# Patient Record
Sex: FEMALE | Race: WHITE | Employment: OTHER | URBAN - NONMETROPOLITAN AREA
[De-identification: names, ages, dates, MRNs, and addresses within clinical notes are randomized per-mention and may not be internally consistent; named-entity substitution may affect disease eponyms.]

---

## 2018-01-05 ENCOUNTER — DOCTOR'S OFFICE (OUTPATIENT)
Dept: URBAN - NONMETROPOLITAN AREA CLINIC 1 | Facility: CLINIC | Age: 68
Setting detail: OPHTHALMOLOGY
End: 2018-01-05
Payer: COMMERCIAL

## 2018-01-05 DIAGNOSIS — H43.813: ICD-10-CM

## 2018-01-05 DIAGNOSIS — H04.123: ICD-10-CM

## 2018-01-05 DIAGNOSIS — H52.4: ICD-10-CM

## 2018-01-05 DIAGNOSIS — H52.13: ICD-10-CM

## 2018-01-05 PROCEDURE — 92014 COMPRE OPH EXAM EST PT 1/>: CPT | Performed by: OPTOMETRIST

## 2018-01-05 PROCEDURE — 92310 CONTACT LENS FITTING OU: CPT | Performed by: OPTOMETRIST

## 2018-01-05 ASSESSMENT — REFRACTION_AUTOREFRACTION
OD_SPHERE: -0.75
OD_CYLINDER: -1.50
OS_SPHERE: -0.75
OD_AXIS: 78
OS_CYLINDER: -0.75
OS_AXIS: 61

## 2018-01-05 ASSESSMENT — REFRACTION_MANIFEST
OS_VA3: 20/
OS_VA3: 20/
OD_VA1: 20/
OS_VA2: 20/
OD_VA3: 20/
OU_VA: 20/
OS_VA2: 20/
OS_VA1: 20/
OD_VA3: 20/
OD_VA2: 20/
OU_VA: 20/
OS_VA1: 20/
OD_VA2: 20/
OD_VA1: 20/

## 2018-01-05 ASSESSMENT — REFRACTION_CURRENTRX
OD_VPRISM_DIRECTION: PROGS
OD_SPHERE: -2.75
OD_AXIS: 096
OS_OVR_VA: 20/
OD_CYLINDER: -0.50
OS_VPRISM_DIRECTION: PROGS
OD_CYLINDER: -0.75
OS_OVR_VA: 20/
OS_ADD: +2.50
OD_SPHERE: -2.50
OD_AXIS: 81
OD_OVR_VA: 20/
OS_CYLINDER: SPH
OD_ADD: +2.50
OS_SPHERE: -2.50
OS_CYLINDER: SPH
OS_OVR_VA: 20/
OS_SPHERE: -2.50
OD_OVR_VA: 20/
OD_OVR_VA: 20/
OS_AXIS: 180

## 2018-01-05 ASSESSMENT — VISUAL ACUITY
OS_BCVA: 20/25
OD_BCVA: 20/30

## 2018-01-05 ASSESSMENT — LID POSITION - COMMENTS
OD_COMMENTS: +VE EVERTED PUNCTUM RUL
OS_COMMENTS: +VE EVERTED PUNCTUM LUL

## 2018-01-05 ASSESSMENT — LID EXAM ASSESSMENTS: OS_TRICHIASIS: LUL 1+

## 2018-01-05 ASSESSMENT — LID POSITION - DERMATOCHALASIS
OD_DERMATOCHALASIS: RUL 1+
OS_DERMATOCHALASIS: LUL 1+

## 2018-01-05 ASSESSMENT — REFRACTION_OUTSIDERX
OS_ADD: +2.50
OU_VA: 20/25
OD_VA1: 20/25-2
OD_AXIS: 090
OD_SPHERE: -1.75
OD_VA2: 20/25+2
OD_CYLINDER: -0.75
OS_VA2: 20/25
OS_AXIS: 060
OS_VA1: 20/25
OD_ADD: +2.50
OS_CYLINDER: -0.75
OS_VA3: 20/
OD_VA3: 20/
OS_SPHERE: -1.00

## 2018-01-05 ASSESSMENT — SUPERFICIAL PUNCTATE KERATITIS (SPK)
OD_SPK: T
OS_SPK: +1

## 2018-01-05 ASSESSMENT — SPHEQUIV_DERIVED
OD_SPHEQUIV: -1.5
OS_SPHEQUIV: -1.125

## 2018-01-05 ASSESSMENT — CONFRONTATIONAL VISUAL FIELD TEST (CVF)
OS_FINDINGS: FULL
OD_FINDINGS: FULL

## 2018-01-18 ENCOUNTER — OPTICAL OFFICE (OUTPATIENT)
Dept: URBAN - NONMETROPOLITAN AREA CLINIC 4 | Facility: CLINIC | Age: 68
Setting detail: OPHTHALMOLOGY
End: 2018-01-18
Payer: COMMERCIAL

## 2018-01-18 DIAGNOSIS — H52.223: ICD-10-CM

## 2018-01-18 PROCEDURE — V2203 LENS SPHCYL BIFOCAL 4.00D/.1: HCPCS | Performed by: OPTOMETRIST

## 2018-01-18 PROCEDURE — S0500 DISPOS CONT LENS: HCPCS | Performed by: OPTOMETRIST

## 2018-01-18 PROCEDURE — V2781 PROGRESSIVE LENS PER LENS: HCPCS | Performed by: OPTOMETRIST

## 2018-01-18 PROCEDURE — V2020 VISION SVCS FRAMES PURCHASES: HCPCS | Performed by: OPTOMETRIST

## 2018-09-13 ENCOUNTER — OPTICAL OFFICE (OUTPATIENT)
Dept: URBAN - NONMETROPOLITAN AREA CLINIC 4 | Facility: CLINIC | Age: 68
Setting detail: OPHTHALMOLOGY
End: 2018-09-13
Payer: COMMERCIAL

## 2018-09-13 DIAGNOSIS — H52.13: ICD-10-CM

## 2018-09-13 PROCEDURE — S0500 DISPOS CONT LENS: HCPCS | Performed by: OPTOMETRIST

## 2019-01-08 ENCOUNTER — DOCTOR'S OFFICE (OUTPATIENT)
Dept: URBAN - NONMETROPOLITAN AREA CLINIC 1 | Facility: CLINIC | Age: 69
Setting detail: OPHTHALMOLOGY
End: 2019-01-08
Payer: COMMERCIAL

## 2019-01-08 ENCOUNTER — RX ONLY (RX ONLY)
Age: 69
End: 2019-01-08

## 2019-01-08 DIAGNOSIS — H52.13: ICD-10-CM

## 2019-01-08 DIAGNOSIS — H52.4: ICD-10-CM

## 2019-01-08 PROCEDURE — 92014 COMPRE OPH EXAM EST PT 1/>: CPT | Performed by: OPTOMETRIST

## 2019-01-08 PROCEDURE — 92310 CONTACT LENS FITTING OU: CPT | Performed by: OPTOMETRIST

## 2019-01-08 ASSESSMENT — REFRACTION_CURRENTRX
OD_AXIS: 096
OS_CYLINDER: SPH
OD_OVR_VA: 20/
OS_CYLINDER: -0.50
OD_CYLINDER: -0.75
OD_AXIS: 091
OS_VPRISM_DIRECTION: PROGS
OD_ADD: +2.50
OS_SPHERE: -0.75
OD_OVR_VA: 20/
OD_VPRISM_DIRECTION: PROGS
OS_OVR_VA: 20/
OD_OVR_VA: 20/
OS_SPHERE: -2.50
OD_SPHERE: -1.75
OS_OVR_VA: 20/
OS_AXIS: 092
OS_OVR_VA: 20/
OD_CYLINDER: -0.75
OD_SPHERE: -2.75
OS_ADD: +2.50

## 2019-01-08 ASSESSMENT — SUPERFICIAL PUNCTATE KERATITIS (SPK)
OD_SPK: T
OS_SPK: +1

## 2019-01-08 ASSESSMENT — REFRACTION_MANIFEST
OS_AXIS: 060
OD_VA1: 20/
OS_VA1: 20/
OD_ADD: +2.50
OS_SPHERE: -0.25
OS_ADD: +2.50
OD_VA2: 20/
OU_VA: 20/
OD_CYLINDER: -0.75
OD_AXIS: 090
OS_VA2: 20/25
OS_VA3: 20/
OD_VA3: 20/
OS_VA2: 20/
OD_VA1: 20/25-2
OD_SPHERE: -1.25
OS_CYLINDER: -0.75
OD_VA3: 20/
OU_VA: 20/25
OS_VA1: 20/25
OS_VA3: 20/
OD_VA2: 20/25+2

## 2019-01-08 ASSESSMENT — SPHEQUIV_DERIVED
OD_SPHEQUIV: -1.625
OS_SPHEQUIV: -0.25
OD_SPHEQUIV: -1.5
OS_SPHEQUIV: -0.625

## 2019-01-08 ASSESSMENT — REFRACTION_AUTOREFRACTION
OD_CYLINDER: -1.00
OD_SPHERE: -1.00
OS_CYLINDER: -1.00
OS_SPHERE: +0.25
OD_AXIS: 101
OS_AXIS: 088

## 2019-01-08 ASSESSMENT — LID POSITION - DERMATOCHALASIS
OS_DERMATOCHALASIS: LUL 1+
OD_DERMATOCHALASIS: RUL 1+

## 2019-01-08 ASSESSMENT — CONFRONTATIONAL VISUAL FIELD TEST (CVF)
OD_FINDINGS: FULL
OS_FINDINGS: FULL

## 2019-01-08 ASSESSMENT — LID EXAM ASSESSMENTS: OS_TRICHIASIS: LUL 1+

## 2019-01-08 ASSESSMENT — VISUAL ACUITY
OD_BCVA: 20/30+2
OS_BCVA: 20/25+2

## 2019-01-08 ASSESSMENT — LID POSITION - COMMENTS
OD_COMMENTS: +VE EVERTED PUNCTUM RUL
OS_COMMENTS: +VE EVERTED PUNCTUM LUL

## 2020-01-21 ENCOUNTER — OPTICAL OFFICE (OUTPATIENT)
Dept: URBAN - NONMETROPOLITAN AREA CLINIC 4 | Facility: CLINIC | Age: 70
Setting detail: OPHTHALMOLOGY
End: 2020-01-21
Payer: COMMERCIAL

## 2020-01-21 ENCOUNTER — DOCTOR'S OFFICE (OUTPATIENT)
Dept: URBAN - NONMETROPOLITAN AREA CLINIC 1 | Facility: CLINIC | Age: 70
Setting detail: OPHTHALMOLOGY
End: 2020-01-21
Payer: COMMERCIAL

## 2020-01-21 DIAGNOSIS — H52.4: ICD-10-CM

## 2020-01-21 DIAGNOSIS — H52.13: ICD-10-CM

## 2020-01-21 DIAGNOSIS — H52.11: ICD-10-CM

## 2020-01-21 PROCEDURE — 92014 COMPRE OPH EXAM EST PT 1/>: CPT | Performed by: OPTOMETRIST

## 2020-01-21 PROCEDURE — S0500 DISPOS CONT LENS: HCPCS | Performed by: OPTOMETRIST

## 2020-01-21 ASSESSMENT — REFRACTION_CURRENTRX
OD_CYLINDER: -0.75
OS_OVR_VA: 20/
OS_SPHERE: -0.75
OS_CYLINDER: SPH
OS_VPRISM_DIRECTION: PROGS
OD_AXIS: 096
OS_ADD: +2.50
OD_OVR_VA: 20/
OD_AXIS: 091
OD_VPRISM_DIRECTION: PROGS
OD_CYLINDER: -0.75
OD_OVR_VA: 20/
OS_CYLINDER: -0.50
OS_SPHERE: -2.50
OS_OVR_VA: 20/
OD_SPHERE: -1.75
OD_SPHERE: -2.75
OD_ADD: +2.50
OS_AXIS: 092

## 2020-01-21 ASSESSMENT — SUPERFICIAL PUNCTATE KERATITIS (SPK)
OS_SPK: +1
OD_SPK: T

## 2020-01-21 ASSESSMENT — REFRACTION_MANIFEST
OD_VA3: 20/
OD_VA2: 20/25+2
OD_ADD: +2.50
OD_SPHERE: -1.50
OS_ADD: +2.50
OS_VA2: 20/25+2
OS_VA3: 20/
OD_AXIS: 085
OS_VA1: 20/25+2
OS_CYLINDER: -0.75
OD_CYLINDER: -0.50
OD_VA1: 20/25+2
OU_VA: 20/25
OS_SPHERE: -1.25
OS_AXIS: 070

## 2020-01-21 ASSESSMENT — LID POSITION - DERMATOCHALASIS
OS_DERMATOCHALASIS: LUL 1+
OD_DERMATOCHALASIS: RUL 1+

## 2020-01-21 ASSESSMENT — REFRACTION_AUTOREFRACTION
OD_CYLINDER: -0.75
OS_CYLINDER: -1.25
OD_AXIS: 083
OS_SPHERE: -0.75
OS_AXIS: 071
OD_SPHERE: -1.00

## 2020-01-21 ASSESSMENT — SPHEQUIV_DERIVED
OD_SPHEQUIV: -1.375
OS_SPHEQUIV: -1.625
OD_SPHEQUIV: -1.75
OS_SPHEQUIV: -1.375

## 2020-01-21 ASSESSMENT — CONFRONTATIONAL VISUAL FIELD TEST (CVF)
OD_FINDINGS: FULL
OS_FINDINGS: FULL

## 2020-01-21 ASSESSMENT — LID EXAM ASSESSMENTS: OS_TRICHIASIS: LUL 1+

## 2020-01-21 ASSESSMENT — LID POSITION - COMMENTS
OS_COMMENTS: +VE EVERTED PUNCTUM LUL
OD_COMMENTS: +VE EVERTED PUNCTUM RUL

## 2020-01-21 ASSESSMENT — VISUAL ACUITY
OD_BCVA: 20/20-1
OS_BCVA: 20/20-2

## 2020-12-05 ENCOUNTER — APPOINTMENT (EMERGENCY)
Dept: CT IMAGING | Facility: HOSPITAL | Age: 70
DRG: 065 | End: 2020-12-05
Payer: COMMERCIAL

## 2020-12-05 ENCOUNTER — HOSPITAL ENCOUNTER (INPATIENT)
Facility: HOSPITAL | Age: 70
LOS: 12 days | DRG: 065 | End: 2020-12-17
Attending: EMERGENCY MEDICINE | Admitting: STUDENT IN AN ORGANIZED HEALTH CARE EDUCATION/TRAINING PROGRAM
Payer: COMMERCIAL

## 2020-12-05 DIAGNOSIS — I63.9 ACUTE CVA (CEREBROVASCULAR ACCIDENT) (HCC): ICD-10-CM

## 2020-12-05 DIAGNOSIS — K59.00 CONSTIPATION: ICD-10-CM

## 2020-12-05 DIAGNOSIS — I63.9 CEREBROVASCULAR ACCIDENT (CVA), UNSPECIFIED MECHANISM (HCC): Primary | ICD-10-CM

## 2020-12-05 DIAGNOSIS — I10 HYPERTENSION: ICD-10-CM

## 2020-12-05 PROBLEM — I16.0 HYPERTENSIVE URGENCY: Status: ACTIVE | Noted: 2020-12-05

## 2020-12-05 PROBLEM — E03.9 HYPOTHYROIDISM: Status: ACTIVE | Noted: 2020-12-05

## 2020-12-05 PROBLEM — G43.909 MIGRAINES: Status: ACTIVE | Noted: 2020-12-05

## 2020-12-05 PROBLEM — R94.31 QT PROLONGATION: Status: ACTIVE | Noted: 2020-12-05

## 2020-12-05 LAB
ANION GAP SERPL CALCULATED.3IONS-SCNC: 9 MMOL/L (ref 4–13)
APTT PPP: 27 SECONDS (ref 23–37)
BUN SERPL-MCNC: 14 MG/DL (ref 5–25)
CALCIUM SERPL-MCNC: 9.7 MG/DL (ref 8.3–10.1)
CHLORIDE SERPL-SCNC: 102 MMOL/L (ref 100–108)
CO2 SERPL-SCNC: 26 MMOL/L (ref 21–32)
CREAT SERPL-MCNC: 0.95 MG/DL (ref 0.6–1.3)
ERYTHROCYTE [DISTWIDTH] IN BLOOD BY AUTOMATED COUNT: 12 % (ref 11.6–15.1)
FLUAV RNA RESP QL NAA+PROBE: NEGATIVE
FLUBV RNA RESP QL NAA+PROBE: NEGATIVE
GFR SERPL CREATININE-BSD FRML MDRD: 60 ML/MIN/1.73SQ M
GLUCOSE SERPL-MCNC: 108 MG/DL (ref 65–140)
GLUCOSE SERPL-MCNC: 138 MG/DL (ref 65–140)
HCT VFR BLD AUTO: 44.5 % (ref 34.8–46.1)
HGB BLD-MCNC: 14.6 G/DL (ref 11.5–15.4)
INR PPP: 0.93 (ref 0.84–1.19)
MCH RBC QN AUTO: 29.9 PG (ref 26.8–34.3)
MCHC RBC AUTO-ENTMCNC: 32.8 G/DL (ref 31.4–37.4)
MCV RBC AUTO: 91 FL (ref 82–98)
PLATELET # BLD AUTO: 321 THOUSANDS/UL (ref 149–390)
PMV BLD AUTO: 9.3 FL (ref 8.9–12.7)
POTASSIUM SERPL-SCNC: 4 MMOL/L (ref 3.5–5.3)
PROTHROMBIN TIME: 12.3 SECONDS (ref 11.6–14.5)
RBC # BLD AUTO: 4.89 MILLION/UL (ref 3.81–5.12)
RSV RNA RESP QL NAA+PROBE: NEGATIVE
SARS-COV-2 RNA RESP QL NAA+PROBE: NEGATIVE
SODIUM SERPL-SCNC: 137 MMOL/L (ref 136–145)
TROPONIN I SERPL-MCNC: <0.02 NG/ML
WBC # BLD AUTO: 7.54 THOUSAND/UL (ref 4.31–10.16)

## 2020-12-05 PROCEDURE — 99291 CRITICAL CARE FIRST HOUR: CPT | Performed by: EMERGENCY MEDICINE

## 2020-12-05 PROCEDURE — 70498 CT ANGIOGRAPHY NECK: CPT

## 2020-12-05 PROCEDURE — 96374 THER/PROPH/DIAG INJ IV PUSH: CPT

## 2020-12-05 PROCEDURE — 85027 COMPLETE CBC AUTOMATED: CPT | Performed by: EMERGENCY MEDICINE

## 2020-12-05 PROCEDURE — 85730 THROMBOPLASTIN TIME PARTIAL: CPT | Performed by: EMERGENCY MEDICINE

## 2020-12-05 PROCEDURE — 99291 CRITICAL CARE FIRST HOUR: CPT

## 2020-12-05 PROCEDURE — 36415 COLL VENOUS BLD VENIPUNCTURE: CPT | Performed by: EMERGENCY MEDICINE

## 2020-12-05 PROCEDURE — 80048 BASIC METABOLIC PNL TOTAL CA: CPT | Performed by: EMERGENCY MEDICINE

## 2020-12-05 PROCEDURE — G0509 CRIT CARE TELEHEA CONSULT 50: HCPCS | Performed by: PSYCHIATRY & NEUROLOGY

## 2020-12-05 PROCEDURE — 93005 ELECTROCARDIOGRAM TRACING: CPT

## 2020-12-05 PROCEDURE — G1004 CDSM NDSC: HCPCS

## 2020-12-05 PROCEDURE — 82948 REAGENT STRIP/BLOOD GLUCOSE: CPT

## 2020-12-05 PROCEDURE — 99223 1ST HOSP IP/OBS HIGH 75: CPT | Performed by: FAMILY MEDICINE

## 2020-12-05 PROCEDURE — 0241U HB NFCT DS VIR RESP RNA 4 TRGT: CPT | Performed by: EMERGENCY MEDICINE

## 2020-12-05 PROCEDURE — 70496 CT ANGIOGRAPHY HEAD: CPT

## 2020-12-05 PROCEDURE — 84484 ASSAY OF TROPONIN QUANT: CPT | Performed by: EMERGENCY MEDICINE

## 2020-12-05 PROCEDURE — 85610 PROTHROMBIN TIME: CPT | Performed by: EMERGENCY MEDICINE

## 2020-12-05 RX ORDER — LABETALOL 20 MG/4 ML (5 MG/ML) INTRAVENOUS SYRINGE
10 EVERY 4 HOURS PRN
Status: DISCONTINUED | OUTPATIENT
Start: 2020-12-05 | End: 2020-12-08

## 2020-12-05 RX ORDER — CLOPIDOGREL BISULFATE 75 MG/1
300 TABLET ORAL ONCE
Status: COMPLETED | OUTPATIENT
Start: 2020-12-05 | End: 2020-12-05

## 2020-12-05 RX ORDER — ATENOLOL 50 MG/1
50 TABLET ORAL DAILY
COMMUNITY

## 2020-12-05 RX ORDER — LEVOTHYROXINE SODIUM 0.03 MG/1
25 TABLET ORAL DAILY
COMMUNITY

## 2020-12-05 RX ORDER — HYDRALAZINE HYDROCHLORIDE 20 MG/ML
10 INJECTION INTRAMUSCULAR; INTRAVENOUS ONCE
Status: COMPLETED | OUTPATIENT
Start: 2020-12-05 | End: 2020-12-05

## 2020-12-05 RX ORDER — ACETAMINOPHEN 325 MG/1
650 TABLET ORAL EVERY 4 HOURS PRN
Status: DISCONTINUED | OUTPATIENT
Start: 2020-12-05 | End: 2020-12-17 | Stop reason: HOSPADM

## 2020-12-05 RX ORDER — AMLODIPINE BESYLATE 2.5 MG/1
2.5 TABLET ORAL DAILY
Status: ON HOLD | COMMUNITY
End: 2021-03-11 | Stop reason: SDUPTHER

## 2020-12-05 RX ORDER — LEVOTHYROXINE SODIUM 0.03 MG/1
25 TABLET ORAL DAILY
Status: DISCONTINUED | OUTPATIENT
Start: 2020-12-05 | End: 2020-12-17 | Stop reason: HOSPADM

## 2020-12-05 RX ORDER — FLUTICASONE PROPIONATE 50 MCG
1 SPRAY, SUSPENSION (ML) NASAL DAILY PRN
COMMUNITY

## 2020-12-05 RX ORDER — CLOPIDOGREL BISULFATE 75 MG/1
75 TABLET ORAL DAILY
Status: DISCONTINUED | OUTPATIENT
Start: 2020-12-05 | End: 2020-12-17 | Stop reason: HOSPADM

## 2020-12-05 RX ORDER — ASPIRIN 325 MG
325 TABLET ORAL ONCE
Status: COMPLETED | OUTPATIENT
Start: 2020-12-05 | End: 2020-12-05

## 2020-12-05 RX ORDER — ATORVASTATIN CALCIUM 40 MG/1
40 TABLET, FILM COATED ORAL EVERY EVENING
Status: DISCONTINUED | OUTPATIENT
Start: 2020-12-05 | End: 2020-12-07

## 2020-12-05 RX ORDER — SODIUM CHLORIDE 9 MG/ML
500 INJECTION, SOLUTION INTRAVENOUS ONCE
Status: COMPLETED | OUTPATIENT
Start: 2020-12-05 | End: 2020-12-06

## 2020-12-05 RX ADMIN — IOHEXOL 100 ML: 350 INJECTION, SOLUTION INTRAVENOUS at 13:03

## 2020-12-05 RX ADMIN — SODIUM CHLORIDE 500 ML/HR: 0.9 INJECTION, SOLUTION INTRAVENOUS at 16:17

## 2020-12-05 RX ADMIN — CLOPIDOGREL BISULFATE 300 MG: 75 TABLET ORAL at 13:20

## 2020-12-05 RX ADMIN — HYDRALAZINE HYDROCHLORIDE 10 MG: 20 INJECTION INTRAMUSCULAR; INTRAVENOUS at 13:24

## 2020-12-05 RX ADMIN — ATORVASTATIN CALCIUM 40 MG: 40 TABLET, FILM COATED ORAL at 17:27

## 2020-12-05 RX ADMIN — ASPIRIN 325 MG ORAL TABLET 325 MG: 325 PILL ORAL at 13:20

## 2020-12-05 NOTE — H&P
H&P- Manuel Stephany 1950, 69 y o  female MRN: [id number]    Unit/Bed#: -01 Encounter: [id number]    Primary Care Provider: No primary care provider on file  Date and time admitted to hospital: 12/5/2020 12:48 PM        * Acute CVA (cerebrovascular accident) [facility])  Assessment & Plan  · Suspected Patient presented with the right-sided weakness some numbness and right facial droop and difficulty finding words  Numbness and weakness has resolved as her strength is 5/5 on both extremities  The speech is clear she has no difficulty or delay in speaking  But there is minimal right facial droop  CT CT of the brain is negative  Patient was found to have hypertensive urgency  She was given a dose of hydralazine  Blood pressure has improved to 170  Now the blood pressure is down to 140 that is too low will give her normal saline 500 mL bolus  · Would like to keep blood pressure for now for at least another 24 hours anywhere 180-200 labetalol if systolic is greater than 636 or diastolic greater than 088  · MRI of the brain to be done  · 2D echo with bubble study  · Reviewed neurology evaluation  · Patient was loaded with Plavix 300 mg on aspirin  Will continue aspirin 81 mg daily and Plavix 75 mg daily place her on Lipitor 40 mg daily  Check hemoglobin A1c in the morning and lipid panel in the morning  All lab work otherwise looks normal   · EKG normal sinus rhythm will keep on telemetry monitor to assess any arrhythmia in terms of atrial fibrillation or atrial flutter  · Patient has no previous history of a stroke or a TIA  · She does have a history of hypertension it usually controlled she is on 2 medications and takes them every day  · Not a tPA candidate as the outside of the window    Migraines  Assessment & Plan  · Patient has history of migraines he denies any headaches yesterday denies any headaches now      Hypothyroidism  Assessment & Plan  · Continue home dose Synthroid and check a TSH in the morning    QT prolongation  Assessment & Plan  · EKG with evidence of QTC of 497  Will keep on telemetry monitor repeat an EKG tomorrow avoid QT prolongation medications  Her potassium is 4  Hypertension  Assessment & Plan  · Uncontrolled initial arrival   Patient is on atenolol and Norvasc  Has been taking them nightly  Did not take them today as she does take nightly  She was given a dose of hydralazine blood pressure improved but now it is on the lower side is stroke rule out walk like to keep it on permissive hypertension and 180-200 will give her a bolus of normal saline 500 mL  She is on labetalol p r n  If systolic is greater than 598 or diastolic is greater than 172  Hypertensive urgency  Assessment & Plan  · In light of ruling out stroke  Will avoid lowering the blood pressure less than 180  VTE Prophylaxis: Pharmacologic VTE Prophylaxis contraindicated due to Encourage ambulation  / sequential compression device   Code Status:  Full code  POLST: There is no POLST form on file for this patient (pre-hospital)  Discussion with family:  Unavailable    Anticipated Length of Stay:  Patient will be admitted on an Inpatient basis with an anticipated length of stay of  > 2 midnights  Justification for Hospital Stay:  Stroke rule out    Total Time for Visit, including Counseling / Coordination of Care: 1 hour  Greater than 50% of this total time spent on direct patient counseling and coordination of care  Chief Complaint:   Speech difficulty finding words and right upper and lower extremity weakness    History of Present Illness:    Shant Richter is a 69 y o  female who presents with right-sided weakness that happened yesterday to 30 and has been improving and now resolved and she had associated some numbness  Today when she spoke to her son she had trouble finding words and apparently there was a right facial droop and that is what brought him to the ER    She denies having a headache before that happening she denies ever having a TIA or stroke denies any double vision or blurry vision she denies having shortness of breath currently denies any chest pain denies any nausea vomiting or diarrhea abdominal pain  She has been taking all her medications       Review of Systems:    Review of Systems   Constitutional: Negative for chills and fever  HENT: Negative for ear pain and sore throat  Eyes: Negative for pain and visual disturbance  Respiratory: Negative for cough and shortness of breath  Cardiovascular: Negative for chest pain and palpitations  Gastrointestinal: Negative for abdominal pain and vomiting  Genitourinary: Negative for dysuria and hematuria  Musculoskeletal: Negative for arthralgias and back pain  Skin: Negative for color change and rash  Neurological: Positive for speech difficulty, weakness and numbness  Negative for seizures and syncope  All other systems reviewed and are negative  Past Medical and Surgical History:     Past Medical History:   Diagnosis Date    Hypertension        Past Surgical History:   Procedure Laterality Date    ABLATION SOFT TISSUE         Meds/Allergies:    Prior to Admission medications    Medication Sig Start Date End Date Taking? Authorizing Provider   amLODIPine (NORVASC) 2 5 mg tablet Take 2 5 mg by mouth daily   Yes Historical Provider, MD   atenolol (TENORMIN) 50 mg tablet Take 50 mg by mouth daily   Yes Historical Provider, MD   levothyroxine 25 mcg tablet Take 25 mcg by mouth daily   Yes Historical Provider, MD   fluticasone (FLONASE) 50 mcg/act nasal spray 1 spray into each nostril daily as needed for rhinitis    Historical Provider, MD     I have reviewed home medications using allscripts      Allergies: No Known Allergies    Social History:     Marital Status: /Civil Union     Substance Use History:   Social History     Substance and Sexual Activity   Alcohol Use Not Currently     Social History Tobacco Use   Smoking Status Never Smoker   Smokeless Tobacco Never Used     Social History     Substance and Sexual Activity   Drug Use Not Currently       Family History:    History reviewed  No pertinent family history  Physical Exam:     Vitals:   Blood Pressure: 141/83 (12/05/20 1449)  Pulse: 72 (12/05/20 1449)  Temperature: (!) 97 4 °F (36 3 °C) (12/05/20 1449)  Respirations: 20 (12/05/20 1449)  Height: 5' 4" (162 6 cm) (12/05/20 1255)  Weight - Scale: 63 6 kg (140 lb 3 4 oz) (12/05/20 1324)  SpO2: 99 % (12/05/20 1449)    Physical Exam  Constitutional:       Appearance: Normal appearance  HENT:      Head: Normocephalic and atraumatic  Eyes:      Pupils: Pupils are equal, round, and reactive to light  Cardiovascular:      Rate and Rhythm: Normal rate and regular rhythm  Pulmonary:      Effort: Pulmonary effort is normal       Breath sounds: Normal breath sounds  Abdominal:      General: Abdomen is flat  Bowel sounds are normal  There is no distension  Palpations: Abdomen is soft  Tenderness: There is no abdominal tenderness  There is no guarding  Musculoskeletal:         General: No swelling  Neurological:      Mental Status: She is alert and oriented to person, place, and time  Cranial Nerves: No cranial nerve deficit  Sensory: No sensory deficit  Motor: No weakness  Coordination: Coordination normal       Deep Tendon Reflexes: Reflexes normal       Comments: Patient has done physical examination 5/5 strength bilaterally  Her speech is clear and she does not have any difficulty finding words she is speaking to them without delay  She does have some mild right facial droop               Additional Data:     Lab Results: I have personally reviewed pertinent films in PACS    Results from last 7 days   Lab Units 12/05/20  1254   WBC Thousand/uL 7 54   HEMOGLOBIN g/dL 14 6   HEMATOCRIT % 44 5   PLATELETS Thousands/uL 321     Results from last 7 days   Lab Units 12/05/20  1254   SODIUM mmol/L 137   POTASSIUM mmol/L 4 0   CHLORIDE mmol/L 102   CO2 mmol/L 26   BUN mg/dL 14   CREATININE mg/dL 0 95   ANION GAP mmol/L 9   CALCIUM mg/dL 9 7   GLUCOSE RANDOM mg/dL 138     Results from last 7 days   Lab Units 12/05/20  1254   INR  0 93     Results from last 7 days   Lab Units 12/05/20  1250   POC GLUCOSE mg/dl 108               Imaging: I have personally reviewed pertinent films in PACS    CTA stroke alert (head/neck)   Final Result by Gamal Dockery MD (12/05 1321)      No large vessel flow limiting stenosis  Findings were directly discussed with Smith Talamantes on 12/5/2020 1:14 PM                      Workstation performed: [id number]         CT stroke alert brain   Final Result by Gamal Dockery MD (12/05 5471)      No acute intracranial abnormality  Findings were directly discussed with Smith Talamantes on 12/5/2020 1:08 PM       Workstation performed: [id number]         MRI inpatient order    (Results Pending)       EKG, Pathology, and Other Studies Reviewed on Admission:   · EKG: reviewed    Allscripts / Epic Records Reviewed: Yes     ** Please Note: This note has been constructed using a voice recognition system   **

## 2020-12-05 NOTE — PLAN OF CARE
Problem: Potential for Falls  Goal: Patient will remain free of falls  Description: INTERVENTIONS:  - Assess patient frequently for physical needs  -  Identify cognitive and physical deficits and behaviors that affect risk of falls  -  [place] fall precautions as indicated by assessment   - Educate patient/family on patient safety including physical limitations  - Instruct patient to call for assistance with activity based on assessment  - Modify environment to reduce risk of injury  - Consider OT/PT consult to assist with strengthening/mobility  12/5/2020 1502 by Rayna Vides RN  Outcome: Progressing  12/5/2020 1502 by Rayna Vides RN  Outcome: Progressing     Problem: Neurological Deficit  Goal: Neurological status is stable or improving  Description: Interventions:  - Monitor and assess patient's level of consciousness, motor function, sensory function, and level of assistance needed for ADLs  - Monitor and report changes from baseline  Collaborate with interdisciplinary team to initiate plan and implement interventions as ordered  - Provide and maintain a safe environment  - Consider seizure precautions  - Consider fall precautions  - Consider aspiration precautions  - Consider bleeding precautions  12/5/2020 1502 by Rayna Vides RN  Outcome: Progressing  12/5/2020 1502 by Rayna Vides RN  Outcome: Progressing     Problem: Activity Intolerance/Impaired Mobility  Goal: Mobility/activity is maintained at optimum level for patient  Description: Interventions:  - Assess and monitor patient  barriers to mobility and need for assistive/adaptive devices  - Assess patient's emotional response to limitations  - Collaborate with interdisciplinary team and initiate plans and interventions as ordered  - Encourage independent activity per ability   - Maintain proper body alignment  - Perform active/passive rom as tolerated/ordered    - Plan activities to conserve energy   - Turn patient as appropriate  12/5/2020 1502 by Maria Beatty RN  Outcome: Progressing  12/5/2020 1502 by Maria Beatty RN  Outcome: Progressing     Problem: Communication Impairment  Goal: Ability to express needs and understand communication  Description: Assess patient's communication skills and ability to understand information  Patient will demonstrate use of effective communication techniques, alternative methods of communication and understanding even if not able to speak  - Encourage communication and provide alternate methods of communication as needed  - Collaborate with case management/ for discharge needs  - Include patient/family/caregiver in decisions related to communication  12/5/2020 1502 by Maria Beatty RN  Outcome: Progressing  12/5/2020 1502 by Maria Beatty RN  Outcome: Progressing     Problem: Potential for Aspiration  Goal: Non-ventilated patient's risk of aspiration is minimized  Description: Assess and monitor vital signs, respiratory status, and labs (WBC)  Monitor for signs of aspiration (tachypnea, cough, rales, wheezing, cyanosis, fever)  - Assess and monitor patient's ability to swallow  - Place patient up in chair to eat if possible  - HOB up at 90 degrees to eat if unable to get patient up into chair   - Supervise patient during oral intake  - Instruct patient/ family to take small bites  - Instruct patient/ family to take small single sips when taking liquids  - Follow patient-specific strategies generated by speech pathologist   12/5/2020 1502 by Maria Beatty RN  Outcome: Progressing  12/5/2020 1502 by Maria Beatty RN  Outcome: Progressing  Goal: Ventilated patient's risk of aspiration is minimized  Description: Assess and monitor vital signs, respiratory status, airway cuff pressure, and labs (WBC)  Monitor for signs of aspiration (tachypnea, cough, rales, wheezing, cyanosis, fever)      - Elevate head of bed 30 degrees if patient has tube feeding   - Monitor tube feeding  12/5/2020 1502 by Godwin Drew RN  Outcome: Progressing  12/5/2020 1502 by Godwin Drew RN  Outcome: Progressing     Problem: Nutrition  Goal: Nutrition/Hydration status is improving  Description: Monitor and assess patient's nutrition/hydration status for malnutrition (ex- brittle hair, bruises, dry skin, pale skin and conjunctiva, muscle wasting, smooth red tongue, and disorientation)  Collaborate with interdisciplinary team and initiate plan and interventions as ordered  Monitor patient's weight and dietary intake as ordered or per policy  Utilize nutrition screening tool and intervene per policy  Determine patient's food preferences and provide high-protein, high-caloric foods as appropriate  - Assist patient with eating   - Allow adequate time for meals   - Encourage patient to take dietary supplement as ordered  - Collaborate with clinical nutritionist   - Include patient/family/caregiver in decisions related to nutrition    12/5/2020 1502 by Godwin Drew RN  Outcome: Progressing  12/5/2020 1502 by Godwin Drew RN  Outcome: Progressing     Problem: NEUROSENSORY - ADULT  Goal: Achieves stable or improved neurological status  Description: INTERVENTIONS  - Monitor and report changes in neurological status  - Monitor vital signs such as temperature, blood pressure, glucose, and any other labs ordered   - Initiate measures to prevent increased intracranial pressure  - Monitor for seizure activity and implement precautions if appropriate      12/5/2020 1502 by Godwin Drew RN  Outcome: Progressing  12/5/2020 1502 by Godwin Drew RN  Outcome: Progressing  Goal: Remains free of injury related to seizures activity  Description: INTERVENTIONS  - Maintain airway, patient safety  and administer oxygen as ordered  - Monitor patient for seizure activity, document and report duration and description of seizure to physician/advanced practitioner  - If seizure occurs,  ensure patient safety during seizure  - Reorient patient post seizure  - Seizure pads on all 4 side rails  - Instruct patient/family to notify RN of any seizure activity including if an aura is experienced  - Instruct patient/family to call for assistance with activity based on nursing assessment  - Administer anti-seizure medications if ordered    12/5/2020 1502 by Sepideh Ragland RN  Outcome: Progressing  12/5/2020 1502 by Sepideh Ragland RN  Outcome: Progressing  Goal: Achieves maximal functionality and self care  Description: INTERVENTIONS  - Monitor swallowing and airway patency with patient fatigue and changes in neurological status  - Encourage and assist patient to increase activity and self care     - Encourage visually impaired, hearing impaired and aphasic patients to use assistive/communication devices  12/5/2020 1502 by Sepideh Ragland RN  Outcome: Progressing  12/5/2020 1502 by Sepideh Ragland RN  Outcome: Progressing     Problem: CARDIOVASCULAR - ADULT  Goal: Maintains optimal cardiac output and hemodynamic stability  Description: INTERVENTIONS:  - Monitor I/O, vital signs and rhythm  - Monitor for S/S and trends of decreased cardiac output  - Administer and titrate ordered vasoactive medications to optimize hemodynamic stability  - Assess quality of pulses, skin color and temperature  - Assess for signs of decreased coronary artery perfusion  - Instruct patient to report change in severity of symptoms  12/5/2020 1502 by Sepideh Ragland RN  Outcome: Progressing  12/5/2020 1502 by Sepideh Ragland RN  Outcome: Progressing  Goal: Absence of cardiac dysrhythmias or at baseline rhythm  Description: INTERVENTIONS:  - Continuous cardiac monitoring, vital signs, obtain 12 lead EKG if ordered  - Administer antiarrhythmic and heart rate control medications as ordered  - Monitor electrolytes and administer replacement therapy as ordered  12/5/2020 1502 by Sepideh Ragland RN  Outcome: Progressing  12/5/2020 1502 by Christa Mckenzie RN  Outcome: Progressing     Problem: METABOLIC, FLUID AND ELECTROLYTES - ADULT  Goal: Electrolytes maintained within normal limits  Description: INTERVENTIONS:  - Monitor labs and assess patient for signs and symptoms of electrolyte imbalances  - Administer electrolyte replacement as ordered  - Monitor response to electrolyte replacements, including repeat lab results as appropriate  - Instruct patient on fluid and nutrition as appropriate  12/5/2020 1502 by Christa Mckenzie RN  Outcome: Progressing  12/5/2020 1502 by Christa Mckenzie RN  Outcome: Progressing  Goal: Fluid balance maintained  Description: INTERVENTIONS:  - Monitor labs   - Monitor I/O and WT  - Instruct patient on fluid and nutrition as appropriate  - Assess for signs & symptoms of volume excess or deficit  12/5/2020 1502 by Christa Mckenzie RN  Outcome: Progressing  12/5/2020 1502 by Christa Mckenzie RN  Outcome: Progressing  Goal: Glucose maintained within target range  Description: INTERVENTIONS:  - Monitor Blood Glucose as ordered  - Assess for signs and symptoms of hyperglycemia and hypoglycemia  - Administer ordered medications to maintain glucose within target range  - Assess nutritional intake and initiate nutrition service referral as needed  12/5/2020 1502 by Christa Mckenzie RN  Outcome: Progressing  12/5/2020 1502 by Christa Mckenzie RN  Outcome: Progressing     Problem: HEMATOLOGIC - ADULT  Goal: Maintains hematologic stability  Description: INTERVENTIONS  - Assess for signs and symptoms of bleeding or hemorrhage  - Monitor labs  - Administer supportive blood products/factors as ordered and appropriate  12/5/2020 1502 by Christa Mckenzie RN  Outcome: Progressing  12/5/2020 1502 by Christa Mckenzie RN  Outcome: Progressing     Problem: PAIN - ADULT  Goal: Verbalizes/displays adequate comfort level or baseline comfort level  Description: Interventions:  - Encourage patient to monitor pain and request assistance  - Assess pain using appropriate pain scale  - Administer analgesics based on type and severity of pain and evaluate response  - Implement non-pharmacological measures as appropriate and evaluate response  - Consider cultural and social influences on pain and pain management  - Notify physician/advanced practitioner if interventions unsuccessful or patient reports new pain  12/5/2020 1502 by Helene Molina RN  Outcome: Progressing  12/5/2020 1502 by Helene Molina RN  Outcome: Progressing     Problem: SAFETY ADULT  Goal: Patient will remain free of falls  Description: INTERVENTIONS:  - Assess patient frequently for physical needs  -  Identify cognitive and physical deficits and behaviors that affect risk of falls    -  [place] fall precautions as indicated by assessment   - Educate patient/family on patient safety including physical limitations  - Instruct patient to call for assistance with activity based on assessment  - Modify environment to reduce risk of injury  - Consider OT/PT consult to assist with strengthening/mobility  12/5/2020 1502 by Helene Molina RN  Outcome: Progressing  12/5/2020 1502 by Helene Molina RN  Outcome: Progressing  Goal: Maintain or return to baseline ADL function  Description: INTERVENTIONS:  -  Assess patient's ability to carry out ADLs; assess patient's baseline for ADL function and identify physical deficits which impact ability to perform ADLs (bathing, care of mouth/teeth, toileting, grooming, dressing, etc )  - Assess/evaluate cause of self-care deficits   - Assess range of motion  - Assess patient's mobility; develop plan if impaired  - Assess patient's need for assistive devices and provide as appropriate  - Encourage maximum independence but intervene and supervise when necessary  - Involve family in performance of ADLs  - Assess for home care needs following discharge   - Consider OT consult to assist with ADL evaluation and planning for discharge  - Provide patient education as appropriate  12/5/2020 1502 by Anila Dobbs RN  Outcome: Progressing  12/5/2020 1502 by Anila Dobbs RN  Outcome: Progressing  Goal: Maintain or return mobility status to optimal level  Description: INTERVENTIONS:  - Assess patient's baseline mobility status (ambulation, transfers, stairs, etc )    - Identify cognitive and physical deficits and behaviors that affect mobility  - Identify mobility aids required to assist with transfers and/or ambulation (gait belt, sit-to-stand, lift, walker, cane, etc )  - [place] fall precautions as indicated by assessment  - Record patient progress and toleration of activity level on Mobility SBAR; progress patient to next Phase/Stage  - Instruct patient to call for assistance with activity based on assessment  - Consider rehabilitation consult to assist with strengthening/weightbearing, etc   12/5/2020 1502 by Anila Dobbs RN  Outcome: Progressing  12/5/2020 1502 by Anila Dobbs RN  Outcome: Progressing     Problem: DISCHARGE PLANNING  Goal: Discharge to home or other facility with appropriate resources  Description: INTERVENTIONS:  - Identify barriers to discharge w/patient and caregiver  - Arrange for needed discharge resources and transportation as appropriate  - Identify discharge learning needs (meds, wound care, etc )  - Arrange for interpretive services to assist at discharge as needed  - Refer to Case Management Department for coordinating discharge planning if the patient needs post-hospital services based on physician/advanced practitioner order or complex needs related to functional status, cognitive ability, or social support system  12/5/2020 1502 by Anila Dobbs RN  Outcome: Progressing  12/5/2020 1502 by Anila Dobbs RN  Outcome: Progressing     Problem: Knowledge Deficit  Goal: Patient/family/caregiver demonstrates understanding of disease process, treatment plan, medications, and discharge instructions  Description: Complete learning assessment and assess knowledge base    Interventions:  - Provide teaching at level of understanding  - Provide teaching via preferred learning methods  12/5/2020 1502 by Jonas Doshi RN  Outcome: Progressing  12/5/2020 1502 by Jonas Doshi, RN  Outcome: Progressing

## 2020-12-05 NOTE — ED NOTES
Pt denies weakness on right side  Hand grasps strong bilateral  - Drift  Pupils equal and reactive  Difficulty forming thoughts  No facial droop        Adina Krishnan, ASAD  12/05/20 5210

## 2020-12-05 NOTE — ASSESSMENT & PLAN NOTE
· EKG with evidence of QTC of 497  Will keep on telemetry monitor repeat an EKG tomorrow avoid QT prolongation medications  Her potassium is 4

## 2020-12-05 NOTE — ASSESSMENT & PLAN NOTE
· Suspected Patient presented with the right-sided weakness some numbness and right facial droop and difficulty finding words  Numbness and weakness has resolved as her strength is 5/5 on both extremities  The speech is clear she has no difficulty or delay in speaking  But there is minimal right facial droop  CT CT of the brain is negative  Patient was found to have hypertensive urgency  She was given a dose of hydralazine  Blood pressure has improved to 170  Now the blood pressure is down to 140 that is too low will give her normal saline 500 mL bolus  · Would like to keep blood pressure for now for at least another 24 hours anywhere 180-200 labetalol if systolic is greater than 531 or diastolic greater than 793  · MRI of the brain to be done  · 2D echo with bubble study  · Reviewed neurology evaluation  · Patient was loaded with Plavix 300 mg on aspirin  Will continue aspirin 81 mg daily and Plavix 75 mg daily place her on Lipitor 40 mg daily  Check hemoglobin A1c in the morning and lipid panel in the morning  All lab work otherwise looks normal   · EKG normal sinus rhythm will keep on telemetry monitor to assess any arrhythmia in terms of atrial fibrillation or atrial flutter  · Patient has no previous history of a stroke or a TIA  · She does have a history of hypertension it usually controlled she is on 2 medications and takes them every day      · Not a tPA candidate as the outside of the window

## 2020-12-05 NOTE — QUICK NOTE
Stroke Alert    Called 12:52pm    70 y/o F with HTN that presents with onset of reported R weakness and "stuttering" speech  LKN 2:30pm yesterday  NIHSS 2 on exam for apparent word-finding difficulty and slight R facial droop; extremity strength appears intact without any other notable deficits  HCT and CTA reviewed - no evidence of acute ischemia or hemorrhage however L MCA did appear hyperdense; no acute vascular abnormality noted on angiogram however  Not a tPA candidate as pt is outside of the window  If no contraindication, load with 325mg ASA and 300mg Plavix then continue on DAPT and admit to stroke pathway  Maintain on telemetry

## 2020-12-05 NOTE — ASSESSMENT & PLAN NOTE
· Uncontrolled initial arrival   Patient is on atenolol and Norvasc  Has been taking them nightly  Did not take them today as she does take nightly  She was given a dose of hydralazine blood pressure improved but now it is on the lower side is stroke rule out walk like to keep it on permissive hypertension and 180-200 will give her a bolus of normal saline 500 mL  She is on labetalol p r n  If systolic is greater than 185 or diastolic is greater than 447

## 2020-12-05 NOTE — TELEMEDICINE
TeleConsultation - Stroke   Lidia Bangura 69 y o  female MRN: [id number]  Unit/Bed#: -01 Encounter: [id number]      REQUIRED DOCUMENTATION:     1  This service was provided via Telemedicine  2  Provider located at office  3  TeleMed provider: Sarah Beth Victoria DO   4  Identify all parties in room with patient during tele consult:  None  5  After connecting through [employer], patient was identified by name and date of birth and assistant checked wristband  Patient was then informed that this was a Telemedicine visit and that the exam was being conducted confidentially over secure lines  My office door was closed  No one else was in the room  Patient acknowledged consent and understanding of privacy and security of the Telemedicine visit, and gave us permission to have the assistant stay in the room in order to assist with the history and to conduct the exam   I informed the patient that I have reviewed their record in Epic and presented the opportunity for them to ask any questions regarding the visit today  The patient agreed to participate  Assessment/Plan   Assessment:  Aphasia and R hemiparesis with subsequent resolution of hemiparesis  Concern for possible minor stroke, potentially embolic given deficits noted  No etiology for stroke found on angiogram     TPA Decision: Patient not a TPA candidate  Unclear time of onset outside appropriate time window      Plan:   -continue neurochecks; notify Neurology with changes  -continue ASA 81mg daily and Plavix 75mg daily for now with plan for total 3 weeks then monotherapy with ASA; if an embolic etiology is found will consider AC in lieu of this  -continue Atorvastatin  -HCT reviewed - unremarkable  -CTA reviewed - unremarkable  -MRI brain pending  -TTE pending  -permissive HTN for now; can begin to gradually reduce to normotension tomorrow  -maintain on telemetry  -check lipid panel and P1H  -if embolic infarct is found on MRI, will need to consider further work-up including potentially LUIZ, thrombosis panel, and D-dimer    Mimi Ruano will need follow up in in 4 weeks with neurovascular attending or advance practitioner  She will not require outpatient neurological testing  Reason for Consult / Principal Problem: stroke  Hx and PE limited by: aphasia  Patient last known well: 2:30pm 12/4  Stroke alert called: 12:52pm  Neurology time of arrival: immediate, by phone    HPI: Mimi Ruano is a 69 y o  female with HTN who presents with reported onset of R sided hemiparesis along with difficulty speaking  She was last seen normal at 2:30pm yesterday and was found this morning by family with difficulty speaking and "stuttering" speech  She reportedly began with weakness yesterday that eventually improved; she did not have weakness upon arrival to the ED for evaluation but was noted to have a R facial droop and difficulty with her expressive speech  NIHSS reported to be 2  She was made a stroke alert - HCT and CTA were performed and were unremarkable for any acute ischemia/hemorrhage or significant vascular abnormality  As she was outside of the window, tPA was not administered  Of note, BP was significantly elevated - 203/98  She was loaded with DAPT then admitted for further work-up  Unfortunately, due to expressive aphasia pt is unable to provide details however she denies ever having symptoms like this before  She admits that her weakness has improved, which was primarily in the arm, but her speech problems persist     Inpatient consult to Neurology  Consult performed by: Lucila Zavala DO  Consult ordered by: Femi Jauregui MD    Consult to Neurology  Consult performed by: Lucila Zavala DO  Consult ordered by: Liu Shaffer MD        Review of Systems   Neurological: Positive for facial asymmetry, speech difficulty and weakness  All other systems reviewed and are negative        Historical Information   Past Medical History:   Diagnosis Date    Hypertension      Past Surgical History:   Procedure Laterality Date    ABLATION SOFT TISSUE       Social History   Social History     Substance and Sexual Activity   Alcohol Use Not Currently     Social History     Substance and Sexual Activity   Drug Use Not Currently     E-Cigarette/Vaping    E-Cigarette Use Never User      E-Cigarette/Vaping Substances     Social History     Tobacco Use   Smoking Status Never Smoker   Smokeless Tobacco Never Used     Family History: History reviewed  No pertinent family history  Review of previous medical records was completed  Meds/Allergies   all current active meds have been reviewed, current meds:   Current Facility-Administered Medications   Medication Dose Route Frequency    acetaminophen (TYLENOL) tablet 650 mg  650 mg Oral Q4H PRN    atorvastatin (LIPITOR) tablet 40 mg  40 mg Oral QPM    clopidogrel (PLAVIX) tablet 75 mg  75 mg Oral Daily    Labetalol HCl (NORMODYNE) injection 10 mg  10 mg Intravenous Q4H PRN    levothyroxine tablet 25 mcg  25 mcg Oral Daily    and PTA meds:   Prior to Admission Medications   Prescriptions Last Dose Informant Patient Reported? Taking? amLODIPine (NORVASC) 2 5 mg tablet 2020 at Unknown time  Yes Yes   Sig: Take 2 5 mg by mouth daily   atenolol (TENORMIN) 50 mg tablet 2020 at Unknown time  Yes Yes   Sig: Take 50 mg by mouth daily   fluticasone (FLONASE) 50 mcg/act nasal spray Unknown at Unknown time  Yes No   Si spray into each nostril daily as needed for rhinitis   levothyroxine 25 mcg tablet 2020 at Unknown time  Yes Yes   Sig: Take 25 mcg by mouth daily      Facility-Administered Medications: None       No Known Allergies    Objective   Vitals:Blood pressure 163/98, pulse 69, temperature 97 8 °F (36 6 °C), temperature source Oral, resp  rate 19, height 5' 4" (1 626 m), weight 63 6 kg (140 lb 3 4 oz), SpO2 97 %  ,Body mass index is 24 07 kg/m²    No intake or output data in the 24 hours ending 20 1657    Invasive Devices: Invasive Devices     Peripheral Intravenous Line            Peripheral IV 20 Right Antecubital less than 1 day                Physical Exam  Constitutional:       Appearance: She is well-developed  HENT:      Head: Normocephalic and atraumatic  Eyes:      Extraocular Movements: EOM normal    Neurological:      Mental Status: She is alert and oriented to person, place, and time  Psychiatric:         Speech: Speech normal        Neurologic Exam     Mental Status   Oriented to person, place, and time  Attention: normal  Concentration: normal    Speech: speech is normal   Level of consciousness: alert  Knowledge: good  Able to name object  Normal comprehension  Mild aphasia noted, expressive with word-finding difficulty  Difficulty with repetition with some paraphasic errors     Cranial Nerves     CN III, IV, VI   Extraocular motions are normal      CN VIII   Hearing: intact    CN XI   Right sternocleidomastoid strength: normal  Left sternocleidomastoid strength: normal  Right trapezius strength: normal  Left trapezius strength: normal    CN XII   Tongue deviation: none  Perhaps slight R lower facial droop     Motor Exam Good antigravity strength in all extremities without any clear asymmetry, no drift noted       NIHSS:  1a Level of Consciousness: 0 = Alert   1b  LOC Questions: 0 = Answers both correctly   1c  LOC Commands: 0 = Obeys both correctly   2  Best Gaze: 0 = Normal   3  Visual: 0 = No visual field loss   4  Facial Palsy: 1=Minor paralysis (flattened nasolabial fold, asymmetric on smiling)   5a  Motor Right Arm: 0=No drift, limb holds 90 (or 45) degrees for full 10 seconds   5b  Motor Left Arm: 0=No drift, limb holds 90 (or 45) degrees for full 10 seconds   6a  Motor Right Le=No drift, limb holds 90 (or 45) degrees for full 10 seconds   6b  Motor Left Le=No drift, limb holds 90 (or 45) degrees for full 10 seconds   7  Limb Ataxia:  0=Absent   8  Sensory: 0=Normal; no sensory loss   9  Best Language:  1=Mild to moderate aphasia; some obvious loss of fluency or facility of comprehension without significant limitation on ideas expressed or form of expression  10  Dysarthria: 0=Normal articulation   11  Extinction and Inattention (formerly Neglect): 0=No abnormality   Total Score: 2     Time NIHSS was completed: 5:40pm 12/5    Modified [place] Score:  Unable to determine currently, will gather additional data    Lab Results:   CBC:   Results from last 7 days   Lab Units 12/05/20  1254   WBC Thousand/uL 7 54   RBC Million/uL 4 89   HEMOGLOBIN g/dL 14 6   HEMATOCRIT % 44 5   MCV fL 91   PLATELETS Thousands/uL 321   , BMP/CMP:   Results from last 7 days   Lab Units 12/05/20  1254   SODIUM mmol/L 137   POTASSIUM mmol/L 4 0   CHLORIDE mmol/L 102   CO2 mmol/L 26   BUN mg/dL 14   CREATININE mg/dL 0 95   CALCIUM mg/dL 9 7   EGFR ml/min/1 73sq m 60   , Coagulation:   Results from last 7 days   Lab Units 12/05/20  1254   INR  0 93     Imaging Studies: I have personally reviewed pertinent films in PACS  EKG, Pathology, and Other Studies: I have personally reviewed pertinent reports  VTE Prophylaxis: Sequential compression device (Venodyne)     Code Status: Level 1 - Full Code    Counseling / Coordination of Care  Total Critical Care time spent 30 minutes excluding procedures, teaching and family updates

## 2020-12-05 NOTE — ED PROVIDER NOTES
History  Chief Complaint   Patient presents with    Extremity Weakness     right side weakness that started yesterday     Patient states that around 230 yesterday she was doing [place] decorations when she noticed some weakness and heaviness in the right arm  Had a slight funny feeling around her face  Unsure if she difficulty speaking at that time as she was by herself  Family noticed her today to have some speech difficulty  Patient still complaining of right arm weakness  No headaches  No chest pain or shortness of breath  No history of blood thinners  Does have a history of hypertension  Does not smoke  History provided by:  Patient   used: No    CVA/TIA-like Symptoms  Presenting symptoms: weakness    Presenting symptoms: no headaches and no visual change    Date/time of last known well:  12/4/2020 2:30 PM  Onset quality:  Sudden  Timing:  Constant  Progression:  Unchanged  Similar to previous episodes: no    Associated symptoms: no chest pain, no trouble swallowing, no dizziness, no fever, no hearing loss, no bladder incontinence, no nausea, no neck pain, no seizures, no tinnitus and no vomiting        None       Past Medical History:   Diagnosis Date    Hypertension        Past Surgical History:   Procedure Laterality Date    ABLATION SOFT TISSUE         History reviewed  No pertinent family history  I have reviewed and agree with the history as documented  E-Cigarette/Vaping    E-Cigarette Use Never User      E-Cigarette/Vaping Substances     Social History     Tobacco Use    Smoking status: Never Smoker    Smokeless tobacco: Never Used   Substance Use Topics    Alcohol use: Not Currently    Drug use: Not Currently       Review of Systems   Constitutional: Negative for chills and fever  HENT: Negative for ear pain, hearing loss, sore throat, tinnitus, trouble swallowing and voice change  Eyes: Negative for pain and discharge     Respiratory: Negative for cough, shortness of breath and wheezing  Cardiovascular: Negative for chest pain and palpitations  Gastrointestinal: Negative for abdominal pain, blood in stool, constipation, diarrhea, nausea and vomiting  Genitourinary: Negative for bladder incontinence, dysuria, flank pain, frequency and hematuria  Musculoskeletal: Negative for joint swelling, neck pain and neck stiffness  Skin: Negative for rash and wound  Neurological: Positive for weakness  Negative for dizziness, seizures, syncope, facial asymmetry and headaches  Psychiatric/Behavioral: Negative for hallucinations, self-injury and suicidal ideas  All other systems reviewed and are negative  Physical Exam  Physical Exam  Vitals signs and nursing note reviewed  Constitutional:       General: She is not in acute distress  Appearance: She is well-developed  HENT:      Head: Normocephalic and atraumatic  Eyes:      Conjunctiva/sclera: Conjunctivae normal    Neck:      Musculoskeletal: Neck supple  Cardiovascular:      Rate and Rhythm: Normal rate and regular rhythm  Heart sounds: No murmur  Pulmonary:      Effort: Pulmonary effort is normal  No respiratory distress  Breath sounds: Normal breath sounds  Abdominal:      Palpations: Abdomen is soft  Tenderness: There is no abdominal tenderness  Skin:     General: Skin is warm and dry  Neurological:      Mental Status: She is alert and oriented to person, place, and time  Comments: Very minimal right facial droop noted  Hand  appeared normal although if pressed may be a 5-on the right  Patient having difficulty getting words out     Psychiatric:         Mood and Affect: Mood normal          Behavior: Behavior normal          Vital Signs  ED Triage Vitals   Temperature Pulse Respirations Blood Pressure SpO2   12/05/20 1248 12/05/20 1250 12/05/20 1250 12/05/20 1250 12/05/20 1255   (!) 97 °F (36 1 °C) 84 16 (!) 203/98 98 %      Temp src Heart Rate Source Patient Position - Orthostatic VS BP Location FiO2 (%)   -- 12/05/20 1324 12/05/20 1324 12/05/20 1324 --    Monitor Sitting Left arm       Pain Score       --                  Vitals:    12/05/20 1250 12/05/20 1255 12/05/20 1309 12/05/20 1324   BP: (!) 203/98 (!) 203/98 (!) 222/108 (!) 206/104   Pulse: 84 84 79 74   Patient Position - Orthostatic VS:    Sitting         Visual Acuity  Visual Acuity      Most Recent Value   L Pupil Size (mm)  3   R Pupil Size (mm)  3          ED Medications  Medications   iohexol (OMNIPAQUE) 350 MG/ML injection (SINGLE-DOSE) 100 mL (100 mL Intravenous Given 12/5/20 1303)   hydrALAZINE (APRESOLINE) injection 10 mg (10 mg Intravenous Given 12/5/20 1324)   aspirin tablet 325 mg (325 mg Oral Given 12/5/20 1320)   clopidogrel (PLAVIX) tablet 300 mg (300 mg Oral Given 12/5/20 1320)       Diagnostic Studies  Results Reviewed     Procedure Component Value Units Date/Time    Protime-INR [[id number]]  (Normal) Collected: 12/05/20 1254    Lab Status: Final result Specimen: Blood from Arm, Right Updated: 12/05/20 1327     Protime 12 3 seconds      INR 0 93    APTT [[id number]]  (Normal) Collected: 12/05/20 1254    Lab Status: Final result Specimen: Blood from Arm, Right Updated: 12/05/20 1327     PTT 27 seconds     Troponin I [[id number]]  (Normal) Collected: 12/05/20 1254    Lab Status: Final result Specimen: Blood from Arm, Right Updated: 12/05/20 1321     Troponin I <0 02 ng/mL     COVID[facility], [facility]/B, [facility] [[id number]] Collected: 12/05/20 1312    Lab Status:  In process Specimen: Nares from Nose Updated: 12/05/20 3881    Basic metabolic panel [[id number]] Collected: 12/05/20 1254    Lab Status: Final result Specimen: Blood from Arm, Right Updated: 12/05/20 1312     Sodium 137 mmol/L      Potassium 4 0 mmol/L      Chloride 102 mmol/L      CO2 26 mmol/L      ANION GAP 9 mmol/L      BUN 14 mg/dL      Creatinine 0 95 mg/dL      Glucose 138 mg/dL      Calcium 9 7 mg/dL      eGFR 60 ml/min/1 73sq m     Narrative:      National Kidney Disease Foundation guidelines for Chronic Kidney Disease (CKD):     Stage 1 with normal or high GFR (GFR > 90 mL/min/1 73 square meters)    Stage 2 Mild CKD (GFR = 60-89 mL/min/1 73 square meters)    Stage 3A Moderate CKD (GFR = 45-59 mL/min/1 73 square meters)    Stage 3B Moderate CKD (GFR = 30-44 mL/min/1 73 square meters)    Stage 4 Severe CKD (GFR = 15-29 mL/min/1 73 square meters)    Stage 5 End Stage CKD (GFR <15 mL/min/1 73 square meters)  Note: GFR calculation is accurate only with a steady state creatinine    CBC and Platelet [[id number]]  (Normal) Collected: 12/05/20 1254    Lab Status: Final result Specimen: Blood from Arm, Right Updated: 12/05/20 1300     WBC 7 54 Thousand/uL      RBC 4 89 Million/uL      Hemoglobin 14 6 g/dL      Hematocrit 44 5 %      MCV 91 fL      MCH 29 9 pg      MCHC 32 8 g/dL      RDW 12 0 %      Platelets 459 Thousands/uL      MPV 9 3 fL     Fingerstick Glucose (POCT) [[id number]]  (Normal) Collected: 12/05/20 1250    Lab Status: Final result Updated: 12/05/20 1253     POC Glucose 108 mg/dl                  CTA stroke alert (head/neck)   Final Result by Fern Villa MD (12/05 1321)      No large vessel flow limiting stenosis  Findings were directly discussed with Natalio Joseph on 12/5/2020 1:14 PM                      Workstation performed: [id number]         CT stroke alert brain   Final Result by Fern Villa MD (12/05 1314)      No acute intracranial abnormality              Findings were directly discussed with Natalio Joseph on 12/5/2020 1:08 PM       Workstation performed: [id number]                    Procedures  ECG 12 Lead Documentation Only    Date/Time: 12/5/2020 1:06 PM  Performed by: Osei Briggs MD  Authorized by: Osei Briggs MD     ECG reviewed by me, the ED Provider: yes    Patient location:  ED  Previous ECG:     Previous ECG:  Unavailable  Rate:     ECG rate: 70  Rhythm:     Rhythm: sinus rhythm    Ectopy:     Ectopy: none    QRS:     QRS axis:  Normal  CriticalCare Time  Performed by: Lloyd Mcfadden MD  Authorized by: Lloyd Mcfadden MD     Critical care provider statement:     Critical care time (minutes):  35    Critical care time was exclusive of:  Separately billable procedures and treating other patients    Critical care was necessary to treat or prevent imminent or life-threatening deterioration of the following conditions:  CNS failure or compromise    Critical care was time spent personally by me on the following activities:  Discussions with consultants, development of treatment plan with patient or surrogate, obtaining history from patient or surrogate, ordering and review of laboratory studies, ordering and review of radiographic studies and re-evaluation of patient's condition             ED Course  ED Course as of Dec 05 1348   Sat Dec 05, 2020   1301 Discussed with neurology on call, Dr Lesly Yu  If CT and CTA are normal recommends aspirin 325 mg and Plavix 300 mg  Admit for stroke pathway  1317 CT and CTA look unremarkable per neurology  Stroke Assessment     Row Name 12/05/20 1253             NIH Stroke Scale    Interval  Baseline      Level of Consciousness (1a )  0      LOC Questions (1b )  0      LOC Commands (1c )  0      Best Gaze (2 )  0      Visual (3 )  0      Facial Palsy (4 )  1      Motor Arm, Left (5a )  0      Motor Arm, Right (5b )  0      Motor Leg, Left (6a )  0      Motor Leg, Right (6b )  0      Limb Ataxia (7 )  0      Sensory (8 )  0      Best Language (9 )  1      Dysarthria (10 )  0      Extinction and Inattention (11 ) (Formerly Neglect)  0      Total  2          First Filed Value   TPA Decision  Patient not a TPA candidate  SBIRT 20yo+      Most Recent Value   SBIRT (24 yo +)   In order to provide better care to our patients, we are screening all of our patients for alcohol and drug use  Would it be okay to ask you these screening questions? Yes Filed at: 12/05/2020 1252   Initial Alcohol Screen: US AUDIT-C    1  How often do you have a drink containing alcohol?  0 Filed at: 12/05/2020 1252   2  How many drinks containing alcohol do you have on a typical day you are drinking? 0 Filed at: 12/05/2020 1252   3a  Male UNDER 65: How often do you have five or more drinks on one occasion? 0 Filed at: 12/05/2020 1252   3b  FEMALE Any Age, or MALE 65+: How often do you have 4 or more drinks on one occassion? 0 Filed at: 12/05/2020 1252   Audit-C Score  0 Filed at: 12/05/2020 1252   HILDA: How many times in the past year have you    Used an illegal drug or used a prescription medication for non-medical reasons? Never Filed at: 12/05/2020 1252                  MDM    Disposition  Final diagnoses:   Cerebrovascular accident (CVA), unspecified mechanism ([address] )   Hypertension     Time reflects when diagnosis was documented in both MDM as applicable and the Disposition within this note     Time User Action Codes Description Comment    12/5/2020  1:17 PM Modesto Waller Add [I63 9] Cerebrovascular accident (CVA), unspecified mechanism ([address] )     12/5/2020  1:17 PM Modesto Waller Add [I10] Hypertension       ED Disposition     ED Disposition Condition Date/Time Comment    Admit Stable Sat Dec 5, 2020  1:47 PM Case was discussed with Dr Max Turk and the patient's admission status was agreed to be  to the service of Dr Max Turk  Follow-up Information    None         Patient's Medications    No medications on file     No discharge procedures on file      PDMP Review     None          ED Provider  Electronically Signed by           Victor Hugo Pérez MD  12/05/20 0069

## 2020-12-06 ENCOUNTER — APPOINTMENT (INPATIENT)
Dept: CT IMAGING | Facility: HOSPITAL | Age: 70
DRG: 065 | End: 2020-12-06
Payer: COMMERCIAL

## 2020-12-06 LAB
ANION GAP SERPL CALCULATED.3IONS-SCNC: 9 MMOL/L (ref 4–13)
BUN SERPL-MCNC: 13 MG/DL (ref 5–25)
CALCIUM SERPL-MCNC: 9.3 MG/DL (ref 8.3–10.1)
CHLORIDE SERPL-SCNC: 107 MMOL/L (ref 100–108)
CHOLEST SERPL-MCNC: 208 MG/DL (ref 50–200)
CO2 SERPL-SCNC: 26 MMOL/L (ref 21–32)
CREAT SERPL-MCNC: 1.05 MG/DL (ref 0.6–1.3)
EST. AVERAGE GLUCOSE BLD GHB EST-MCNC: 117 MG/DL
GFR SERPL CREATININE-BSD FRML MDRD: 54 ML/MIN/1.73SQ M
GLUCOSE SERPL-MCNC: 107 MG/DL (ref 65–140)
HBA1C MFR BLD: 5.7 %
HDLC SERPL-MCNC: 40 MG/DL
LDLC SERPL CALC-MCNC: 125 MG/DL (ref 0–100)
POTASSIUM SERPL-SCNC: 3.9 MMOL/L (ref 3.5–5.3)
SODIUM SERPL-SCNC: 142 MMOL/L (ref 136–145)
T4 FREE SERPL-MCNC: 1.07 NG/DL (ref 0.76–1.46)
TRIGL SERPL-MCNC: 214 MG/DL
TSH SERPL DL<=0.05 MIU/L-ACNC: 6.21 UIU/ML (ref 0.36–3.74)

## 2020-12-06 PROCEDURE — G1004 CDSM NDSC: HCPCS

## 2020-12-06 PROCEDURE — 84443 ASSAY THYROID STIM HORMONE: CPT | Performed by: FAMILY MEDICINE

## 2020-12-06 PROCEDURE — 99232 SBSQ HOSP IP/OBS MODERATE 35: CPT | Performed by: FAMILY MEDICINE

## 2020-12-06 PROCEDURE — 83036 HEMOGLOBIN GLYCOSYLATED A1C: CPT | Performed by: FAMILY MEDICINE

## 2020-12-06 PROCEDURE — 80061 LIPID PANEL: CPT | Performed by: FAMILY MEDICINE

## 2020-12-06 PROCEDURE — 70450 CT HEAD/BRAIN W/O DYE: CPT

## 2020-12-06 PROCEDURE — 84439 ASSAY OF FREE THYROXINE: CPT | Performed by: FAMILY MEDICINE

## 2020-12-06 PROCEDURE — 80048 BASIC METABOLIC PNL TOTAL CA: CPT | Performed by: FAMILY MEDICINE

## 2020-12-06 RX ORDER — AMLODIPINE BESYLATE 5 MG/1
5 TABLET ORAL DAILY
Status: DISCONTINUED | OUTPATIENT
Start: 2020-12-06 | End: 2020-12-07

## 2020-12-06 RX ORDER — ATENOLOL 50 MG/1
50 TABLET ORAL DAILY
Status: DISCONTINUED | OUTPATIENT
Start: 2020-12-06 | End: 2020-12-07

## 2020-12-06 RX ADMIN — AMLODIPINE BESYLATE 5 MG: 5 TABLET ORAL at 11:44

## 2020-12-06 RX ADMIN — ATENOLOL 50 MG: 50 TABLET ORAL at 11:44

## 2020-12-06 RX ADMIN — ATORVASTATIN CALCIUM 40 MG: 40 TABLET, FILM COATED ORAL at 17:19

## 2020-12-06 RX ADMIN — ACETAMINOPHEN 650 MG: 325 TABLET ORAL at 11:08

## 2020-12-06 RX ADMIN — LEVOTHYROXINE SODIUM 25 MCG: 25 TABLET ORAL at 05:27

## 2020-12-06 RX ADMIN — CLOPIDOGREL 75 MG: 75 TABLET, FILM COATED ORAL at 08:08

## 2020-12-06 NOTE — ASSESSMENT & PLAN NOTE
· Continue home dose Synthroid and check a TSH in the morning TSH is slightly elevated will check a free T4 continue same dose

## 2020-12-06 NOTE — PROGRESS NOTES
Patient is alert and oriented  Woken up during shift for neuro assessments  Call bell within reach no safety concerns noted  No questions or concerns from patient  Alarms are on and functioning no incautious behavior

## 2020-12-06 NOTE — PROGRESS NOTES
Reported to have some worsening dysarthria with difficulty articulating her words  No other weakness noted anywhere else  No visual deficits noted  Will do a stat CT brain for further evaluation  Continue present management and monitoring    Blood pressure is acceptable range at this time at 160/58

## 2020-12-06 NOTE — ASSESSMENT & PLAN NOTE
· Resolved  · Start getting blood pressure to normotensive restarted her home medications increased norvasc to 5 mg daily and restarted her atenolol 50 mg daily

## 2020-12-06 NOTE — PLAN OF CARE
Problem: Potential for Falls  Goal: Patient will remain free of falls  Description: INTERVENTIONS:  - Assess patient frequently for physical needs  -  Identify cognitive and physical deficits and behaviors that affect risk of falls  -  [place] fall precautions as indicated by assessment   - Educate patient/family on patient safety including physical limitations  - Instruct patient to call for assistance with activity based on assessment  - Modify environment to reduce risk of injury  - Consider OT/PT consult to assist with strengthening/mobility  Outcome: Progressing     Problem: Neurological Deficit  Goal: Neurological status is stable or improving  Description: Interventions:  - Monitor and assess patient's level of consciousness, motor function, sensory function, and level of assistance needed for ADLs  - Monitor and report changes from baseline  Collaborate with interdisciplinary team to initiate plan and implement interventions as ordered  - Provide and maintain a safe environment  - Consider seizure precautions  - Consider fall precautions  - Consider aspiration precautions  - Consider bleeding precautions  Outcome: Progressing     Problem: Activity Intolerance/Impaired Mobility  Goal: Mobility/activity is maintained at optimum level for patient  Description: Interventions:  - Assess and monitor patient  barriers to mobility and need for assistive/adaptive devices  - Assess patient's emotional response to limitations  - Collaborate with interdisciplinary team and initiate plans and interventions as ordered  - Encourage independent activity per ability   - Maintain proper body alignment  - Perform active/passive rom as tolerated/ordered    - Plan activities to conserve energy   - Turn patient as appropriate  Outcome: Progressing     Problem: Communication Impairment  Goal: Ability to express needs and understand communication  Description: Assess patient's communication skills and ability to understand information  Patient will demonstrate use of effective communication techniques, alternative methods of communication and understanding even if not able to speak  - Encourage communication and provide alternate methods of communication as needed  - Collaborate with case management/ for discharge needs  - Include patient/family/caregiver in decisions related to communication  Outcome: Progressing     Problem: Potential for Aspiration  Goal: Non-ventilated patient's risk of aspiration is minimized  Description: Assess and monitor vital signs, respiratory status, and labs (WBC)  Monitor for signs of aspiration (tachypnea, cough, rales, wheezing, cyanosis, fever)  - Assess and monitor patient's ability to swallow  - Place patient up in chair to eat if possible  - HOB up at 90 degrees to eat if unable to get patient up into chair   - Supervise patient during oral intake  - Instruct patient/ family to take small bites  - Instruct patient/ family to take small single sips when taking liquids  - Follow patient-specific strategies generated by speech pathologist   Outcome: Progressing  Goal: Ventilated patient's risk of aspiration is minimized  Description: Assess and monitor vital signs, respiratory status, airway cuff pressure, and labs (WBC)  Monitor for signs of aspiration (tachypnea, cough, rales, wheezing, cyanosis, fever)  - Elevate head of bed 30 degrees if patient has tube feeding   - Monitor tube feeding  Outcome: Progressing     Problem: Nutrition  Goal: Nutrition/Hydration status is improving  Description: Monitor and assess patient's nutrition/hydration status for malnutrition (ex- brittle hair, bruises, dry skin, pale skin and conjunctiva, muscle wasting, smooth red tongue, and disorientation)  Collaborate with interdisciplinary team and initiate plan and interventions as ordered  Monitor patient's weight and dietary intake as ordered or per policy   Utilize nutrition screening tool and intervene per policy  Determine patient's food preferences and provide high-protein, high-caloric foods as appropriate  - Assist patient with eating   - Allow adequate time for meals   - Encourage patient to take dietary supplement as ordered  - Collaborate with clinical nutritionist   - Include patient/family/caregiver in decisions related to nutrition  Outcome: Progressing     Problem: NEUROSENSORY - ADULT  Goal: Achieves stable or improved neurological status  Description: INTERVENTIONS  - Monitor and report changes in neurological status  - Monitor vital signs such as temperature, blood pressure, glucose, and any other labs ordered   - Initiate measures to prevent increased intracranial pressure  - Monitor for seizure activity and implement precautions if appropriate      Outcome: Progressing  Goal: Remains free of injury related to seizures activity  Description: INTERVENTIONS  - Maintain airway, patient safety  and administer oxygen as ordered  - Monitor patient for seizure activity, document and report duration and description of seizure to physician/advanced practitioner  - If seizure occurs,  ensure patient safety during seizure  - Reorient patient post seizure  - Seizure pads on all 4 side rails  - Instruct patient/family to notify RN of any seizure activity including if an aura is experienced  - Instruct patient/family to call for assistance with activity based on nursing assessment  - Administer anti-seizure medications if ordered    Outcome: Progressing  Goal: Achieves maximal functionality and self care  Description: INTERVENTIONS  - Monitor swallowing and airway patency with patient fatigue and changes in neurological status  - Encourage and assist patient to increase activity and self care     - Encourage visually impaired, hearing impaired and aphasic patients to use assistive/communication devices  Outcome: Progressing     Problem: CARDIOVASCULAR - ADULT  Goal: Maintains optimal cardiac output and hemodynamic stability  Description: INTERVENTIONS:  - Monitor I/O, vital signs and rhythm  - Monitor for S/S and trends of decreased cardiac output  - Administer and titrate ordered vasoactive medications to optimize hemodynamic stability  - Assess quality of pulses, skin color and temperature  - Assess for signs of decreased coronary artery perfusion  - Instruct patient to report change in severity of symptoms  Outcome: Progressing  Goal: Absence of cardiac dysrhythmias or at baseline rhythm  Description: INTERVENTIONS:  - Continuous cardiac monitoring, vital signs, obtain 12 lead EKG if ordered  - Administer antiarrhythmic and heart rate control medications as ordered  - Monitor electrolytes and administer replacement therapy as ordered  Outcome: Progressing     Problem: METABOLIC, FLUID AND ELECTROLYTES - ADULT  Goal: Electrolytes maintained within normal limits  Description: INTERVENTIONS:  - Monitor labs and assess patient for signs and symptoms of electrolyte imbalances  - Administer electrolyte replacement as ordered  - Monitor response to electrolyte replacements, including repeat lab results as appropriate  - Instruct patient on fluid and nutrition as appropriate  Outcome: Progressing  Goal: Fluid balance maintained  Description: INTERVENTIONS:  - Monitor labs   - Monitor I/O and WT  - Instruct patient on fluid and nutrition as appropriate  - Assess for signs & symptoms of volume excess or deficit  Outcome: Progressing  Goal: Glucose maintained within target range  Description: INTERVENTIONS:  - Monitor Blood Glucose as ordered  - Assess for signs and symptoms of hyperglycemia and hypoglycemia  - Administer ordered medications to maintain glucose within target range  - Assess nutritional intake and initiate nutrition service referral as needed  Outcome: Progressing     Problem: HEMATOLOGIC - ADULT  Goal: Maintains hematologic stability  Description: INTERVENTIONS  - Assess for signs and symptoms of bleeding or hemorrhage  - Monitor labs  - Administer supportive blood products/factors as ordered and appropriate  Outcome: Progressing     Problem: PAIN - ADULT  Goal: Verbalizes/displays adequate comfort level or baseline comfort level  Description: Interventions:  - Encourage patient to monitor pain and request assistance  - Assess pain using appropriate pain scale  - Administer analgesics based on type and severity of pain and evaluate response  - Implement non-pharmacological measures as appropriate and evaluate response  - Consider cultural and social influences on pain and pain management  - Notify physician/advanced practitioner if interventions unsuccessful or patient reports new pain  Outcome: Progressing     Problem: SAFETY ADULT  Goal: Patient will remain free of falls  Description: INTERVENTIONS:  - Assess patient frequently for physical needs  -  Identify cognitive and physical deficits and behaviors that affect risk of falls    -  [place] fall precautions as indicated by assessment   - Educate patient/family on patient safety including physical limitations  - Instruct patient to call for assistance with activity based on assessment  - Modify environment to reduce risk of injury  - Consider OT/PT consult to assist with strengthening/mobility  Outcome: Progressing  Goal: Maintain or return to baseline ADL function  Description: INTERVENTIONS:  -  Assess patient's ability to carry out ADLs; assess patient's baseline for ADL function and identify physical deficits which impact ability to perform ADLs (bathing, care of mouth/teeth, toileting, grooming, dressing, etc )  - Assess/evaluate cause of self-care deficits   - Assess range of motion  - Assess patient's mobility; develop plan if impaired  - Assess patient's need for assistive devices and provide as appropriate  - Encourage maximum independence but intervene and supervise when necessary  - Involve family in performance of ADLs  - Assess for home care needs following discharge   - Consider OT consult to assist with ADL evaluation and planning for discharge  - Provide patient education as appropriate  Outcome: Progressing  Goal: Maintain or return mobility status to optimal level  Description: INTERVENTIONS:  - Assess patient's baseline mobility status (ambulation, transfers, stairs, etc )    - Identify cognitive and physical deficits and behaviors that affect mobility  - Identify mobility aids required to assist with transfers and/or ambulation (gait belt, sit-to-stand, lift, walker, cane, etc )  - [place] fall precautions as indicated by assessment  - Record patient progress and toleration of activity level on Mobility SBAR; progress patient to next Phase/Stage  - Instruct patient to call for assistance with activity based on assessment  - Consider rehabilitation consult to assist with strengthening/weightbearing, etc   Outcome: Progressing     Problem: DISCHARGE PLANNING  Goal: Discharge to home or other facility with appropriate resources  Description: INTERVENTIONS:  - Identify barriers to discharge w/patient and caregiver  - Arrange for needed discharge resources and transportation as appropriate  - Identify discharge learning needs (meds, wound care, etc )  - Arrange for interpretive services to assist at discharge as needed  - Refer to Case Management Department for coordinating discharge planning if the patient needs post-hospital services based on physician/advanced practitioner order or complex needs related to functional status, cognitive ability, or social support system  Outcome: Progressing     Problem: Knowledge Deficit  Goal: Patient/family/caregiver demonstrates understanding of disease process, treatment plan, medications, and discharge instructions  Description: Complete learning assessment and assess knowledge base    Interventions:  - Provide teaching at level of understanding  - Provide teaching via preferred learning methods  Outcome: Progressing

## 2020-12-06 NOTE — UTILIZATION REVIEW
Initial Clinical Review    Admission: Date/Time/Statement:   Admission Orders (From admission, onward)     Ordered        12/05/20 1348  Inpatient Admission  Once                   Orders Placed This Encounter   Procedures    Inpatient Admission     Standing Status:   Standing     Number of Occurrences:   1     Order Specific Question:   Admitting Physician     Answer:   Sydnie Gomes [[id number]]     Order Specific Question:   Level of Care     Answer:   Med Surg [16]     Order Specific Question:   Estimated length of stay     Answer:   More than 2 Midnights     Order Specific Question:   Certification     Answer:   I certify that inpatient services are medically necessary for this patient for a duration of greater than two midnights  See H&P and MD Progress Notes for additional information about the patient's course of treatment  ED Arrival Information     Expected Arrival Acuity Means of Arrival Escorted By Service Admission Type    - 12/5/2020 12:44 Urgent Walk-In Family Member Hospitalist Urgent    Arrival Complaint    Rightside weakness, slurring words, confusion        Chief Complaint   Patient presents with    Extremity Weakness     right side weakness that started yesterday     Assessment/Plan:   69  Y O female  Presents to ED  From home with right sided weakness and numbness  Which started the day  Prior to admission, now resolved  The am of admission, son  Noticed right facial droop and  Difficulty with word finding  Denies  Any other symptoms  BP  Elevated on arrival,   203/98  PMH  Is  Hypertension, migraines and  Hypothyroidism  Ct  Brain negative  Not a   Candidate for TPA, out of window  Admit  Ip with Suspected acute  CVA and  Uncontrolled  Hypertension and plan is  2 DE,  MRI brain, monitor labs, neuro consult, NS  Bolus, plavix and aspirin  Neuro consult  ( 12/5)   Telemedicine  Not a  TPA  Candidate, unsure onset  Of symptoms       Aphasia and R hemiparesis with subsequent resolution of hemiparesis  Concern for possible minor stroke, potentially embolic given deficits noted  No etiology  For stroke found  Continue  Neuro checks  CTA/CT scan negative  Continue  Tele  Wait  MRI  Brain  12/6  Continue current  Treatment for  Acute  CVA>  Continue neuro checks, wait  MRI  Brain  Monitor  BP  Still complains of right sided heaviness         ED Triage Vitals   Temperature Pulse Respirations Blood Pressure SpO2   12/05/20 1248 12/05/20 1250 12/05/20 1250 12/05/20 1250 12/05/20 1255   (!) 97 °F (36 1 °C) 84 16 (!) 203/98 98 %      Temp Source Heart Rate Source Patient Position - Orthostatic VS BP Location FiO2 (%)   12/05/20 1550 12/05/20 1324 12/05/20 1324 12/05/20 1324 --   Oral Monitor Sitting Left arm       Pain Score       12/05/20 1950       No Pain          Wt Readings from Last 1 Encounters:   12/05/20 63 6 kg (140 lb 3 4 oz)     Additional Vital Signs:   12/06/20 0350  98 1 °F (36 7 °C)  --  18  155/84  96  --  --  Lying   12/06/20 01:48:58  98 1 °F (36 7 °C)  65  16  156/69  --  98 %  --  Lying   12/05/20 2350  97 9 °F (36 6 °C)  63  12  137/88  97  97 %  None (Room air)  Lying   12/05/20 2150  97 5 °F (36 4 °C)  68  18  169/97  116  --  --  --   12/05/20 2100  98 °F (36 7 °C)  66  18  165/96  131  --  --  --   12/05/20 1950  97 9 °F (36 6 °C)  69  18  154/88  100  --  None (Room air)  --   12/05/20 1849  98 7 °F (37 1 °C)  66  19  169/95  111  96 %  None (Room air)  Lying   12/05/20 1750  97 5 °F (36 4 °C)  69  20  191/101Abnormal   133  97 %  None (Room air)  Lying   12/05/20 1651  97 8 °F (36 6 °C)  69  19  163/98  121  97 %  None (Room air)  Sitting   12/05/20 16:49:42  --  67  --  --  --  97 %  --  --   12/05/20 1550  97 6 °F (36 4 °C)  60  20  167/93  120  97 %  None (Room air)  Lying   12/05/20 1500  --  --  --  --  --  --  None (Room air)  --   12/05/20 14:49:51  97 4 °F (36 3 °C)Abnormal   72  20  141/83  102  99 %  --  --   12/05/20 1430  --  72  24Abnormal 154/80  112  99 %  --  --   12/05/20 1415  --  73  21  174/80Abnormal   115  98 %  --  --   12/05/20 1400  --  74  20  167/82  117  98 %  --  --   12/05/20 13:55:02  --  72  20  164/80  --  98 %  --  --   12/05/20 1350  --  74  --  164/80  115  98 %  --  --   12/05/20 1340  --  72  --  179/87Abnormal   125  98 %  --  --   12/05/20 1324  --  74  14  206/104Abnormal   --  98 %  None (Room air)  Sitting   12/05/20 13:09:16  --  79  16  222/108Abnormal   --  97 %  --  --   12/05/20 1255  97 °F (36 1 °C)Abnormal   84  16  203/98Abnormal   --  98 %  None (Room air)  --   12/05/20 1250  97 °F (36 1 °C)Abnormal   84  16  203/98Abnormal   --  --  --  --   12/05/20 1248  97 °F (36 1 °C)Abnormal   --  --  --  --  --  --           Pertinent Labs/Diagnostic Test Results:   CTA  head/neck ( 12/5)    No large vessel flow limiting stenosis     Ct  Brain ( 12/5)  No acute intracranial abnormality  EKG    ( 12/5)    Sinus rhythm    Normal    QRS     QTC of 497  Results from last 7 days   Lab Units 12/05/20  1312   SARS-COV-2  Negative     Results from last 7 days   Lab Units 12/05/20  1254   WBC Thousand/uL 7 54   HEMOGLOBIN g/dL 14 6   HEMATOCRIT % 44 5   PLATELETS Thousands/uL 321         Results from last 7 days   Lab Units 12/06/20  0510 12/05/20  1254   SODIUM mmol/L 142 137   POTASSIUM mmol/L 3 9 4 0   CHLORIDE mmol/L 107 102   CO2 mmol/L 26 26   ANION GAP mmol/L 9 9   BUN mg/dL 13 14   CREATININE mg/dL 1 05 0 95   EGFR ml/min/1 73sq m 54 60   CALCIUM mg/dL 9 3 9 7         Results from last 7 days   Lab Units 12/05/20  1250   POC GLUCOSE mg/dl 108     Results from last 7 days   Lab Units 12/06/20  0510 12/05/20  1254   GLUCOSE RANDOM mg/dL 107 138           Results from last 7 days   Lab Units 12/05/20  1254   TROPONIN I ng/mL <0 02         Results from last 7 days   Lab Units 12/05/20  1254   PROTIME seconds 12 3   INR  0 93   PTT seconds 27     Results from last 7 days   Lab Units 12/06/20  0510   TSH 3RD GENERATON uIU/mL 6 205*           Results from last 7 days   Lab Units 12/05/20  1312   INFLUENZA A PCR  Negative   INFLUENZA B PCR  Negative   RSV PCR  Negative         ED Treatment:   Medication Administration from 12/05/2020 1242 to 12/05/2020 1443       Date/Time Order Dose Route Action Comments     12/05/2020 1303 iohexol (OMNIPAQUE) 350 MG/ML injection (SINGLE-DOSE) 100 mL 100 mL Intravenous Given      12/05/2020 1324 hydrALAZINE (APRESOLINE) injection 10 mg 10 mg Intravenous Given      12/05/2020 1320 aspirin tablet 325 mg 325 mg Oral Given      12/05/2020 1320 clopidogrel (PLAVIX) tablet 300 mg 300 mg Oral Given         Admitting Diagnosis: Weakness [R53 1]  Hypertension [I10]  Cerebrovascular accident (CVA), unspecified mechanism ([facility] [address] ) [I63 9]  Age/Sex: 69 y o  female  Admission Orders:  Scheduled Medications:  atorvastatin, 40 mg, Oral, QPM  clopidogrel, 75 mg, Oral, Daily  levothyroxine, 25 mcg, Oral, Daily      Continuous IV Infusions:     PRN Meds:  acetaminophen, 650 mg, Oral, Q4H PRN  Labetalol HCl, 10 mg, Intravenous, Q4H PRN    Stroke teaching  Neuro checks  Q 15  Min  X 4,  Q  30 min  X 2, Q 1 hr  X 4  Then Q  4 hrs  X  72  hrs  Dysphagia eval  2 DE  MRI  Brain  PT/OT/speech  O2  2L    IP CONSULT TO NEUROLOGY  IP CONSULT TO NEUROLOGY  IP CONSULT TO CASE MANAGEMENT  IP CONSULT TO NUTRITION SERVICES    Network Utilization Review Department  [email or web address]  ATTENTION: Please call with any questions or concerns to [phone] and carefully listen to the prompts so that you are directed to the right person  All voicemails are confidential   Chitra Rao all requests for admission clinical reviews, approved or denied determinations and any other requests to dedicated fax number below belonging to the campus where the patient is receiving treatment   List of dedicated fax numbers for the Facilities:  FACILITY NAME [facility] [place] [address] DENIALS (Administrative/Medical Necessity) [phone] [address] (Maternity/NICU/Pediatrics) [facility] [phone]   Manuela Caller [phone]   Ellie Moran [phone]   [address] [phone]   [facility]  [phone]   [address] [facility]  [address] [facility] [address] [facility] [phone]

## 2020-12-06 NOTE — ASSESSMENT & PLAN NOTE
· Suboptimal will start getting her to normotensive Norvasc increased to 5 mg as stated above in restarted her atenolol 50 mg daily

## 2020-12-06 NOTE — ASSESSMENT & PLAN NOTE
· Evaluated by Neurology appreciate recommendations the MRI still pending but she will need aspirin and Plavix for 3 weeks and then monitor therapy of aspirin alone  Her right-sided hemiparesis has resolved she does have still some right facial droop and some expressive aphasia although has been improving since admission  She does not have any trouble finding words that I have noticed  · No arrhythmia evidenced on telemetry will continue 24 hours more  Will start getting blood pressure normotensive restart her atenolol 50 mg daily and increase her Norvasc to 5 mg daily  · MRI of the brain to be done  · 2D echo with bubble study  · Reviewed neurology evaluation  · Patient was loaded with Plavix 300 mg on aspirin  · Lipid panel is suboptimal continue Lipitor 40 mg daily  · EKG normal sinus rhythm will keep on telemetry monitor to assess any arrhythmia in terms of atrial fibrillation or atrial flutter  · Patient has no previous history of a stroke or a TIA  · She does have a history of hypertension it usually controlled she is on 2 medications and takes them every day      · Not a tPA candidate as the outside of the window

## 2020-12-06 NOTE — PROGRESS NOTES
Progress Note - Angel Riggins 1950, 69 y o  female MRN: [id number]    Unit/Bed#: -01 Encounter: [id number]    Primary Care Provider: No primary care provider on file  Date and time admitted to hospital: 12/5/2020 12:48 PM        * Acute CVA (cerebrovascular accident) [facility])  Assessment & Plan  · Evaluated by Neurology appreciate recommendations the MRI still pending but she will need aspirin and Plavix for 3 weeks and then monitor therapy of aspirin alone  Her right-sided hemiparesis has resolved she does have still some right facial droop and some expressive aphasia although has been improving since admission  She does not have any trouble finding words that I have noticed  · No arrhythmia evidenced on telemetry will continue 24 hours more  Will start getting blood pressure normotensive restart her atenolol 50 mg daily and increase her Norvasc to 5 mg daily  · MRI of the brain to be done  · 2D echo with bubble study  · Reviewed neurology evaluation  · Patient was loaded with Plavix 300 mg on aspirin  · Lipid panel is suboptimal continue Lipitor 40 mg daily  · EKG normal sinus rhythm will keep on telemetry monitor to assess any arrhythmia in terms of atrial fibrillation or atrial flutter  · Patient has no previous history of a stroke or a TIA  · She does have a history of hypertension it usually controlled she is on 2 medications and takes them every day  · Not a tPA candidate as the outside of the window    Migraines  Assessment & Plan  · Patient has history of migraines he denies any headaches yesterday denies any headaches now  Hypothyroidism  Assessment & Plan  · Continue home dose Synthroid and check a TSH in the morning TSH is slightly elevated will check a free T4 continue same dose    QT prolongation  Assessment & Plan  · EKG with evidence of QTC of 497   Improved on the EKG reviewed to 480    Hypertension  Assessment & Plan  · Suboptimal will start getting her to normotensive Norvasc increased to 5 mg as stated above in restarted her atenolol 50 mg daily    Hypertensive urgency  Assessment & Plan  · Resolved  · Start getting blood pressure to normotensive restarted her home medications increased norvasc to 5 mg daily and restarted her atenolol 50 mg daily        VTE Pharmacologic Prophylaxis:   Pharmacologic: Pharmacologic VTE Prophylaxis contraindicated due to Encourage ambulation  Mechanical VTE Prophylaxis in Place: Yes    Patient Centered Rounds: I have performed bedside rounds with nursing staff today  Discussions with Specialists or Other Care Team Provider:  Discussed with nursing    Education and Discussions with Family / Patient:  Patient and son    Time Spent for Care: 30 minutes  More than 50% of total time spent on counseling and coordination of care as described above  Current Length of Stay: 1 day(s)    Current Patient Status: Inpatient   Certification Statement: The patient will continue to require additional inpatient hospital stay due to Acute CVA    Discharge Plan:  Anticipate 24 hours pending imaging    Code Status: Level 1 - Full Code      Subjective:   Patient seen and examined still feels the right side a little heavy no chest pain or shortness of breath    Objective:     Vitals:   Temp (24hrs), Av 7 °F (36 5 °C), Min:97 °F (36 1 °C), Max:98 7 °F (37 1 °C)    Temp:  [97 °F (36 1 °C)-98 7 °F (37 1 °C)] 98 °F (36 7 °C)  HR:  [60-84] 74  Resp:  [12-24] 18  BP: (137-222)/() 163/88  SpO2:  [96 %-99 %] 98 %  Body mass index is 24 07 kg/m²  Input and Output Summary (last 24 hours): Intake/Output Summary (Last 24 hours) at 2020 1124  Last data filed at 2020  Gross per 24 hour   Intake 1340 ml   Output --   Net 1340 ml       Physical Exam:     Physical Exam  Constitutional:       Appearance: Normal appearance  HENT:      Head: Normocephalic and atraumatic     Eyes:      Extraocular Movements: Extraocular movements intact  Pupils: Pupils are equal, round, and reactive to light  Neck:      Musculoskeletal: Normal range of motion and neck supple  Cardiovascular:      Rate and Rhythm: Normal rate and regular rhythm  Pulmonary:      Effort: Pulmonary effort is normal  No respiratory distress  Breath sounds: Normal breath sounds  No wheezing or rales  Abdominal:      General: Abdomen is flat  Bowel sounds are normal       Palpations: Abdomen is soft  Musculoskeletal:         General: No swelling  Neurological:      Mental Status: She is alert and oriented to person, place, and time  Comments: Mild expressive aphasia strength 5/5 equally on both upper extremity and lower extremities she does have a mild right facial droop  Psychiatric:         Mood and Affect: Mood normal            Additional Data:     Labs:    Results from last 7 days   Lab Units 12/05/20  1254   WBC Thousand/uL 7 54   HEMOGLOBIN g/dL 14 6   HEMATOCRIT % 44 5   PLATELETS Thousands/uL 321     Results from last 7 days   Lab Units 12/06/20  0510   SODIUM mmol/L 142   POTASSIUM mmol/L 3 9   CHLORIDE mmol/L 107   CO2 mmol/L 26   BUN mg/dL 13   CREATININE mg/dL 1 05   ANION GAP mmol/L 9   CALCIUM mg/dL 9 3   GLUCOSE RANDOM mg/dL 107     Results from last 7 days   Lab Units 12/05/20  1254   INR  0 93     Results from last 7 days   Lab Units 12/05/20  1250   POC GLUCOSE mg/dl 108                   * I Have Reviewed All Lab Data Listed Above  * Additional Pertinent Lab Tests Reviewed:  All Labs Within Last 24 Hours Reviewed    Imaging:    Imaging Reports Reviewed Today Include: none today  Imaging Personally Reviewed by Myself Includes:      Recent Cultures (last 7 days):           Last 24 Hours Medication List:   Current Facility-Administered Medications   Medication Dose Route Frequency Provider Last Rate    acetaminophen  650 mg Oral Q4H PRN Denilson Miguel MD      amLODIPine  5 mg Oral Daily Denilson Miguel MD      atenolol  50 mg Oral Daily Roxi Giordano MD      atorvastatin  40 mg Oral QPM Roxi Giordano MD      clopidogrel  75 mg Oral Daily Roxi Giordano MD      Labetalol HCl  10 mg Intravenous Q4H PRN Roxi Giordano MD     Shruti Crystal levothyroxine  25 mcg Oral Daily Roxi Giordano MD          Today, Patient Was Seen By: Roxi Giordano MD    ** Please Note: Dictation voice to text software may have been used in the creation of this document   **

## 2020-12-06 NOTE — PLAN OF CARE
Problem: Potential for Falls  Goal: Patient will remain free of falls  Description: INTERVENTIONS:  - Assess patient frequently for physical needs  -  Identify cognitive and physical deficits and behaviors that affect risk of falls  -  [place] fall precautions as indicated by assessment   - Educate patient/family on patient safety including physical limitations  - Instruct patient to call for assistance with activity based on assessment  - Modify environment to reduce risk of injury  - Consider OT/PT consult to assist with strengthening/mobility  Outcome: Progressing     Problem: Neurological Deficit  Goal: Neurological status is stable or improving  Description: Interventions:  - Monitor and assess patient's level of consciousness, motor function, sensory function, and level of assistance needed for ADLs  - Monitor and report changes from baseline  Collaborate with interdisciplinary team to initiate plan and implement interventions as ordered  - Provide and maintain a safe environment  - Consider seizure precautions  - Consider fall precautions  - Consider aspiration precautions  - Consider bleeding precautions  Outcome: Progressing     Problem: Activity Intolerance/Impaired Mobility  Goal: Mobility/activity is maintained at optimum level for patient  Description: Interventions:  - Assess and monitor patient  barriers to mobility and need for assistive/adaptive devices  - Assess patient's emotional response to limitations  - Collaborate with interdisciplinary team and initiate plans and interventions as ordered  - Encourage independent activity per ability   - Maintain proper body alignment  - Perform active/passive rom as tolerated/ordered    - Plan activities to conserve energy   - Turn patient as appropriate  Outcome: Progressing     Problem: Communication Impairment  Goal: Ability to express needs and understand communication  Description: Assess patient's communication skills and ability to understand information  Patient will demonstrate use of effective communication techniques, alternative methods of communication and understanding even if not able to speak  - Encourage communication and provide alternate methods of communication as needed  - Collaborate with case management/ for discharge needs  - Include patient/family/caregiver in decisions related to communication  Outcome: Progressing     Problem: Potential for Aspiration  Goal: Non-ventilated patient's risk of aspiration is minimized  Description: Assess and monitor vital signs, respiratory status, and labs (WBC)  Monitor for signs of aspiration (tachypnea, cough, rales, wheezing, cyanosis, fever)  - Assess and monitor patient's ability to swallow  - Place patient up in chair to eat if possible  - HOB up at 90 degrees to eat if unable to get patient up into chair   - Supervise patient during oral intake  - Instruct patient/ family to take small bites  - Instruct patient/ family to take small single sips when taking liquids  - Follow patient-specific strategies generated by speech pathologist   Outcome: Progressing  Goal: Ventilated patient's risk of aspiration is minimized  Description: Assess and monitor vital signs, respiratory status, airway cuff pressure, and labs (WBC)  Monitor for signs of aspiration (tachypnea, cough, rales, wheezing, cyanosis, fever)  - Elevate head of bed 30 degrees if patient has tube feeding   - Monitor tube feeding    Outcome: Progressing

## 2020-12-07 ENCOUNTER — APPOINTMENT (INPATIENT)
Dept: MRI IMAGING | Facility: HOSPITAL | Age: 70
DRG: 065 | End: 2020-12-07
Payer: COMMERCIAL

## 2020-12-07 ENCOUNTER — APPOINTMENT (INPATIENT)
Dept: NON INVASIVE DIAGNOSTICS | Facility: HOSPITAL | Age: 70
DRG: 065 | End: 2020-12-07
Payer: COMMERCIAL

## 2020-12-07 LAB
ATRIAL RATE: 73 BPM
P AXIS: 70 DEGREES
PR INTERVAL: 136 MS
QRS AXIS: 32 DEGREES
QRSD INTERVAL: 84 MS
QT INTERVAL: 452 MS
QTC INTERVAL: 497 MS
T WAVE AXIS: 60 DEGREES
VENTRICULAR RATE: 73 BPM

## 2020-12-07 PROCEDURE — 85303 CLOT INHIBIT PROT C ACTIVITY: CPT | Performed by: PSYCHIATRY & NEUROLOGY

## 2020-12-07 PROCEDURE — 81241 F5 GENE: CPT | Performed by: PSYCHIATRY & NEUROLOGY

## 2020-12-07 PROCEDURE — 97116 GAIT TRAINING THERAPY: CPT

## 2020-12-07 PROCEDURE — 85306 CLOT INHIBIT PROT S FREE: CPT | Performed by: PSYCHIATRY & NEUROLOGY

## 2020-12-07 PROCEDURE — NC001 PR NO CHARGE: Performed by: PSYCHIATRY & NEUROLOGY

## 2020-12-07 PROCEDURE — 85705 THROMBOPLASTIN INHIBITION: CPT | Performed by: PSYCHIATRY & NEUROLOGY

## 2020-12-07 PROCEDURE — 86146 BETA-2 GLYCOPROTEIN ANTIBODY: CPT | Performed by: PSYCHIATRY & NEUROLOGY

## 2020-12-07 PROCEDURE — 97167 OT EVAL HIGH COMPLEX 60 MIN: CPT

## 2020-12-07 PROCEDURE — 85670 THROMBIN TIME PLASMA: CPT | Performed by: PSYCHIATRY & NEUROLOGY

## 2020-12-07 PROCEDURE — 93010 ELECTROCARDIOGRAM REPORT: CPT | Performed by: INTERNAL MEDICINE

## 2020-12-07 PROCEDURE — 70551 MRI BRAIN STEM W/O DYE: CPT

## 2020-12-07 PROCEDURE — 85613 RUSSELL VIPER VENOM DILUTED: CPT | Performed by: PSYCHIATRY & NEUROLOGY

## 2020-12-07 PROCEDURE — 99232 SBSQ HOSP IP/OBS MODERATE 35: CPT | Performed by: FAMILY MEDICINE

## 2020-12-07 PROCEDURE — G1004 CDSM NDSC: HCPCS

## 2020-12-07 PROCEDURE — 86147 CARDIOLIPIN ANTIBODY EA IG: CPT | Performed by: PSYCHIATRY & NEUROLOGY

## 2020-12-07 PROCEDURE — 81240 F2 GENE: CPT | Performed by: PSYCHIATRY & NEUROLOGY

## 2020-12-07 PROCEDURE — 97129 THER IVNTJ 1ST 15 MIN: CPT

## 2020-12-07 PROCEDURE — 85305 CLOT INHIBIT PROT S TOTAL: CPT | Performed by: PSYCHIATRY & NEUROLOGY

## 2020-12-07 PROCEDURE — 85300 ANTITHROMBIN III ACTIVITY: CPT | Performed by: PSYCHIATRY & NEUROLOGY

## 2020-12-07 PROCEDURE — 97163 PT EVAL HIGH COMPLEX 45 MIN: CPT

## 2020-12-07 PROCEDURE — 93306 TTE W/DOPPLER COMPLETE: CPT | Performed by: INTERNAL MEDICINE

## 2020-12-07 PROCEDURE — 92523 SPEECH SOUND LANG COMPREHEN: CPT

## 2020-12-07 PROCEDURE — 93306 TTE W/DOPPLER COMPLETE: CPT

## 2020-12-07 PROCEDURE — 85732 THROMBOPLASTIN TIME PARTIAL: CPT | Performed by: PSYCHIATRY & NEUROLOGY

## 2020-12-07 RX ORDER — AMLODIPINE BESYLATE 5 MG/1
5 TABLET ORAL ONCE
Status: COMPLETED | OUTPATIENT
Start: 2020-12-07 | End: 2020-12-07

## 2020-12-07 RX ORDER — AMLODIPINE BESYLATE 10 MG/1
10 TABLET ORAL DAILY
Status: DISCONTINUED | OUTPATIENT
Start: 2020-12-08 | End: 2020-12-07

## 2020-12-07 RX ORDER — AMLODIPINE BESYLATE 5 MG/1
5 TABLET ORAL DAILY
Status: DISCONTINUED | OUTPATIENT
Start: 2020-12-08 | End: 2020-12-08

## 2020-12-07 RX ORDER — ATORVASTATIN CALCIUM 40 MG/1
80 TABLET, FILM COATED ORAL EVERY EVENING
Status: DISCONTINUED | OUTPATIENT
Start: 2020-12-07 | End: 2020-12-17 | Stop reason: HOSPADM

## 2020-12-07 RX ORDER — ATENOLOL 50 MG/1
50 TABLET ORAL DAILY
Status: DISCONTINUED | OUTPATIENT
Start: 2020-12-08 | End: 2020-12-08

## 2020-12-07 RX ORDER — LORAZEPAM 2 MG/ML
0.5 INJECTION INTRAMUSCULAR ONCE
Status: COMPLETED | OUTPATIENT
Start: 2020-12-07 | End: 2020-12-07

## 2020-12-07 RX ADMIN — CLOPIDOGREL 75 MG: 75 TABLET, FILM COATED ORAL at 09:16

## 2020-12-07 RX ADMIN — LEVOTHYROXINE SODIUM 25 MCG: 25 TABLET ORAL at 05:47

## 2020-12-07 RX ADMIN — AMLODIPINE BESYLATE 5 MG: 5 TABLET ORAL at 12:26

## 2020-12-07 RX ADMIN — ATORVASTATIN CALCIUM 80 MG: 40 TABLET, FILM COATED ORAL at 17:50

## 2020-12-07 RX ADMIN — ATENOLOL 50 MG: 50 TABLET ORAL at 09:16

## 2020-12-07 RX ADMIN — LORAZEPAM 0.5 MG: 2 INJECTION INTRAMUSCULAR; INTRAVENOUS at 09:54

## 2020-12-07 RX ADMIN — AMLODIPINE BESYLATE 5 MG: 5 TABLET ORAL at 09:16

## 2020-12-07 NOTE — SPEECH THERAPY NOTE
Speech/Language Assessment    Patient Name: Archie Garcia 69 y o  Today's Date: 2020    Problem List  Principal Problem:    Acute CVA (cerebrovascular accident) [facility])  Active Problems:    Hypertensive urgency    Hypertension    QT prolongation    Hypothyroidism    Migraines    Past Medical History  Past Medical History:   Diagnosis Date    Hypertension      Past Surgical History  Past Surgical History:   Procedure Laterality Date    ABLATION SOFT TISSUE         CURRENT MEDICAL per Dr Tom Kuhn H&P 2020  Archie Garcia is a 69 y o  female who presents with right-sided weakness that happened yesterday to 27 and has been improving and now resolved and she had associated some numbness  Today when she spoke to her son she had trouble finding words and apparently there was a right facial droop and that is what brought him to the ER  She denies having a headache before that happening she denies ever having a TIA or stroke denies any double vision or blurry vision she denies having shortness of breath currently denies any chest pain denies any nausea vomiting or diarrhea abdominal pain   She has been taking all her medications      CURRENT COGNITIVE:  Alert and oriented     CURRENT PAIN & RESPONSE TO INTERVENTIONS:  No indication of pain     Pt was assessed using the Western Aphasia Battery revised Bedside with the following findings/results:     AUDITORY COMPREHENSION:  Body part ID: 100%  Object/Picture ID: 100%   Personal yes/no ?'s: 100%  Simple yes/no ?'s: 100%  Moderate y/n ?'s: 100%  One step commands: 100%  Complex or 3 step commands: 100%     READING COMPREHENSION/FLUENCY:  Paragraph comprehension: WFL  Simple paragraph reading fluency: Impaired d/t dysarthria     VERBAL EXPRESSION/EXPR LANGUAGE:  Word/phrase repetition: 100% (mildly dysarthric)  Confrontation Namin%  Picture Description: WFL  Conversation: Spontaneous speech fluency judged to be halting at times however appears to be more due to pt attempting to correct her articulation errors rather than aphasia  Ability to make needs known: WFL     WRITTEN EXPRESSION:  Name: Significant difficulty writing name, discontinued to avoid frustration     ORAL MOTOR/SPEECH MECHANISM EXAM:  R facial weakness  Decreased lingual coordination  Lower partial, upper natural  Reduced R side labial retraction   Reduced lingual and labial rate/precision    MOTOR SPEECH:  Dysarthria: yes              Imprecise artic: yes              Rate: WFL however intelligibility is improved when rate is slowed              Nasality: hyponasal              Breath support: WFL              Volume: mildly reduced  Apraxia:              Oral: no              Verbal: no     Needs further motor speech evaluation: yes     Cognitive -linguistic skills:  Suspect WFL     Symptoms noted:  Aware of deficits: yes    WAB Bedside:  Bedside Aphasia Score 97  Bedside Language Score 85 (brought down due to poor writing, however suspect this is due to R hand weakness)     Summary/Impression: The bedside WAB was administered due to pt's report of difficulty getting words out  Through assessment, it appears pt has minimal-no aphasia, however more significant dysarthria  Her difficulty in getting words out appears more due to trouble with motor production of the words rather than inability to retrieve them  Speech is halting at times, however this appears to be due to pt attempting to correct her articulation errors   ST will f/u for additional diagnostic treatment of dysarthria       Treatment Recommended and Frequency:   Yes, 2-3x/week    Impression and Recommendations reviewed with:   Pt     Patient Stated Goal:   To go home     Education Initiated with:   Pt      Therapy Prognosis: good  Prognosis considerations: age, motivation    LONG TERM GOALS:  -The patient will demonstrate intelligible speech in all activities of daily living including dynamic conversation  -The patient will independently utilize compensatory strategies to maximize communication in all ADLs      SHORT TERM GOALS:  -Patient will utilize the strategy of increased respiratory support (ie inhale more deeply)  before beginning an utterance at the word, phrase and sentence level tasks  for 90% of trials with minimal to no cues needed in both formal speech tasks and in informal ADL activities    -Patient will demonstrate adequate strength, ROM and control for labial and bilabial sounds m, p, b, w, f, v and produce 20 intelligible words, phrases, sentences) related to basic medical and personal  needs     -Patient will demonstrate adequate anterior lingual strength, ROM and control for  t, d, n,l and produce 20 intelligible (words, phrases, sentences) related to basic personal and medical  needs      -Patient will demonstrate adequate posterior  lingual strength, ROM and control for k, g  and produce 20 intelligible (words, phrases, sentences) related to basic personal and medical needs      -Patient will demonstrate adequate lingual strength, ROM and control for  fricatives and affricates and produce 20 understandable words, phrases, sentences) related to basic personal and medical needs    -Patient will accurately produce vowels/consonants (bilabials/velars/palatals) in isolation, initial position, medial position and final position with __% accuracy     -Patient will use trained strategies (eg slowed rate, over articulation, increased oral opening, writing key word, increased loudness, phrasing)  to improve speech intelligibility in word,  phrases, sentences and  conversation with __% accuracy      -Patient will discriminate between intelligible and unintelligible speech and use trained strategies to repair communication (eg slowed rate, exaggerated articulation, repetition, rephrasal)

## 2020-12-07 NOTE — PLAN OF CARE
Problem: PHYSICAL THERAPY ADULT  Goal: Performs mobility at highest level of function for planned discharge setting  See evaluation for individualized goals  Description: Treatment/Interventions: Functional transfer training, LE strengthening/ROM, Elevations, Therapeutic exercise, Endurance training, Patient/family training, Equipment eval/education, Bed mobility, Gait training, Compensatory technique education, Spoke to nursing, OT          See flowsheet documentation for full assessment, interventions and recommendations  Note: Prognosis: Good  Problem List: Decreased strength, Decreased endurance, Impaired balance, Decreased mobility, Impaired judgement, Decreased safety awareness  Assessment: Pt is a 69 y o  female seen for PT evaluation s/p admission to [address] [place] [address] on 12/5/2020 with Acute CVA (cerebrovascular accident) [facility])  Order placed for PT services  Upon evaluation: Pt is presenting with impaired functional mobility due to decreased strength, decreased endurance, impaired balance, gait deviations, impaired cognition, decreased safety awareness, impaired judgment and fall risk requiring supervision assistance for bed mobility and steadying assistance for transfers and ambulation with no  Pt's clinical presentation is currently unstable/unpredictable given the functional mobility deficits above coupled with fall risks as indicated by AM-PAC 6-Clicks: 29/98 as well as impaired balance, impaired coordination, impaired judgement, decreased safety awareness and decreased cognition and combined with medical complications of hypertension  and need for input for mobility technique/safety  Pt's PMHx and comorbidities that may affect physical performance and progress include: HTN, CVA, limited cognition and hypothyroidism   Personal factors affecting pt at time of IE include: inaccessible home environment, step(s) to enter environment, limited home support, inability to navigate community distances, limited insight into impairments and difficulty communicating  Pt will benefit from continued skilled PT services to address deficits as defined above and to maximize level of functional mobility to facilitate return toward PLOF and improved QOL  From PT/mobility standpoint, recommendation at time of d/c would be Short term rehab pending progress in order to reduce fall risk and maximize pt's functional independence and consistency with mobility in order to facilitate return to PLOF  Recommend trial with cane next 1-2 sessions and ther ex next 1-2 sessions  Barriers to Discharge: Decreased caregiver support, Inaccessible home environment  Barriers to Discharge Comments: Pt requires increased assistance for mobility and lives alone  PT Discharge Recommendation: Post-Acute Rehabilitation Services(rehab)     PT - OK to Discharge: Yes(when medically clear)    See flowsheet documentation for full assessment

## 2020-12-07 NOTE — ASSESSMENT & PLAN NOTE
· Patient today had worsening expressive aphasia and she had right upper extremity and lower extremity weakness compared to the previous  Discussed with Neurology Dr Isadora Sanders- as she had the MRI today she most likely was completing her acute stroke the stroke is quite large  Will keep blood pressure anywhere systolic [phone]  To continue her Norvasc increased to 5 mg daily as prior and atenolol  There was no AFib found on 48 hours of telemetry CT was negative  For now do not think it embolic 2D echo with bubble study is pending to show if there is any clot/pfo  · She will continue aspirin and Plavix for 3 weeks and aspirin alone actually increased her Lipitor to 80 mg daily  Evaluated by PT OT she will need acute rehab  · Neurology wants her monitor neurologically for another 48 hours     · MRI of the brain-Acute to early subacute left basal ganglionic infarction involving the left putamen and corona radiata      · Mild cerebral chronic microangiopathic disease  · 2D echo with bubble study pending  · Patient was loaded with Plavix 300 mg on aspirin  In the ER  · Lipid panel is suboptimal  · EKG normal sinus rhythm will keep on telemetry monitor to assess any arrhythmia in terms of atrial fibrillation or atrial flutter  · Patient has no previous history of a stroke or a TIA  · She does have a history of hypertension it usually controlled she is on 2 medications and takes them every day      · Not a tPA candidate as the outside of the window

## 2020-12-07 NOTE — CONSULTS
Consult received for stroke pathway  Pt with elevated lipid profile  Reports fairly low consumption of fruits and vegetables, eats refined grains and sometimes lunchmeat/higher fat/Na meat options  Discussed heart healthy diet including lean meats, fruits and vegetables, whole grains, low fat diary products  Discussed alternatives to typical intake  Handout provided  Pt had no questions at this time  Intake of 50 - 100% of meals  Will order cardiac diet 4 gm FACUNDO to reinforce diet ed, will monitor intake and liberalize if indicated  Thank you for the consult

## 2020-12-07 NOTE — PROGRESS NOTES
Progress Note - Diane Luna 1950, 69 y o  female MRN: [id number]    Unit/Bed#: -01 Encounter: [id number]    Primary Care Provider: Talisha Pearl DO   Date and time admitted to hospital: 12/5/2020 12:48 PM        * Acute CVA (cerebrovascular accident) [facility])  Assessment & Plan  · Patient today had worsening expressive aphasia and she had right upper extremity and lower extremity weakness compared to the previous  Discussed with Neurology Dr Andrew Zapien- as she had the MRI today she most likely was completing her acute stroke the stroke is quite large  Will keep blood pressure anywhere systolic 739-580  To continue her Norvasc increased to 5 mg daily as prior and atenolol  There was no AFib found on 48 hours of telemetry CT was negative  For now do not think it embolic 2D echo with bubble study is pending to show if there is any clot/pfo  · She will continue aspirin and Plavix for 3 weeks and aspirin alone actually increased her Lipitor to 80 mg daily  Evaluated by PT OT she will need acute rehab  · Neurology wants her monitor neurologically for another 48 hours     · MRI of the brain-Acute to early subacute left basal ganglionic infarction involving the left putamen and corona radiata      · Mild cerebral chronic microangiopathic disease  · 2D echo with bubble study pending  · Patient was loaded with Plavix 300 mg on aspirin  In the ER  · Lipid panel is suboptimal  · EKG normal sinus rhythm will keep on telemetry monitor to assess any arrhythmia in terms of atrial fibrillation or atrial flutter  · Patient has no previous history of a stroke or a TIA  · She does have a history of hypertension it usually controlled she is on 2 medications and takes them every day  · Not a tPA candidate as the outside of the window    Migraines  Assessment & Plan  · Patient has history of migraines he denies any headaches yesterday denies any headaches now      Hypothyroidism  Assessment & Plan  · Continue home dose Synthroid and check a TSH in the morning TSH is slightly elevated free T4 is normal   Will continue same dose repeat TSH in 4 weeks    QT prolongation  Assessment & Plan  · EKG with evidence of QTC of 497  Improved on the EKG reviewed to 480    Hypertension  Assessment & Plan  · Blood pressure is acceptable keep her on higher side anywhere between 160-180 continue Norvasc and atenolol    Hypertensive urgency  Assessment & Plan  · Resolved  · Due to large stroke keep systolic 606-536  She will continue her current Norvasc and atenolol      VTE Pharmacologic Prophylaxis:   Pharmacologic: Pharmacologic VTE Prophylaxis contraindicated due to Encourage ambulation  Mechanical VTE Prophylaxis in Place: Yes    Patient Centered Rounds: I have performed bedside rounds with nursing staff today  Discussions with Specialists or Other Care Team Provider:  Discussed with Neurology    Education and Discussions with Family / Patient:  Patient ,I  updated son    Time Spent for Care: 30 minutes  More than 50% of total time spent on counseling and coordination of care as described above  Current Length of Stay: 2 day(s)    Current Patient Status: Inpatient   Certification Statement: The patient will continue to require additional inpatient hospital stay due to Acute large CVA    Discharge Plan: At least 48 hours    Code Status: Level 1 - Full Code      Subjective:   Patient seen and examined today she had worsening dysarthria and weakness in the right side    Objective:     Vitals:   Temp (24hrs), Av °F (36 7 °C), Min:97 3 °F (36 3 °C), Max:98 6 °F (37 °C)    Temp:  [97 3 °F (36 3 °C)-98 6 °F (37 °C)] 98 6 °F (37 °C)  HR:  [] 68  Resp:  [16-20] 20  BP: (153-190)/() 157/81  SpO2:  [96 %-98 %] 98 %  Body mass index is 21 8 kg/m²  Input and Output Summary (last 24 hours):        Intake/Output Summary (Last 24 hours) at 2020 1512  Last data filed at 2020 1427  Gross per 24 hour   Intake 540 ml   Output --   Net 540 ml       Physical Exam:     Physical Exam  HENT:      Head: Normocephalic and atraumatic  Eyes:      Extraocular Movements: Extraocular movements intact  Pupils: Pupils are equal, round, and reactive to light  Cardiovascular:      Rate and Rhythm: Normal rate and regular rhythm  Pulses: Normal pulses  Heart sounds: Normal heart sounds  Pulmonary:      Effort: Pulmonary effort is normal  No respiratory distress  Breath sounds: Normal breath sounds  No wheezing or rales  Abdominal:      General: Abdomen is flat  Bowel sounds are normal  There is no distension  Palpations: Abdomen is soft  Tenderness: There is no abdominal tenderness  Musculoskeletal:         General: No swelling  Neurological:      Mental Status: She is alert  Comments: So today the patient has worsening dysarthria with the expressive aphasia she does have right upper and lower extremity weakness   Psychiatric:         Mood and Affect: Mood normal            Additional Data:     Labs:    Results from last 7 days   Lab Units 12/05/20  1254   WBC Thousand/uL 7 54   HEMOGLOBIN g/dL 14 6   HEMATOCRIT % 44 5   PLATELETS Thousands/uL 321     Results from last 7 days   Lab Units 12/06/20  0510   SODIUM mmol/L 142   POTASSIUM mmol/L 3 9   CHLORIDE mmol/L 107   CO2 mmol/L 26   BUN mg/dL 13   CREATININE mg/dL 1 05   ANION GAP mmol/L 9   CALCIUM mg/dL 9 3   GLUCOSE RANDOM mg/dL 107     Results from last 7 days   Lab Units 12/05/20  1254   INR  0 93     Results from last 7 days   Lab Units 12/05/20  1250   POC GLUCOSE mg/dl 108     Results from last 7 days   Lab Units 12/06/20  0510   HEMOGLOBIN A1C % 5 7*               * I Have Reviewed All Lab Data Listed Above  * Additional Pertinent Lab Tests Reviewed:  All Labs Within Last 24 Hours Reviewed    Imaging:    Imaging Reports Reviewed Today Include:  MRI of the brain  Imaging Personally Reviewed by Myself Includes:      Recent Cultures (last 7 days):           Last 24 Hours Medication List:   Current Facility-Administered Medications   Medication Dose Route Frequency Provider Last Rate    acetaminophen  650 mg Oral Q4H PRN Pricilla Garcia MD     Lary Anderson [START ON 12/8/2020] amLODIPine  5 mg Oral Daily Pricilla Garcia MD      [START ON 12/8/2020] atenolol  50 mg Oral Daily Pricilla Garcia MD      atorvastatin  80 mg Oral QPM Pricilla Garcia MD      clopidogrel  75 mg Oral Daily Pricilla Garcia MD      Labetalol HCl  10 mg Intravenous Q4H PRN Pricilla Garcia MD      levothyroxine  25 mcg Oral Daily Pricilla Garcia MD          Today, Patient Was Seen By: Pricilla Garcia MD    ** Please Note: Dictation voice to text software may have been used in the creation of this document   **

## 2020-12-07 NOTE — PLAN OF CARE
Problem: PHYSICAL THERAPY ADULT  Goal: Performs mobility at highest level of function for planned discharge setting  See evaluation for individualized goals  Description: Treatment/Interventions: Functional transfer training, LE strengthening/ROM, Elevations, Therapeutic exercise, Endurance training, Patient/family training, Equipment eval/education, Bed mobility, Gait training, Compensatory technique education, Spoke to nursing, OT          See flowsheet documentation for full assessment, interventions and recommendations  12/7/2020 1209 by Mora Loaiza PT  Outcome: Progressing  Note: Prognosis: Good  Problem List: Decreased strength, Decreased endurance, Impaired balance, Decreased mobility, Impaired judgement, Decreased safety awareness  Pt seen for PT treatment session this date with interventions consisting of gait training w/ emphasis on improving pt's ability to ambulate level surfaces x 30' with steadying assistance provided by therapist with [address] O[address]eda  Pt agreeable to PT treatment session upon arrival, pt found standing at sink with therapist present, in no apparent distress  In comparison to previous session, pt with improvements in functional mobility as evidenced by pt ambulating increased distance  Post session: pt returned back to recliner, chair alarm engaged, all needs in reach and RN notified of session findings/recommendations Continue to recommend STR at time of d/c in order to maximize pt's functional independence and safety w/ mobility  Pt continues to be functioning below baseline level, and remains limited 2* factors listed above and including decreased strength, balance, activity tolerance, decreased safety awareness, and limited insight into impairments Pt likely able to tolerate 3 hrs of therapy per day  PT will continue to see pt while here in order to address the deficits listed above and provide interventions consistent w/ POC in effort to achieve STGs     Barriers to Discharge: Decreased caregiver support, Inaccessible home environment  Barriers to Discharge Comments: Pt requires increased assistance for mobility and lives alone  PT Discharge Recommendation: Post-Acute Rehabilitation Services(rehab)     PT - OK to Discharge: Yes(when medically clear)    See flowsheet documentation for full assessment

## 2020-12-07 NOTE — ASSESSMENT & PLAN NOTE
· Resolved  · Due to large stroke keep systolic 118-839    She will continue her current Norvasc and atenolol

## 2020-12-07 NOTE — PLAN OF CARE
Problem: OCCUPATIONAL THERAPY ADULT  Goal: Performs self-care activities at highest level of function for planned discharge setting  See evaluation for individualized goals  Description: Treatment Interventions: ADL retraining, Visual perceptual retraining, Functional transfer training, UE strengthening/ROM, Endurance training, Cognitive reorientation, Equipment evaluation/education, Neuromuscular reeducation, Fine motor coordination activities, Compensatory technique education, Continued evaluation, Energy conservation, Activityengagement          See flowsheet documentation for full assessment, interventions and recommendations  Note: Limitation: Decreased ADL status, Decreased UE strength, Decreased Safe judgement during ADL, Decreased cognition, Decreased endurance, Visual deficit, Decreased fine motor control, Decreased self-care trans, Decreased high-level ADLs  Prognosis: Good  Assessment: Pt is a 68 y/o F admitted to [address] [place] [address] 12/5/2020 d/t experiencing R sided weakness and numbness  Dx: acute CVA  MRI shows Acute to early subacute left basal ganglionic infarction involving the left putamen and corona radiata  Mild cerebral chronic microangiopathic disease  Pt with PMHx impacting performance during functional tasks including: HTN, migraines  Pt reports living at home independently in a 1 story home with 3 DAMION  Pt reports completing ADLs, IADLs, functional ambulation, and community mobility + driving @ I  Pt has support from her children PRN  On evaluation, Pt A&Ox4 (cues for year)  Pt completing bed mobility @ S  UB Dressing @ S after set-up  LB Dressing @ Steadying-Min A for balance while standing to complete CM around waist  Pt completing STS and SPT with no AD for UB support @ Steadying Assist  Pt insist on using no AD although demonstrate multiple episodes of LOB to R side  Pt standing at sinkside to complete hand hygiene @ SBA with increased time   Decreased FM and GM coordination noted in RUE during functional tasks  Slight visual deficit noted to R side although Pt inconsistently reporting  Will continue to assess as able  Pt then returning to recliner chair with call bell in reach, chair alarm intact and all needs met at this time  Pt BUE ROM and MS WFL  Although Pt noted to have slight decreased MS in RUE  Pt scoring 45/100 on Barthel Index  Pt presents with decrease activity tolerance, decrease standing balance, decrease performance during ADL tasks, decrease cognition, decrease safety awareness , decrease BUE ROM, decrease UB MS, decrease generalized strength, decrease activity engagement, decrease performance during functional transfers, decrease FM control and decrease GM control  Pt would benefit from continued acute OT services to address deficits as well as post acute rehab services upon d/c from [address] [place] [address]  Pt is motivated to participate and demonstrates ability to tolerate high levels of therapy at this time    Recommendation: (post acute rehab)  OT Discharge Recommendation: Post-Acute Rehabilitation Services

## 2020-12-07 NOTE — TELEMEDICINE
Discussed case with Bobbi Shin and reviewed imaging in PACS  Pt with worse aphasia and now densely paretic right UE and LE worse than exam yesterday and days prior  MRI brain done earlier today with LBG infarct- lenticulostriate artery territory  CTA H/N with no large vessel occlusion  Pt has not been hypotensive per primary team or per chart review  Suspect Pt completed her stroke with ischemia noted left caudate/ putamen- thus worse symptoms  Potential etiology small vessel disease however  Cannot rule out embolic etiology  ECHO pending, no Afib on telemetry I am told  C/w DUAP for now, after three weeks switch to ASA  Will follow up ECHO, pending this will consider LUIZ    Recommend thrombosis panel  BP goal for now- 160s-180s SBP for at least 48 hours pending clinical course can gently lower afterwards    D/w Dr Rice  Total time spent with direct physician to physician audio telecommunication including chart review to formulate above plan at least 15 minutes

## 2020-12-07 NOTE — ASSESSMENT & PLAN NOTE
· Blood pressure is acceptable keep her on higher side anywhere between 160-180 continue Norvasc and atenolol

## 2020-12-07 NOTE — PHYSICAL THERAPY NOTE
PHYSICAL THERAPY EVALUATION  NAME:  Yodit Elise  DATE: 12/07/20    AGE:   69 y o  Mrn:   [id number]  ADMIT DX:  Weakness [R53 1]  Hypertension [I10]  Cerebrovascular accident (CVA), unspecified mechanism ([address] ) [I63 9]    Past Medical History:   Diagnosis Date    Hypertension      Length Of Stay: 2  Performed at least 2 patient identifiers during session: Name and Birthday  PHYSICAL THERAPY EVALUATION :        12/07/20 1059   Note Type   Note type Evaluation   Pain Assessment   Pain Assessment Tool 0-10   Pain Score No Pain   Home Living   Type of Home House  (3 DAMION w/ HR)   Home Layout One level; Able to live on main level with bedroom/bathroom; Performs ADLs on one level   Bathroom Shower/Tub Tub/shower unit   Bathroom Toilet Raised   Bathroom Equipment Grab bars in [facility] Other (Comment)  (denies DME)   Additional Comments Pt reports living independently in 1 SH with 3 DAMION with HR  Pt reports her son and DIL live up the street   Prior Function   Level of [place] Independent with ADLs and functional mobility   Lives With Alone   Receives Help From Family  (son and DIL "live up the street")   ADL Assistance Independent   IADLs Independent   Falls in the last 6 months 0   Vocational Retired   Comments Pt reports being Ind with ADLs, IADLs, mobility without AD, and driving   Restrictions/Precautions   Other Precautions Chair Alarm; Bed Alarm; Fall Risk;Cognitive   General   Additional Pertinent History Pt admitted with dx acute CVA   Cognition   Orientation Level Oriented X4  (cues for year)   Following Commands Follows one step commands without difficulty   RLE Assessment   RLE Assessment WFL  (4/5 strength)   LLE Assessment   LLE Assessment WFL  (4/5 strength)   Coordination   Sensation WFL   Light Touch   RLE Light Touch Grossly intact   LLE Light Touch Grossly intact   Bed Mobility   Supine to Sit 5  Supervision   Additional items Increased time required;Verbal cues   Additional Comments Pt performed supine > sit with supervision and min VC for sequencing   Transfers   Sit to Stand   (steadying assistance)   Additional items Increased time required;Verbal cues   Stand to Sit   (steadying assistance)   Additional items Increased time required;Verbal cues   Stand pivot   (steaying assistance)   Additional items Increased time required;Verbal cues   Additional Comments Pt performed all transfers without AD with steadying assistance  Pt required several attempts to obtain standing from seated EOB due to imbalance  Ambulation/Elevation   Gait pattern Decreased foot clearance;Narrow GARRY; Short stride; Excessively slow   Gait Assistance   (steadying assist)   Additional items Verbal cues   Assistive Device None   Distance 12' Pt ambulated without AD with narrow GARRY, decreased foot clearance, and short stride  Pt stood at sink to complete ADL for 5 min without UE support with trunk support on sink  Pt without LOB however noted unsteadiness   Balance   Static Sitting Good   Dynamic Sitting Fair   Static Standing Fair   Dynamic Standing Fair -   Ambulatory Fair -   Endurance Deficit   Endurance Deficit No   Activity Tolerance   Activity Tolerance Patient tolerated treatment well   Medical Staff Made Aware Agnieszka SHERMAN   Nurse Made Aware RN, Corinne Foreman   Assessment   Prognosis Good   Problem List Decreased strength;Decreased endurance; Impaired balance;Decreased mobility; Impaired judgement;Decreased safety awareness   Barriers to Discharge Decreased caregiver support; Inaccessible home environment   Barriers to Discharge Comments Pt requires increased assistance for mobility and lives alone   Goals   Patient Goals None stated   STG Expiration Date 12/17/20   PT Treatment Day 1   Plan   Treatment/Interventions Functional transfer training;LE strengthening/ROM; Elevations; Therapeutic exercise; Endurance training;Patient/family training;Equipment eval/education; Bed mobility;Gait training; Compensatory technique education;Spoke to nursing;OT   PT Frequency 5x/wk   Recommendation   PT Discharge Recommendation Post-Acute Rehabilitation Services  (rehab)   PT - OK to Discharge Yes  (when medically clear)   [address] Mobility Inpatient   Turning in Bed Without Bedrails 3   Lying on Back to Sitting on Edge of Flat Bed 3   Moving Bed to Chair 3   Standing Up From Chair 3   Walk in Room 3   Climb 3-5 Stairs 3   Basic Mobility Inpatient Raw Score 18   Basic Mobility Standardized Score 41 05       (Please find full objective findings from PT assessment regarding body systems outlined above)  Assessment: Pt is a 69 y o  female seen for PT evaluation s/p admission to [address] [place] [address] on 12/5/2020 with Acute CVA (cerebrovascular accident) [facility])  Order placed for PT services  Upon evaluation: Pt is presenting with impaired functional mobility due to decreased strength, decreased endurance, impaired balance, gait deviations, impaired cognition, decreased safety awareness, impaired judgment and fall risk requiring supervision assistance for bed mobility and steadying assistance for transfers and ambulation with no  Pt's clinical presentation is currently unstable/unpredictable given the functional mobility deficits above coupled with fall risks as indicated by AM-PAC 6-Clicks: 73/14 as well as impaired balance, impaired coordination, impaired judgement, decreased safety awareness and decreased cognition and combined with medical complications of hypertension  and need for input for mobility technique/safety  Pt's PMHx and comorbidities that may affect physical performance and progress include: HTN, CVA, limited cognition and hypothyroidism  Personal factors affecting pt at time of IE include: inaccessible home environment, step(s) to enter environment, limited home support, inability to navigate community distances, limited insight into impairments and difficulty communicating   Pt will benefit from continued skilled PT services to address deficits as defined above and to maximize level of functional mobility to facilitate return toward PLOF and improved QOL  From PT/mobility standpoint, recommendation at time of d/c would be Short term rehab pending progress in order to reduce fall risk and maximize pt's functional independence and consistency with mobility in order to facilitate return to PLOF  Recommend trial with cane next 1-2 sessions and ther ex next 1-2 sessions  Goals: Pt will: Perform bed mobility tasks with modified I to reposition in bed and prepare for transfers  Pt will perform transfers with modified I to increase Indep in home alone environment and prepare for ambulation  Pt will ambulate with LRAD for >/= 250' with  modified I  to improve gait quality and promote proper use of assistive device and to access home environment  Pt will complete >/= 3 steps with with unilateral handrail with modified I to return to home with DAMION  Increase bilateral LE strength 1/2 grade to facilitate independent mobility and Increase all balance 1/2 grade to decrease risk for falls  Prateek Mcleod, PT,DPT       PHYSICAL THERAPY TREATMENT NOTE  NAME:  Lidia Headings  DATE: 12/07/20    Length Of Stay: 2  Performed at least 2 patient identifiers during session: Name and Birthday  Treatment time: 6778 - 9812  Treatment length: 12 min    S: "I don't need to go [to the bathroom]"    O: At start of session, pt standing at sink performing ADL in presence of therapist  Pt ambulated 12' with steadying assist and no AD with F- ambulatory balance, decreased step length, gait speed, and narrow GARRY  Pt completed sit> stand transfer with steadying assistance  Pt ambulated 30' with SPC with min VC for sequencing and technique  Pt with 2 instances of nearly walking into objects on R side, min A provided to prevent collision as mod VCs provided however pt unable to self correct path   Pt without LOB with F-/P+ ambulatory balance with SPC  Pt states she feels she doesn't need a cane  Pt completed SPT and stand > sit transfer with steadying assistance  No LOB however pt continues to demonstrate imbalance with gait and transfers  Assessment: Pt seen for PT treatment session this date with interventions consisting of gait training w/ emphasis on improving pt's ability to ambulate level surfaces x 30' with steadying assistance provided by therapist with [address]  Pt agreeable to PT treatment session upon arrival, pt found standing at sink with therapist present, in no apparent distress  In comparison to previous session, pt with improvements in functional mobility as evidenced by pt ambulating increased distance  Post session: pt returned back to recliner, chair alarm engaged, all needs in reach and RN notified of session findings/recommendations Continue to recommend STR at time of d/c in order to maximize pt's functional independence and safety w/ mobility  Pt continues to be functioning below baseline level, and remains limited 2* factors listed above and including decreased strength, balance, activity tolerance, decreased safety awareness, and limited insight into impairments Pt likely able to tolerate 3 hrs of therapy per day  PT will continue to see pt while here in order to address the deficits listed above and provide interventions consistent w/ POC in effort to achieve STGs  P: Complete therex, gait training, stair training, balance, and endurance training      [place], PT,DPT

## 2020-12-07 NOTE — ASSESSMENT & PLAN NOTE
· Continue home dose Synthroid and check a TSH in the morning TSH is slightly elevated free T4 is normal   Will continue same dose repeat TSH in 4 weeks

## 2020-12-07 NOTE — OCCUPATIONAL THERAPY NOTE
Occupational Therapy Evaluation     Evaluation: 1375-1278  Treatment: 6928-9811    Patient Name: Edel Valera  KKLYN'H Date: 12/7/2020  Problem List  Principal Problem:    Acute CVA (cerebrovascular accident) [facility])  Active Problems:    Hypertensive urgency    Hypertension    QT prolongation    Hypothyroidism    Migraines    Past Medical History  Past Medical History:   Diagnosis Date    Hypertension      Past Surgical History  Past Surgical History:   Procedure Laterality Date    ABLATION SOFT TISSUE           12/07/20 1100   Note Type   Note type Evaluation   Restrictions/Precautions   Other Precautions Cognitive; Chair Alarm; Bed Alarm; Fall Risk   Pain Assessment   Pain Assessment Tool 0-10   Pain Score No Pain   Home Living   Type of Home House  (3 DAMION with HR)   Home Layout One level;Performs ADLs on one level; Able to live on main level with bedroom/bathroom   Bathroom Shower/Tub Tub/shower unit   [employer] Raised   Bathroom Equipment Grab bars in shower   Additional Comments Pt reports living alone in a 1 story home with 3 DAMION  Pt ambulates with no AD PTA  Prior Function   Level of [place] Independent with ADLs and functional mobility   Lives With Alone   Receives Help From Family   ADL Assistance Independent   IADLs Independent   Falls in the last 6 months 0   Vocational Retired   Comments Pt reports being completely independent with ADLs, IADLs, functional ambulation , community mobility + driving @ I   ADL   Where Assessed Edge of bed   Grooming Assistance   (SBA standing at sinkside)   Grooming Deficit Verbal cueing;Supervision/safety; Increased time to complete;Wash/dry hands; Teeth care   UB Dressing Assistance 5  Supervision/Setup   UB Dressing Deficit Setup;Verbal cueing;Supervision/safety; Increased time to complete   LB Dressing Assistance   (Steadying Assist-Min A)   LB Dressing Deficit Steadying;Verbal cueing;Supervision/safety; Increased time to complete; Don/doff R sock; Don/doff L sock;Thread RLE into pants; Thread LLE into pants;Pull up over hips   Additional Comments Pt completing ADL tasks while seated at EOB  UB dressing @ S after set-up  Pt donning socks @ S with increased time  Donning CM around waist @ Steadying-Min A for balance  Pt standing at sinkside to complete hand hygiene and oral care @ SBA with increased time  Pt noted to be utilize LUE to complete oral care, when asked why Pt was not using dominant hand (right) Pt reports "its harder"  Pt encouraged to utilize R hand, decreased coordination noted, increased time required  Pt able to locate tooth brush on R side of sink  Pt requiring Steadying-Min A for LB dressing when standign d/t balance deficits noted, Pt with multiple LOB to R side requiring UB support to correct  Bed Mobility   Supine to Sit 5  Supervision   Additional items Increased time required;Verbal cues   Transfers   Sit to Stand   [facility] Assist)   Additional items Increased time required;Verbal cues   Stand to Sit   (SBA)   Additional items Increased time required;Verbal cues   Stand pivot   (Steadying Assist)   Additional items Increased time required;Verbal cues   Additional Comments Pt completing functional transfers with no AD for UB support  Pt completin gSTS from EOB @ Steadying Assist although requiring attempt x's 3 to complete d/t LOB to R side   Pt then completing SPT with no AD @ Steadying Assist with increased time and vc'ing for safety and tehcnique   Balance   Static Sitting Good   Dynamic Sitting Fair +   Static Standing Fair   Dynamic Standing Fair -   Activity Tolerance   Activity Tolerance Patient limited by fatigue   Medical Staff Made Aware Spoke with PT, Ya   Nurse Made Aware Spoke with RNYong Assessment   RUE Assessment WFL   LUE Assessment   LUE Assessment WFL   Hand Function   Gross Motor Coordination Impaired   Fine Motor Coordination Impaired   Sensation   Light Touch No apparent deficits   Additional Comments Pt noted to have full ROM in BUE  slight decrease MS noted in RUE compared to right (more significant in biceps/triceps)  Pt able to comoplete finger opposition although having difficulty with writing and other FM tasks  Pt with increased difficulty noted during oral hygiene, increaesd time and difficulty noted when utilizing RUE  Pt reports no change in sensation at this time  Pt reports no visual changes other than "sometimes it gets burrly but then it goes away"  Pt unable to identify finger in perhipheral vision until 20* away from midline on R side  L side intact  Pt is aware of R side and demonstrates spontaneous attention to R side although appears to possibly have some R visual deficits  will continue to monitor and assess as able   Perception   Figure Ground Pt able to complete finger to nose with B hands although increased time and effort required for accuracy of RUE   Cognition   Overall Cognitive Status Impaired   Arousal/Participation Alert; Responsive; Cooperative   Attention Attends with cues to redirect   Orientation Level Oriented to person;Oriented to place;Oriented to situation;Disoriented to time  (oriented to month, cues for year)   Memory Within functional limits   Following Commands Follows one step commands with increased time or repetition   Comments Therapist completed SLUMS with Pt  pt scoring 21/30 which suggest moderate cognitive deficits at this time  Cognition Assessment Tools SLUMS   Score 21   Assessment   Limitation Decreased ADL status; Decreased UE strength;Decreased Safe judgement during ADL;Decreased cognition;Decreased endurance;Visual deficit; Decreased fine motor control;Decreased self-care trans;Decreased high-level ADLs   Prognosis Good   Assessment Pt is a 68 y/o F admitted to [address] [place] [address] 12/5/2020 d/t experiencing R sided weakness and numbness  Dx: acute CVA  MRI shows Acute to early subacute left basal ganglionic infarction involving the left putamen and corona radiata   Mild cerebral chronic microangiopathic disease  Pt with PMHx impacting performance during functional tasks including: HTN, migraines  Pt reports living at home independently in a 1 story home with 3 DAMION  Pt reports completing ADLs, IADLs, functional ambulation, and community mobility + driving @ I  Pt has support from her children PRN  On evaluation, Pt A&Ox4 (cues for year)  Pt completing bed mobility @ S  UB Dressing @ S after set-up  LB Dressing @ Steadying-Min A for balance while standing to complete CM around waist  Pt completing STS and SPT with no AD for UB support @ Steadying Assist  Pt insist on using no AD although demonstrate multiple episodes of LOB to R side  Pt standing at sinkside to complete hand hygiene @ SBA with increased time  Decreased FM and GM coordination noted in RUE during functional tasks  Slight visual deficit noted to R side although Pt inconsistently reporting  Will continue to assess as able  Pt then returning to recliner chair with call bell in reach, chair alarm intact and all needs met at this time  Pt BUE ROM and MS WFL  Although Pt noted to have slight decreased MS in RUE  Pt scoring 45/100 on Barthel Index  Pt presents with decrease activity tolerance, decrease standing balance, decrease performance during ADL tasks, decrease cognition, decrease safety awareness , decrease BUE ROM, decrease UB MS, decrease generalized strength, decrease activity engagement, decrease performance during functional transfers, decrease FM control and decrease GM control  Pt would benefit from continued acute OT services to address deficits as well as post acute rehab services upon d/c from [address] [place] [address]  Pt is motivated to participate and demonstrates ability to tolerate high levels of therapy at this time  Plan   Treatment Interventions ADL retraining;Visual perceptual retraining;Functional transfer training;UE strengthening/ROM; Endurance training;Cognitive reorientation;Equipment evaluation/education; Neuromuscular reeducation; Fine motor coordination activities; Compensatory technique education;Continued evaluation; Energy conservation; Activityengagement   Goal Expiration Date 12/17/20   OT Treatment Day 1   OT Frequency 3-5x/wk   Additional Treatment Session   Start Time 1115   End Time 1130   Treatment Assessment Pt seen for treatment session #1 this date  pt alert and agreeable to participate at this time  Pt completing SLUMS with therapist while seated upright in recliner chair  Pt scorign 21/30  Pt losing points in recall, high level problen solving, FM coordination, comprehension  Pt with Good participation and attention to task although increased difficulty completing task noted  Pt seated OOB at end of session with call bell in reach, chair alarm intact and all needs met at this time   Additional Treatment Day 1   Recommendation   Recommendation   (post acute rehab)   OT Discharge Recommendation Post-Acute Rehabilitation Services   Additional Comments  Pt demonstrating Good participation during therapy session  Pt is able to tolerate high level of therapy and demonstrates good potential to progress and return to PLOF with intensive therapy  Barthel Index   Feeding 5   Bathing 0   Grooming Score 0   Dressing Score 5   Bladder Score 10   Bowels Score 10   Toilet Use Score 5   Transfers (Bed/Chair) Score 10   Mobility (Level Surface) Score 0   Stairs Score 0   Barthel Index Score 45     Pt goals to be met by 12/17/2020    1  Pt will demonstrate ability to complete LB dressing @ I in order to increase safety and independence during meaningful tasks  2  Pt will demonstrate ability to eric/doff socks/shoes while sitting EOB @ I in order to increase safety and independence during meaningful tasks  3  Pt will demonstrate ability to complete toileting tasks including CM and pericare @ I in order to increase safety and independence during meaningful tasks    4  Pt will demonstrate ability to complete EOB, chair, toilet/commode transfers @ I in order to increase performance and participation during functional tasks  5  Pt will demonstrate ability to stand for 10+ minutes while maintaining G balance with use of no AD for UB support in order to safely complete ADL/IADL tasks  6  Pt will demonstrate ability to tolerate 30-35 minute OT session with no vc'ing for deep breathing or use of energy conservation techniques in order to increase activity tolerance during functional tasks  7  Pt will demonstrate Good carryover of use of energy conservation/compensatory strategies during ADLs and functional tasks in order to increase safety and reduce risk for falls  8  Pt will demonstrate Good attention and participation in continued evaluation of functional ambulation house hold distances in order to assist with safe d/c planning  9  Pt will attend to continued cognitive assessments 100% of the time in order to provide most appropriate d/c recommendations  10  Pt will follow 100% simple 2-step commands and be A&O x4 consistently with environmental cues to increase participation in functional activities  11  Pt will identify 3 areas of interest/hobbies and 1 intervention on how to incorporate into daily life in order to increase interaction with environment and peers as well as increase participation in meaningful tasks  12  Pt will demonstrate 100% carryover of BUE HEP in order to increase BUE MS and increase performance during functional tasks upon d/c home      Nilesh Roche OTR/L

## 2020-12-07 NOTE — PLAN OF CARE
Problem: Potential for Falls  Goal: Patient will remain free of falls  Description: INTERVENTIONS:  - Assess patient frequently for physical needs  -  Identify cognitive and physical deficits and behaviors that affect risk of falls  -  [place] fall precautions as indicated by assessment   - Educate patient/family on patient safety including physical limitations  - Instruct patient to call for assistance with activity based on assessment  - Modify environment to reduce risk of injury  - Consider OT/PT consult to assist with strengthening/mobility  Outcome: Not Progressing     Problem: Neurological Deficit  Goal: Neurological status is stable or improving  Description: Interventions:  - Monitor and assess patient's level of consciousness, motor function, sensory function, and level of assistance needed for ADLs  - Monitor and report changes from baseline  Collaborate with interdisciplinary team to initiate plan and implement interventions as ordered  - Provide and maintain a safe environment  - Consider seizure precautions  - Consider fall precautions  - Consider aspiration precautions  - Consider bleeding precautions  Outcome: Not Progressing     Problem: Activity Intolerance/Impaired Mobility  Goal: Mobility/activity is maintained at optimum level for patient  Description: Interventions:  - Assess and monitor patient  barriers to mobility and need for assistive/adaptive devices  - Assess patient's emotional response to limitations  - Collaborate with interdisciplinary team and initiate plans and interventions as ordered  - Encourage independent activity per ability   - Maintain proper body alignment  - Perform active/passive rom as tolerated/ordered    - Plan activities to conserve energy   - Turn patient as appropriate  Outcome: Not Progressing     Problem: Communication Impairment  Goal: Ability to express needs and understand communication  Description: Assess patient's communication skills and ability to understand information  Patient will demonstrate use of effective communication techniques, alternative methods of communication and understanding even if not able to speak  - Encourage communication and provide alternate methods of communication as needed  - Collaborate with case management/ for discharge needs  - Include patient/family/caregiver in decisions related to communication  Outcome: Not Progressing     Problem: Potential for Aspiration  Goal: Non-ventilated patient's risk of aspiration is minimized  Description: Assess and monitor vital signs, respiratory status, and labs (WBC)  Monitor for signs of aspiration (tachypnea, cough, rales, wheezing, cyanosis, fever)  - Assess and monitor patient's ability to swallow  - Place patient up in chair to eat if possible  - HOB up at 90 degrees to eat if unable to get patient up into chair   - Supervise patient during oral intake  - Instruct patient/ family to take small bites  - Instruct patient/ family to take small single sips when taking liquids  - Follow patient-specific strategies generated by speech pathologist   Outcome: Not Progressing  Goal: Ventilated patient's risk of aspiration is minimized  Description: Assess and monitor vital signs, respiratory status, airway cuff pressure, and labs (WBC)  Monitor for signs of aspiration (tachypnea, cough, rales, wheezing, cyanosis, fever)  - Elevate head of bed 30 degrees if patient has tube feeding   - Monitor tube feeding  Outcome: Not Progressing     Problem: Nutrition  Goal: Nutrition/Hydration status is improving  Description: Monitor and assess patient's nutrition/hydration status for malnutrition (ex- brittle hair, bruises, dry skin, pale skin and conjunctiva, muscle wasting, smooth red tongue, and disorientation)  Collaborate with interdisciplinary team and initiate plan and interventions as ordered    Monitor patient's weight and dietary intake as ordered or per policy  Utilize nutrition screening tool and intervene per policy  Determine patient's food preferences and provide high-protein, high-caloric foods as appropriate  - Assist patient with eating   - Allow adequate time for meals   - Encourage patient to take dietary supplement as ordered  - Collaborate with clinical nutritionist   - Include patient/family/caregiver in decisions related to nutrition  Outcome: Not Progressing     Problem: NEUROSENSORY - ADULT  Goal: Achieves stable or improved neurological status  Description: INTERVENTIONS  - Monitor and report changes in neurological status  - Monitor vital signs such as temperature, blood pressure, glucose, and any other labs ordered   - Initiate measures to prevent increased intracranial pressure  - Monitor for seizure activity and implement precautions if appropriate      Outcome: Not Progressing  Goal: Remains free of injury related to seizures activity  Description: INTERVENTIONS  - Maintain airway, patient safety  and administer oxygen as ordered  - Monitor patient for seizure activity, document and report duration and description of seizure to physician/advanced practitioner  - If seizure occurs,  ensure patient safety during seizure  - Reorient patient post seizure  - Seizure pads on all 4 side rails  - Instruct patient/family to notify RN of any seizure activity including if an aura is experienced  - Instruct patient/family to call for assistance with activity based on nursing assessment  - Administer anti-seizure medications if ordered    Outcome: Not Progressing  Goal: Achieves maximal functionality and self care  Description: INTERVENTIONS  - Monitor swallowing and airway patency with patient fatigue and changes in neurological status  - Encourage and assist patient to increase activity and self care     - Encourage visually impaired, hearing impaired and aphasic patients to use assistive/communication devices  Outcome: Not Progressing Problem: CARDIOVASCULAR - ADULT  Goal: Maintains optimal cardiac output and hemodynamic stability  Description: INTERVENTIONS:  - Monitor I/O, vital signs and rhythm  - Monitor for S/S and trends of decreased cardiac output  - Administer and titrate ordered vasoactive medications to optimize hemodynamic stability  - Assess quality of pulses, skin color and temperature  - Assess for signs of decreased coronary artery perfusion  - Instruct patient to report change in severity of symptoms  Outcome: Not Progressing  Goal: Absence of cardiac dysrhythmias or at baseline rhythm  Description: INTERVENTIONS:  - Continuous cardiac monitoring, vital signs, obtain 12 lead EKG if ordered  - Administer antiarrhythmic and heart rate control medications as ordered  - Monitor electrolytes and administer replacement therapy as ordered  Outcome: Not Progressing     Problem: METABOLIC, FLUID AND ELECTROLYTES - ADULT  Goal: Electrolytes maintained within normal limits  Description: INTERVENTIONS:  - Monitor labs and assess patient for signs and symptoms of electrolyte imbalances  - Administer electrolyte replacement as ordered  - Monitor response to electrolyte replacements, including repeat lab results as appropriate  - Instruct patient on fluid and nutrition as appropriate  Outcome: Not Progressing  Goal: Fluid balance maintained  Description: INTERVENTIONS:  - Monitor labs   - Monitor I/O and WT  - Instruct patient on fluid and nutrition as appropriate  - Assess for signs & symptoms of volume excess or deficit  Outcome: Not Progressing  Goal: Glucose maintained within target range  Description: INTERVENTIONS:  - Monitor Blood Glucose as ordered  - Assess for signs and symptoms of hyperglycemia and hypoglycemia  - Administer ordered medications to maintain glucose within target range  - Assess nutritional intake and initiate nutrition service referral as needed  Outcome: Not Progressing     Problem: HEMATOLOGIC - ADULT  Goal: Maintains hematologic stability  Description: INTERVENTIONS  - Assess for signs and symptoms of bleeding or hemorrhage  - Monitor labs  - Administer supportive blood products/factors as ordered and appropriate  Outcome: Not Progressing     Problem: PAIN - ADULT  Goal: Verbalizes/displays adequate comfort level or baseline comfort level  Description: Interventions:  - Encourage patient to monitor pain and request assistance  - Assess pain using appropriate pain scale  - Administer analgesics based on type and severity of pain and evaluate response  - Implement non-pharmacological measures as appropriate and evaluate response  - Consider cultural and social influences on pain and pain management  - Notify physician/advanced practitioner if interventions unsuccessful or patient reports new pain  Outcome: Not Progressing     Problem: SAFETY ADULT  Goal: Patient will remain free of falls  Description: INTERVENTIONS:  - Assess patient frequently for physical needs  -  Identify cognitive and physical deficits and behaviors that affect risk of falls    -  [place] fall precautions as indicated by assessment   - Educate patient/family on patient safety including physical limitations  - Instruct patient to call for assistance with activity based on assessment  - Modify environment to reduce risk of injury  - Consider OT/PT consult to assist with strengthening/mobility  Outcome: Not Progressing  Goal: Maintain or return to baseline ADL function  Description: INTERVENTIONS:  -  Assess patient's ability to carry out ADLs; assess patient's baseline for ADL function and identify physical deficits which impact ability to perform ADLs (bathing, care of mouth/teeth, toileting, grooming, dressing, etc )  - Assess/evaluate cause of self-care deficits   - Assess range of motion  - Assess patient's mobility; develop plan if impaired  - Assess patient's need for assistive devices and provide as appropriate  - Encourage maximum independence but intervene and supervise when necessary  - Involve family in performance of ADLs  - Assess for home care needs following discharge   - Consider OT consult to assist with ADL evaluation and planning for discharge  - Provide patient education as appropriate  Outcome: Not Progressing  Goal: Maintain or return mobility status to optimal level  Description: INTERVENTIONS:  - Assess patient's baseline mobility status (ambulation, transfers, stairs, etc )    - Identify cognitive and physical deficits and behaviors that affect mobility  - Identify mobility aids required to assist with transfers and/or ambulation (gait belt, sit-to-stand, lift, walker, cane, etc )  - [place] fall precautions as indicated by assessment  - Record patient progress and toleration of activity level on Mobility SBAR; progress patient to next Phase/Stage  - Instruct patient to call for assistance with activity based on assessment  - Consider rehabilitation consult to assist with strengthening/weightbearing, etc   Outcome: Not Progressing     Problem: DISCHARGE PLANNING  Goal: Discharge to home or other facility with appropriate resources  Description: INTERVENTIONS:  - Identify barriers to discharge w/patient and caregiver  - Arrange for needed discharge resources and transportation as appropriate  - Identify discharge learning needs (meds, wound care, etc )  - Arrange for interpretive services to assist at discharge as needed  - Refer to Case Management Department for coordinating discharge planning if the patient needs post-hospital services based on physician/advanced practitioner order or complex needs related to functional status, cognitive ability, or social support system  Outcome: Not Progressing     Problem: Knowledge Deficit  Goal: Patient/family/caregiver demonstrates understanding of disease process, treatment plan, medications, and discharge instructions  Description: Complete learning assessment and assess knowledge base   Interventions:  - Provide teaching at level of understanding  - Provide teaching via preferred learning methods  Outcome: Not Progressing     Problem: Nutrition/Hydration-ADULT  Goal: Nutrient/Hydration intake appropriate for improving, restoring or maintaining nutritional needs  Description: Monitor and assess patient's nutrition/hydration status for malnutrition  Collaborate with interdisciplinary team and initiate plan and interventions as ordered  Monitor patient's weight and dietary intake as ordered or per policy  Utilize nutrition screening tool and intervene as necessary  Determine patient's food preferences and provide high-protein, high-caloric foods as appropriate       INTERVENTIONS:  - Monitor oral intake, urinary output, labs, and treatment plans  - Assess nutrition and hydration status and recommend course of action  - Evaluate amount of meals eaten  - Assist patient with eating if necessary   - Allow adequate time for meals  - Recommend/ encourage appropriate diets, oral nutritional supplements, and vitamin/mineral supplements  - Order, calculate, and assess calorie counts as needed  - Recommend, monitor, and adjust tube feedings and TPN/PPN based on assessed needs  - Assess need for intravenous fluids  - Provide specific nutrition/hydration education as appropriate  - Include patient/family/caregiver in decisions related to nutrition  Outcome: Not Progressing

## 2020-12-07 NOTE — SOCIAL WORK
Current LOS 2 days  CM consult for Ischemic Stroke  CM completed chart review  Pt case discussed with attending Dr Alyce Hunter  Neurology consult completed via telemed  [employer]  MRI pending for further recommendations  PT/OT evals completed  Recommending [facility] for pt  Speech eval still pending  Nutritional eval still pending  CM met with patient at the bedside,baseline information was obtained  CM discussed the role of CM in helping the patient develop a discharge plan and assist the patient in carry out their plan  Pt lives alone in a 1 [address], 3 [address]  Pt completes ADLs independently  Pt ambulates independently No AD  No DME  Pt is retired  Pt drives  Pt has no hx of STR or HHC  Pt denies hx of MH or D&A     PCP: Dr Charlie Shea  Preferred Pharmacy: [facility] on [address] in [place]  Contact: Srinivasa Cutler (son) [phone]  Pt states yassine Balderas is POA -[phone]  Transportation at time of d/c  A post acute care recommendation was made by your care team for Acute Rehab  Discussed [place] [facility] with patient  List of facilities given to patient via in person  patient aware the list is custom filtered for them by zip code location and that [facility] post acute providers are designated  Pt wants to discuss with her sons  Pt wants to stay close  (Will make [facility] referral to [facility] once therapy notes are available)    CM will continue to follow pt medical course and assist with d/c planning as needed

## 2020-12-08 ENCOUNTER — APPOINTMENT (INPATIENT)
Dept: CT IMAGING | Facility: HOSPITAL | Age: 70
DRG: 065 | End: 2020-12-08
Payer: COMMERCIAL

## 2020-12-08 PROBLEM — I16.0 HYPERTENSIVE URGENCY: Status: RESOLVED | Noted: 2020-12-05 | Resolved: 2020-12-08

## 2020-12-08 PROBLEM — G43.909 MIGRAINES: Status: RESOLVED | Noted: 2020-12-05 | Resolved: 2020-12-08

## 2020-12-08 PROBLEM — I10 HYPERTENSION: Status: RESOLVED | Noted: 2020-12-05 | Resolved: 2020-12-08

## 2020-12-08 LAB
ANION GAP SERPL CALCULATED.3IONS-SCNC: 10 MMOL/L (ref 4–13)
BASOPHILS # BLD AUTO: 0.05 THOUSANDS/ΜL (ref 0–0.1)
BASOPHILS NFR BLD AUTO: 1 % (ref 0–1)
BUN SERPL-MCNC: 20 MG/DL (ref 5–25)
CALCIUM SERPL-MCNC: 9.7 MG/DL (ref 8.3–10.1)
CARDIOLIPIN IGA SER IA-ACNC: <9 APL U/ML (ref 0–11)
CARDIOLIPIN IGG SER IA-ACNC: <9 GPL U/ML (ref 0–14)
CARDIOLIPIN IGM SER IA-ACNC: 22 MPL U/ML (ref 0–12)
CHLORIDE SERPL-SCNC: 106 MMOL/L (ref 100–108)
CO2 SERPL-SCNC: 24 MMOL/L (ref 21–32)
CREAT SERPL-MCNC: 1.03 MG/DL (ref 0.6–1.3)
DEPRECATED AT III PPP: 89 % OF NORMAL (ref 92–136)
DEPRECATED AT III PPP: 93 % OF NORMAL (ref 92–136)
EOSINOPHIL # BLD AUTO: 0.22 THOUSAND/ΜL (ref 0–0.61)
EOSINOPHIL NFR BLD AUTO: 2 % (ref 0–6)
ERYTHROCYTE [DISTWIDTH] IN BLOOD BY AUTOMATED COUNT: 12 % (ref 11.6–15.1)
GFR SERPL CREATININE-BSD FRML MDRD: 55 ML/MIN/1.73SQ M
GLUCOSE SERPL-MCNC: 120 MG/DL (ref 65–140)
GLUCOSE SERPL-MCNC: 148 MG/DL (ref 65–140)
HCT VFR BLD AUTO: 43.5 % (ref 34.8–46.1)
HGB BLD-MCNC: 14.3 G/DL (ref 11.5–15.4)
IMM GRANULOCYTES # BLD AUTO: 0.04 THOUSAND/UL (ref 0–0.2)
IMM GRANULOCYTES NFR BLD AUTO: 0 % (ref 0–2)
INR PPP: 1.02 (ref 0.84–1.19)
LYMPHOCYTES # BLD AUTO: 2.69 THOUSANDS/ΜL (ref 0.6–4.47)
LYMPHOCYTES NFR BLD AUTO: 27 % (ref 14–44)
MCH RBC QN AUTO: 30.1 PG (ref 26.8–34.3)
MCHC RBC AUTO-ENTMCNC: 32.9 G/DL (ref 31.4–37.4)
MCV RBC AUTO: 92 FL (ref 82–98)
MONOCYTES # BLD AUTO: 0.85 THOUSAND/ΜL (ref 0.17–1.22)
MONOCYTES NFR BLD AUTO: 9 % (ref 4–12)
NEUTROPHILS # BLD AUTO: 5.95 THOUSANDS/ΜL (ref 1.85–7.62)
NEUTS SEG NFR BLD AUTO: 61 % (ref 43–75)
NRBC BLD AUTO-RTO: 0 /100 WBCS
PLATELET # BLD AUTO: 309 THOUSANDS/UL (ref 149–390)
PMV BLD AUTO: 9.1 FL (ref 8.9–12.7)
POTASSIUM SERPL-SCNC: 3.5 MMOL/L (ref 3.5–5.3)
PROTHROMBIN TIME: 13.2 SECONDS (ref 11.6–14.5)
RBC # BLD AUTO: 4.75 MILLION/UL (ref 3.81–5.12)
SODIUM SERPL-SCNC: 140 MMOL/L (ref 136–145)
WBC # BLD AUTO: 9.8 THOUSAND/UL (ref 4.31–10.16)

## 2020-12-08 PROCEDURE — 81241 F5 GENE: CPT | Performed by: FAMILY MEDICINE

## 2020-12-08 PROCEDURE — 85732 THROMBOPLASTIN TIME PARTIAL: CPT | Performed by: FAMILY MEDICINE

## 2020-12-08 PROCEDURE — 85613 RUSSELL VIPER VENOM DILUTED: CPT | Performed by: FAMILY MEDICINE

## 2020-12-08 PROCEDURE — G1004 CDSM NDSC: HCPCS

## 2020-12-08 PROCEDURE — 85610 PROTHROMBIN TIME: CPT | Performed by: PHYSICIAN ASSISTANT

## 2020-12-08 PROCEDURE — 85306 CLOT INHIBIT PROT S FREE: CPT | Performed by: FAMILY MEDICINE

## 2020-12-08 PROCEDURE — 85300 ANTITHROMBIN III ACTIVITY: CPT | Performed by: FAMILY MEDICINE

## 2020-12-08 PROCEDURE — 80048 BASIC METABOLIC PNL TOTAL CA: CPT | Performed by: PHYSICIAN ASSISTANT

## 2020-12-08 PROCEDURE — 85670 THROMBIN TIME PLASMA: CPT | Performed by: FAMILY MEDICINE

## 2020-12-08 PROCEDURE — 99233 SBSQ HOSP IP/OBS HIGH 50: CPT | Performed by: NURSE PRACTITIONER

## 2020-12-08 PROCEDURE — 82948 REAGENT STRIP/BLOOD GLUCOSE: CPT

## 2020-12-08 PROCEDURE — 85025 COMPLETE CBC W/AUTO DIFF WBC: CPT | Performed by: PHYSICIAN ASSISTANT

## 2020-12-08 PROCEDURE — 86147 CARDIOLIPIN ANTIBODY EA IG: CPT | Performed by: FAMILY MEDICINE

## 2020-12-08 PROCEDURE — 81240 F2 GENE: CPT | Performed by: FAMILY MEDICINE

## 2020-12-08 PROCEDURE — G0425 INPT/ED TELECONSULT30: HCPCS | Performed by: PSYCHIATRY & NEUROLOGY

## 2020-12-08 PROCEDURE — NC001 PR NO CHARGE: Performed by: STUDENT IN AN ORGANIZED HEALTH CARE EDUCATION/TRAINING PROGRAM

## 2020-12-08 PROCEDURE — 99292 CRITICAL CARE ADDL 30 MIN: CPT | Performed by: PHYSICIAN ASSISTANT

## 2020-12-08 PROCEDURE — 85303 CLOT INHIBIT PROT C ACTIVITY: CPT | Performed by: FAMILY MEDICINE

## 2020-12-08 PROCEDURE — 70450 CT HEAD/BRAIN W/O DYE: CPT

## 2020-12-08 PROCEDURE — 85705 THROMBOPLASTIN INHIBITION: CPT | Performed by: FAMILY MEDICINE

## 2020-12-08 PROCEDURE — 99291 CRITICAL CARE FIRST HOUR: CPT | Performed by: PHYSICIAN ASSISTANT

## 2020-12-08 PROCEDURE — 86146 BETA-2 GLYCOPROTEIN ANTIBODY: CPT | Performed by: FAMILY MEDICINE

## 2020-12-08 PROCEDURE — 85305 CLOT INHIBIT PROT S TOTAL: CPT | Performed by: FAMILY MEDICINE

## 2020-12-08 RX ORDER — SODIUM CHLORIDE, SODIUM GLUCONATE, SODIUM ACETATE, POTASSIUM CHLORIDE, MAGNESIUM CHLORIDE, SODIUM PHOSPHATE, DIBASIC, AND POTASSIUM PHOSPHATE .53; .5; .37; .037; .03; .012; .00082 G/100ML; G/100ML; G/100ML; G/100ML; G/100ML; G/100ML; G/100ML
500 INJECTION, SOLUTION INTRAVENOUS ONCE
Status: COMPLETED | OUTPATIENT
Start: 2020-12-08 | End: 2020-12-08

## 2020-12-08 RX ADMIN — SODIUM CHLORIDE, SODIUM GLUCONATE, SODIUM ACETATE, POTASSIUM CHLORIDE, MAGNESIUM CHLORIDE, SODIUM PHOSPHATE, DIBASIC, AND POTASSIUM PHOSPHATE 500 ML: .53; .5; .37; .037; .03; .012; .00082 INJECTION, SOLUTION INTRAVENOUS at 09:36

## 2020-12-08 RX ADMIN — PHENYLEPHRINE HYDROCHLORIDE 25 MCG/MIN: 10 INJECTION INTRAVENOUS at 11:14

## 2020-12-08 RX ADMIN — ATORVASTATIN CALCIUM 80 MG: 40 TABLET, FILM COATED ORAL at 18:51

## 2020-12-08 RX ADMIN — CLOPIDOGREL 75 MG: 75 TABLET, FILM COATED ORAL at 10:08

## 2020-12-08 RX ADMIN — LEVOTHYROXINE SODIUM 25 MCG: 25 TABLET ORAL at 05:44

## 2020-12-08 NOTE — ASSESSMENT & PLAN NOTE
· Hx of Hypertension  · Outpatient maintained on Norvasc and tenormin - will hold at this time as will allow for permissive hypertension with systolic BP goal [phone]

## 2020-12-08 NOTE — CONSULTS
Critical Care Consult / Acceptance Note- Ghazal Mack 1950, 69 y o  female MRN: [id number]    Unit/Bed#: -01 Encounter: [id number]    Primary Care Provider: Shannon Mcfarlane DO   Date and time admitted to hospital: 12/5/2020 12:48 PM        * Acute CVA (cerebrovascular accident) [facility])  Assessment & Plan  · Patient is a 70 y/o F whom presented on 12/5 with R Sided weakness and speech difficulties  · Initial CTA Head and neck without large vessel flow stenosis  · Initial [address] documented as 2  · Patient not a tPA candidate  · She was admitted and continued on Stroke pathway  · Continued ASA, Plavix and Lipitor daily  · Speech evaluation performed   · Neurology following in consult  · MRI Brain performed on 12/7 revealing Acute to early subacute left basal ganglionic infarction involving the left putamen and corona radiata  · 12/7 ECHO (TTE) performed revealing EF 55-60% with G1DD without evidence of PFO  · This am (12/8) Rapid response secondary to worsening facial droop and R Sided weakness  · See Rapid Response note  · [address] at time of rapid response 6  · STAT Head CT performed revealing evolving left basal ganglia infarct  · Consider worsening symptoms in setting of hypoperfusion  · Initial plan to place supine, admin fluid bolus and closely monitor  · Further discussed with Neurology regarding further interventions or performance of repeat CTA - determined not indicated  · Frequently reassessed noting no improvement in BP or symptoms  · Upgraded to ICU for pressor initiation and frequent monitoring  · Further discussed with neurology whom was in agreement with plan  · Continue Telemetry and Hemodynamic monitoring  · Will initiate Johnny and titrate to systolic BP goal of [phone]  · Closely monitor Neuro exam  · Repeat dysphagia assessment  · Continue stroke pathway    Hypertension  Assessment & Plan  · Hx of Hypertension  · Outpatient maintained on Norvasc and tenormin - will hold at this time as will allow for permissive hypertension with systolic BP goal [id number]    Hypothyroidism  Assessment & Plan  · TSH 6 20  · T4 Normal  · Continue home levothyroxine    QT prolongation  Assessment & Plan  · Initial EKG revealed QTc 497 with repeat EKG noting improvement  · Avoid QTc prolonging medications    Hypertensive urgency-resolved as of 12/8/2020  Assessment & Plan  · Hx of Hypertension  · Outpatient maintained on Norvasc and tenormin - will hold at this time as will allow for permissive hypertension with systolic BP goal [id number]        -------------------------------------------------------------------------------------------------------------  Chief Complaint:   "I am weak"    History of Present Illness   Sameera Adams is a 69 y o  female whom presented to [address] [place] [address] on 12/5 with the chief complaint of RUE and RLE weakness as well as speech difficulties  Initial documented [address] of 2, not a tPA candidate  In speaking with patients children today they reported patient with vague complaint of "not feeling well Friday"  Son then spoke with patient on Saturday and noted patient with difficulty speaking  Patient has a PMH of Hypertension with reported medication compliance  Initial workup included CT Head / CTA Head/Neck without large vessel flow stenosis  On 12/7 MRI obtained revealing Acute vs early subacute Left basal ganglionic infarct involving putamen and corona radiata  Echo performed on 12/7 revealing EF 55-60% with G1DD, no evidence of PFO  Overnight 12/7 into 12/8 Primary RN reported worsening R sided weakness as well as facial droop  Rapid response called later in the am when attempting to ambulate back from bathroom  Team noted worsening facial droop as well as R Sided weakness  Repeat Head CT revealed evolving left basal ganglia infarct without hemorrhage  Considered more perfusion related    Patient placed supine and fluid bolus initiated without marked increase in BP or symptom resolution  Patient upgraded to the [facility] service of Dr Claudeen Last for initiation of pressor support for goal systolic -[id number]    History obtained from chart review, the patient and discussion with sons  -------------------------------------------------------------------------------------------------------------  Dispo: Transfer to Critical Care     Code Status: Level 1 - Full Code  --------------------------------------------------------------------------------------------------------------  Review of Systems   Constitutional: Negative for activity change, appetite change, chills, diaphoresis, fatigue and fever  HENT: Negative  Eyes: Negative for photophobia and visual disturbance  Respiratory: Negative for shortness of breath  Cardiovascular: Negative for chest pain and leg swelling  Gastrointestinal: Negative for abdominal pain, nausea and vomiting  Endocrine: Negative  Genitourinary: Negative for dysuria, frequency and urgency  Musculoskeletal: Positive for gait problem  Negative for back pain, neck pain and neck stiffness  Skin: Negative  Allergic/Immunologic: Negative  Neurological: Positive for dizziness, facial asymmetry, speech difficulty, weakness and light-headedness  Negative for tremors, seizures, syncope, numbness and headaches  Hematological: Negative  A 12-point, complete review of systems was reviewed and negative except as stated above     Physical Exam     General:  70 y/o F in bed, awake, alert with continued R sided facial droop, slurred speech as well as R sided weakness  HEENT: Normocephalic, atraumatic   PERR, No visual field deficits, sclera anicteric, conjunctiva pink, no gaze preference  Neck: supple, no bruits, trachea midline  Heart: RRR without murmur, rub, or gallop  Lungs: clear and equal all fields bilaterally  Abd: soft, non-tender, no guarding, rebound, or peritoneal signs, active BS noted  Ext: RUE and RLE weakness with continued drift, however again not striking bed, no left sided deficits, sensory intact  Neuro: GCS 15, conscious, alert, and oriented  Skin: warm and dry  --------------------------------------------------------------------------------------------------------------  Vitals:   Vitals:    20 1116 20 1215 20 1230 20 1245   BP: 144/78 165/85 166/92 165/87   BP Location: Right arm Right arm Right arm Right arm   Pulse: 70 74 75 67   Resp: 20  18 20   Temp:       TempSrc:       SpO2: 97% 99% 99% 97%   Weight:       Height:         Temp  Min: 97 °F (36 1 °C)  Max: 98 7 °F (37 1 °C)  IBW: 54 7 kg  Height: 5' 4" (162 6 cm)  Body mass index is 21 8 kg/m²  Laboratory and Diagnostics:  Results from last 7 days   Lab Units 20  0750 20  1254   WBC Thousand/uL 9 80 7 54   HEMOGLOBIN g/dL 14 3 14 6   HEMATOCRIT % 43 5 44 5   PLATELETS Thousands/uL 309 321   NEUTROS PCT % 61  --    MONOS PCT % 9  --      Results from last 7 days   Lab Units 20  0750 20  0510 20  1254   SODIUM mmol/L 140 142 137   POTASSIUM mmol/L 3 5 3 9 4 0   CHLORIDE mmol/L 106 107 102   CO2 mmol/L 24 26 26   ANION GAP mmol/L 10 9 9   BUN mg/dL 20 13 14   CREATININE mg/dL 1 03 1 05 0 95   CALCIUM mg/dL 9 7 9 3 9 7   GLUCOSE RANDOM mg/dL 148* 107 138          Results from last 7 days   Lab Units 20  0750 20  1254   INR  1 02 0 93   PTT seconds  --  27      Results from last 7 days   Lab Units 20  1254   TROPONIN I ng/mL <0 02     Micro:        COVID Negative    EK Lead EKG reviewed noting QTc 497ms on  with improvement on repeat EKGs  Imaging: I have personally reviewed pertinent reports     and I have personally reviewed pertinent films in PACS      Historical Information   Past Medical History:   Diagnosis Date    Hypertension      Past Surgical History:   Procedure Laterality Date    ABLATION SOFT TISSUE       Social History   Social History     Substance and Sexual Activity   Alcohol Use Not Currently     Social History     Substance and Sexual Activity   Drug Use Not Currently     Social History     Tobacco Use   Smoking Status Never Smoker   Smokeless Tobacco Never Used     Exercise History: Walks frequently  Family History:   History reviewed  No pertinent family history  I have reviewed this patient's family history and commented on sigificant items within the HPI and Discussed with patient, no signficant stroke history      Medications:  Current Facility-Administered Medications   Medication Dose Route Frequency    acetaminophen (TYLENOL) tablet 650 mg  650 mg Oral Q4H PRN    atorvastatin (LIPITOR) tablet 80 mg  80 mg Oral QPM    clopidogrel (PLAVIX) tablet 75 mg  75 mg Oral Daily    levothyroxine tablet 25 mcg  25 mcg Oral Daily    phenylephrine (LISETH-SYNEPHRINE) 50 mg (STANDARD CONCENTRATION) in sodium chloride 0 9% 250 mL   mcg/min Intravenous Titrated     Home medications:  Prior to Admission Medications   Prescriptions Last Dose Informant Patient Reported? Taking?    amLODIPine (NORVASC) 2 5 mg tablet 2020 at Unknown time  Yes Yes   Sig: Take 2 5 mg by mouth daily   atenolol (TENORMIN) 50 mg tablet 2020 at Unknown time  Yes Yes   Sig: Take 50 mg by mouth daily   fluticasone (FLONASE) 50 mcg/act nasal spray Unknown at Unknown time  Yes No   Si spray into each nostril daily as needed for rhinitis   levothyroxine 25 mcg tablet 2020 at Unknown time  Yes Yes   Sig: Take 25 mcg by mouth daily      Facility-Administered Medications: None     Allergies:  No Known Allergies  ------------------------------------------------------------------------------------------------------------  Advance Directive and Living Will:      Power of :    POLST:    ------------------------------------------------------------------------------------------------------------  Care Time Delivered:   Upon my evaluation, this patient had a high probability of imminent or life-threatening deterioration due to Stroke with concerns for hypoperfusion, which required my direct attention, intervention, and personal management  I have personally provided 30 minutes (0930 to 1000) of critical care time, exclusive of procedures, teaching, family meetings, and any prior time recorded by providers other than myself  Red De Souza PA-C        Portions of the record may have been created with voice recognition software  Occasional wrong word or "sound a like" substitutions may have occurred due to the inherent limitations of voice recognition software    Read the chart carefully and recognize, using context, where substitutions have occurred

## 2020-12-08 NOTE — NURSING NOTE
Pt has worsening R sided weakness with facial droop when assessed at 0150  BP now 143/80, HR 70, Resp 16, Temp 97 4  Pt states right extremities feel "heavy"  Pupils are reactive and visual fields are intact at this time  Only other complaint is tingling in lips, which is ongoing  Yolanda Suh, TINO, aware of same  Will notify if systolic BP gets less than 130  Call bell in reach, bed alarmed  Will continue to monitor

## 2020-12-08 NOTE — PLAN OF CARE
Problem: Potential for Falls  Goal: Patient will remain free of falls  Description: INTERVENTIONS:  - Assess patient frequently for physical needs  -  Identify cognitive and physical deficits and behaviors that affect risk of falls  -  [place] fall precautions as indicated by assessment   - Educate patient/family on patient safety including physical limitations  - Instruct patient to call for assistance with activity based on assessment  - Modify environment to reduce risk of injury  - Consider OT/PT consult to assist with strengthening/mobility  Outcome: Progressing     Problem: Neurological Deficit  Goal: Neurological status is stable or improving  Description: Interventions:  - Monitor and assess patient's level of consciousness, motor function, sensory function, and level of assistance needed for ADLs  - Monitor and report changes from baseline  Collaborate with interdisciplinary team to initiate plan and implement interventions as ordered  - Provide and maintain a safe environment  - Consider seizure precautions  - Consider fall precautions  - Consider aspiration precautions  - Consider bleeding precautions  Outcome: Progressing     Problem: Activity Intolerance/Impaired Mobility  Goal: Mobility/activity is maintained at optimum level for patient  Description: Interventions:  - Assess and monitor patient  barriers to mobility and need for assistive/adaptive devices  - Assess patient's emotional response to limitations  - Collaborate with interdisciplinary team and initiate plans and interventions as ordered  - Encourage independent activity per ability   - Maintain proper body alignment  - Perform active/passive rom as tolerated/ordered    - Plan activities to conserve energy   - Turn patient as appropriate  Outcome: Progressing     Problem: Communication Impairment  Goal: Ability to express needs and understand communication  Description: Assess patient's communication skills and ability to understand information  Patient will demonstrate use of effective communication techniques, alternative methods of communication and understanding even if not able to speak  - Encourage communication and provide alternate methods of communication as needed  - Collaborate with case management/ for discharge needs  - Include patient/family/caregiver in decisions related to communication  Outcome: Progressing     Problem: Potential for Aspiration  Goal: Non-ventilated patient's risk of aspiration is minimized  Description: Assess and monitor vital signs, respiratory status, and labs (WBC)  Monitor for signs of aspiration (tachypnea, cough, rales, wheezing, cyanosis, fever)  - Assess and monitor patient's ability to swallow  - Place patient up in chair to eat if possible  - HOB up at 90 degrees to eat if unable to get patient up into chair   - Supervise patient during oral intake  - Instruct patient/ family to take small bites  - Instruct patient/ family to take small single sips when taking liquids  - Follow patient-specific strategies generated by speech pathologist   Outcome: Progressing  Goal: Ventilated patient's risk of aspiration is minimized  Description: Assess and monitor vital signs, respiratory status, airway cuff pressure, and labs (WBC)  Monitor for signs of aspiration (tachypnea, cough, rales, wheezing, cyanosis, fever)  - Elevate head of bed 30 degrees if patient has tube feeding   - Monitor tube feeding  Outcome: Progressing     Problem: Nutrition  Goal: Nutrition/Hydration status is improving  Description: Monitor and assess patient's nutrition/hydration status for malnutrition (ex- brittle hair, bruises, dry skin, pale skin and conjunctiva, muscle wasting, smooth red tongue, and disorientation)  Collaborate with interdisciplinary team and initiate plan and interventions as ordered  Monitor patient's weight and dietary intake as ordered or per policy   Utilize nutrition screening tool and intervene per policy  Determine patient's food preferences and provide high-protein, high-caloric foods as appropriate  - Assist patient with eating   - Allow adequate time for meals   - Encourage patient to take dietary supplement as ordered  - Collaborate with clinical nutritionist   - Include patient/family/caregiver in decisions related to nutrition  Outcome: Progressing     Problem: NEUROSENSORY - ADULT  Goal: Achieves stable or improved neurological status  Description: INTERVENTIONS  - Monitor and report changes in neurological status  - Monitor vital signs such as temperature, blood pressure, glucose, and any other labs ordered   - Initiate measures to prevent increased intracranial pressure  - Monitor for seizure activity and implement precautions if appropriate      Outcome: Progressing  Goal: Remains free of injury related to seizures activity  Description: INTERVENTIONS  - Maintain airway, patient safety  and administer oxygen as ordered  - Monitor patient for seizure activity, document and report duration and description of seizure to physician/advanced practitioner  - If seizure occurs,  ensure patient safety during seizure  - Reorient patient post seizure  - Seizure pads on all 4 side rails  - Instruct patient/family to notify RN of any seizure activity including if an aura is experienced  - Instruct patient/family to call for assistance with activity based on nursing assessment  - Administer anti-seizure medications if ordered    Outcome: Progressing  Goal: Achieves maximal functionality and self care  Description: INTERVENTIONS  - Monitor swallowing and airway patency with patient fatigue and changes in neurological status  - Encourage and assist patient to increase activity and self care     - Encourage visually impaired, hearing impaired and aphasic patients to use assistive/communication devices  Outcome: Progressing     Problem: CARDIOVASCULAR - ADULT  Goal: Maintains optimal cardiac output and hemodynamic stability  Description: INTERVENTIONS:  - Monitor I/O, vital signs and rhythm  - Monitor for S/S and trends of decreased cardiac output  - Administer and titrate ordered vasoactive medications to optimize hemodynamic stability  - Assess quality of pulses, skin color and temperature  - Assess for signs of decreased coronary artery perfusion  - Instruct patient to report change in severity of symptoms  Outcome: Progressing  Goal: Absence of cardiac dysrhythmias or at baseline rhythm  Description: INTERVENTIONS:  - Continuous cardiac monitoring, vital signs, obtain 12 lead EKG if ordered  - Administer antiarrhythmic and heart rate control medications as ordered  - Monitor electrolytes and administer replacement therapy as ordered  Outcome: Progressing     Problem: METABOLIC, FLUID AND ELECTROLYTES - ADULT  Goal: Electrolytes maintained within normal limits  Description: INTERVENTIONS:  - Monitor labs and assess patient for signs and symptoms of electrolyte imbalances  - Administer electrolyte replacement as ordered  - Monitor response to electrolyte replacements, including repeat lab results as appropriate  - Instruct patient on fluid and nutrition as appropriate  Outcome: Progressing  Goal: Fluid balance maintained  Description: INTERVENTIONS:  - Monitor labs   - Monitor I/O and WT  - Instruct patient on fluid and nutrition as appropriate  - Assess for signs & symptoms of volume excess or deficit  Outcome: Progressing  Goal: Glucose maintained within target range  Description: INTERVENTIONS:  - Monitor Blood Glucose as ordered  - Assess for signs and symptoms of hyperglycemia and hypoglycemia  - Administer ordered medications to maintain glucose within target range  - Assess nutritional intake and initiate nutrition service referral as needed  Outcome: Progressing     Problem: HEMATOLOGIC - ADULT  Goal: Maintains hematologic stability  Description: INTERVENTIONS  - Assess for signs and symptoms of bleeding or hemorrhage  - Monitor labs  - Administer supportive blood products/factors as ordered and appropriate  Outcome: Progressing     Problem: PAIN - ADULT  Goal: Verbalizes/displays adequate comfort level or baseline comfort level  Description: Interventions:  - Encourage patient to monitor pain and request assistance  - Assess pain using appropriate pain scale  - Administer analgesics based on type and severity of pain and evaluate response  - Implement non-pharmacological measures as appropriate and evaluate response  - Consider cultural and social influences on pain and pain management  - Notify physician/advanced practitioner if interventions unsuccessful or patient reports new pain  Outcome: Progressing     Problem: SAFETY ADULT  Goal: Patient will remain free of falls  Description: INTERVENTIONS:  - Assess patient frequently for physical needs  -  Identify cognitive and physical deficits and behaviors that affect risk of falls    -  [place] fall precautions as indicated by assessment   - Educate patient/family on patient safety including physical limitations  - Instruct patient to call for assistance with activity based on assessment  - Modify environment to reduce risk of injury  - Consider OT/PT consult to assist with strengthening/mobility  Outcome: Progressing  Goal: Maintain or return to baseline ADL function  Description: INTERVENTIONS:  -  Assess patient's ability to carry out ADLs; assess patient's baseline for ADL function and identify physical deficits which impact ability to perform ADLs (bathing, care of mouth/teeth, toileting, grooming, dressing, etc )  - Assess/evaluate cause of self-care deficits   - Assess range of motion  - Assess patient's mobility; develop plan if impaired  - Assess patient's need for assistive devices and provide as appropriate  - Encourage maximum independence but intervene and supervise when necessary  - Involve family in performance of ADLs  - Assess for home care needs following discharge   - Consider OT consult to assist with ADL evaluation and planning for discharge  - Provide patient education as appropriate  Outcome: Progressing  Goal: Maintain or return mobility status to optimal level  Description: INTERVENTIONS:  - Assess patient's baseline mobility status (ambulation, transfers, stairs, etc )    - Identify cognitive and physical deficits and behaviors that affect mobility  - Identify mobility aids required to assist with transfers and/or ambulation (gait belt, sit-to-stand, lift, walker, cane, etc )  - [place] fall precautions as indicated by assessment  - Record patient progress and toleration of activity level on Mobility SBAR; progress patient to next Phase/Stage  - Instruct patient to call for assistance with activity based on assessment  - Consider rehabilitation consult to assist with strengthening/weightbearing, etc   Outcome: Progressing     Problem: DISCHARGE PLANNING  Goal: Discharge to home or other facility with appropriate resources  Description: INTERVENTIONS:  - Identify barriers to discharge w/patient and caregiver  - Arrange for needed discharge resources and transportation as appropriate  - Identify discharge learning needs (meds, wound care, etc )  - Arrange for interpretive services to assist at discharge as needed  - Refer to Case Management Department for coordinating discharge planning if the patient needs post-hospital services based on physician/advanced practitioner order or complex needs related to functional status, cognitive ability, or social support system  Outcome: Progressing     Problem: Knowledge Deficit  Goal: Patient/family/caregiver demonstrates understanding of disease process, treatment plan, medications, and discharge instructions  Description: Complete learning assessment and assess knowledge base    Interventions:  - Provide teaching at level of understanding  - Provide teaching via preferred learning methods  Outcome: Progressing     Problem: Nutrition/Hydration-ADULT  Goal: Nutrient/Hydration intake appropriate for improving, restoring or maintaining nutritional needs  Description: Monitor and assess patient's nutrition/hydration status for malnutrition  Collaborate with interdisciplinary team and initiate plan and interventions as ordered  Monitor patient's weight and dietary intake as ordered or per policy  Utilize nutrition screening tool and intervene as necessary  Determine patient's food preferences and provide high-protein, high-caloric foods as appropriate       INTERVENTIONS:  - Monitor oral intake, urinary output, labs, and treatment plans  - Assess nutrition and hydration status and recommend course of action  - Evaluate amount of meals eaten  - Assist patient with eating if necessary   - Allow adequate time for meals  - Recommend/ encourage appropriate diets, oral nutritional supplements, and vitamin/mineral supplements  - Order, calculate, and assess calorie counts as needed  - Recommend, monitor, and adjust tube feedings and TPN/PPN based on assessed needs  - Assess need for intravenous fluids  - Provide specific nutrition/hydration education as appropriate  - Include patient/family/caregiver in decisions related to nutrition  Outcome: Progressing

## 2020-12-08 NOTE — ASSESSMENT & PLAN NOTE
· Patient is a 68 y/o F whom presented on 12/5 with R Sided weakness and speech difficulties  · Initial CTA Head and neck without large vessel flow stenosis  · Initial 82 Magalys Pineda documented as 2  · Patient not a tPA candidate  · She was admitted and continued on Stroke pathway  · Continued ASA, Plavix and Lipitor daily  · Speech evaluation performed   · Neurology following in consult  · MRI Brain performed on 12/7 revealing Acute to early subacute left basal ganglionic infarction involving the left putamen and corona radiata  · 12/7 ECHO (TTE) performed revealing EF 55-60% with G1DD without evidence of PFO  · This am (12/8) Rapid response secondary to worsening facial droop and R Sided weakness  · See Rapid Response note  · 82 Rujade Pineda at time of rapid response 6  · STAT Head CT performed revealing evolving left basal ganglia infarct  · Consider worsening symptoms in setting of hypoperfusion  · Initial plan to place supine, admin fluid bolus and closely monitor  · Further discussed with Neurology regarding further interventions or performance of repeat CTA - determined not indicated  · Frequently reassessed noting no improvement in BP or symptoms  · Upgraded to ICU for pressor initiation and frequent monitoring  · Further discussed with neurology whom was in agreement with plan  · Continue Telemetry and Hemodynamic monitoring  · Will initiate Johnny and titrate to systolic BP goal of [id number]/XM  · Closely monitor Neuro exam  · Repeat dysphagia assessment  · Continue stroke pathway

## 2020-12-08 NOTE — RAPID RESPONSE
Progress Note - Rapid Response   Sameera Adams 69 y o  female MRN: [id number]    Time Called ( Time): 2039  Date Called: 12/8/2020  Level of Care: MS  Room#: 201  [id number] Time ( Time): 0802  Event End Time ( Time): 0820  Primary reason for call: Other Stroke Symptoms  Interventions:  Airway/Breathing:  No Intervention  Circulation: IV Fluid Bolus and EKG  Other Treatments: CT Head / Discussion with Neurology       Assessment:   1  Acute Stroke   2  Dysarthria  3  Right Sided Facial Droop  4  R Hemiparesis    Plan:   · STAT Accu-check  · STAT 12 Lead EKG  · Further discussion with primary RN and in review of EMR, note in chart reporting worsening R sided Weakness / Facial droop at 0150  · This am, attempted to ambulate from bathroom, extremely weak, worsening facial droop and speech, staff lowered to floor and RRT was called  · NIH 6 - LOC 1 / Facial Palsy 2 / R Arm Drift 1 / R Leg Drift 1 / Dysarthria 1   · At time of RRT noted Systolic BPs since admission <160mm/Hg - consider this could be perfusion related   · Will Repeat STAT Head CT r/o Hemorrhage  · Hold Antihypertensives  · Will lay patient supine and administer 500cc bolus in attempt to increase systolic BP to >407GZ/TV  · Discussed urgently with Dr Juan Carlos Grubbs whom is in agreement with above plan  Additionally discussed repeat CTA as well as anything further from an endovascular perspective  · Will continue to closely monitor, if no improvement in BP with fluids, will transfer to ICU and initiate pressors to assess if more perfusion related       HPI/Chief Complaint (Background/Situation):   Sameera Adams is a 69y o  year old female whom presented to [address] [place] [address] on 12/5 with the chief complaint of RUE and RLE weakness as well as word finding difficulties    See HPI as obtained by admission team   Workup here has included CTA Head and Neck revealing No large vessel flow stenosis, as well as MRI Brain obtained on 12/7 revealing Acute vs early subacute Left basal ganglionic infarct involving putamen and corona radiata  At time of RRT as well as discussion with Primary RN and review of EMR initial NIHSS reported to be 2 on 12/5  Noted note reporting [id number] patient with worsening R sided weakness and facial droop  Provider notified  This am RRT called as patient ambulating back from bathroom, patient extremely weak, lowered to floor by staff and RRT called  Patient reported feeling "ok" however noted profound facial droop, slurred speech as well as RUE and RLE weakness - reported worse than on admission       Historical Information   Past Medical History:   Diagnosis Date    Hypertension      Past Surgical History:   Procedure Laterality Date    ABLATION SOFT TISSUE       Social History   Social History     Substance and Sexual Activity   Alcohol Use Not Currently     Social History     Substance and Sexual Activity   Drug Use Not Currently     Social History     Tobacco Use   Smoking Status Never Smoker   Smokeless Tobacco Never Used     Family History: non-contributory    Meds/Allergies   Current Facility-Administered Medications   Medication Dose Route Frequency Provider Last Rate    acetaminophen  650 mg Oral Q4H PRN Lashonda Momin MD      atenolol  50 mg Oral Daily Lashonda Momin MD      atorvastatin  80 mg Oral QPM Lashonda Momin MD      clopidogrel  75 mg Oral Daily Lashonda Momin MD      Labetalol HCl  10 mg Intravenous Q4H PRN Lashonda Momin MD      levothyroxine  25 mcg Oral Daily Lashonda Momin MD      multi-electrolyte  500 mL Intravenous Once Kashif Fritz PA-C              No Known Allergies    ROS: Neurological ROS: positive for - dizziness, speech problems and weakness  Otherwise Negative    Vitals: 142/82 67 16 97%RA    Physical Exam:  Gen:71 y/o F in bed, awake, and alert with profound R sided facial droop, slurred speech and R sided weakness  HEENT:Normocephalic, atraumatic, PERR, No visual field deficit, no gaze preference, sclera anicteric  Neck:Supple, trachea midline, no bruits  Chest:RRR without murmur, rub, or gallop  Cor:Clear and equal all fields bilaterally  Abd:soft, non-tender, no guarding, rebound or peritoneal signs  Ext:RUE and RLE weakness with noted drift; however not striking the bed, No left sided deficits, sensory intact  Neuro:GCS 15, conscious, alert, and oriented  Skin:warm and dry      Intake/Output Summary (Last 24 hours) at 12/8/2020 0801  Last data filed at 12/7/2020 1427  Gross per 24 hour   Intake 300 ml   Output --   Net 300 ml       Respiratory    Lab Data (Last 4 hours)    None         O2/Vent Data (Last 4 hours)    None              Invasive Devices     Peripheral Intravenous Line            Peripheral IV 12/05/20 Right Antecubital 2 days                DIAGNOSTIC DATA:    Lab: I have personally reviewed pertinent lab results  CBC:   Results from last 7 days   Lab Units 12/08/20  0750   WBC Thousand/uL 9 80   HEMOGLOBIN g/dL 14 3   HEMATOCRIT % 43 5   PLATELETS Thousands/uL 309     CMP:   Results from last 7 days   Lab Units 12/06/20  0510 12/05/20  1254   POTASSIUM mmol/L 3 9 4 0   CHLORIDE mmol/L 107 102   CO2 mmol/L 26 26   BUN mg/dL 13 14   CREATININE mg/dL 1 05 0 95   CALCIUM mg/dL 9 3 9 7     PT/INR:   No results found for: PT, INR,   Magnesium: No components found for: MAG,   Phosphorous: No results found for: PHOS    Microbiology:  No results found for: Michele Rosario, SPUTUMCULTUR      OUTCOME:   Stayed in room initially will continue to monitor and upgrade if needed  Family notified of transfer: yes  Family member contacted: Discussed with Sons after the rapid response  Code Status: Level 1 - Full Code  Critical Care Time: Total Critical Care time spent 30 minutes excluding procedures, teaching and family updates

## 2020-12-08 NOTE — ASSESSMENT & PLAN NOTE
· Continue home dose Synthroid and check a TSH in the morning TSH is slightly elevated free T4 is normal     · Continue levothyroxine at home dose  · Repeat TSH in 4 weeks

## 2020-12-08 NOTE — PLAN OF CARE
Problem: Potential for Falls  Goal: Patient will remain free of falls  Description: INTERVENTIONS:  - Assess patient frequently for physical needs  -  Identify cognitive and physical deficits and behaviors that affect risk of falls  -  [place] fall precautions as indicated by assessment   - Educate patient/family on patient safety including physical limitations  - Instruct patient to call for assistance with activity based on assessment  - Modify environment to reduce risk of injury  - Consider OT/PT consult to assist with strengthening/mobility  Outcome: Not Progressing     Problem: Neurological Deficit  Goal: Neurological status is stable or improving  Description: Interventions:  - Monitor and assess patient's level of consciousness, motor function, sensory function, and level of assistance needed for ADLs  - Monitor and report changes from baseline  Collaborate with interdisciplinary team to initiate plan and implement interventions as ordered  - Provide and maintain a safe environment  - Consider seizure precautions  - Consider fall precautions  - Consider aspiration precautions  - Consider bleeding precautions  Outcome: Not Progressing     Problem: Activity Intolerance/Impaired Mobility  Goal: Mobility/activity is maintained at optimum level for patient  Description: Interventions:  - Assess and monitor patient  barriers to mobility and need for assistive/adaptive devices  - Assess patient's emotional response to limitations  - Collaborate with interdisciplinary team and initiate plans and interventions as ordered  - Encourage independent activity per ability   - Maintain proper body alignment  - Perform active/passive rom as tolerated/ordered    - Plan activities to conserve energy   - Turn patient as appropriate  Outcome: Not Progressing     Problem: Communication Impairment  Goal: Ability to express needs and understand communication  Description: Assess patient's communication skills and ability to understand information  Patient will demonstrate use of effective communication techniques, alternative methods of communication and understanding even if not able to speak  - Encourage communication and provide alternate methods of communication as needed  - Collaborate with case management/ for discharge needs  - Include patient/family/caregiver in decisions related to communication  Outcome: Not Progressing     Problem: Potential for Aspiration  Goal: Non-ventilated patient's risk of aspiration is minimized  Description: Assess and monitor vital signs, respiratory status, and labs (WBC)  Monitor for signs of aspiration (tachypnea, cough, rales, wheezing, cyanosis, fever)  - Assess and monitor patient's ability to swallow  - Place patient up in chair to eat if possible  - HOB up at 90 degrees to eat if unable to get patient up into chair   - Supervise patient during oral intake  - Instruct patient/ family to take small bites  - Instruct patient/ family to take small single sips when taking liquids  - Follow patient-specific strategies generated by speech pathologist   Outcome: Not Progressing  Goal: Ventilated patient's risk of aspiration is minimized  Description: Assess and monitor vital signs, respiratory status, airway cuff pressure, and labs (WBC)  Monitor for signs of aspiration (tachypnea, cough, rales, wheezing, cyanosis, fever)  - Elevate head of bed 30 degrees if patient has tube feeding   - Monitor tube feeding  Outcome: Not Progressing     Problem: Nutrition  Goal: Nutrition/Hydration status is improving  Description: Monitor and assess patient's nutrition/hydration status for malnutrition (ex- brittle hair, bruises, dry skin, pale skin and conjunctiva, muscle wasting, smooth red tongue, and disorientation)  Collaborate with interdisciplinary team and initiate plan and interventions as ordered    Monitor patient's weight and dietary intake as ordered or per policy  Utilize nutrition screening tool and intervene per policy  Determine patient's food preferences and provide high-protein, high-caloric foods as appropriate  - Assist patient with eating   - Allow adequate time for meals   - Encourage patient to take dietary supplement as ordered  - Collaborate with clinical nutritionist   - Include patient/family/caregiver in decisions related to nutrition  Outcome: Not Progressing     Problem: NEUROSENSORY - ADULT  Goal: Achieves stable or improved neurological status  Description: INTERVENTIONS  - Monitor and report changes in neurological status  - Monitor vital signs such as temperature, blood pressure, glucose, and any other labs ordered   - Initiate measures to prevent increased intracranial pressure  - Monitor for seizure activity and implement precautions if appropriate      Outcome: Not Progressing  Goal: Remains free of injury related to seizures activity  Description: INTERVENTIONS  - Maintain airway, patient safety  and administer oxygen as ordered  - Monitor patient for seizure activity, document and report duration and description of seizure to physician/advanced practitioner  - If seizure occurs,  ensure patient safety during seizure  - Reorient patient post seizure  - Seizure pads on all 4 side rails  - Instruct patient/family to notify RN of any seizure activity including if an aura is experienced  - Instruct patient/family to call for assistance with activity based on nursing assessment  - Administer anti-seizure medications if ordered    Outcome: Not Progressing  Goal: Achieves maximal functionality and self care  Description: INTERVENTIONS  - Monitor swallowing and airway patency with patient fatigue and changes in neurological status  - Encourage and assist patient to increase activity and self care     - Encourage visually impaired, hearing impaired and aphasic patients to use assistive/communication devices  Outcome: Not Progressing Problem: CARDIOVASCULAR - ADULT  Goal: Maintains optimal cardiac output and hemodynamic stability  Description: INTERVENTIONS:  - Monitor I/O, vital signs and rhythm  - Monitor for S/S and trends of decreased cardiac output  - Administer and titrate ordered vasoactive medications to optimize hemodynamic stability  - Assess quality of pulses, skin color and temperature  - Assess for signs of decreased coronary artery perfusion  - Instruct patient to report change in severity of symptoms  Outcome: Not Progressing  Goal: Absence of cardiac dysrhythmias or at baseline rhythm  Description: INTERVENTIONS:  - Continuous cardiac monitoring, vital signs, obtain 12 lead EKG if ordered  - Administer antiarrhythmic and heart rate control medications as ordered  - Monitor electrolytes and administer replacement therapy as ordered  Outcome: Not Progressing     Problem: METABOLIC, FLUID AND ELECTROLYTES - ADULT  Goal: Electrolytes maintained within normal limits  Description: INTERVENTIONS:  - Monitor labs and assess patient for signs and symptoms of electrolyte imbalances  - Administer electrolyte replacement as ordered  - Monitor response to electrolyte replacements, including repeat lab results as appropriate  - Instruct patient on fluid and nutrition as appropriate  Outcome: Not Progressing  Goal: Fluid balance maintained  Description: INTERVENTIONS:  - Monitor labs   - Monitor I/O and WT  - Instruct patient on fluid and nutrition as appropriate  - Assess for signs & symptoms of volume excess or deficit  Outcome: Not Progressing  Goal: Glucose maintained within target range  Description: INTERVENTIONS:  - Monitor Blood Glucose as ordered  - Assess for signs and symptoms of hyperglycemia and hypoglycemia  - Administer ordered medications to maintain glucose within target range  - Assess nutritional intake and initiate nutrition service referral as needed  Outcome: Not Progressing     Problem: HEMATOLOGIC - ADULT  Goal: Maintains hematologic stability  Description: INTERVENTIONS  - Assess for signs and symptoms of bleeding or hemorrhage  - Monitor labs  - Administer supportive blood products/factors as ordered and appropriate  Outcome: Not Progressing     Problem: PAIN - ADULT  Goal: Verbalizes/displays adequate comfort level or baseline comfort level  Description: Interventions:  - Encourage patient to monitor pain and request assistance  - Assess pain using appropriate pain scale  - Administer analgesics based on type and severity of pain and evaluate response  - Implement non-pharmacological measures as appropriate and evaluate response  - Consider cultural and social influences on pain and pain management  - Notify physician/advanced practitioner if interventions unsuccessful or patient reports new pain  Outcome: Not Progressing     Problem: SAFETY ADULT  Goal: Patient will remain free of falls  Description: INTERVENTIONS:  - Assess patient frequently for physical needs  -  Identify cognitive and physical deficits and behaviors that affect risk of falls  -  [place] fall precautions as indicated by assessment   - Educate patient/family on patient safety including physical limitations  - Instruct patient to call for assistance with activity based on assessment  - Modify environment to reduce risk of injury  - Consider OT/PT consult to assist with strengthening/mobility  Outcome: Not Progressing  Goal: Maintain or return to baseline ADL function  Description: INTERVENTIONS:  - Assess patient frequently for physical needs  -  Identify cognitive and physical deficits and behaviors that affect risk of falls    -  [place] fall precautions as indicated by assessment   - Educate patient/family on patient safety including physical limitations  - Instruct patient to call for assistance with activity based on assessment  - Modify environment to reduce risk of injury  - Consider OT/PT consult to assist with strengthening/mobility  Outcome: Not Progressing  Goal: Maintain or return mobility status to optimal level  Description: INTERVENTIONS:  -  Assess patient's ability to carry out ADLs; assess patient's baseline for ADL function and identify physical deficits which impact ability to perform ADLs (bathing, care of mouth/teeth, toileting, grooming, dressing, etc )  - Assess/evaluate cause of self-care deficits   - Assess range of motion  - Assess patient's mobility; develop plan if impaired  - Assess patient's need for assistive devices and provide as appropriate  - Encourage maximum independence but intervene and supervise when necessary  - Involve family in performance of ADLs  - Assess for home care needs following discharge   - Consider OT consult to assist with ADL evaluation and planning for discharge  - Provide patient education as appropriate  Outcome: Not Progressing     Problem: DISCHARGE PLANNING  Goal: Discharge to home or other facility with appropriate resources  Description: INTERVENTIONS:  - Identify barriers to discharge w/patient and caregiver  - Arrange for needed discharge resources and transportation as appropriate  - Identify discharge learning needs (meds, wound care, etc )  - Arrange for interpretive services to assist at discharge as needed  - Refer to Case Management Department for coordinating discharge planning if the patient needs post-hospital services based on physician/advanced practitioner order or complex needs related to functional status, cognitive ability, or social support system  Outcome: Not Progressing     Problem: Knowledge Deficit  Goal: Patient/family/caregiver demonstrates understanding of disease process, treatment plan, medications, and discharge instructions  Description: Complete learning assessment and assess knowledge base    Interventions:  - Provide teaching at level of understanding  - Provide teaching via preferred learning methods  Outcome: Not Progressing Problem: Nutrition/Hydration-ADULT  Goal: Nutrient/Hydration intake appropriate for improving, restoring or maintaining nutritional needs  Description: Monitor and assess patient's nutrition/hydration status for malnutrition  Collaborate with interdisciplinary team and initiate plan and interventions as ordered  Monitor patient's weight and dietary intake as ordered or per policy  Utilize nutrition screening tool and intervene as necessary  Determine patient's food preferences and provide high-protein, high-caloric foods as appropriate       INTERVENTIONS:  - Monitor oral intake, urinary output, labs, and treatment plans  - Assess nutrition and hydration status and recommend course of action  - Evaluate amount of meals eaten  - Assist patient with eating if necessary   - Allow adequate time for meals  - Recommend/ encourage appropriate diets, oral nutritional supplements, and vitamin/mineral supplements  - Order, calculate, and assess calorie counts as needed  - Recommend, monitor, and adjust tube feedings and TPN/PPN based on assessed needs  - Assess need for intravenous fluids  - Provide specific nutrition/hydration education as appropriate  - Include patient/family/caregiver in decisions related to nutrition  Outcome: Not Progressing

## 2020-12-08 NOTE — ASSESSMENT & PLAN NOTE
· Blood pressure is acceptable keep her on higher side anywhere between 160-180   · Hold off on antihypertensive allow for permissive hypertension in setting of evolving stroke

## 2020-12-08 NOTE — ASSESSMENT & PLAN NOTE
· Hx of Hypertension  · Outpatient maintained on Norvasc and tenormin - will hold at this time as will allow for permissive hypertension with systolic BP goal [id number]/TT

## 2020-12-08 NOTE — QUICK NOTE
I contacted patients Son Daniel Sánchez at [phone]  Daniel Sánchez is a surgical PA working for [employer] in [place]  I updated him regarding patients improvement in symptomatolgy with the initiation of pressor support  Daniel Sánchez also requested that he be the main contact and he will update his other brother Joshua Villar  He was appreciative of the update and aware that she is in the ICU 2/2 BP support medications as well as closer Neuro monitoring

## 2020-12-08 NOTE — ASSESSMENT & PLAN NOTE
· Initial EKG revealed QTc 497 with repeat EKG noting improvement  · Avoid QTc prolonging medications

## 2020-12-08 NOTE — ASSESSMENT & PLAN NOTE
· Present on admission with worsening expressive aphasia and she had right upper extremity and lower extremity weakness compared to the previous  · MRI obtained showing acute LBG infarct  Neurology discussion by previous provider continue patient with progressive blood pressure anywhere systolic [phone]  · continue her Norvasc increased to 5 mg daily as prior and atenolol  There was no AFib found on 48 hours of telemetry  · Patient was a rapid response overnight due to worsening weakness and altered mental status  Patient was taken for CT of the head without contrast that shows a involving LBG infarct  CC discussed with Neurology recommending given involving stroke and wanting to get her blood pressure is up patient to be transferred to ICU for pressors  · Echocardiogram completed showing LVEF 55-65% noted grade 1 diastolic dysfunction  No evidence of PFO  · She will continue aspirin and Plavix for 3 weeks and aspirin alone actually increased her Lipitor to 80 mg daily  Evaluated by PT OT she will need acute rehab  · Neurology wants her monitor neurologically for another 24 hours  · MRI of the brain-Acute to early subacute left basal ganglionic infarction involving the left putamen and corona radiata    · Mild cerebral chronic microangiopathic disease  · Patient was loaded with Plavix 300 mg on aspirin  In the ER  · Lipid panel is suboptimal  · EKG normal sinus rhythm will resume on telemetry monitor to assess any arrhythmia in terms of atrial fibrillation or atrial flutter  · Patient has no previous history of a stroke or a TIA  · She does have a history of hypertension it usually controlled she is on 2 medications and takes them every day      · Not a tPA candidate as the outside of the window

## 2020-12-08 NOTE — CODE DOCUMENTATION
Patient walking out of bathroom assisted by two staff members when she became weak  Staff lowered her to the floor and called a rapid response  Patient lifted back into bed when team arrived  Remained SR on the monitor  O2  already intact at 2L NC  Labs drawn, EKG done, patient taken for stat CT  Patient alert and oriented  Remained on unit

## 2020-12-08 NOTE — PROGRESS NOTES
Progress Note - Manuel Hammond 1950, 69 y o  female MRN: [id number]    Unit/Bed#: -01 Encounter: [id number]    Primary Care Provider: Christopher Craven DO   Date and time admitted to hospital: 12/5/2020 12:48 PM        * Acute CVA (cerebrovascular accident) [facility])  Assessment & Plan  · Present on admission with worsening expressive aphasia and she had right upper extremity and lower extremity weakness compared to the previous  · MRI obtained showing acute LBG infarct  Neurology discussion by previous provider continue patient with progressive blood pressure anywhere systolic [id number]  · continue her Norvasc increased to 5 mg daily as prior and atenolol  There was no AFib found on 48 hours of telemetry  · Patient was a rapid response overnight due to worsening weakness and altered mental status  Patient was taken for CT of the head without contrast that shows a involving LBG infarct  CC discussed with Neurology recommending given involving stroke and wanting to get her blood pressure is up patient to be transferred to ICU for pressors  · Echocardiogram completed showing LVEF 55-65% noted grade 1 diastolic dysfunction  No evidence of PFO  · She will continue aspirin and Plavix for 3 weeks and aspirin alone actually increased her Lipitor to 80 mg daily  Evaluated by PT OT she will need acute rehab  · Neurology wants her monitor neurologically for another 24 hours  · MRI of the brain-Acute to early subacute left basal ganglionic infarction involving the left putamen and corona radiata    · Mild cerebral chronic microangiopathic disease  · Patient was loaded with Plavix 300 mg on aspirin  In the ER  · Lipid panel is suboptimal  · EKG normal sinus rhythm will resume on telemetry monitor to assess any arrhythmia in terms of atrial fibrillation or atrial flutter    · Patient has no previous history of a stroke or a TIA  · She does have a history of hypertension it usually controlled she is on 2 medications and takes them every day  · Not a tPA candidate as the outside of the window    Hypertensive urgency  Assessment & Plan  · Due to large stroke keep systolic 941-297  · Will hold atenolol and amlodipine for now    Migraines  Assessment & Plan  · Patient has history of migraines he denies any headaches yesterday denies any headaches now  Hypothyroidism  Assessment & Plan  · Continue home dose Synthroid and check a TSH in the morning TSH is slightly elevated free T4 is normal     · Continue levothyroxine at home dose  · Repeat TSH in 4 weeks    QT prolongation  Assessment & Plan  · EKG with evidence of QTC of 497  Improved on the EKG reviewed to 480    Hypertension  Assessment & Plan  · Blood pressure is acceptable keep her on higher side anywhere between 160-180   · Hold off on antihypertensive allow for permissive hypertension in setting of evolving stroke      VTE Pharmacologic Prophylaxis:   Pharmacologic: At this time due to ICU transfer will defer to Critical Care  Mechanical VTE Prophylaxis in Place: Yes    Patient Centered Rounds: I have performed bedside rounds with nursing staff today  Discussions with Specialists or Other Care Team Provider: Neurology/CC    Education and Discussions with Family / Patient:  Patient; family to be updated    Time Spent for Care: 40 minutes  More than 50% of total time spent on counseling and coordination of care as described above  Current Length of Stay: 3 day(s)    Current Patient Status: Inpatient   Certification Statement: The patient will continue to require additional inpatient hospital stay due to Ongoing treatment in setting of acute CVA    Discharge Plan:  Not Medically cleared    Code Status: Level 1 - Full Code      Subjective:   Patient sleeping arouses easily able to state name that she is in [address]    Patient understands that she is here for CVA did not understand happen patient made aware that she did have changes to show evolving CVA  Patient complaining increased weakness right-sided ongoing difficulty finding her words  Objective:     Vitals:   Temp (24hrs), Av 1 °F (36 7 °C), Min:97 4 °F (36 3 °C), Max:98 6 °F (37 °C)    Temp:  [97 4 °F (36 3 °C)-98 6 °F (37 °C)] 97 7 °F (36 5 °C)  HR:  [57-78] 67  Resp:  [16-18] 16  BP: (132-161)/(76-93) 142/82  SpO2:  [94 %-98 %] 97 %  Body mass index is 21 8 kg/m²  Input and Output Summary (last 24 hours): Intake/Output Summary (Last 24 hours) at 2020 1038  Last data filed at 2020 1427  Gross per 24 hour   Intake 120 ml   Output --   Net 120 ml       Physical Exam:     Physical Exam  Constitutional:       General: She is sleeping  HENT:      Head: Normocephalic and atraumatic  Nose: Nose normal       Mouth/Throat:      Mouth: Mucous membranes are moist    Eyes:      Pupils: Pupils are equal, round, and reactive to light  Cardiovascular:      Rate and Rhythm: Normal rate and regular rhythm  Pulses: Normal pulses  Pulmonary:      Effort: Pulmonary effort is normal       Breath sounds: Normal breath sounds  Abdominal:      General: Bowel sounds are normal    Skin:     General: Skin is warm and dry  Neurological:      Mental Status: She is oriented to person, place, and time and easily aroused  Motor: Weakness present        Comments: Right upper extremity strength 3/5 right lower extremity 3/5 right-sided facial droop   Psychiatric:         Mood and Affect: Mood normal          Behavior: Behavior normal            Additional Data:     Labs:    Results from last 7 days   Lab Units 20  0750   WBC Thousand/uL 9 80   HEMOGLOBIN g/dL 14 3   HEMATOCRIT % 43 5   PLATELETS Thousands/uL 309   NEUTROS PCT % 61   LYMPHS PCT % 27   MONOS PCT % 9   EOS PCT % 2     Results from last 7 days   Lab Units 20  0750   SODIUM mmol/L 140   POTASSIUM mmol/L 3 5   CHLORIDE mmol/L 106   CO2 mmol/L 24   BUN mg/dL 20   CREATININE mg/dL 1 03   ANION GAP mmol/L 10   CALCIUM mg/dL 9 7   GLUCOSE RANDOM mg/dL 148*     Results from last 7 days   Lab Units 12/08/20  0750   INR  1 02     Results from last 7 days   Lab Units 12/05/20  1250   POC GLUCOSE mg/dl 108     Results from last 7 days   Lab Units 12/06/20  0510   HEMOGLOBIN A1C % 5 7*               * I Have Reviewed All Lab Data Listed Above  * Additional Pertinent Lab Tests Reviewed: Naina 66 Admission Reviewed    Imaging:    Imaging Reports Reviewed Today Include:  As mentioned above      Recent Cultures (last 7 days):           Last 24 Hours Medication List:   Current Facility-Administered Medications   Medication Dose Route Frequency Provider Last Rate    acetaminophen  650 mg Oral Q4H PRN Krystal Patches, MD      atorvastatin  80 mg Oral QPM Krystal Patches, MD      clopidogrel  75 mg Oral Daily Krystal Patches, MD      Labetalol HCl  10 mg Intravenous Q4H PRN Krystal Patches, MD      levothyroxine  25 mcg Oral Daily Krystal Patches, MD      phenylephine   mcg/min Intravenous Titrated Ed CAMI Soto          Today, Patient Was Seen By: LIZA Odell    ** Please Note: Dictation voice to text software may have been used in the creation of this document   **

## 2020-12-09 LAB
ANION GAP SERPL CALCULATED.3IONS-SCNC: 2 MMOL/L (ref 4–13)
APTT SCREEN TO CONFIRM RATIO: 1.15 RATIO (ref 0–1.4)
B2 GLYCOPROT1 IGA SER-ACNC: <9 GPI IGA UNITS (ref 0–25)
B2 GLYCOPROT1 IGG SER-ACNC: <9 GPI IGG UNITS (ref 0–20)
B2 GLYCOPROT1 IGM SER-ACNC: 15 GPI IGM UNITS (ref 0–32)
BASOPHILS # BLD AUTO: 0.06 THOUSANDS/ΜL (ref 0–0.1)
BASOPHILS NFR BLD AUTO: 1 % (ref 0–1)
BUN SERPL-MCNC: 19 MG/DL (ref 5–25)
CALCIUM SERPL-MCNC: 9 MG/DL (ref 8.3–10.1)
CARDIOLIPIN IGA SER IA-ACNC: <9 APL U/ML (ref 0–11)
CARDIOLIPIN IGG SER IA-ACNC: <9 GPL U/ML (ref 0–14)
CARDIOLIPIN IGM SER IA-ACNC: 26 MPL U/ML (ref 0–12)
CHLORIDE SERPL-SCNC: 106 MMOL/L (ref 100–108)
CO2 SERPL-SCNC: 27 MMOL/L (ref 21–32)
CONFIRM APTT/NORMAL: 39.1 SEC (ref 0–55)
CREAT SERPL-MCNC: 0.93 MG/DL (ref 0.6–1.3)
EOSINOPHIL # BLD AUTO: 0.2 THOUSAND/ΜL (ref 0–0.61)
EOSINOPHIL NFR BLD AUTO: 2 % (ref 0–6)
ERYTHROCYTE [DISTWIDTH] IN BLOOD BY AUTOMATED COUNT: 12 % (ref 11.6–15.1)
GFR SERPL CREATININE-BSD FRML MDRD: 62 ML/MIN/1.73SQ M
GLUCOSE SERPL-MCNC: 105 MG/DL (ref 65–140)
HCT VFR BLD AUTO: 39.1 % (ref 34.8–46.1)
HGB BLD-MCNC: 13.1 G/DL (ref 11.5–15.4)
IMM GRANULOCYTES # BLD AUTO: 0.02 THOUSAND/UL (ref 0–0.2)
IMM GRANULOCYTES NFR BLD AUTO: 0 % (ref 0–2)
LA PPP-IMP: NORMAL
LYMPHOCYTES # BLD AUTO: 2.16 THOUSANDS/ΜL (ref 0.6–4.47)
LYMPHOCYTES NFR BLD AUTO: 25 % (ref 14–44)
MAGNESIUM SERPL-MCNC: 2.2 MG/DL (ref 1.6–2.6)
MCH RBC QN AUTO: 30.6 PG (ref 26.8–34.3)
MCHC RBC AUTO-ENTMCNC: 33.5 G/DL (ref 31.4–37.4)
MCV RBC AUTO: 91 FL (ref 82–98)
MONOCYTES # BLD AUTO: 0.73 THOUSAND/ΜL (ref 0.17–1.22)
MONOCYTES NFR BLD AUTO: 8 % (ref 4–12)
NEUTROPHILS # BLD AUTO: 5.6 THOUSANDS/ΜL (ref 1.85–7.62)
NEUTS SEG NFR BLD AUTO: 64 % (ref 43–75)
NRBC BLD AUTO-RTO: 0 /100 WBCS
PLATELET # BLD AUTO: 304 THOUSANDS/UL (ref 149–390)
PMV BLD AUTO: 9.2 FL (ref 8.9–12.7)
POTASSIUM SERPL-SCNC: 3.5 MMOL/L (ref 3.5–5.3)
PROT S ACT/NOR PPP: 105 % (ref 57–157)
PROT S PPP-ACNC: 93 % (ref 60–150)
RBC # BLD AUTO: 4.28 MILLION/UL (ref 3.81–5.12)
SCREEN APTT: 33.4 SEC (ref 0–51.9)
SCREEN DRVVT: 36.9 SEC (ref 0–47)
SODIUM SERPL-SCNC: 135 MMOL/L (ref 136–145)
THROMBIN TIME: 17.9 SEC (ref 0–23)
WBC # BLD AUTO: 8.77 THOUSAND/UL (ref 4.31–10.16)

## 2020-12-09 PROCEDURE — 97116 GAIT TRAINING THERAPY: CPT

## 2020-12-09 PROCEDURE — 80048 BASIC METABOLIC PNL TOTAL CA: CPT | Performed by: NURSE PRACTITIONER

## 2020-12-09 PROCEDURE — 83735 ASSAY OF MAGNESIUM: CPT | Performed by: NURSE PRACTITIONER

## 2020-12-09 PROCEDURE — 97530 THERAPEUTIC ACTIVITIES: CPT

## 2020-12-09 PROCEDURE — 99291 CRITICAL CARE FIRST HOUR: CPT | Performed by: STUDENT IN AN ORGANIZED HEALTH CARE EDUCATION/TRAINING PROGRAM

## 2020-12-09 PROCEDURE — 85025 COMPLETE CBC W/AUTO DIFF WBC: CPT | Performed by: NURSE PRACTITIONER

## 2020-12-09 RX ORDER — POTASSIUM CHLORIDE 20 MEQ/1
40 TABLET, EXTENDED RELEASE ORAL ONCE
Status: COMPLETED | OUTPATIENT
Start: 2020-12-09 | End: 2020-12-09

## 2020-12-09 RX ORDER — HEPARIN SODIUM 5000 [USP'U]/ML
5000 INJECTION, SOLUTION INTRAVENOUS; SUBCUTANEOUS EVERY 8 HOURS SCHEDULED
Status: DISCONTINUED | OUTPATIENT
Start: 2020-12-09 | End: 2020-12-17 | Stop reason: HOSPADM

## 2020-12-09 RX ADMIN — LEVOTHYROXINE SODIUM 25 MCG: 25 TABLET ORAL at 05:14

## 2020-12-09 RX ADMIN — CLOPIDOGREL 75 MG: 75 TABLET, FILM COATED ORAL at 09:49

## 2020-12-09 RX ADMIN — HEPARIN SODIUM 5000 UNITS: 5000 INJECTION INTRAVENOUS; SUBCUTANEOUS at 18:14

## 2020-12-09 RX ADMIN — HEPARIN SODIUM 5000 UNITS: 5000 INJECTION INTRAVENOUS; SUBCUTANEOUS at 22:53

## 2020-12-09 RX ADMIN — POTASSIUM CHLORIDE 40 MEQ: 1500 TABLET, EXTENDED RELEASE ORAL at 07:43

## 2020-12-09 RX ADMIN — ATORVASTATIN CALCIUM 80 MG: 40 TABLET, FILM COATED ORAL at 18:14

## 2020-12-09 RX ADMIN — PHENYLEPHRINE HYDROCHLORIDE 30 MCG/MIN: 10 INJECTION INTRAVENOUS at 10:49

## 2020-12-09 NOTE — ASSESSMENT & PLAN NOTE
· Patient is a 70 y/o F whom presented on 12/5 with R Sided weakness and speech difficulties  · Initial CTA Head and neck without large vessel flow stenosis  · Initial [address] Pineda documented as 2  · Patient not a tPA candidate  · She was admitted and continued on Stroke pathway  · Continued ASA, Plavix and Lipitor daily  · Per neurology aspirin 81 mg and Plavix 75 mg for 3 weeks then Plavix only  · Speech evaluation performed   · Neurology following in consult  · MRI Brain performed on 12/7 revealing Acute to early subacute left basal ganglionic infarction involving the left putamen and corona radiata  · 12/7 ECHO (TTE) performed revealing EF 55-60% with G1DD without evidence of PFO  · This am (12/8) Rapid response secondary to worsening facial droop and R Sided weakness  · See Rapid Response note  · 82 Ruj[address] Pineda at time of rapid response 6  · STAT Head CT performed revealing evolving left basal ganglia infarct  · Consider worsening symptoms in setting of hypoperfusion  · Initial plan to place supine, admin fluid bolus and closely monitor  · Further discussed with Neurology regarding further interventions or performance of repeat CTA - determined not indicated  · Frequently reassessed noting no improvement in BP or symptoms  · Upgraded to ICU on 12/07 for pressor initiation and frequent monitoring  · Further discussed with neurology whom was in agreement with plan  · Continue Telemetry and Hemodynamic monitoring  · Continue Johnny and titrate to systolic BP goal of 070-975LY/WH  · At 48 hours wean Johnny and start Midodrine if needed - start morning of 12/10  · Closely monitor Neuro exam  · Repeat dysphagia assessment  · Continue stroke pathway  · Consult Cardiology - pt needs LUIZ

## 2020-12-09 NOTE — PHYSICAL THERAPY NOTE
Physical Therapy Treatment Session Note    Patient's Name: David Brandt    Admitting Diagnosis  Weakness [R53 1]  Hypertension [I10]  Cerebrovascular accident (CVA), unspecified mechanism ([facility] ) [I63 9]    Problem List  Patient Active Problem List   Diagnosis    Acute CVA (cerebrovascular accident) ([facility] )    Hypertension    QT prolongation    Hypothyroidism       Past Medical History  Past Medical History:   Diagnosis Date    Hypertension        Past Surgical History  Past Surgical History:   Procedure Laterality Date    ABLATION SOFT TISSUE          12/09/20 1315   PT Last Visit   PT Visit Date 12/09/20   Note Type   Note Type Treatment   Pain Assessment   Pain Assessment Tool Pain Assessment not indicated - pt denies pain   Pain Score No Pain   Restrictions/Precautions   Weight Bearing Precautions Per Order No   Other Precautions Cognitive; Chair Alarm; Bed Alarm;Multiple lines; Fall Risk;Telemetry   General   Chart Reviewed Yes   Additional Pertinent History pt  prefers "Earleen Drum"   Response to Previous Treatment Patient unable to report, no changes reported from family or staff   Family/Caregiver Present No   Cognition   Overall Cognitive Status Impaired   Arousal/Participation Alert; Cooperative   Attention Attends with cues to redirect   Orientation Level Oriented to person;Oriented to place   Memory Decreased recall of precautions   Following Commands Follows one step commands with increased time or repetition   Comments Pt  agreeable to PT tx session,pleasant  Subjective   Subjective "I have to pee"   Bed Mobility   Supine to Sit   (CGA)   Additional items Assist x 1;HOB elevated; Bedrails; Increased time required;Verbal cues   Sit to Supine   (DNT as pt  was sitting OOB on recliner upon conclusion )   Transfers   Sit to Stand   (CGA)   Additional items Assist x 1;Bedrails; Increased time required;Verbal cues   Stand to Sit   (CGA)   Additional items Assist x 1; Armrests; Increased time required;Verbal cues Stand pivot   (CGA)   Additional items Assist x 1; Increased time required;Verbal cues   Toilet transfer 4  Minimal assistance   Additional items Assist x 1; Increased time required;Verbal cues;Standard toilet   Additional Comments Upon transitioning to bedside, Emerald Dorado was able to maintain static sitting w/supervision of 1  Pt  did not demonstrate any overt LOB or report any dizziness  Ambulation/Elevation   Gait pattern Improper Weight shift; Short stride;Narrow GARRY   Gait Assistance   (CGA)   Additional items Assist x 1;Verbal cues; Tactile cues  (directional changes x 3)   Assistive Device None; Other (Comment)  (HHA)   Distance 40 feet total  (bedside->toilet->recliner)   Stair Management Assistance Not tested  (unsafe for trialing at this time)   Balance   Static Sitting Good   Dynamic Sitting Fair +   Static Standing Fair   Dynamic Standing Fair -   Ambulatory Fair -   Endurance Deficit   Endurance Deficit Yes   Activity Tolerance   Activity Tolerance Patient limited by fatigue; Other (Comment)  (decreased insight into limitations)   Nurse Made Aware Yes,Torie RN was informed of tx session outcome  Assessment   Prognosis Good   Problem List Decreased strength;Decreased endurance; Impaired balance;Decreased mobility; Impaired judgement;Decreased safety awareness;Decreased coordination   Assessment Pt seen for PT treatment session this date with interventions consisting of gait training w/ emphasis on improving pt's ability to ambulate level surfaces x 40 feet with CGA provided by therapist with no AD and HHA  and therapeutic activity consisting of training: supine<>sit transfers, sit<>stand transfers, static sitting tolerance at EOB for >5 minutes w/ o UE support, static standing tolerance for >3 minutes w/ unilateral UE support, vc and tactile cues for static sitting posture faciliation, vc and tactile cues for static standing posture faciliation, stand pivot transfers towards B direction and toileting on standard toilet, environmental awareness  Pt agreeable to PT treatment session upon arrival, pt found supine in bed w/ HOB elevated, in no apparent distress and A&O x 2  In comparison to previous session, pt with improvements in ambulatory distance  Post session: chair alarm engaged, all needs in reach and RN notified of session findings/recommendations Continue to recommend STR at time of d/c in order to maximize pt's functional independence and safety w/ mobility  Pt continues to be functioning below baseline level, and remains limited 2* factors listed above and including decreased endurance, decreased activity tolerance, muscle weakness,impaired balance,gait deviations  PT will continue to see pt while here in order to address the deficits listed above and provide interventions consistent w/ POC in effort to achieve STGs  Barriers to Discharge Decreased caregiver support; Inaccessible home environment   Goals   Patient Goals to finish her lunch   STG Expiration Date 12/17/20   Short Term Goal #1 STGs remain appropriate   PT Treatment Day 2   Plan   Treatment/Interventions Functional transfer training;LE strengthening/ROM; Therapeutic exercise; Endurance training;Patient/family training;Bed mobility;Gait training;Spoke to nursing;OT   Progress Progressing toward goals   PT Frequency 5x/wk   Recommendation   PT Discharge Recommendation Post-Acute Rehabilitation Services   PT - OK to Discharge Yes  (if medically stable, to PAR)   Additional Comments Upon conclusion,pt  was resting OOB on recliner w/chair alarm engaged and all needs within reach  Adilene Saravia RN was informed of tx session outcome       Padmini Martinez, PT

## 2020-12-09 NOTE — PROGRESS NOTES
Progress Note - Martin Jordan 1950, 69 y o  female MRN: [id number]    Unit/Bed#: -01 Encounter: [id number]    Primary Care Provider: Osbaldo Randall DO   Date and time admitted to hospital: 12/5/2020 12:48 PM        * Acute CVA (cerebrovascular accident) [facility])  [address] [place] [address]  · Patient is a 70 y/o F whom presented on 12/5 with R Sided weakness and speech difficulties  · Initial CTA Head and neck without large vessel flow stenosis  · Initial 82 Magalys Pineda documented as 2  · Patient not a tPA candidate  · She was admitted and continued on Stroke pathway  · Continued ASA, Plavix and Lipitor daily  · Speech evaluation performed   · Neurology following in consult  · MRI Brain performed on 12/7 revealing Acute to early subacute left basal ganglionic infarction involving the left putamen and corona radiata  · 12/7 ECHO (TTE) performed revealing EF 55-60% with G1DD without evidence of PFO  · This am (12/8) Rapid response secondary to worsening facial droop and R Sided weakness  · See Rapid Response note  · 82 Magalys Pineda at time of rapid response 6  · STAT Head CT performed revealing evolving left basal ganglia infarct  · Consider worsening symptoms in setting of hypoperfusion  · Initial plan to place supine, admin fluid bolus and closely monitor  · Further discussed with Neurology regarding further interventions or performance of repeat CTA - determined not indicated  · Frequently reassessed noting no improvement in BP or symptoms  · Upgraded to ICU on 12/07 for pressor initiation and frequent monitoring  · Further discussed with neurology whom was in agreement with plan  · Continue Telemetry and Hemodynamic monitoring  · Continue Johnny and titrate to systolic BP goal of [id number]/YS  · Closely monitor Neuro exam  · Repeat dysphagia assessment  · Continue stroke pathway    Hypertension  Assessment & Plan  · Hx of Hypertension  · Outpatient maintained on Norvasc and tenormin - will hold at this time as will allow for permissive hypertension with systolic BP goal [id number]    Hypothyroidism  Assessment & Plan  · TSH 6 20  · T4 Normal  · Continue home levothyroxine    QT prolongation  Assessment & Plan  · Initial EKG revealed QTc 497 with repeat EKG noting improvement  · Avoid QTc prolonging medications    ----------------------------------------------------------------------------------------  HPI/24hr events: Pt was rapid response yesterday (12/07) - pt was ambulating back from bathroom and became extremely weak needing to be lowered to floor by staff  At the time, it was noted that pt had profound facial droop, slurred speech and RUE and RLE weakness - reported worse than on admit  It was suspected that this was due hypoperfusion  Pt was started on neosynephrine infusion to maintain goal -180  It was reported that symptoms improved (but not resolved) with BP in goal  Repeat CT head showed evolving left basal ganglia infarct  Overnight, pt remain on neosynephrine infusion  Pt continues to have right facial droop, slurred speech, RUE and RLE weakness  Disposition: Continue Critical Care   Code Status: Level 1 - Full Code  ---------------------------------------------------------------------------------------  SUBJECTIVE    Review of Systems   Constitutional: Negative  HENT: Negative  Eyes: Negative  Respiratory: Negative  Cardiovascular: Negative  Gastrointestinal: Negative  Genitourinary: Negative  Musculoskeletal: Negative  Neurological: Positive for facial asymmetry, speech difficulty and weakness  Negative for dizziness, tremors, seizures, syncope, light-headedness, numbness and headaches     Psychiatric/Behavioral: Negative       ---------------------------------------------------------------------------------------  OBJECTIVE    Vitals   Vitals:    12/09/20 0215 12/09/20 0230 12/09/20 0245 12/09/20 0400   BP: 161/84 (!) 174/90 157/81 (!) 178/99   BP Location:    Right arm   Pulse: 67 65 65 65   Resp:    16   Temp:    97 8 °F (36 6 °C)   TempSrc:    Oral   SpO2:    98%   Weight:       Height:         Temp (24hrs), Av °F (36 7 °C), Min:97 7 °F (36 5 °C), Max:98 3 °F (36 8 °C)  Current: Temperature: 97 8 °F (36 6 °C)          Respiratory:  SpO2: SpO2: 98 %, SpO2 Activity: SpO2 Activity: At Rest, SpO2 Device: O2 Device: None (Room air)       Invasive/non-invasive ventilation settings   Respiratory    Lab Data (Last 4 hours)    None         O2/Vent Data (Last 4 hours)    None                Physical Exam  Constitutional:       General: She is not in acute distress  Appearance: Normal appearance  She is not ill-appearing  HENT:      Head: Normocephalic and atraumatic  Right Ear: External ear normal       Left Ear: External ear normal       Nose: Nose normal  No congestion  Mouth/Throat:      Mouth: Mucous membranes are moist       Pharynx: Oropharynx is clear  No oropharyngeal exudate  Eyes:      General: No scleral icterus  Extraocular Movements: EOM normal       Conjunctiva/sclera: Conjunctivae normal       Pupils: Pupils are equal, round, and reactive to light  Neck:      Musculoskeletal: Normal range of motion and neck supple  No neck rigidity  Cardiovascular:      Rate and Rhythm: Normal rate and regular rhythm  Pulses: Normal pulses  Heart sounds: Normal heart sounds  Pulmonary:      Effort: Pulmonary effort is normal       Breath sounds: Normal breath sounds  Abdominal:      General: Abdomen is flat  Bowel sounds are normal  There is no distension  Palpations: Abdomen is soft  Musculoskeletal: Normal range of motion  General: No swelling  Lymphadenopathy:      Cervical: No cervical adenopathy  Skin:     General: Skin is warm and dry  Capillary Refill: Capillary refill takes less than 2 seconds  Coloration: Skin is not jaundiced or pale  Neurological:      Mental Status: She is alert  GCS: GCS eye subscore is 4   GCS verbal subscore is 5  GCS motor subscore is 6  Comments: RUE weakness  Psychiatric:         Speech: Speech is slurred  Neurologic Exam     Mental Status   Oriented to person  Oriented to place  Oriented to time  Oriented to year and month  Follows 2 step commands  Attention: normal  Concentration: normal    Speech: slurred   Level of consciousness: alert  Knowledge: consistent with education  Cranial Nerves     CN III, IV, VI   Pupils are equal, round, and reactive to light  Extraocular motions are normal    Right pupil: Size: 4 mm  Shape: regular  Reactivity: brisk  Accommodation: intact  Left pupil: Size: 4 mm  Shape: regular  Reactivity: brisk  Accommodation: intact  Nystagmus: none       Laboratory and Diagnostics:      A m  Labs pending at time of note      Results from last 7 days   Lab Units 12/09/20  0413 12/08/20  0750 12/05/20  1254   WBC Thousand/uL 8 77 9 80 7 54   HEMOGLOBIN g/dL 13 1 14 3 14 6   HEMATOCRIT % 39 1 43 5 44 5   PLATELETS Thousands/uL 304 309 321   NEUTROS PCT % 64 61  --    MONOS PCT % 8 9  --      Results from last 7 days   Lab Units 12/08/20  0750 12/06/20  0510 12/05/20  1254   SODIUM mmol/L 140 142 137   POTASSIUM mmol/L 3 5 3 9 4 0   CHLORIDE mmol/L 106 107 102   CO2 mmol/L 24 26 26   ANION GAP mmol/L 10 9 9   BUN mg/dL 20 13 14   CREATININE mg/dL 1 03 1 05 0 95   CALCIUM mg/dL 9 7 9 3 9 7   GLUCOSE RANDOM mg/dL 148* 107 138          Results from last 7 days   Lab Units 12/08/20  0750 12/05/20  1254   INR  1 02 0 93   PTT seconds  --  27      Results from last 7 days   Lab Units 12/05/20  1254   TROPONIN I ng/mL <0 02         ABG:    VBG:          Micro        EKG: NSR  Imaging: I have personally reviewed pertinent reports  and I have personally reviewed pertinent films in PACS    CT head wo contrast   Final Result by Eileen Vogel MD (12/08 0805)      Evolving left basal ganglia infarct                    Workstation performed: [id number]         MRI brain wo contrast   Final Result by Edna Cheney MD (12/07 1102)      Acute to early subacute left basal ganglionic infarction involving the left putamen and corona radiata  Mild cerebral chronic microangiopathic disease  I personally discussed this study with Anastacio Ortega on 12/7/2020 at 11:02 AM                Workstation performed: [id number]         CT head wo contrast   Final Result by Brooke Castillo MD (12/06 1709)      No acute intracranial abnormality  Workstation performed: [id number]         CTA stroke alert (head/neck)   Final Result by Tiara Pacheco MD (12/05 1321)      No large vessel flow limiting stenosis  Findings were directly discussed with Singh Mayer on 12/5/2020 1:14 PM                      Workstation performed: [id number]         CT stroke alert brain   Final Result by Tiara Pacheco MD (12/05 1314)      No acute intracranial abnormality  Findings were directly discussed with PRAVIN GUERRERO on 12/5/2020 1:08 PM       Workstation performed: [id number]             Intake and Output  I/O       12/07 0701 - 12/08 0700 12/08 0701 - 12/09 0700    P  O  300 120    I V  (mL/kg)  72 4 (1 3)    Total Intake(mL/kg) 300 (5 2) 192 4 (3 3)    Urine (mL/kg/hr)  175 (0 2)    Stool  0    Total Output  175    Net +300 +17 4          Unmeasured Urine Occurrence 2 x 1 x    Unmeasured Stool Occurrence  1 x          Height and Weights   Height: 5' 4" (162 6 cm)  IBW: 54 7 kg  Body mass index is 21 8 kg/m²  Weight (last 2 days)     None            Nutrition       Diet Orders   (From admission, onward)             Start     Ordered    12/07/20 1439  Diet Cardiovascular; Sodium 4 Gm (FACUNDO)  Diet effective now     Question Answer Comment   Diet Type Cardiovascular    Cardiac Sodium 4 Gm (FACUNDO)    RD to adjust diet per protocol?  Yes        12/07/20 1438                  Active Medications  Scheduled Meds:  Current Facility-Administered Medications   Medication Dose Route Frequency Provider Last Rate    acetaminophen  650 mg Oral Q4H PRN Kashif Fritz PA-C      atorvastatin  80 mg Oral QPM Kashif Fritz PA-C      clopidogrel  75 mg Oral Daily Daniela Melton      levothyroxine  25 mcg Oral Daily Kashif Fritz [place]      phenylephine   mcg/min Intravenous Titrated Kashif Fritz PA-C 30 mcg/min (12/08/20 2341)     Continuous Infusions:  phenylephine,  mcg/min, Last Rate: 30 mcg/min (12/08/20 2341)      PRN Meds:   acetaminophen, 650 mg, Q4H PRN        Invasive Devices Review  Invasive Devices     Peripheral Intravenous Line            Peripheral IV 12/05/20 Left Arm 3 days    Peripheral IV 12/08/20 Distal;Right;Ventral (anterior) Forearm less than 1 day                Rationale for remaining devices: n/a  ---------------------------------------------------------------------------------------  Advance Directive and Living Will:      Power of :    POLST:    ---------------------------------------------------------------------------------------  Care Time Delivered:   No Critical Care time spent       [facility]      Portions of the record may have been created with voice recognition software  Occasional wrong word or "sound a like" substitutions may have occurred due to the inherent limitations of voice recognition software    Read the chart carefully and recognize, using context, where substitutions have occurred

## 2020-12-09 NOTE — PLAN OF CARE
Pt transferred to ICU yesterday due to rapid response for increased stroke symptoms  Pt's CVA evolving more than likely due to hypoperfusion from lower BP  Pt on Johnny gtt to keep -180's  Pt's BP very labile  SBP anywhere from 140-190's on same johnny dose  Pt's neuro status has remained the same  Labs drawn and results WDL  Pt with dysarthria and takes time to communicate needs  Pt needs encouragement to turn and reposition self to prevent breakdown  Will continue to monitor and intervene as needed to improve outcomes  Problem: Potential for Falls  Goal: Patient will remain free of falls  Description: INTERVENTIONS:  - Assess patient frequently for physical needs  -  Identify cognitive and physical deficits and behaviors that affect risk of falls  -  [place] fall precautions as indicated by assessment   - Educate patient/family on patient safety including physical limitations  - Instruct patient to call for assistance with activity based on assessment  - Modify environment to reduce risk of injury  - Consider OT/PT consult to assist with strengthening/mobility  Outcome: Progressing     Problem: Activity Intolerance/Impaired Mobility  Goal: Mobility/activity is maintained at optimum level for patient  Description: Interventions:  - Assess and monitor patient  barriers to mobility and need for assistive/adaptive devices  - Assess patient's emotional response to limitations  - Collaborate with interdisciplinary team and initiate plans and interventions as ordered  - Encourage independent activity per ability   - Maintain proper body alignment  - Perform active/passive rom as tolerated/ordered  - Plan activities to conserve energy   - Turn patient as appropriate  Outcome: Progressing     Problem: Communication Impairment  Goal: Ability to express needs and understand communication  Description: Assess patient's communication skills and ability to understand information    Patient will demonstrate use of effective communication techniques, alternative methods of communication and understanding even if not able to speak  - Encourage communication and provide alternate methods of communication as needed  - Collaborate with case management/ for discharge needs  - Include patient/family/caregiver in decisions related to communication  Outcome: Progressing     Problem: Potential for Aspiration  Goal: Non-ventilated patient's risk of aspiration is minimized  Description: Assess and monitor vital signs, respiratory status, and labs (WBC)  Monitor for signs of aspiration (tachypnea, cough, rales, wheezing, cyanosis, fever)  - Assess and monitor patient's ability to swallow  - Place patient up in chair to eat if possible  - HOB up at 90 degrees to eat if unable to get patient up into chair   - Supervise patient during oral intake  - Instruct patient/ family to take small bites  - Instruct patient/ family to take small single sips when taking liquids  - Follow patient-specific strategies generated by speech pathologist   Outcome: Progressing     Problem: Nutrition  Goal: Nutrition/Hydration status is improving  Description: Monitor and assess patient's nutrition/hydration status for malnutrition (ex- brittle hair, bruises, dry skin, pale skin and conjunctiva, muscle wasting, smooth red tongue, and disorientation)  Collaborate with interdisciplinary team and initiate plan and interventions as ordered  Monitor patient's weight and dietary intake as ordered or per policy  Utilize nutrition screening tool and intervene per policy  Determine patient's food preferences and provide high-protein, high-caloric foods as appropriate  - Assist patient with eating   - Allow adequate time for meals   - Encourage patient to take dietary supplement as ordered  - Collaborate with clinical nutritionist   - Include patient/family/caregiver in decisions related to nutrition    Outcome: Progressing Problem: NEUROSENSORY - ADULT  Goal: Achieves stable or improved neurological status  Description: INTERVENTIONS  - Monitor and report changes in neurological status  - Monitor vital signs such as temperature, blood pressure, glucose, and any other labs ordered   - Initiate measures to prevent increased intracranial pressure  - Monitor for seizure activity and implement precautions if appropriate      Outcome: Progressing  Goal: Achieves maximal functionality and self care  Description: INTERVENTIONS  - Monitor swallowing and airway patency with patient fatigue and changes in neurological status  - Encourage and assist patient to increase activity and self care     - Encourage visually impaired, hearing impaired and aphasic patients to use assistive/communication devices  Outcome: Progressing     Problem: CARDIOVASCULAR - ADULT  Goal: Maintains optimal cardiac output and hemodynamic stability  Description: INTERVENTIONS:  - Monitor I/O, vital signs and rhythm  - Monitor for S/S and trends of decreased cardiac output  - Administer and titrate ordered vasoactive medications to optimize hemodynamic stability  - Assess quality of pulses, skin color and temperature  - Assess for signs of decreased coronary artery perfusion  - Instruct patient to report change in severity of symptoms  Outcome: Progressing  Goal: Absence of cardiac dysrhythmias or at baseline rhythm  Description: INTERVENTIONS:  - Continuous cardiac monitoring, vital signs, obtain 12 lead EKG if ordered  - Administer antiarrhythmic and heart rate control medications as ordered  - Monitor electrolytes and administer replacement therapy as ordered  Outcome: Progressing     Problem: PAIN - ADULT  Goal: Verbalizes/displays adequate comfort level or baseline comfort level  Description: Interventions:  - Encourage patient to monitor pain and request assistance  - Assess pain using appropriate pain scale  - Administer analgesics based on type and severity of pain and evaluate response  - Implement non-pharmacological measures as appropriate and evaluate response  - Consider cultural and social influences on pain and pain management  - Notify physician/advanced practitioner if interventions unsuccessful or patient reports new pain  Outcome: Progressing     Problem: SAFETY ADULT  Goal: Patient will remain free of falls  Description: INTERVENTIONS:  - Assess patient frequently for physical needs  -  Identify cognitive and physical deficits and behaviors that affect risk of falls    -  [place] fall precautions as indicated by assessment   - Educate patient/family on patient safety including physical limitations  - Instruct patient to call for assistance with activity based on assessment  - Modify environment to reduce risk of injury  - Consider OT/PT consult to assist with strengthening/mobility  Outcome: Progressing  Goal: Maintain or return mobility status to optimal level  Description: INTERVENTIONS:  - Assess patient's baseline mobility status (ambulation, transfers, stairs, etc )    - Identify cognitive and physical deficits and behaviors that affect mobility  - Identify mobility aids required to assist with transfers and/or ambulation (gait belt, sit-to-stand, lift, walker, cane, etc )  - [place] fall precautions as indicated by assessment  - Record patient progress and toleration of activity level on Mobility SBAR; progress patient to next Phase/Stage  - Instruct patient to call for assistance with activity based on assessment  - Consider rehabilitation consult to assist with strengthening/weightbearing, etc   Outcome: Progressing     Problem: DISCHARGE PLANNING  Goal: Discharge to home or other facility with appropriate resources  Description: INTERVENTIONS:  - Identify barriers to discharge w/patient and caregiver  - Arrange for needed discharge resources and transportation as appropriate  - Identify discharge learning needs (meds, wound care, etc )  - Arrange for interpretive services to assist at discharge as needed  - Refer to Case Management Department for coordinating discharge planning if the patient needs post-hospital services based on physician/advanced practitioner order or complex needs related to functional status, cognitive ability, or social support system  Outcome: Progressing     Problem: Knowledge Deficit  Goal: Patient/family/caregiver demonstrates understanding of disease process, treatment plan, medications, and discharge instructions  Description: Complete learning assessment and assess knowledge base  Interventions:  - Provide teaching at level of understanding  - Provide teaching via preferred learning methods  Outcome: Progressing     Problem: Nutrition/Hydration-ADULT  Goal: Nutrient/Hydration intake appropriate for improving, restoring or maintaining nutritional needs  Description: Monitor and assess patient's nutrition/hydration status for malnutrition  Collaborate with interdisciplinary team and initiate plan and interventions as ordered  Monitor patient's weight and dietary intake as ordered or per policy  Utilize nutrition screening tool and intervene as necessary  Determine patient's food preferences and provide high-protein, high-caloric foods as appropriate       INTERVENTIONS:  - Monitor oral intake, urinary output, labs, and treatment plans  - Assess nutrition and hydration status and recommend course of action  - Evaluate amount of meals eaten  - Assist patient with eating if necessary   - Allow adequate time for meals  - Recommend/ encourage appropriate diets, oral nutritional supplements, and vitamin/mineral supplements  - Order, calculate, and assess calorie counts as needed  - Recommend, monitor, and adjust tube feedings and TPN/PPN based on assessed needs  - Assess need for intravenous fluids  - Provide specific nutrition/hydration education as appropriate  - Include patient/family/caregiver in decisions related to nutrition  Outcome: Progressing     Problem: Prexisting or High Potential for Compromised Skin Integrity  Goal: Skin integrity is maintained or improved  Description: INTERVENTIONS:  - Identify patients at risk for skin breakdown  - Assess and monitor skin integrity  - Assess and monitor nutrition and hydration status  - Monitor labs   - Assess for incontinence   - Turn and reposition patient  - Assist with mobility/ambulation  - Relieve pressure over bony prominences  - Avoid friction and shearing  - Provide appropriate hygiene as needed including keeping skin clean and dry  - Evaluate need for skin moisturizer/barrier cream  - Collaborate with interdisciplinary team   - Patient/family teaching  - Consider wound care consult   Outcome: Progressing

## 2020-12-09 NOTE — ASSESSMENT & PLAN NOTE
· Patient is a 70 y/o F whom presented on 12/5 with R Sided weakness and speech difficulties  · Initial CTA Head and neck without large vessel flow stenosis  · Initial 82 Magalys Pineda documented as 2  · Patient not a tPA candidate  · She was admitted and continued on Stroke pathway  · Continued ASA, Plavix and Lipitor daily  · Speech evaluation performed   · Neurology following in consult  · MRI Brain performed on 12/7 revealing Acute to early subacute left basal ganglionic infarction involving the left putamen and corona radiata  · 12/7 ECHO (TTE) performed revealing EF 55-60% with G1DD without evidence of PFO  · This am (12/8) Rapid response secondary to worsening facial droop and R Sided weakness  · See Rapid Response note  · 82 Rujade Pineda at time of rapid response 6  · STAT Head CT performed revealing evolving left basal ganglia infarct  · Consider worsening symptoms in setting of hypoperfusion  · Initial plan to place supine, admin fluid bolus and closely monitor  · Further discussed with Neurology regarding further interventions or performance of repeat CTA - determined not indicated  · Frequently reassessed noting no improvement in BP or symptoms  · Upgraded to ICU on 12/07 for pressor initiation and frequent monitoring  · Further discussed with neurology whom was in agreement with plan  · Continue Telemetry and Hemodynamic monitoring  · Continue Johnny and titrate to systolic BP goal of [id number]  · Closely monitor Neuro exam  · Repeat dysphagia assessment  · Continue stroke pathway

## 2020-12-09 NOTE — PLAN OF CARE
Problem: PHYSICAL THERAPY ADULT  Goal: Performs mobility at highest level of function for planned discharge setting  See evaluation for individualized goals  Description: Treatment/Interventions: Functional transfer training, LE strengthening/ROM, Elevations, Therapeutic exercise, Endurance training, Patient/family training, Equipment eval/education, Bed mobility, Gait training, Compensatory technique education, Spoke to nursing, OT          See flowsheet documentation for full assessment, interventions and recommendations  Outcome: Progressing  Note: Prognosis: Good  Problem List: Decreased strength, Decreased endurance, Impaired balance, Decreased mobility, Impaired judgement, Decreased safety awareness, Decreased coordination  Assessment: Pt seen for PT treatment session this date with interventions consisting of gait training w/ emphasis on improving pt's ability to ambulate level surfaces x 40 feet with CGA provided by therapist with no AD and HHA  and therapeutic activity consisting of training: supine<>sit transfers, sit<>stand transfers, static sitting tolerance at EOB for >5 minutes w/ o UE support, static standing tolerance for >3 minutes w/ unilateral UE support, vc and tactile cues for static sitting posture faciliation, vc and tactile cues for static standing posture faciliation, stand pivot transfers towards B direction and toileting on standard toilet, environmental awareness  Pt agreeable to PT treatment session upon arrival, pt found supine in bed w/ HOB elevated, in no apparent distress and A&O x 2  In comparison to previous session, pt with improvements in ambulatory distance  Post session: chair alarm engaged, all needs in reach and RN notified of session findings/recommendations Continue to recommend STR at time of d/c in order to maximize pt's functional independence and safety w/ mobility   Pt continues to be functioning below baseline level, and remains limited 2* factors listed above and including decreased endurance, decreased activity tolerance, muscle weakness,impaired balance,gait deviations  PT will continue to see pt while here in order to address the deficits listed above and provide interventions consistent w/ POC in effort to achieve STGs  Barriers to Discharge: Decreased caregiver support, Inaccessible home environment  Barriers to Discharge Comments: Pt requires increased assistance for mobility and lives alone  PT Discharge Recommendation: [address]     PT - OK to Discharge: Yes(if medically stable, to PAR)    See flowsheet documentation for full assessment

## 2020-12-09 NOTE — ASSESSMENT & PLAN NOTE
· Hx of Hypertension  · Outpatient maintained on Norvasc and tenormin - will hold at this time as will allow for permissive hypertension with systolic BP goal [phone]/XE

## 2020-12-09 NOTE — TELEMEDICINE
REQUIRED DOCUMENTATION:     1  This service was provided via Telemedicine  2  Provider located at [facility] AND [facility]  3  TeleMed provider: Darby Will DO   4  Identify all parties in room with patient during tele consult:  Pt and critical care physician and CC AP  5  After connecting through [employer], patient was identified by name and date of birth and assistant checked wristband  Patient was then informed that this was a Telemedicine visit and that the exam was being conducted confidentially over secure lines  My office door was closed  No one else was in the room  Patient acknowledged consent and understanding of privacy and security of the Telemedicine visit, and gave us permission to have the assistant stay in the room in order to assist with the history and to conduct the exam   I informed the patient that I have reviewed their record in Epic and presented the opportunity for them to ask any questions regarding the visit today  The patient agreed to participate  Neurology Consultation    Assessment/Plan:  Acute left basal ganglia infarct with worse hemiparesis and dysarthria and mild expressive aphasia noted 12 7 concerning for hypoperfusion as well as possible worsening symptoms due to edema in the setting of acute stroke  Recommend systolic blood pressure is 1 60s to 180s for 48 hours and then slowly bring down have however did not drop below systolic blood pressure of 140  If needed can use phenylephrine short-term to elevated blood pressures in afterward can trial midodrine 2 5-5 mg b i d  Echocardiogram with no overt intracardiac thrombus  Recommend LUIZ  CTA head and neck with no significant disease that might be the cause of her current stroke    Will follow-up thrombosis panel  Continue with aspirin 81 mg and Plavix 75 mg for 3 weeks followed by Plavix only      Discussed with critical care physician and AP at the time of video consultation  KALIE  Prema Arana is a 69 y o  seen in telemedicine follow up after initial teleconsult by neurology 12/5/20, for aphasia and right sided hemiparesis with signiifcant resolution of hemiparesis, exam concerning for stroke  tPA not given as unclear last known normal     Neurology was reconsulted due to significantly worse symptoms  This is noted 12 7 by the admitting team attending his stated that the day prior patient had mild aphasia and minimal right-sided weakness however on 12/07 patient was noted to have dense right-sided hemiparesis difficulty with ambulation which she did not have prior more pronounced right facial droop as well as significant dysarthria  Patient's blood pressure systolic at this time was in the 190s  MRI brain done earlier on 12/07/2020 did show acute left basal ganglia ischemic infarction  Patient was subsequently transferred to the ICU as her systolic blood pressure was dropping to 130s 140s and was not increasing despite IV fluids as recommended by me  It was a concern for hypoperfusion and I did discuss systolic blood pressure to be closer to 160s- 180s as there was concern for hypoperfusion  Patient was started on phenylephrine appropriately by the critical care team to help elevate her blood pressures  When I saw patient in video tele consultation after her blood pressures were improved her right-sided hemiparesis as well as aphasia and dysarthria were improved  Echocardiogram with no intracardiac thrombus or vegetation noted  Ten point ROS reviewed and negative other than that noted above              Physical Exam    General: AO x person place and time dysarthric  HEENT: NCAT, PERRL, No septal deviation  CVS: RRR  Respiratory: Symmetric chest rise  No respiratory distress noted  Extremities: No edema noted  MSK: Normal ROM       Neurologic exam:  Mental Status: Alert and oriented to person, place, time and situation  Mild aphasia, can name simple objects  Moderate dysarthria  Comprehension intact  Judgement and insight intact  Cranial Nerves:  Grossly intact with right facial droop noted  Motor Exam:  Left upper extremity and lower extremity strength intact, exam done with help of critical care staff  Right upper extremity can hold for 10 seconds without drift however does not hit bed and right lower extremity mild drift noted and can hold for 5 seconds without hitting bed  Sensory Exam:  Reduced sensation right upper extremity and right lower extremity   Coordination: No dysmetria to FNF or HS testing     Gait testing deferred      Imaging Studies: I have personally reviewed pertinent films in PACS

## 2020-12-10 ENCOUNTER — APPOINTMENT (INPATIENT)
Dept: CT IMAGING | Facility: HOSPITAL | Age: 70
DRG: 065 | End: 2020-12-10
Payer: COMMERCIAL

## 2020-12-10 PROBLEM — R51.9 HEADACHE: Status: ACTIVE | Noted: 2020-12-10

## 2020-12-10 LAB
ANION GAP SERPL CALCULATED.3IONS-SCNC: 11 MMOL/L (ref 4–13)
APTT SCREEN TO CONFIRM RATIO: 1.13 RATIO (ref 0–1.4)
B2 GLYCOPROT1 IGA SER-ACNC: <9 GPI IGA UNITS (ref 0–25)
B2 GLYCOPROT1 IGG SER-ACNC: <9 GPI IGG UNITS (ref 0–20)
B2 GLYCOPROT1 IGM SER-ACNC: 9 GPI IGM UNITS (ref 0–32)
BUN SERPL-MCNC: 15 MG/DL (ref 5–25)
CALCIUM SERPL-MCNC: 9.3 MG/DL (ref 8.3–10.1)
CHLORIDE SERPL-SCNC: 105 MMOL/L (ref 100–108)
CO2 SERPL-SCNC: 24 MMOL/L (ref 21–32)
CONFIRM APTT/NORMAL: 37.3 SEC (ref 0–55)
CREAT SERPL-MCNC: 0.87 MG/DL (ref 0.6–1.3)
ERYTHROCYTE [DISTWIDTH] IN BLOOD BY AUTOMATED COUNT: 12 % (ref 11.6–15.1)
GFR SERPL CREATININE-BSD FRML MDRD: 67 ML/MIN/1.73SQ M
GLUCOSE SERPL-MCNC: 106 MG/DL (ref 65–140)
HCT VFR BLD AUTO: 41.6 % (ref 34.8–46.1)
HGB BLD-MCNC: 13.6 G/DL (ref 11.5–15.4)
LA PPP-IMP: NORMAL
MCH RBC QN AUTO: 30.3 PG (ref 26.8–34.3)
MCHC RBC AUTO-ENTMCNC: 32.7 G/DL (ref 31.4–37.4)
MCV RBC AUTO: 93 FL (ref 82–98)
PLATELET # BLD AUTO: 287 THOUSANDS/UL (ref 149–390)
PMV BLD AUTO: 9.1 FL (ref 8.9–12.7)
POTASSIUM SERPL-SCNC: 4.1 MMOL/L (ref 3.5–5.3)
PROT C AG ACT/NOR PPP IA: 122 % OF NORMAL (ref 60–150)
PROT C AG ACT/NOR PPP IA: 129 % OF NORMAL (ref 60–150)
PROT S ACT/NOR PPP: 101 % (ref 68–108)
PROT S ACT/NOR PPP: 103 % (ref 57–157)
PROT S ACT/NOR PPP: 92 % (ref 68–108)
PROT S PPP-ACNC: 85 % (ref 60–150)
RBC # BLD AUTO: 4.49 MILLION/UL (ref 3.81–5.12)
SCREEN APTT: 33.2 SEC (ref 0–51.9)
SCREEN DRVVT: 37.1 SEC (ref 0–47)
SODIUM SERPL-SCNC: 140 MMOL/L (ref 136–145)
THROMBIN TIME: 18.8 SEC (ref 0–23)
WBC # BLD AUTO: 8.36 THOUSAND/UL (ref 4.31–10.16)

## 2020-12-10 PROCEDURE — 97535 SELF CARE MNGMENT TRAINING: CPT

## 2020-12-10 PROCEDURE — 97110 THERAPEUTIC EXERCISES: CPT

## 2020-12-10 PROCEDURE — 97116 GAIT TRAINING THERAPY: CPT

## 2020-12-10 PROCEDURE — 99291 CRITICAL CARE FIRST HOUR: CPT | Performed by: STUDENT IN AN ORGANIZED HEALTH CARE EDUCATION/TRAINING PROGRAM

## 2020-12-10 PROCEDURE — 80048 BASIC METABOLIC PNL TOTAL CA: CPT | Performed by: INTERNAL MEDICINE

## 2020-12-10 PROCEDURE — 85027 COMPLETE CBC AUTOMATED: CPT | Performed by: INTERNAL MEDICINE

## 2020-12-10 PROCEDURE — 99222 1ST HOSP IP/OBS MODERATE 55: CPT | Performed by: INTERNAL MEDICINE

## 2020-12-10 PROCEDURE — 70450 CT HEAD/BRAIN W/O DYE: CPT

## 2020-12-10 PROCEDURE — G1004 CDSM NDSC: HCPCS

## 2020-12-10 PROCEDURE — B246ZZ4 ULTRASONOGRAPHY OF RIGHT AND LEFT HEART, TRANSESOPHAGEAL: ICD-10-PCS | Performed by: INTERNAL MEDICINE

## 2020-12-10 PROCEDURE — 93005 ELECTROCARDIOGRAM TRACING: CPT

## 2020-12-10 RX ORDER — ASPIRIN 81 MG/1
81 TABLET ORAL DAILY
Status: DISCONTINUED | OUTPATIENT
Start: 2020-12-10 | End: 2020-12-11

## 2020-12-10 RX ORDER — ONDANSETRON HYDROCHLORIDE 4 MG/5ML
4 SOLUTION ORAL EVERY 6 HOURS PRN
Status: DISCONTINUED | OUTPATIENT
Start: 2020-12-10 | End: 2020-12-17 | Stop reason: HOSPADM

## 2020-12-10 RX ORDER — TRAMADOL HYDROCHLORIDE 50 MG/1
25 TABLET ORAL ONCE
Status: COMPLETED | OUTPATIENT
Start: 2020-12-10 | End: 2020-12-10

## 2020-12-10 RX ORDER — DIPHENHYDRAMINE HCL 25 MG
25 TABLET ORAL
Status: DISCONTINUED | OUTPATIENT
Start: 2020-12-10 | End: 2020-12-11

## 2020-12-10 RX ORDER — FENTANYL CITRATE 50 UG/ML
25 INJECTION, SOLUTION INTRAMUSCULAR; INTRAVENOUS ONCE
Status: COMPLETED | OUTPATIENT
Start: 2020-12-10 | End: 2020-12-10

## 2020-12-10 RX ORDER — MIDODRINE HYDROCHLORIDE 5 MG/1
2.5 TABLET ORAL
Status: DISCONTINUED | OUTPATIENT
Start: 2020-12-10 | End: 2020-12-10

## 2020-12-10 RX ORDER — MAGNESIUM SULFATE HEPTAHYDRATE 40 MG/ML
2 INJECTION, SOLUTION INTRAVENOUS ONCE
Status: COMPLETED | OUTPATIENT
Start: 2020-12-10 | End: 2020-12-10

## 2020-12-10 RX ADMIN — TRAMADOL HYDROCHLORIDE 25 MG: 50 TABLET, FILM COATED ORAL at 23:23

## 2020-12-10 RX ADMIN — ONDANSETRON 8 MG: 2 INJECTION INTRAMUSCULAR; INTRAVENOUS at 18:40

## 2020-12-10 RX ADMIN — DIPHENHYDRAMINE HCL 25 MG: 25 TABLET, COATED ORAL at 23:22

## 2020-12-10 RX ADMIN — CLOPIDOGREL 75 MG: 75 TABLET, FILM COATED ORAL at 09:51

## 2020-12-10 RX ADMIN — MAGNESIUM SULFATE IN WATER 2 G: 40 INJECTION, SOLUTION INTRAVENOUS at 18:05

## 2020-12-10 RX ADMIN — MIDODRINE HYDROCHLORIDE 2.5 MG: 5 TABLET ORAL at 15:51

## 2020-12-10 RX ADMIN — HEPARIN SODIUM 5000 UNITS: 5000 INJECTION INTRAVENOUS; SUBCUTANEOUS at 14:48

## 2020-12-10 RX ADMIN — FENTANYL CITRATE 25 MCG: 50 INJECTION INTRAMUSCULAR; INTRAVENOUS at 19:56

## 2020-12-10 RX ADMIN — HEPARIN SODIUM 5000 UNITS: 5000 INJECTION INTRAVENOUS; SUBCUTANEOUS at 23:22

## 2020-12-10 RX ADMIN — HEPARIN SODIUM 5000 UNITS: 5000 INJECTION INTRAVENOUS; SUBCUTANEOUS at 05:43

## 2020-12-10 RX ADMIN — VALPROATE SODIUM 500 MG: 100 INJECTION, SOLUTION INTRAVENOUS at 19:56

## 2020-12-10 RX ADMIN — ACETAMINOPHEN 650 MG: 325 TABLET ORAL at 14:53

## 2020-12-10 RX ADMIN — ONDANSETRON HYDROCHLORIDE 4 MG: 4 SOLUTION ORAL at 23:23

## 2020-12-10 RX ADMIN — TRIMETHOBENZAMIDE HYDROCHLORIDE 200 MG: 100 INJECTION INTRAMUSCULAR at 20:44

## 2020-12-10 RX ADMIN — LEVOTHYROXINE SODIUM 25 MCG: 25 TABLET ORAL at 05:43

## 2020-12-10 NOTE — ASSESSMENT & PLAN NOTE
Patient initially presented 12/05/2020 with right-sided weakness and dysarthria  Worsening symptoms noted 12/07/2020  MRI of the brain 12/7 showed acute to early subacute left basal ganglia on infarction involving the left putamen and corona radiata  Echocardiogram 12/7 was unremarkable  LUIZ was recommended by neurology  Aspirin 81 mg/clopidogrel 75 mg for 3 weeks then clopidogrel alone  Atorvastatin 80 mg daily added  Patient denies any cardiac history  She denies any palpitations/lightheadedness/dizziness/chest pain  Will arrange LUIZ for 12/11/2020

## 2020-12-10 NOTE — UTILIZATION REVIEW
Continued Stay Review    Date: 12/10                      Current Patient Class: Inpatient  Current Level of Care: ICU step down    HPI:70 y o  female initially admitted on 12/5    Assessment/Plan:  Weaning vasopressor and phenylephrine with continued improvement of symptoms  Continue asa, plavix, lipitor  Plan for LUIZ tomorrow  GCS-15  PT/ot recommend STR    12/10 Cardiology consult:  Continues to have word finding difficulty and right upper extremity weakness     Pertinent Labs/Diagnostic Results:   Results from last 7 days   Lab Units 12/05/20  1312   SARS-COV-2  Negative     Results from last 7 days   Lab Units 12/10/20  0455 12/09/20  0413 12/08/20  0750 12/05/20  1254   WBC Thousand/uL 8 36 8 77 9 80 7 54   HEMOGLOBIN g/dL 13 6 13 1 14 3 14 6   HEMATOCRIT % 41 6 39 1 43 5 44 5   PLATELETS Thousands/uL 287 304 309 321   NEUTROS ABS Thousands/µL  --  5 60 5 95  --          Results from last 7 days   Lab Units 12/10/20  0455 12/09/20  0413 12/08/20  0750 12/06/20  0510 12/05/20  1254   SODIUM mmol/L 140 135* 140 142 137   POTASSIUM mmol/L 4 1 3 5 3 5 3 9 4 0   CHLORIDE mmol/L 105 106 106 107 102   CO2 mmol/L 24 27 24 26 26   ANION GAP mmol/L 11 2* 10 9 9   BUN mg/dL 15 19 20 13 14   CREATININE mg/dL 0 87 0 93 1 03 1 05 0 95   EGFR ml/min/1 73sq m 67 62 55 54 60   CALCIUM mg/dL 9 3 9 0 9 7 9 3 9 7   MAGNESIUM mg/dL  --  2 2  --   --   --      Results from last 7 days   Lab Units 12/08/20  0546 12/07/20  1547   BETA 2 GLYCO 1 IGA GPI IgA units <9 <9   BETA 2 GLYCO 1 IGG GPI IgG units <9 <9   BETA 2 GLYCO 1 IGM GPI IgM units 9 15     Results from last 7 days   Lab Units 12/08/20  0735 12/05/20  1250   POC GLUCOSE mg/dl 120 108     Results from last 7 days   Lab Units 12/10/20  0455 12/09/20  0413 12/08/20  0750 12/06/20  0510 12/05/20  1254   GLUCOSE RANDOM mg/dL 106 105 148* 107 138         Results from last 7 days   Lab Units 12/06/20  0510   HEMOGLOBIN A1C % 5 7*   EAG mg/dl 117       Results from last 7 days Lab Units 12/05/20  1254   TROPONIN I ng/mL <0 02         Results from last 7 days   Lab Units 12/08/20  0750 12/05/20  1254   PROTIME seconds 13 2 12 3   INR  1 02 0 93   PTT seconds  --  27     Results from last 7 days   Lab Units 12/06/20  0510   TSH 3RD GENERATON uIU/mL 6 205*     Results from last 7 days   Lab Units 12/05/20  1312   INFLUENZA A PCR  Negative   INFLUENZA B PCR  Negative   RSV PCR  Negative       Vital Signs:   Date/Time  Temp  Pulse  Resp  BP  MAP (mmHg)  SpO2  O2 Device  Patient Position - Orthostatic VS   12/10/20 1511  97 3 °F (36 3 °C)Abnormal   73  20  150/90  115  99 %  None (Room air)  Lying   12/10/20 1450  --  --  --  131/80  100  --  --  Lying   12/10/20 1400  --  --  --  156/81  111  --  --  Lying   12/10/20 1300  --  --  --  126/78  97  --  --  Sitting   12/10/20 1230  --  --  --  155/82  114  --  --  Sitting   12/10/20 1200  --  --  --  171/86Abnormal   121  --  --  Sitting   12/10/20 1130  --  --  --  189/94Abnormal   134  --  --  Sitting   12/10/20 1100  98 1 °F (36 7 °C)  71  20  155/82  114  99 %  None (Room air)  Sitting   12/10/20 1030  --  --  --  167/84  119  --  --  Sitting   12/10/20 1000  --  --  --  171/87Abnormal   118  --  --  Sitting   12/10/20 0930  --  --  --  168/79  113  --  --  Sitting   12/10/20 0900  --  --  --  120/83  98  --  --  Sitting   12/10/20 0830  --  --  --  162/87  127  --  --  Sitting   12/10/20 0800  --  --  --  161/97  123  --  --  Sitting   12/10/20 0730  --  --  --  189/85Abnormal   122  --  --  Sitting   12/10/20 0700  98 4 °F (36 9 °C)  70  20  189/85Abnormal   122  98 %  None (Room air)  Lying   12/10/20 0640  --  67  --  149/88  114  --  --  --   12/10/20 0630  --  61  --  130/83  102  --  --  --   12/10/20 0600  --  62  --  194/89Abnormal   128  --  --  --   12/10/20 0530  --  61  --  181/88Abnormal                Medications:   Scheduled Medications:  atorvastatin, 80 mg, Oral, QPM  clopidogrel, 75 mg, Oral, Daily  heparin (porcine), 5,000 Units, Subcutaneous, Q8H [facility]  levothyroxine, 25 mcg, Oral, Daily  midodrine, 2 5 mg, Oral, TID AC      Continuous IV Infusions:     PRN Meds:  acetaminophen, 650 mg, Oral, Q4H PRN        Discharge Plan:TBD  Network Utilization Review Department  [email or web address]  ATTENTION: Please call with any questions or concerns to [phone] and carefully listen to the prompts so that you are directed to the right person  All voicemails are confidential   Chrystine Can all requests for admission clinical reviews, approved or denied determinations and any other requests to dedicated fax number below belonging to the campus where the patient is receiving treatment   List of dedicated fax numbers for the Facilities:  [address] DENIALS (Administrative/Medical Necessity) [phone]   [address] (Maternity/NICU/Pediatrics) [phone] 5400 [facility] [phone]   Deni Singh [phone]   Sadie Slater [phone]   Shannan Gay [phone]   1205 [facility] 1525 [facility] [phone]   1102 [facility] [phone]   2200 [facility] [address] [facility]  2401 [facility] 1000 [address] [facility] [phone]

## 2020-12-10 NOTE — QUICK NOTE
Discussed case with team today: Patient was weaned off vasopressors today and few hours later it was believe she may have had more dysarthria and right arm weakness  However, pressure remains elevated at 150/90  More pressingly, she developed headache with light sensitivity  She has history of migraines  CT head was repeated and showed no bleeding  I personally reviewed CTA and I see no large vessel disease to suggest pressure augmentation is still needed  We will treat for headache using Mg, Valproate, Bbenadryl and Zofran

## 2020-12-10 NOTE — PLAN OF CARE
Pt tolerating protocols  Pt continues on jeramie gtt  Pt for possible d/c of jeramie and start of midodrine  Pt's neuro status remains stable  Labs drawn and results pending  Problem: Potential for Falls  Goal: Patient will remain free of falls  Description: INTERVENTIONS:  - Assess patient frequently for physical needs  -  Identify cognitive and physical deficits and behaviors that affect risk of falls  -  [place] fall precautions as indicated by assessment   - Educate patient/family on patient safety including physical limitations  - Instruct patient to call for assistance with activity based on assessment  - Modify environment to reduce risk of injury  - Consider OT/PT consult to assist with strengthening/mobility  Outcome: Progressing     Problem: Activity Intolerance/Impaired Mobility  Goal: Mobility/activity is maintained at optimum level for patient  Description: Interventions:  - Assess and monitor patient  barriers to mobility and need for assistive/adaptive devices  - Assess patient's emotional response to limitations  - Collaborate with interdisciplinary team and initiate plans and interventions as ordered  - Encourage independent activity per ability   - Maintain proper body alignment  - Perform active/passive rom as tolerated/ordered  - Plan activities to conserve energy   - Turn patient as appropriate  Outcome: Progressing     Problem: Communication Impairment  Goal: Ability to express needs and understand communication  Description: Assess patient's communication skills and ability to understand information  Patient will demonstrate use of effective communication techniques, alternative methods of communication and understanding even if not able to speak  - Encourage communication and provide alternate methods of communication as needed  - Collaborate with case management/ for discharge needs    - Include patient/family/caregiver in decisions related to communication  Outcome: Progressing     Problem: Potential for Aspiration  Goal: Non-ventilated patient's risk of aspiration is minimized  Description: Assess and monitor vital signs, respiratory status, and labs (WBC)  Monitor for signs of aspiration (tachypnea, cough, rales, wheezing, cyanosis, fever)  - Assess and monitor patient's ability to swallow  - Place patient up in chair to eat if possible  - HOB up at 90 degrees to eat if unable to get patient up into chair   - Supervise patient during oral intake  - Instruct patient/ family to take small bites  - Instruct patient/ family to take small single sips when taking liquids  - Follow patient-specific strategies generated by speech pathologist   Outcome: Progressing     Problem: Nutrition  Goal: Nutrition/Hydration status is improving  Description: Monitor and assess patient's nutrition/hydration status for malnutrition (ex- brittle hair, bruises, dry skin, pale skin and conjunctiva, muscle wasting, smooth red tongue, and disorientation)  Collaborate with interdisciplinary team and initiate plan and interventions as ordered  Monitor patient's weight and dietary intake as ordered or per policy  Utilize nutrition screening tool and intervene per policy  Determine patient's food preferences and provide high-protein, high-caloric foods as appropriate  - Assist patient with eating   - Allow adequate time for meals   - Encourage patient to take dietary supplement as ordered  - Collaborate with clinical nutritionist   - Include patient/family/caregiver in decisions related to nutrition    Outcome: Progressing     Problem: NEUROSENSORY - ADULT  Goal: Achieves stable or improved neurological status  Description: INTERVENTIONS  - Monitor and report changes in neurological status  - Monitor vital signs such as temperature, blood pressure, glucose, and any other labs ordered   - Initiate measures to prevent increased intracranial pressure  - Monitor for seizure activity and implement precautions if appropriate      Outcome: Progressing  Goal: Achieves maximal functionality and self care  Description: INTERVENTIONS  - Monitor swallowing and airway patency with patient fatigue and changes in neurological status  - Encourage and assist patient to increase activity and self care     - Encourage visually impaired, hearing impaired and aphasic patients to use assistive/communication devices  Outcome: Progressing     Problem: CARDIOVASCULAR - ADULT  Goal: Maintains optimal cardiac output and hemodynamic stability  Description: INTERVENTIONS:  - Monitor I/O, vital signs and rhythm  - Monitor for S/S and trends of decreased cardiac output  - Administer and titrate ordered vasoactive medications to optimize hemodynamic stability  - Assess quality of pulses, skin color and temperature  - Assess for signs of decreased coronary artery perfusion  - Instruct patient to report change in severity of symptoms  Outcome: Progressing  Goal: Absence of cardiac dysrhythmias or at baseline rhythm  Description: INTERVENTIONS:  - Continuous cardiac monitoring, vital signs, obtain 12 lead EKG if ordered  - Administer antiarrhythmic and heart rate control medications as ordered  - Monitor electrolytes and administer replacement therapy as ordered  Outcome: Progressing     Problem: PAIN - ADULT  Goal: Verbalizes/displays adequate comfort level or baseline comfort level  Description: Interventions:  - Encourage patient to monitor pain and request assistance  - Assess pain using appropriate pain scale  - Administer analgesics based on type and severity of pain and evaluate response  - Implement non-pharmacological measures as appropriate and evaluate response  - Consider cultural and social influences on pain and pain management  - Notify physician/advanced practitioner if interventions unsuccessful or patient reports new pain  Outcome: Progressing     Problem: SAFETY ADULT  Goal: Patient will remain free of falls  Description: INTERVENTIONS:  - Assess patient frequently for physical needs  -  Identify cognitive and physical deficits and behaviors that affect risk of falls    -  [place] fall precautions as indicated by assessment   - Educate patient/family on patient safety including physical limitations  - Instruct patient to call for assistance with activity based on assessment  - Modify environment to reduce risk of injury  - Consider OT/PT consult to assist with strengthening/mobility  Outcome: Progressing  Goal: Maintain or return mobility status to optimal level  Description: INTERVENTIONS:  - Assess patient's baseline mobility status (ambulation, transfers, stairs, etc )    - Identify cognitive and physical deficits and behaviors that affect mobility  - Identify mobility aids required to assist with transfers and/or ambulation (gait belt, sit-to-stand, lift, walker, cane, etc )  - [place] fall precautions as indicated by assessment  - Record patient progress and toleration of activity level on Mobility SBAR; progress patient to next Phase/Stage  - Instruct patient to call for assistance with activity based on assessment  - Consider rehabilitation consult to assist with strengthening/weightbearing, etc   Outcome: Progressing     Problem: DISCHARGE PLANNING  Goal: Discharge to home or other facility with appropriate resources  Description: INTERVENTIONS:  - Identify barriers to discharge w/patient and caregiver  - Arrange for needed discharge resources and transportation as appropriate  - Identify discharge learning needs (meds, wound care, etc )  - Arrange for interpretive services to assist at discharge as needed  - Refer to Case Management Department for coordinating discharge planning if the patient needs post-hospital services based on physician/advanced practitioner order or complex needs related to functional status, cognitive ability, or social support system  Outcome: Progressing Problem: Knowledge Deficit  Goal: Patient/family/caregiver demonstrates understanding of disease process, treatment plan, medications, and discharge instructions  Description: Complete learning assessment and assess knowledge base  Interventions:  - Provide teaching at level of understanding  - Provide teaching via preferred learning methods  Outcome: Progressing     Problem: Nutrition/Hydration-ADULT  Goal: Nutrient/Hydration intake appropriate for improving, restoring or maintaining nutritional needs  Description: Monitor and assess patient's nutrition/hydration status for malnutrition  Collaborate with interdisciplinary team and initiate plan and interventions as ordered  Monitor patient's weight and dietary intake as ordered or per policy  Utilize nutrition screening tool and intervene as necessary  Determine patient's food preferences and provide high-protein, high-caloric foods as appropriate       INTERVENTIONS:  - Monitor oral intake, urinary output, labs, and treatment plans  - Assess nutrition and hydration status and recommend course of action  - Evaluate amount of meals eaten  - Assist patient with eating if necessary   - Allow adequate time for meals  - Recommend/ encourage appropriate diets, oral nutritional supplements, and vitamin/mineral supplements  - Order, calculate, and assess calorie counts as needed  - Recommend, monitor, and adjust tube feedings and TPN/PPN based on assessed needs  - Assess need for intravenous fluids  - Provide specific nutrition/hydration education as appropriate  - Include patient/family/caregiver in decisions related to nutrition  Outcome: Progressing     Problem: Prexisting or High Potential for Compromised Skin Integrity  Goal: Skin integrity is maintained or improved  Description: INTERVENTIONS:  - Identify patients at risk for skin breakdown  - Assess and monitor skin integrity  - Assess and monitor nutrition and hydration status  - Monitor labs   - Assess for incontinence   - Turn and reposition patient  - Assist with mobility/ambulation  - Relieve pressure over bony prominences  - Avoid friction and shearing  - Provide appropriate hygiene as needed including keeping skin clean and dry  - Evaluate need for skin moisturizer/barrier cream  - Collaborate with interdisciplinary team   - Patient/family teaching  - Consider wound care consult   Outcome: Progressing

## 2020-12-10 NOTE — ASSESSMENT & PLAN NOTE
· Hx of Hypertension  · Outpatient maintained on Norvasc and tenormin - will hold at this time as will allow for permissive hypertension with systolic BP goal [id number]/LX

## 2020-12-10 NOTE — PROGRESS NOTES
Progress Note - Brii Almaguer 1950, 69 y o  female MRN: [id number]    Unit/Bed#: -01 Encounter: [id number]    Primary Care Provider: Sophia Caro DO   Date and time admitted to hospital: 12/5/2020 12:48 PM        * Acute CVA (cerebrovascular accident) [facility])  [address] [place] [address]  · Patient is a 68 y/o F whom presented on 12/5 with R Sided weakness and speech difficulties  · Initial CTA Head and neck without large vessel flow stenosis  · Initial [address] Pineda documented as 2  · Patient not a tPA candidate  · She was admitted and continued on Stroke pathway  · Continued ASA, Plavix and Lipitor daily  · Per neurology aspirin 81 mg and Plavix 75 mg for 3 weeks then Plavix only  · Speech evaluation performed   · Neurology following in consult  · MRI Brain performed on 12/7 revealing Acute to early subacute left basal ganglionic infarction involving the left putamen and corona radiata  · 12/7 ECHO (TTE) performed revealing EF 55-60% with G1DD without evidence of PFO  · This am (12/8) Rapid response secondary to worsening facial droop and R Sided weakness  · See Rapid Response note  · 82 Magalys Pineda at time of rapid response 6  · STAT Head CT performed revealing evolving left basal ganglia infarct  · Consider worsening symptoms in setting of hypoperfusion  · Initial plan to place supine, admin fluid bolus and closely monitor  · Further discussed with Neurology regarding further interventions or performance of repeat CTA - determined not indicated  · Frequently reassessed noting no improvement in BP or symptoms  · Upgraded to ICU on 12/07 for pressor initiation and frequent monitoring  · Further discussed with neurology whom was in agreement with plan  · Continue Telemetry and Hemodynamic monitoring  · Continue Johnny and titrate to systolic BP goal of [id number]/OT  · At 48 hours wean Johnny and start Midodrine if needed - start morning of 12/10  · Closely monitor Neuro exam  · Repeat dysphagia assessment  · Continue stroke pathway  · Consult Cardiology - pt needs LUIZ    Hypertension  Assessment & Plan  · Hx of Hypertension  · Outpatient maintained on Norvasc and tenormin - will hold at this time as will allow for permissive hypertension with systolic BP goal [id number]/WF    Hypothyroidism  Assessment & Plan  · TSH 6 20  · T4 Normal  · Continue home levothyroxine    QT prolongation  Assessment & Plan  · Initial EKG revealed QTc 497 with repeat EKG noting improvement  · Avoid QTc prolonging medications    ----------------------------------------------------------------------------------------  HPI/24hr events: Continued on Johnny-Synephrine infusion for goal -180 mm Hg for 48 hours as per Neurology-of note 48 hours will end at 10:00 a m  today  Pt denies lightheadedness, dizzy, headache, chest pain, palpitations, shortness of breath, cough, N/V/D  Disposition: Consider downgrading patient to step-down level to verses med surge with telemetry pending Johnny-Synephrine infusion titration off and hemodynamics stability       Code Status: Level 1 - Full Code  ---------------------------------------------------------------------------------------  SUBJECTIVE    Review of Systems  Review of systems was reviewed and negative unless stated above in HPI/24-hour events   ---------------------------------------------------------------------------------------  OBJECTIVE    Vitals   Vitals:    12/10/20 0300 12/10/20 0330 12/10/20 0400 12/10/20 0430   BP: (!) 194/93 156/81 (!) 172/103 158/84   BP Location:    Right arm   Pulse: 64 65 63 68   Resp:    16   Temp:    98 3 °F (36 8 °C)   TempSrc:    Oral   SpO2:    98%   Weight:       Height:         Temp (24hrs), Av 3 °F (36 8 °C), Min:97 6 °F (36 4 °C), Max:98 6 °F (37 °C)  Current: Temperature: 98 3 °F (36 8 °C)          Respiratory:  SpO2: SpO2: 98 %, SpO2 Activity: SpO2 Activity: At Rest, SpO2 Device: O2 Device: None (Room air)       Invasive/non-invasive ventilation settings Respiratory    Lab Data (Last 4 hours)    None         O2/Vent Data (Last 4 hours)    None                Physical Exam  Vitals signs reviewed  Constitutional:       General: She is not in acute distress  HENT:      Head: Normocephalic and atraumatic  Right Ear: External ear normal       Left Ear: External ear normal       Nose: Nose normal  No congestion  Mouth/Throat:      Mouth: Mucous membranes are moist       Pharynx: Oropharynx is clear  No oropharyngeal exudate  Eyes:      General: No scleral icterus  Conjunctiva/sclera: Conjunctivae normal       Pupils: Pupils are equal, round, and reactive to light  Neck:      Musculoskeletal: Normal range of motion and neck supple  No neck rigidity  Cardiovascular:      Rate and Rhythm: Normal rate and regular rhythm  Pulses: Normal pulses  Heart sounds: Normal heart sounds  No murmur  Pulmonary:      Effort: Pulmonary effort is normal  No respiratory distress  Breath sounds: Normal breath sounds  Abdominal:      General: Abdomen is flat  Bowel sounds are normal  There is no distension  Genitourinary:     Comments: Voiding spontaneously  Musculoskeletal: Normal range of motion  General: No swelling or deformity  Skin:     General: Skin is warm and dry  Capillary Refill: Capillary refill takes less than 2 seconds  Coloration: Skin is not jaundiced or pale  Neurological:      Mental Status: She is alert and oriented to person, place, and time  GCS: GCS eye subscore is 4  GCS verbal subscore is 5  GCS motor subscore is 6  Comments: PERRLA  Pupils 3 mm bilaterally  Right facial droop  Tongue midline  Dysarthria  YOUSSEF  RUE 2/5, RLE 2/5, LUE 4/5, LLE 4/5  Psychiatric:         Mood and Affect: Mood normal          Laboratory and Diagnostics:    A m   Labs pending at time of note    Results from last 7 days   Lab Units 12/10/20  0455 12/09/20  0413 12/08/20  0750 12/05/20  1254   WBC Thousand/uL 8 36 8  77 9 80 7 54   HEMOGLOBIN g/dL 13 6 13 1 14 3 14 6   HEMATOCRIT % 41 6 39 1 43 5 44 5   PLATELETS Thousands/uL 287 304 309 321   NEUTROS PCT %  --  64 61  --    MONOS PCT %  --  8 9  --      Results from last 7 days   Lab Units 12/10/20  0455 12/09/20  0413 12/08/20  0750 12/06/20  0510 12/05/20  1254   SODIUM mmol/L 140 135* 140 142 137   POTASSIUM mmol/L 4 1 3 5 3 5 3 9 4 0   CHLORIDE mmol/L 105 106 106 107 102   CO2 mmol/L 24 27 24 26 26   ANION GAP mmol/L 11 2* 10 9 9   BUN mg/dL 15 19 20 13 14   CREATININE mg/dL 0 87 0 93 1 03 1 05 0 95   CALCIUM mg/dL 9 3 9 0 9 7 9 3 9 7   GLUCOSE RANDOM mg/dL 106 105 148* 107 138     Results from last 7 days   Lab Units 12/09/20  0413   MAGNESIUM mg/dL 2 2      Results from last 7 days   Lab Units 12/08/20  0750 12/05/20  1254   INR  1 02 0 93   PTT seconds  --  27      Results from last 7 days   Lab Units 12/05/20  1254   TROPONIN I ng/mL <0 02         ABG:    VBG:          Micro        EKG: NSR  Imaging: No new imaging    Intake and Output  I/O       12/08 0701 - 12/09 0700 12/09 0701 - 12/10 0700    P  O  120 900    I V  (mL/kg) 108 1 (1 9) 163 9 (2 8)    Total Intake(mL/kg) 228 1 (4) 1063 9 (18 5)    Urine (mL/kg/hr) 525 (0 4) 575 (0 6)    Stool 0 0    Total Output 525 575    Net -297 +488  9          Unmeasured Urine Occurrence 1 x 2 x    Unmeasured Stool Occurrence 1 x 1 x          Height and Weights   Height: 5' 4" (162 6 cm)  IBW: 54 7 kg  Body mass index is 21 8 kg/m²  Weight (last 2 days)     None            Nutrition       Diet Orders   (From admission, onward)             Start     Ordered    12/07/20 1439  Diet Cardiovascular; Sodium 4 Gm (FACUNDO)  Diet effective now     Question Answer Comment   Diet Type Cardiovascular    Cardiac Sodium 4 Gm (FACUNDO)    RD to adjust diet per protocol?  Yes        12/07/20 1438                  Active Medications  Scheduled Meds:  Current Facility-Administered Medications   Medication Dose Route Frequency Provider Last Rate    acetaminophen  650 mg Oral Q4H PRN Rinku Espino PA-C      atorvastatin  80 mg Oral QPM Rinku Espino PA-C      clopidogrel  75 mg Oral Daily Rinku Espino PA-C      heparin (porcine)  5,000 Units Subcutaneous [facility] LIZA Ramirez      levothyroxine  25 mcg Oral Daily Rinku Espino, [place]      phenylephine   mcg/min Intravenous Titrated Rinku Espino PA-C 30 mcg/min (12/09/20 1049)     Continuous Infusions:  phenylephine,  mcg/min, Last Rate: 30 mcg/min (12/09/20 1049)      PRN Meds:   acetaminophen, 650 mg, Q4H PRN        Invasive Devices Review  Invasive Devices     Peripheral Intravenous Line            Peripheral IV 12/08/20 Distal;Right;Ventral (anterior) Forearm 1 day                Rationale for remaining devices: n/a  ---------------------------------------------------------------------------------------  Advance Directive and Living Will:      Power of :    POLST:    ---------------------------------------------------------------------------------------  Care Time Delivered:   No Critical Care time spent       [facility]      Portions of the record may have been created with voice recognition software  Occasional wrong word or "sound a like" substitutions may have occurred due to the inherent limitations of voice recognition software    Read the chart carefully and recognize, using context, where substitutions have occurred

## 2020-12-10 NOTE — PLAN OF CARE
Problem: PHYSICAL THERAPY ADULT  Goal: Performs mobility at highest level of function for planned discharge setting  See evaluation for individualized goals  Description: Treatment/Interventions: Functional transfer training, LE strengthening/ROM, Elevations, Therapeutic exercise, Endurance training, Patient/family training, Equipment eval/education, Bed mobility, Gait training, Compensatory technique education, Spoke to nursing, OT          See flowsheet documentation for full assessment, interventions and recommendations  Outcome: Progressing  Note: Prognosis: Good  Problem List: Decreased strength, Decreased endurance, Impaired balance, Decreased mobility, Impaired judgement, Decreased safety awareness, Decreased coordination  Assessment: Pt seen for PT treatment session this date with interventions consisting of gait training w/ emphasis on improving pt's ability to ambulate level surfaces x 25 and 10 x 2 ft with CGA provided by therapist with no AD and Therapeutic exercise consisting of: AROM 20 reps B LE in sitting position  Pt agreeable to PT treatment session upon arrival, pt found seated OOB in recliner, in no apparent distress  In comparison to previous session, pt with improvements in toileting  Post session: pt returned back to recliner, chair alarm engaged and all needs in reach Continue to recommend STR at time of d/c in order to maximize pt's functional independence and safety w/ mobility  Pt continues to be functioning below baseline level, and remains limited 2* factors listed above and including decreased activity tolerance  PT will continue to see pt while here in order to address the deficits listed above and provide interventions consistent w/ POC in effort to achieve goals    Barriers to Discharge: Decreased caregiver support, Inaccessible home environment  Barriers to Discharge Comments: Pt requires increased assistance for mobility and lives alone  PT Discharge Recommendation: Post-Acute Rehabilitation Services     PT - OK to Discharge: Yes    See flowsheet documentation for full assessment     Lenore Sos, PT

## 2020-12-10 NOTE — CASE MANAGEMENT
Chart reviewed aware of medical management  Case was discussed in multidisciplinary discharge meeting  Therapy- PT Nurse Coordinator- Nurse Case management- Hospitalist  Clinical information supporting hospitalization:   + stroke  Johnny was discontinued    Scheduled Meds:  Current Facility-Administered Medications   Medication Dose Route Frequency Provider Last Rate    acetaminophen  650 mg Oral Q4H PRN Marito Ciara, PA-C      atorvastatin  80 mg Oral QPM Marito Ciara, PA-C      clopidogrel  75 mg Oral Daily Marito Ciara, PA-C      heparin (porcine)  5,000 Units Subcutaneous Q8H [address] LIZA Ramirez      levothyroxine  25 mcg Oral Daily Marito Ciara, PA-C      midodrine  2 5 mg Oral TID AC LIZA Ramirez       Plan is LUIZ tomorrow  Discussed patient is an acute Rehab Candidate  CM spoke to patient and she is feeling pretty good, slow to respond to questions  She does want to stay local and her choice remains [facility]-- CM updated Aubrey Callahan at [facility] -- anticipate medical stability tomorrow vs Sat  Barriers to discharge:  - medical management  Discharge plan:  Pt was accepted at [facility] pending authorization    CM will continue to follow plan of care

## 2020-12-10 NOTE — PHYSICAL THERAPY NOTE
PT Treatment Note       12/10/20 1054   PT Last Visit   PT Visit Date 12/10/20   Note Type   Note Type Treatment   Pain Assessment   Pain Assessment Tool Pain Assessment not indicated - pt denies pain   Restrictions/Precautions   Weight Bearing Precautions Per Order No   Other Precautions Fall Risk;Multiple lines;Limb alert;Telemetry   General   Chart Reviewed Yes   Response to Previous Treatment Patient with no complaints from previous session  Family/Caregiver Present No   Cognition   Overall Cognitive Status WFL   Arousal/Participation Responsive   Attention   (slow response time)   Orientation Level Oriented X4   Following Commands Follows one step commands without difficulty   Comments pt agreeable to PT session   Subjective   Subjective pt was frustrated by lack of use of RUE   Bed Mobility   Additional Comments upon enterance pt OOB in recliner   Transfers   Sit to Stand   (CGA)   Additional items Assist x 1; Increased time required;Verbal cues;Armrests   Stand to Sit   (CGA)   Additional items Assist x 1; Increased time required;Verbal cues;Armrests   Toilet transfer   (CGA)   Additional items Assist x 1; Increased time required;Verbal cues;Standard toilet  (grab bar)   Ambulation/Elevation   Gait pattern Improper Weight shift; Excessively slow   Gait Assistance   (CGA)   Additional items Verbal cues  (RUE got stuck on door handle and pt didn't realize (cues))   Assistive Device None   Distance 25 ft and 10 ft x 2   Balance   Static Sitting Good   Dynamic Sitting Fair +   Static Standing Fair   Dynamic Standing Fair -   Ambulatory Fair -   Endurance Deficit   Endurance Deficit Yes   Endurance Deficit Description requested to sit   Activity Tolerance   Activity Tolerance Patient limited by fatigue   Nurse Made Aware Spoke with RN   Exercises   Hip Flexion Sitting;20 reps;AROM; Bilateral   Hip Abduction Sitting;20 reps;AROM; Bilateral   Hip Adduction Sitting;20 reps;AROM; Bilateral   Knee AROM [employer] Sitting;20 reps;AROM; Bilateral   Ankle Pumps Sitting;20 reps;AROM; Bilateral   Assessment   Prognosis Good   Assessment Pt seen for PT treatment session this date with interventions consisting of gait training w/ emphasis on improving pt's ability to ambulate level surfaces x 25 and 10 x 2 ft with CGA provided by therapist with no AD and Therapeutic exercise consisting of: AROM 20 reps B LE in sitting position  Pt agreeable to PT treatment session upon arrival, pt found seated OOB in recliner, in no apparent distress  In comparison to previous session, pt with improvements in toileting  Post session: pt returned back to recliner, chair alarm engaged and all needs in reach Continue to recommend STR at time of d/c in order to maximize pt's functional independence and safety w/ mobility  Pt continues to be functioning below baseline level, and remains limited 2* factors listed above and including decreased activity tolerance  PT will continue to see pt while here in order to address the deficits listed above and provide interventions consistent w/ POC in effort to achieve goals     Goals   Patient Goals to go to the bathroom   PT Treatment Day 3   Plan   Progress Progressing toward goals   Recommendation   PT Discharge Recommendation Post-Acute Rehabilitation Services   PT - OK to Discharge Yes  (to STR)   Marta Parent, PT

## 2020-12-10 NOTE — OCCUPATIONAL THERAPY NOTE
Occupational Therapy Treatment Note      Meg Gonzalez    12/10/2020    Principal Problem:    Acute CVA (cerebrovascular accident) [facility])  Active Problems:    Hypertension    QT prolongation    Hypothyroidism      Past Medical History:   Diagnosis Date    Hypertension        Past Surgical History:   Procedure Laterality Date    ABLATION SOFT TISSUE          12/10/20 1055   OT Last Visit   OT Visit Date 12/10/20   Note Type   Note Type Treatment   Restrictions/Precautions   Weight Bearing Precautions Per Order No   Other Precautions Fall Risk  (IV)   Pain Assessment   Pain Assessment Tool Pain Assessment not indicated - pt denies pain   Pain Score No Pain   ADL   Toileting Assistance    (CGA)   Toileting Deficit Setup;Steadying;Verbal cueing;Supervison/safety; Increased time to complete;Grab bar use   Bed Mobility   Additional Comments Pt OOB in recliner upon arriva;   Transfers   Sit to Stand   (CGA)   Additional items Assist x 1; Increased time required;Verbal cues   Stand to Sit   (CGA)   Additional items Assist x 1; Increased time required;Verbal cues   Toilet transfer   (CGA)   Additional items Assist x 1; Increased time required;Verbal cues;Standard toilet   Toilet Transfers   Toilet Transfer From   (recliner)   Toilet Transfer Type To and from   Toilet Transfer to [employer] Transfers   (CGA)   Therapeutic Exercise - ROM   UE-ROM Yes   ROM- Right Upper Extremities   R Shoulder AAROM; Flexion; Extension;Horizontal ABduction  (horizontal add, pro/ret)   R Elbow AAROM;Elbow flexion;Elbow extension  (sup/pro)   R Hand AROM  (ball squeeze)   R Position Seated   R Weight/Reps/Sets no weight, 20 reps, 1 set   ROM - Left Upper Extremities    L Shoulder AROM; Flexion; Extension;Horizontal ABduction  (horizontal add, pro/ret)   L Elbow AROM;Elbow flexion;Elbow extension  (sup/pro)   L Position Seated   L Weight/Reps/Sets no weight, 20 reps, 1 set   Cognition   Overall Cognitive Status WFL   Arousal/Participation Alert; Cooperative   Attention Within functional limits   Orientation Level Oriented X4   Memory Within functional limits   Following Commands Follows one step commands without difficulty   Activity Tolerance   Activity Tolerance Patient limited by fatigue   Assessment   Assessment Patient participated in Skilled OT session this date with interventions consisting of ADL re training with the use of correct body mechnaics and therapeutic exercise to: increase functional use of BUEs, increase BUE muscle strength    Patient agreeable to OT treatment session, upon arrival patient was found seated OOB to Recliner  In comparison to previous session, patient with improvements in toileting  Patient requiring verbal cues for safety  Patient continues to be functioning below baseline level, occupational performance remains limited secondary to factors listed above and increased risk for falls and injury  From OT standpoint, recommendation at time of d/c would be Short Term Rehab  Patient to benefit from continued Occupational Therapy treatment while in the hospital to address deficits as defined above and maximize level of functional independence with ADLs and functional mobility  Plan   Treatment Interventions ADL retraining;Visual perceptual retraining;Functional transfer training;UE strengthening/ROM; Endurance training;Cognitive reorientation;Equipment evaluation/education; Neuromuscular reeducation; Fine motor coordination activities; Compensatory technique education;Continued evaluation; Energy conservation; Activityengagement   Goal Expiration Date 12/17/20   OT Treatment Day 2   OT Frequency 3-5x/wk   Recommendation   OT Discharge Recommendation Post-Acute Rehabilitation Services     Camille Castellano MS, OTR/L

## 2020-12-10 NOTE — PLAN OF CARE
Problem: OCCUPATIONAL THERAPY ADULT  Goal: Performs self-care activities at highest level of function for planned discharge setting  See evaluation for individualized goals  Description: Treatment Interventions: ADL retraining, Visual perceptual retraining, Functional transfer training, UE strengthening/ROM, Endurance training, Cognitive reorientation, Equipment evaluation/education, Neuromuscular reeducation, Fine motor coordination activities, Compensatory technique education, Continued evaluation, Energy conservation, Activityengagement          See flowsheet documentation for full assessment, interventions and recommendations  Outcome: Progressing  Note: Limitation: Decreased ADL status, Decreased UE strength, Decreased Safe judgement during ADL, Decreased cognition, Decreased endurance, Visual deficit, Decreased fine motor control, Decreased self-care trans, Decreased high-level ADLs  Prognosis: Good  Assessment: Patient participated in Skilled OT session this date with interventions consisting of ADL re training with the use of correct body mechnaics and therapeutic exercise to: increase functional use of BUEs, increase BUE muscle strength    Patient agreeable to OT treatment session, upon arrival patient was found seated OOB to Recliner  In comparison to previous session, patient with improvements in toileting  Patient requiring verbal cues for safety  Patient continues to be functioning below baseline level, occupational performance remains limited secondary to factors listed above and increased risk for falls and injury  From OT standpoint, recommendation at time of d/c would be Short Term Rehab  Patient to benefit from continued Occupational Therapy treatment while in the hospital to address deficits as defined above and maximize level of functional independence with ADLs and functional mobility    Recommendation: (post acute rehab)  OT Discharge Recommendation: Post-Acute Rehabilitation Services Camille Haji MS, OTR/L

## 2020-12-10 NOTE — ASSESSMENT & PLAN NOTE
· Patient is a 68 y/o F whom presented on 12/5 with R Sided weakness and speech difficulties  · Initial CTA Head and neck without large vessel flow stenosis  · Initial 82 Rujade Pineda documented as 2  · Patient not a tPA candidate  · She was admitted and continued on Stroke pathway  · Continued ASA, Plavix and Lipitor daily  · Per neurology aspirin 81 mg and Plavix 75 mg for 3 weeks then Plavix only  · Speech evaluation performed   · Neurology following in consult  · MRI Brain performed on 12/7 revealing Acute to early subacute left basal ganglionic infarction involving the left putamen and corona radiata  · 12/7 ECHO (TTE) performed revealing EF 55-60% with G1DD without evidence of PFO  · This am (12/8) Rapid response secondary to worsening facial droop and R Sided weakness  · See Rapid Response note  · 82 Rujade Pineda at time of rapid response 6  · STAT Head CT performed revealing evolving left basal ganglia infarct  · Consider worsening symptoms in setting of hypoperfusion  · Initial plan to place supine, admin fluid bolus and closely monitor  · Further discussed with Neurology regarding further interventions or performance of repeat CTA - determined not indicated  · Frequently reassessed noting no improvement in BP or symptoms  · Upgraded to ICU on 12/07 for pressor initiation and frequent monitoring  · Further discussed with neurology whom was in agreement with plan  · Continue Telemetry and Hemodynamic monitoring  · Continue Johnny and titrate to systolic BP goal of [id number]  · At 48 hours wean Johnny and start Midodrine if needed - start morning of 12/10  · 12/10 Johnny weaned to off - pt developed headache and slight regression of R side weakness when compared to the morning, CT head - no evidence of hemorrhage    · Closely monitor Neuro exam  · Repeat dysphagia assessment  · Continue stroke pathway  · Consult Cardiology - LUIZ morning of 12/11

## 2020-12-10 NOTE — CONSULTS
Consult Cardiology    Ezequiel Polo 1950, 69 y o  female MRN: [id number]    Unit/Bed#: -01 Encounter: [id number]    Attending Provider: Pedrito Jang DO   Primary Care Provider: Marge Garza DO   Date admitted to hospital: 12/5/2020       Inpatient consult to Cardiology  Consult performed by: Curtis Cyr DO  Consult ordered by: LIZA Thornton          * Acute CVA (cerebrovascular accident) [facility])  Assessment & Plan  Patient initially presented 12/05/2020 with right-sided weakness and dysarthria  Worsening symptoms noted 12/07/2020  MRI of the brain 12/7 showed acute to early subacute left basal ganglia on infarction involving the left putamen and corona radiata  Echocardiogram 12/7 was unremarkable  LUIZ was recommended by neurology  Aspirin 81 mg/clopidogrel 75 mg for 3 weeks then clopidogrel alone  Atorvastatin 80 mg daily added  Patient denies any cardiac history  She denies any palpitations/lightheadedness/dizziness/chest pain  Will arrange LUIZ for 12/11/2020  Physician Requesting Consult: Pedrito Jang DO    Reason for Consult / Principal Problem:  Patient status post CVA request for LUIZ  HPI: Ezequiel Polo is a 69y o  year old female who has a history of hypertension who presented 12/05/2020 with right-sided weakness as well as word-finding difficulty and right facial droop  She was found to have LBG infarct-lenticulostriate artery territory  LUIZ was recommended by neurology  Patient denies any prior cardiac history other than hypertension  She denies any chest pain or shortness of breath at home prior to admission  She does follow with Dr Emily Estrada for hypertension  She denies any palpitations/lightheadedness/dizziness over the last few weeks/months  No history of arrhythmia  She continues to have word-finding difficulty as well as right upper extremity weakness      We discussed indications for LUIZ and she is agreeable to proceed, Review of Systems   Constitutional: Positive for activity change and fatigue  Negative for appetite change  HENT: Negative for congestion, hearing loss, tinnitus and trouble swallowing  Eyes: Negative for visual disturbance  Respiratory: Negative for cough, chest tightness, shortness of breath and wheezing  Cardiovascular: Negative for chest pain, palpitations and leg swelling  Gastrointestinal: Negative for abdominal distention, abdominal pain, nausea and vomiting  Genitourinary: Negative for difficulty urinating  Musculoskeletal: Negative for arthralgias  Skin: Negative for rash  Neurological: Positive for speech difficulty and weakness (right upper extremity)  Negative for dizziness, syncope and light-headedness  Hematological: Does not bruise/bleed easily  Psychiatric/Behavioral: Negative for confusion  The patient is not nervous/anxious  All other systems reviewed and are negative         Historical Information     Past Medical History:   Diagnosis Date    Hypertension      Past Surgical History:   Procedure Laterality Date    ABLATION SOFT TISSUE       Social History     Substance and Sexual Activity   Alcohol Use Not Currently     Social History     Substance and Sexual Activity   Drug Use Not Currently     Social History     Tobacco Use   Smoking Status Never Smoker   Smokeless Tobacco Never Used       Family History: non-contributory    Meds/Allergies     current meds:   Current Facility-Administered Medications   Medication Dose Route Frequency    acetaminophen (TYLENOL) tablet 650 mg  650 mg Oral Q4H PRN    atorvastatin (LIPITOR) tablet 80 mg  80 mg Oral QPM    clopidogrel (PLAVIX) tablet 75 mg  75 mg Oral Daily    heparin (porcine) subcutaneous injection 5,000 Units  5,000 Units Subcutaneous Q8H [facility]    levothyroxine tablet 25 mcg  25 mcg Oral Daily    phenylephrine (LISETH-SYNEPHRINE) 50 mg (STANDARD CONCENTRATION) in sodium chloride 0 9% 250 mL   mcg/min Intravenous Titrated     phenylephine,  mcg/min, Last Rate: Stopped (12/10/20 1247)        No Known Allergies    Objective     Vitals: Blood pressure 155/82, pulse 71, temperature 98 1 °F (36 7 °C), temperature source Oral, resp  rate 20, height 5' 4" (1 626 m), weight 63 6 kg (140 lb 3 4 oz), SpO2 99 %  , Body mass index is 21 8 kg/m²  Orthostatic Blood Pressures      Most Recent Value   Blood Pressure  155/82 filed at 12/10/2020 1230   Patient Position - Orthostatic VS  Sitting filed at 12/10/2020 1547          Systolic (74AII), UPI:831 , Min:120 , RYH:486     Diastolic (19RBF), HQZ:97, Min:79, Max:103        Intake/Output Summary (Last 24 hours) at 12/10/2020 1254  Last data filed at 12/10/2020 1247  Gross per 24 hour   Intake 1071 7 ml   Output 800 ml   Net 271 7 ml       Weight (last 2 days)     None          Invasive Devices     Peripheral Intravenous Line            Peripheral IV 12/08/20 Distal;Right;Ventral (anterior) Forearm 2 days                Physical Exam  Vitals signs reviewed  Constitutional:       Appearance: She is well-developed  HENT:      Head: Normocephalic and atraumatic  Eyes:      Pupils: Pupils are equal, round, and reactive to light  Neck:      Musculoskeletal: Normal range of motion  Vascular: No JVD  Cardiovascular:      Rate and Rhythm: Normal rate and regular rhythm  Heart sounds: Normal heart sounds  No murmur  No friction rub  No gallop  Pulmonary:      Effort: Pulmonary effort is normal       Breath sounds: Normal breath sounds  No rales  Abdominal:      Palpations: Abdomen is soft  Skin:     General: Skin is warm and dry  Neurological:      Mental Status: She is alert and oriented to person, place, and time  Motor: Weakness present        Comments: Word-finding difficulty   Psychiatric:         Behavior: Behavior normal               Laboratory Results:    Results from last 7 days   Lab Units 12/05/20  1254   TROPONIN I ng/mL <0 02 CBC with diff:   Results from last 7 days   Lab Units 12/10/20  0455 12/09/20  0413 12/08/20  0750   WBC Thousand/uL 8 36 8 77 9 80   HEMOGLOBIN g/dL 13 6 13 1 14 3   HEMATOCRIT % 41 6 39 1 43 5   MCV fL 93 91 92   PLATELETS Thousands/uL 287 304 309   MCH pg 30 3 30 6 30 1   MCHC g/dL 32 7 33 5 32 9   RDW % 12 0 12 0 12 0   MPV fL 9 1 9 2 9 1   NRBC AUTO /100 WBCs  --  0 0         CMP:  Results from last 7 days   Lab Units 12/10/20  0455 12/09/20  0413 12/08/20  0750   POTASSIUM mmol/L 4 1 3 5 3 5   CHLORIDE mmol/L 105 106 106   CO2 mmol/L 24 27 24   BUN mg/dL 15 19 20   CREATININE mg/dL 0 87 0 93 1 03   CALCIUM mg/dL 9 3 9 0 9 7   EGFR ml/min/1 73sq m 67 62 55       BMP:  Results from last 7 days   Lab Units 12/10/20  0455 12/09/20  0413 12/08/20  0750   POTASSIUM mmol/L 4 1 3 5 3 5   CHLORIDE mmol/L 105 106 106   CO2 mmol/L 24 27 24   BUN mg/dL 15 19 20   CREATININE mg/dL 0 87 0 93 1 03   CALCIUM mg/dL 9 3 9 0 9 7       Magnesium:   Results from last 7 days   Lab Units 12/09/20  0413   MAGNESIUM mg/dL 2 2       Coags:   Results from last 7 days   Lab Units 12/08/20  0750 12/05/20  1254   PTT seconds  --  27   INR  1 02 0 93       Hemoglobin A1C:   Results from last 7 days   Lab Units 12/06/20  0510   HEMOGLOBIN A1C % 5 7*       Lipid Profile:   Lab Results   Component Value Date    CHOLESTEROL 208 (H) 12/06/2020    CHOLESTEROL 222 (H) 10/16/2019     Lab Results   Component Value Date    HDL 40 12/06/2020     Lab Results   Component Value Date    TRIG 214 (H) 12/06/2020     Lab Results   Component Value Date    LDLCALC 125 (H) 12/06/2020     No results found for: [place]     Cardiac testing:     Results for orders placed during the hospital encounter of 12/05/20   Echo complete with contrast if indicated    Narrative [address] [place] [address]    Transthoracic Echocardiogram  2D, M-mode, Doppler, and Color Doppler    Study date:  07-Dec-2020    Patient: Kevin Bahena  MR number: [id number]  Account number: [de-identified]  : 1950  Age: 69 years  Gender: Female  Status: Inpatient  Location: [facility] Echo Lab  Height: 63 in  Weight: 126 lb  BP: 157/ 81 mmHg    Indications: CVA    Diagnoses: I63 9 - Cerebral infarction, unspecified    Sonographer:  WOLFGANG Trinidad  Primary Physician:  Prateek Bacon MD  Referring Physician:  Kathy Calderon MD  Group:  [facility]  Interpreting Physician:  Solange Lewis DO    SUMMARY    LEFT VENTRICLE:  Size was normal   Systolic function was normal  Ejection fraction was estimated in the range of 60 % to 65 %  There were no regional wall motion abnormalities  Wall thickness was mildly increased  Doppler parameters were consistent with abnormal left ventricular relaxation (grade 1 diastolic dysfunction)  There was no evidence of elevated ventricular filling pressure by Doppler parameters  VENTRICULAR SEPTUM:  Abnormal septal motion consistent with a conduction abnormality or paced rhythm  RIGHT VENTRICLE:  The size was normal   Systolic function was normal     LEFT ATRIUM:  The atrium was mildly dilated  ATRIAL SEPTUM:  No defect or patent foramen ovale was identified  A bubble study was performed  Angie Serum MITRAL VALVE:  There was trace regurgitation  TRICUSPID VALVE:  There was trace to mild regurgitation  COMPARISONS:  No prior study for comparison  HISTORY: PRIOR HISTORY: CVA, HTN, QT prolong    PROCEDURE: The study was performed in the [facility] Echo Lab  This was a routine study  The transthoracic approach was used  The study included complete 2D imaging, M-mode, complete spectral Doppler, and color Doppler  The heart  rate was 73 bpm, at the start of the study  Images were obtained from the parasternal, apical, subcostal, and suprasternal notch acoustic windows  Intravenous contrast (agitated saline) was administered  Image quality was adequate      LEFT VENTRICLE: Size was normal  Systolic function was normal  Ejection fraction was estimated in the range of 60 % to 65 %  There were no regional wall motion abnormalities  Wall thickness was mildly increased  DOPPLER: Doppler parameters  were consistent with abnormal left ventricular relaxation (grade 1 diastolic dysfunction)  There was no evidence of elevated ventricular filling pressure by Doppler parameters  VENTRICULAR SEPTUM: Abnormal septal motion consistent with a conduction abnormality or paced rhythm  RIGHT VENTRICLE: The size was normal  Systolic function was normal     LEFT ATRIUM: The atrium was mildly dilated  ATRIAL SEPTUM: No defect or patent foramen ovale was identified  A bubble study was performed  Linda Bardales RIGHT ATRIUM: Size was normal     MITRAL VALVE: Valve structure was normal  There was normal leaflet separation  DOPPLER: The transmitral velocity was within the normal range  There was no evidence for stenosis  There was trace regurgitation  AORTIC VALVE: The valve was trileaflet  Leaflets exhibited mild calcification and sclerosis  DOPPLER: Transaortic velocity was within the normal range  There was no evidence for stenosis  There was no significant regurgitation  TRICUSPID VALVE: The valve structure was normal  There was normal leaflet separation  DOPPLER: The transtricuspid velocity was within the normal range  There was no evidence for stenosis  There was trace to mild regurgitation  PULMONIC VALVE: Not well visualized  DOPPLER: The transpulmonic velocity was within the normal range  There was no significant regurgitation  PERICARDIUM: There was no pericardial effusion  The pericardium was normal in appearance  AORTA: The root exhibited normal size  SYSTEMIC VEINS: IVC: The inferior vena cava was normal in size   Respirophasic changes were normal     SYSTEM MEASUREMENT TABLES    2D  %FS: 36 64 %  AV Diam: 2 19 cm  EDV(Teich): 50 2 ml  EF(Teich): 67 52 %  ESV(Teich): 16 3 ml  IVSd: 1 cm  LA Diam: 2 57 cm  LAAs A4C: 11 35 cm2  LAESV A-L A4C: 25 32 ml  LAESV MOD A4C: 22 72 ml  LALs A4C: 4 32 cm  LVEDV MOD A4C: 68 97 ml  LVEF MOD A4C: 60 52 %  LVESV MOD A4C: 27 23 ml  LVIDd: 3 48 cm  LVIDs: 2 21 cm  LVLd A4C: 6 75 cm  LVLs A4C: 5 74 cm  LVPWd: 1 06 cm  RAAs A4C: 9 19 cm2  RAESV A-L: 18 93 ml  RAESV MOD: 17 57 ml  RALs: 3 79 cm  RVIDd: 2 81 cm  RWT: 0 61  SV MOD A4C: 41 74 ml  SV(Teich): 33 89 ml    CW  AV Vmax: 1 39 m/s  AV maxP 73 mmHg    MM  TAPSE: 1 61 cm    PW  E' Av 07 m/s  E' Lat: 0 08 m/s  E' Sept: 0 07 m/s  E/E' Av 53  E/E' Lat: 8 33  E/E' Sept: 8 74  MV A Rasta: 0 91 m/s  MV Dec [place]: 2 77 m/s2  MV DecT: 226 5 ms  MV E Rasta: 0 63 m/s  MV E/A Ratio: 0 69    [employer] Accredited Echocardiography Laboratory    Prepared and electronically signed by    Astrid Kauffman DO  Signed 07-Dec-2020 17:00:39           Imaging: I have personally reviewed pertinent reports  Ct Head Wo Contrast    Result Date: 2020  Narrative: CT BRAIN - WITHOUT CONTRAST INDICATION:   Stroke, follow up reevaluate stroke  COMPARISON:  2020  TECHNIQUE:  CT examination of the brain was performed  In addition to axial images, sagittal and coronal 2D reformatted images were created and submitted for interpretation  Radiation dose length product (DLP) for this visit:  831 mGy-cm   This examination, like all CT scans performed in the [facility], was performed utilizing techniques to minimize radiation dose exposure, including the use of iterative reconstruction and automated exposure control  IMAGE QUALITY:  Diagnostic  FINDINGS: PARENCHYMA:  Increase hypodensity is identified within the left basal ganglia consistent with evolution of known infarct  Mild chronic microvascular ischemic change is again present  No hemorrhage is noted  No mass effect is present  VENTRICLES AND EXTRA-AXIAL SPACES:  Normal for the patient's age   VISUALIZED ORBITS AND PARANASAL SINUSES: Unremarkable  CALVARIUM AND EXTRACRANIAL SOFT TISSUES:  Normal      Impression: Evolving left basal ganglia infarct  Workstation performed: [id number]     Ct Head Wo Contrast    Result Date: 12/6/2020  Narrative: CT BRAIN - WITHOUT CONTRAST INDICATION:   Stroke, follow up Dysarthria  COMPARISON:  12/5/2020  TECHNIQUE:  CT examination of the brain was performed  In addition to axial images, sagittal and coronal 2D reformatted images were created and submitted for interpretation  Radiation dose length product (DLP) for this visit:  838 mGy-cm   This examination, like all CT scans performed in the [facility], was performed utilizing techniques to minimize radiation dose exposure, including the use of iterative reconstruction and automated exposure control  IMAGE QUALITY:  Diagnostic  FINDINGS: PARENCHYMA:  No intracranial mass, mass effect or midline shift  No CT signs of acute infarction  No acute parenchymal hemorrhage  VENTRICLES AND EXTRA-AXIAL SPACES:  Normal for the patient's age  VISUALIZED ORBITS AND PARANASAL SINUSES:  Unremarkable  CALVARIUM AND EXTRACRANIAL SOFT TISSUES:  Normal      Impression: No acute intracranial abnormality  Workstation performed: [id number]     Mri Brain Wo Contrast    Result Date: 12/7/2020  Narrative: MRI BRAIN WITHOUT CONTRAST INDICATION: right facial droop with speech abnormality  COMPARISON:   Head CT and CT angiography of the head and neck from December 5, 2020 and follow-up head CT from December 6, 2020  TECHNIQUE:  Sagittal T1, axial T2, axial FLAIR, axial T1, axial [place] and axial diffusion imaging  Imaging performed on 1 5T MRI  IMAGE QUALITY:  Diagnostic  FINDINGS: BRAIN PARENCHYMA:  Restricted diffusion within the left basal ganglia is consistent with acute infarction  There is matching diffusion and FLAIR hyperintense signal in the left putamen and corona radiata consistent with acute to early subacute infarction  No associated hemorrhage   Mild periventricular and deep cerebral white matter chronic microangiopathic disease  VENTRICLES:  Normal for the patient's age  SELLA AND PITUITARY GLAND:  Normal  ORBITS:  Normal  PARANASAL SINUSES:  Normal  VASCULATURE:  Evaluation of the major intracranial vasculature demonstrates appropriate flow voids  CALVARIUM AND SKULL BASE:  Normal  EXTRACRANIAL SOFT TISSUES:  Mild cervical spondylosis at C4-C5  Impression: Acute to early subacute left basal ganglionic infarction involving the left putamen and corona radiata  Mild cerebral chronic microangiopathic disease  I personally discussed this study with Tobias Bateman on 12/7/2020 at 11:02 AM  Workstation performed: [id number]     Ct Stroke Alert Brain    Result Date: 12/5/2020  Narrative: CT BRAIN - STROKE ALERT PROTOCOL INDICATION:   "Patient states that around 230 yesterday she was doing [place] decorations when she noticed some weakness and heaviness in the right arm  Had a slight funny feeling around her face  Unsure if she difficulty speaking at that time as she  was by herself  Family noticed her today to have some speech difficulty  Patient still complaining of right arm weakness  COMPARISON:  None  TECHNIQUE:  CT examination of the brain was performed  In addition to axial images, coronal reformatted images were created and submitted for interpretation  Radiation dose length product (DLP) for this visit:  851 mGy-cm   This examination, like all CT scans performed in the [facility], was performed utilizing techniques to minimize radiation dose exposure, including the use of iterative reconstruction and automated exposure control  IMAGE QUALITY:  Diagnostic  FINDINGS:  PARENCHYMA:  Decreased attenuation is noted in the supratentorial white matter demonstrating an appearance most consistent with mild microangiopathic change  No intracranial mass, mass effect or midline shift  No acute intracranial hemorrhage   No CT signs of acute infarction  Normal intracranial vasculature  VENTRICLES AND EXTRA-AXIAL SPACES:  Normal for patient's age  VISUALIZED ORBITS AND PARANASAL SINUSES:  Unremarkable  CALVARIUM AND EXTRACRANIAL SOFT TISSUES:   Normal      Impression: No acute intracranial abnormality  Findings were directly discussed with Ariel Vela on 12/5/2020 1:08 PM  Workstation performed: [id number]     Cta Stroke Alert (head/neck)    Result Date: 12/5/2020  Narrative: CTA NECK AND BRAIN WITH CONTRAST INDICATION: "Patient states that around 230 yesterday she was doing [place] decorations when she noticed some weakness and heaviness in the right arm  Had a slight funny feeling around her face  Unsure if she difficulty speaking at that time as she was by herself  Family noticed her today to have some speech difficulty  Patient still complaining of right arm weakness   " COMPARISON:   Concurrent noncontrast CT head  TECHNIQUE:   Post contrast imaging was performed after administration of iodinated contrast through the neck and brain  Post contrast axial 0 625 mm images timed to opacify the arterial system  3D rendering was performed on an independent workstation  MIP reconstructions performed  Coronal reconstructions were performed of the noncontrast portion of the brain  Radiation dose length product (DLP) for this visit:  589 mGy-cm   This examination, like all CT scans performed in the [facility], was performed utilizing techniques to minimize radiation dose exposure, including the use of iterative reconstruction and automated exposure control  IV Contrast:  100 mL of iohexol (OMNIPAQUE)  IMAGE QUALITY:   Diagnostic FINDINGS: CERVICAL VASCULATURE AORTIC ARCH AND GREAT VESSELS:  Mild athersclerotic disease of the arch, proximal great vessels and visualized subclavian vessels  No significant stenosis  RIGHT VERTEBRAL ARTERY CERVICAL SEGMENT:  Normal origin  The vessel is normal in caliber throughout the neck   LEFT VERTEBRAL ARTERY CERVICAL SEGMENT:  Normal origin  The vessel is normal in caliber throughout the neck  RIGHT EXTRACRANIAL CAROTID SEGMENT:  Mild atherosclerotic disease of the distal common carotid artery and proximal cervical internal carotid artery without significant stenosis compared to the more distal ICA  LEFT EXTRACRANIAL CAROTID SEGMENT:  Mild atherosclerotic disease of the distal common carotid artery and proximal cervical internal carotid artery without significant stenosis compared to the more distal ICA  NASCET criteria was used to determine the degree of internal carotid artery diameter stenosis  INTRACRANIAL VASCULATURE INTERNAL CAROTID ARTERIES: Mild atherosclerotic changes  Normal enhancement of the intracranial portions of the internal carotid arteries  Normal ophthalmic artery origins  Normal ICA terminus  ANTERIOR CIRCULATION:  Symmetric A1 segments and anterior cerebral arteries with normal enhancement  Normal anterior communicating artery  MIDDLE CEREBRAL ARTERY CIRCULATION:  M1 segment and middle cerebral artery branches demonstrate normal enhancement bilaterally  DISTAL VERTEBRAL ARTERIES:  Mild atherosclerotic changes of the distal vertebral arteries  Posterior inferior cerebellar artery origins are normal  Normal vertebral basilar junction  BASILAR ARTERY:  Basilar artery is normal in caliber  Normal superior cerebellar arteries  POSTERIOR CEREBRAL ARTERIES: Both posterior cerebral arteries arises from the basilar tip  Both arteries demonstrate normal enhancement  Normal posterior communicating arteries  DURAL VENOUS SINUSES:  Normal  NON VASCULAR ANATOMY BONY STRUCTURES:  No acute osseous abnormality  SOFT TISSUES OF THE NECK:  Unremarkable  VISUALIZED CHEST:  No clinically significant abnormality identified in the visualized portion of the thorax  Impression: No large vessel flow limiting stenosis   Findings were directly discussed with Karin Zaidi on 12/5/2020 1:14 PM  Workstation performed: [id number]       Telemetry:  Normal sinus rhythm  Counseling / Coordination of Care  Total floor / unit time spent today 30 minutes  Greater than 50% of total time was spent with the patient and / or family counseling and / or coordination of care  A description of the counseling / coordination of care: LUIZ indication and procedure explained to patient  Called PCP to update on clinical status         Code Status: Level 1 - Full Code

## 2020-12-11 ENCOUNTER — ANESTHESIA (INPATIENT)
Dept: NON INVASIVE DIAGNOSTICS | Facility: HOSPITAL | Age: 70
DRG: 065 | End: 2020-12-11
Payer: COMMERCIAL

## 2020-12-11 ENCOUNTER — APPOINTMENT (INPATIENT)
Dept: NON INVASIVE DIAGNOSTICS | Facility: HOSPITAL | Age: 70
DRG: 065 | End: 2020-12-11
Attending: INTERNAL MEDICINE
Payer: COMMERCIAL

## 2020-12-11 VITALS — HEART RATE: 64 BPM

## 2020-12-11 LAB
ANION GAP SERPL CALCULATED.3IONS-SCNC: 6 MMOL/L (ref 4–13)
BUN SERPL-MCNC: 18 MG/DL (ref 5–25)
CALCIUM SERPL-MCNC: 9.1 MG/DL (ref 8.3–10.1)
CHLORIDE SERPL-SCNC: 105 MMOL/L (ref 100–108)
CO2 SERPL-SCNC: 29 MMOL/L (ref 21–32)
CREAT SERPL-MCNC: 1.09 MG/DL (ref 0.6–1.3)
ERYTHROCYTE [DISTWIDTH] IN BLOOD BY AUTOMATED COUNT: 11.9 % (ref 11.6–15.1)
F2 GENE MUT ANL BLD/T: NORMAL
F5 GENE MUT ANL BLD/T: NORMAL
GFR SERPL CREATININE-BSD FRML MDRD: 51 ML/MIN/1.73SQ M
GLUCOSE SERPL-MCNC: 103 MG/DL (ref 65–140)
HCT VFR BLD AUTO: 40.7 % (ref 34.8–46.1)
HGB BLD-MCNC: 13.6 G/DL (ref 11.5–15.4)
MCH RBC QN AUTO: 30.7 PG (ref 26.8–34.3)
MCHC RBC AUTO-ENTMCNC: 33.4 G/DL (ref 31.4–37.4)
MCV RBC AUTO: 92 FL (ref 82–98)
PLATELET # BLD AUTO: 263 THOUSANDS/UL (ref 149–390)
PMV BLD AUTO: 9.2 FL (ref 8.9–12.7)
POTASSIUM SERPL-SCNC: 3.9 MMOL/L (ref 3.5–5.3)
RBC # BLD AUTO: 4.43 MILLION/UL (ref 3.81–5.12)
SODIUM SERPL-SCNC: 140 MMOL/L (ref 136–145)
VIT B12 SERPL-MCNC: 473 PG/ML (ref 100–900)
WBC # BLD AUTO: 9.27 THOUSAND/UL (ref 4.31–10.16)

## 2020-12-11 PROCEDURE — 93320 DOPPLER ECHO COMPLETE: CPT | Performed by: INTERNAL MEDICINE

## 2020-12-11 PROCEDURE — 93312 ECHO TRANSESOPHAGEAL: CPT

## 2020-12-11 PROCEDURE — 93005 ELECTROCARDIOGRAM TRACING: CPT

## 2020-12-11 PROCEDURE — 99291 CRITICAL CARE FIRST HOUR: CPT | Performed by: STUDENT IN AN ORGANIZED HEALTH CARE EDUCATION/TRAINING PROGRAM

## 2020-12-11 PROCEDURE — 85027 COMPLETE CBC AUTOMATED: CPT | Performed by: INTERNAL MEDICINE

## 2020-12-11 PROCEDURE — 80048 BASIC METABOLIC PNL TOTAL CA: CPT | Performed by: INTERNAL MEDICINE

## 2020-12-11 PROCEDURE — 93325 DOPPLER ECHO COLOR FLOW MAPG: CPT | Performed by: INTERNAL MEDICINE

## 2020-12-11 PROCEDURE — 82607 VITAMIN B-12: CPT | Performed by: INTERNAL MEDICINE

## 2020-12-11 PROCEDURE — 93312 ECHO TRANSESOPHAGEAL: CPT | Performed by: INTERNAL MEDICINE

## 2020-12-11 PROCEDURE — 97535 SELF CARE MNGMENT TRAINING: CPT

## 2020-12-11 RX ORDER — ASPIRIN 81 MG/1
81 TABLET ORAL DAILY
Status: DISCONTINUED | OUTPATIENT
Start: 2020-12-11 | End: 2020-12-17 | Stop reason: HOSPADM

## 2020-12-11 RX ORDER — PROPOFOL 10 MG/ML
INJECTION, EMULSION INTRAVENOUS AS NEEDED
Status: DISCONTINUED | OUTPATIENT
Start: 2020-12-11 | End: 2020-12-11

## 2020-12-11 RX ORDER — LIDOCAINE HYDROCHLORIDE 10 MG/ML
INJECTION, SOLUTION EPIDURAL; INFILTRATION; INTRACAUDAL; PERINEURAL AS NEEDED
Status: DISCONTINUED | OUTPATIENT
Start: 2020-12-11 | End: 2020-12-11

## 2020-12-11 RX ORDER — SODIUM CHLORIDE, SODIUM LACTATE, POTASSIUM CHLORIDE, CALCIUM CHLORIDE 600; 310; 30; 20 MG/100ML; MG/100ML; MG/100ML; MG/100ML
INJECTION, SOLUTION INTRAVENOUS CONTINUOUS PRN
Status: DISCONTINUED | OUTPATIENT
Start: 2020-12-11 | End: 2020-12-11

## 2020-12-11 RX ADMIN — LIDOCAINE HYDROCHLORIDE 50 MG: 10 INJECTION, SOLUTION EPIDURAL; INFILTRATION; INTRACAUDAL; PERINEURAL at 08:09

## 2020-12-11 RX ADMIN — PROPOFOL 30 MG: 10 INJECTION, EMULSION INTRAVENOUS at 08:19

## 2020-12-11 RX ADMIN — SODIUM CHLORIDE, SODIUM LACTATE, POTASSIUM CHLORIDE, AND CALCIUM CHLORIDE: .6; .31; .03; .02 INJECTION, SOLUTION INTRAVENOUS at 08:05

## 2020-12-11 RX ADMIN — HEPARIN SODIUM 5000 UNITS: 5000 INJECTION INTRAVENOUS; SUBCUTANEOUS at 14:00

## 2020-12-11 RX ADMIN — PROPOFOL 20 MG: 10 INJECTION, EMULSION INTRAVENOUS at 08:13

## 2020-12-11 RX ADMIN — PROPOFOL 20 MG: 10 INJECTION, EMULSION INTRAVENOUS at 08:17

## 2020-12-11 RX ADMIN — ATORVASTATIN CALCIUM 80 MG: 40 TABLET, FILM COATED ORAL at 18:09

## 2020-12-11 RX ADMIN — PROPOFOL 40 MG: 10 INJECTION, EMULSION INTRAVENOUS at 08:09

## 2020-12-11 RX ADMIN — PHENYLEPHRINE HYDROCHLORIDE 100 MCG: 10 INJECTION INTRAVENOUS at 08:23

## 2020-12-11 RX ADMIN — CLOPIDOGREL 75 MG: 75 TABLET, FILM COATED ORAL at 09:29

## 2020-12-11 RX ADMIN — PROPOFOL 30 MG: 10 INJECTION, EMULSION INTRAVENOUS at 08:21

## 2020-12-11 RX ADMIN — HEPARIN SODIUM 5000 UNITS: 5000 INJECTION INTRAVENOUS; SUBCUTANEOUS at 05:13

## 2020-12-11 RX ADMIN — PROPOFOL 20 MG: 10 INJECTION, EMULSION INTRAVENOUS at 08:15

## 2020-12-11 RX ADMIN — ASPIRIN 81 MG: 81 TABLET, COATED ORAL at 09:29

## 2020-12-11 RX ADMIN — PROPOFOL 10 MG: 10 INJECTION, EMULSION INTRAVENOUS at 08:31

## 2020-12-11 RX ADMIN — PROPOFOL 20 MG: 10 INJECTION, EMULSION INTRAVENOUS at 08:10

## 2020-12-11 RX ADMIN — PROPOFOL 20 MG: 10 INJECTION, EMULSION INTRAVENOUS at 08:27

## 2020-12-11 RX ADMIN — PHENYLEPHRINE HYDROCHLORIDE 100 MCG: 10 INJECTION INTRAVENOUS at 08:32

## 2020-12-11 RX ADMIN — PROPOFOL 40 MG: 10 INJECTION, EMULSION INTRAVENOUS at 08:11

## 2020-12-11 RX ADMIN — HEPARIN SODIUM 5000 UNITS: 5000 INJECTION INTRAVENOUS; SUBCUTANEOUS at 23:05

## 2020-12-11 RX ADMIN — LEVOTHYROXINE SODIUM 25 MCG: 25 TABLET ORAL at 05:14

## 2020-12-11 NOTE — PLAN OF CARE
Yesterday, pt had severe headache with light sensitivity, nausea with dry heaving, and increased dysarthria  Repeat CT head completed showing no bleed or new stroke  Cards seen pt and pt scheduled for [facility] this am   Pt was NPO after midnight  Pt given several medicines for headache and nausea  At 0345 assessment, headache and nausea resolved  Pt with delayed responses with dry mouth; appears to be from waking pt up from sleep  Labs and repeat EKG done  Plan for pt to go to [facility] once ready for d/c  Problem: Potential for Falls  Goal: Patient will remain free of falls  Description: INTERVENTIONS:  - Assess patient frequently for physical needs  -  Identify cognitive and physical deficits and behaviors that affect risk of falls  -  [place] fall precautions as indicated by assessment   - Educate patient/family on patient safety including physical limitations  - Instruct patient to call for assistance with activity based on assessment  - Modify environment to reduce risk of injury  - Consider OT/PT consult to assist with strengthening/mobility  Outcome: Progressing     Problem: Activity Intolerance/Impaired Mobility  Goal: Mobility/activity is maintained at optimum level for patient  Description: Interventions:  - Assess and monitor patient  barriers to mobility and need for assistive/adaptive devices  - Assess patient's emotional response to limitations  - Collaborate with interdisciplinary team and initiate plans and interventions as ordered  - Encourage independent activity per ability   - Maintain proper body alignment  - Perform active/passive rom as tolerated/ordered  - Plan activities to conserve energy   - Turn patient as appropriate  Outcome: Progressing     Problem: Potential for Aspiration  Goal: Non-ventilated patient's risk of aspiration is minimized  Description: Assess and monitor vital signs, respiratory status, and labs (WBC)    Monitor for signs of aspiration (tachypnea, cough, rales, wheezing, cyanosis, fever)  - Assess and monitor patient's ability to swallow  - Place patient up in chair to eat if possible  - HOB up at 90 degrees to eat if unable to get patient up into chair   - Supervise patient during oral intake  - Instruct patient/ family to take small bites  - Instruct patient/ family to take small single sips when taking liquids  - Follow patient-specific strategies generated by speech pathologist   Outcome: Progressing     Problem: CARDIOVASCULAR - ADULT  Goal: Maintains optimal cardiac output and hemodynamic stability  Description: INTERVENTIONS:  - Monitor I/O, vital signs and rhythm  - Monitor for S/S and trends of decreased cardiac output  - Administer and titrate ordered vasoactive medications to optimize hemodynamic stability  - Assess quality of pulses, skin color and temperature  - Assess for signs of decreased coronary artery perfusion  - Instruct patient to report change in severity of symptoms  Outcome: Progressing  Goal: Absence of cardiac dysrhythmias or at baseline rhythm  Description: INTERVENTIONS:  - Continuous cardiac monitoring, vital signs, obtain 12 lead EKG if ordered  - Administer antiarrhythmic and heart rate control medications as ordered  - Monitor electrolytes and administer replacement therapy as ordered  Outcome: Progressing     Problem: SAFETY ADULT  Goal: Patient will remain free of falls  Description: INTERVENTIONS:  - Assess patient frequently for physical needs  -  Identify cognitive and physical deficits and behaviors that affect risk of falls    -  [place] fall precautions as indicated by assessment   - Educate patient/family on patient safety including physical limitations  - Instruct patient to call for assistance with activity based on assessment  - Modify environment to reduce risk of injury  - Consider OT/PT consult to assist with strengthening/mobility  Outcome: Progressing  Goal: Maintain or return mobility status to optimal level  Description: INTERVENTIONS:  - Assess patient's baseline mobility status (ambulation, transfers, stairs, etc )    - Identify cognitive and physical deficits and behaviors that affect mobility  - Identify mobility aids required to assist with transfers and/or ambulation (gait belt, sit-to-stand, lift, walker, cane, etc )  - [place] fall precautions as indicated by assessment  - Record patient progress and toleration of activity level on Mobility SBAR; progress patient to next Phase/Stage  - Instruct patient to call for assistance with activity based on assessment  - Consider rehabilitation consult to assist with strengthening/weightbearing, etc   Outcome: Progressing     Problem: DISCHARGE PLANNING  Goal: Discharge to home or other facility with appropriate resources  Description: INTERVENTIONS:  - Identify barriers to discharge w/patient and caregiver  - Arrange for needed discharge resources and transportation as appropriate  - Identify discharge learning needs (meds, wound care, etc )  - Arrange for interpretive services to assist at discharge as needed  - Refer to Case Management Department for coordinating discharge planning if the patient needs post-hospital services based on physician/advanced practitioner order or complex needs related to functional status, cognitive ability, or social support system  Outcome: Progressing     Problem: Knowledge Deficit  Goal: Patient/family/caregiver demonstrates understanding of disease process, treatment plan, medications, and discharge instructions  Description: Complete learning assessment and assess knowledge base    Interventions:  - Provide teaching at level of understanding  - Provide teaching via preferred learning methods  Outcome: Progressing     Problem: Nutrition/Hydration-ADULT  Goal: Nutrient/Hydration intake appropriate for improving, restoring or maintaining nutritional needs  Description: Monitor and assess patient's nutrition/hydration status for malnutrition  Collaborate with interdisciplinary team and initiate plan and interventions as ordered  Monitor patient's weight and dietary intake as ordered or per policy  Utilize nutrition screening tool and intervene as necessary  Determine patient's food preferences and provide high-protein, high-caloric foods as appropriate       INTERVENTIONS:  - Monitor oral intake, urinary output, labs, and treatment plans  - Assess nutrition and hydration status and recommend course of action  - Evaluate amount of meals eaten  - Assist patient with eating if necessary   - Allow adequate time for meals  - Recommend/ encourage appropriate diets, oral nutritional supplements, and vitamin/mineral supplements  - Order, calculate, and assess calorie counts as needed  - Recommend, monitor, and adjust tube feedings and TPN/PPN based on assessed needs  - Assess need for intravenous fluids  - Provide specific nutrition/hydration education as appropriate  - Include patient/family/caregiver in decisions related to nutrition  Outcome: Progressing     Problem: Prexisting or High Potential for Compromised Skin Integrity  Goal: Skin integrity is maintained or improved  Description: INTERVENTIONS:  - Identify patients at risk for skin breakdown  - Assess and monitor skin integrity  - Assess and monitor nutrition and hydration status  - Monitor labs   - Assess for incontinence   - Turn and reposition patient  - Assist with mobility/ambulation  - Relieve pressure over bony prominences  - Avoid friction and shearing  - Provide appropriate hygiene as needed including keeping skin clean and dry  - Evaluate need for skin moisturizer/barrier cream  - Collaborate with interdisciplinary team   - Patient/family teaching  - Consider wound care consult   Outcome: Progressing     Problem: Communication Impairment  Goal: Ability to express needs and understand communication  Description: Assess patient's communication skills and ability to understand information  Patient will demonstrate use of effective communication techniques, alternative methods of communication and understanding even if not able to speak  - Encourage communication and provide alternate methods of communication as needed  - Collaborate with case management/ for discharge needs  - Include patient/family/caregiver in decisions related to communication  Outcome: Not Progressing     Problem: Nutrition  Goal: Nutrition/Hydration status is improving  Description: Monitor and assess patient's nutrition/hydration status for malnutrition (ex- brittle hair, bruises, dry skin, pale skin and conjunctiva, muscle wasting, smooth red tongue, and disorientation)  Collaborate with interdisciplinary team and initiate plan and interventions as ordered  Monitor patient's weight and dietary intake as ordered or per policy  Utilize nutrition screening tool and intervene per policy  Determine patient's food preferences and provide high-protein, high-caloric foods as appropriate  - Assist patient with eating   - Allow adequate time for meals   - Encourage patient to take dietary supplement as ordered  - Collaborate with clinical nutritionist   - Include patient/family/caregiver in decisions related to nutrition    Outcome: Not Progressing     Problem: NEUROSENSORY - ADULT  Goal: Achieves stable or improved neurological status  Description: INTERVENTIONS  - Monitor and report changes in neurological status  - Monitor vital signs such as temperature, blood pressure, glucose, and any other labs ordered   - Initiate measures to prevent increased intracranial pressure  - Monitor for seizure activity and implement precautions if appropriate      Outcome: Not Progressing  Goal: Achieves maximal functionality and self care  Description: INTERVENTIONS  - Monitor swallowing and airway patency with patient fatigue and changes in neurological status  - Encourage and assist patient to increase activity and self care     - Encourage visually impaired, hearing impaired and aphasic patients to use assistive/communication devices  Outcome: Not Progressing     Problem: PAIN - ADULT  Goal: Verbalizes/displays adequate comfort level or baseline comfort level  Description: Interventions:  - Encourage patient to monitor pain and request assistance  - Assess pain using appropriate pain scale  - Administer analgesics based on type and severity of pain and evaluate response  - Implement non-pharmacological measures as appropriate and evaluate response  - Consider cultural and social influences on pain and pain management  - Notify physician/advanced practitioner if interventions unsuccessful or patient reports new pain  Outcome: Not Progressing

## 2020-12-11 NOTE — PHYSICAL THERAPY NOTE
PT     12/11/20 1600   Note Type   Cancel Reasons Patient off floor/test  (also decline in condition)   Will continue to follow    Jannie Cardenas, PT

## 2020-12-11 NOTE — ASSESSMENT & PLAN NOTE
· Initial EKG revealed QTc 497 with repeat EKG noting improvement  · Avoid QTc prolonging medications  · 12/10 QTc 472

## 2020-12-11 NOTE — ASSESSMENT & PLAN NOTE
· Hx of Hypertension  · Outpatient maintained on Norvasc and tenormin - will hold at this time  · After 48 hours of permissive hyptension - 12/10 SBP goal now 140 mm Hg

## 2020-12-11 NOTE — PROGRESS NOTES
Progress Note - Denisse Osuna 1950, 69 y o  female MRN: [id number]    Unit/Bed#: -01 Encounter: [id number]    Primary Care Provider: Lawyer Yessica DO   Date and time admitted to hospital: 12/5/2020 12:48 PM        * Acute CVA (cerebrovascular accident) [facility])  Assessment & Plan  · Patient is a 70 y/o F whom presented on 12/5 with R Sided weakness and speech difficulties  · Initial CTA Head and neck without large vessel flow stenosis  · Initial [address] Pineda documented as 2  · Patient not a tPA candidate  · She was admitted and continued on Stroke pathway  · Continued ASA, Plavix and Lipitor daily  · Per neurology aspirin 81 mg and Plavix 75 mg for 3 weeks then Plavix only  · Speech evaluation performed   · Neurology following in consult  · MRI Brain performed on 12/7 revealing Acute to early subacute left basal ganglionic infarction involving the left putamen and corona radiata  · 12/7 ECHO (TTE) performed revealing EF 55-60% with G1DD without evidence of PFO  · This am (12/8) Rapid response secondary to worsening facial droop and R Sided weakness  · See Rapid Response note  · 82 Magalys Pineda at time of rapid response 6  · STAT Head CT performed revealing evolving left basal ganglia infarct  · Consider worsening symptoms in setting of hypoperfusion  · Initial plan to place supine, admin fluid bolus and closely monitor  · Further discussed with Neurology regarding further interventions or performance of repeat CTA - determined not indicated  · Frequently reassessed noting no improvement in BP or symptoms  · Upgraded to ICU on 12/07 for pressor initiation and frequent monitoring  · Further discussed with neurology whom was in agreement with plan  · Continue Telemetry and Hemodynamic monitoring  · Continue Johnny and titrate to systolic BP goal of 451-124UC/FD  · At 48 hours wean Johnny and start Midodrine if needed - start morning of 12/10  · 12/10 Johnny weaned to off - pt developed headache and slight regression of R side weakness when compared to the morning, CT head - no evidence of hemorrhage    · Closely monitor Neuro exam  · Repeat dysphagia assessment  · Continue stroke pathway  · Consult Cardiology - [facility] morning of 12/11    Headache  Assessment & Plan  · Patient was weaned off vasopressors today and few hours later she began to complain of frontal headache  Neuro exam show increased dysarthria and right upper extremity weakness  BP 150s  · History of migraines  · Repeat CT head - no bleeding  · Discussed with Neurology  · Suggested giving Mg, Valproate, benadryl and Zofran  · Pt continue to c/o headache and dry heaves during treatment therefore was given fentanyl 25 mcg IV x1 and tigan x1  Ultram and zofran given for reoccurrence  Hypertension  Assessment & Plan  · Hx of Hypertension  · Outpatient maintained on Norvasc and tenormin - will hold at this time  · After 48 hours of permissive hyptension - 12/10 SBP goal now 140 mm Hg    Hypothyroidism  Assessment & Plan  · TSH 6 20  · T4 Normal  · Continue home levothyroxine    QT prolongation  Assessment & Plan  · Initial EKG revealed QTc 497 with repeat EKG noting improvement  · Avoid QTc prolonging medications  · 12/10 QTc 472    ----------------------------------------------------------------------------------------  HPI/24hr events: Pt weaned of jeramie  Developed headache with light sensitivity a few hours later  Neuro exam showed increased dysarthria and RUE weakness  Neuro was made aware, scans reviewed and no change in from previous CT head  Neuro suggested migraine treatment with magnesium, valproate, zofran and benadryl which was ordered  During infusion of medications pt continued to c/o headache and then began with dry heaves  Fentanyl and tigan were administered - headache improved and nausea resolved  When pt awoke from nap last evening, had reoccurrence of headache with less intensity  Given ultram and zofran      Disposition: Continue Stepdown Level 2 level of care   Code Status: Level 1 - Full Code  ---------------------------------------------------------------------------------------  SUBJECTIVE    Review of Systems  Review of systems was reviewed and negative unless stated above in HPI/24-hour events   ---------------------------------------------------------------------------------------  OBJECTIVE    Vitals   Vitals:    20 0330 20 0345 20 0400 20 0415   BP: 136/81 (!) 184/87 150/86 139/84   BP Location:  Right arm     Pulse: 67 85 73 70   Resp: 15 14 (!) 32 (!) 35   Temp:  (!) 97 4 °F (36 3 °C)     TempSrc:  Oral     SpO2: 97% 98% 98% 97%   Weight:       Height:         Temp (24hrs), Av 6 °F (36 4 °C), Min:97 1 °F (36 2 °C), Max:98 4 °F (36 9 °C)  Current: Temperature: (!) 97 4 °F (36 3 °C)          Respiratory:  SpO2: SpO2: 97 %, SpO2 Activity: SpO2 Activity: At Rest, SpO2 Device: O2 Device: None (Room air)       Invasive/non-invasive ventilation settings   Respiratory    Lab Data (Last 4 hours)    None         O2/Vent Data (Last 4 hours)    None                Physical Exam  Vitals signs reviewed  Constitutional:       General: She is not in acute distress  Appearance: She is not ill-appearing  HENT:      Head: Normocephalic and atraumatic  Right Ear: External ear normal       Left Ear: External ear normal       Nose: Nose normal  No congestion  Mouth/Throat:      Mouth: Mucous membranes are moist       Pharynx: Oropharynx is clear  No oropharyngeal exudate  Eyes:      General: No scleral icterus  Conjunctiva/sclera: Conjunctivae normal       Pupils: Pupils are equal, round, and reactive to light  Neck:      Musculoskeletal: Normal range of motion and neck supple  Cardiovascular:      Rate and Rhythm: Normal rate and regular rhythm  Pulses: Normal pulses  Heart sounds: Normal heart sounds  Pulmonary:      Effort: Pulmonary effort is normal       Breath sounds: Normal breath sounds     Abdominal: General: Abdomen is flat  Bowel sounds are normal  There is no distension  Palpations: Abdomen is soft  Musculoskeletal: Normal range of motion  General: No swelling  Skin:     General: Skin is warm and dry  Capillary Refill: Capillary refill takes less than 2 seconds  Coloration: Skin is not jaundiced or pale  Neurological:      Mental Status: She is alert and oriented to person, place, and time  GCS: GCS eye subscore is 4  GCS verbal subscore is 5  GCS motor subscore is 6  Cranial Nerves: Dysarthria and facial asymmetry present  Sensory: Sensation is intact  Motor: Weakness present  No seizure activity or pronator drift  Comments: Right facial droop  RUE 3/5, LUE 4/5, RLE 3/5, LLE 4/5     Psychiatric:         Mood and Affect: Mood normal          Laboratory and Diagnostics:  Results from last 7 days   Lab Units 12/11/20  0358 12/10/20  0455 12/09/20  0413 12/08/20  0750 12/05/20  1254   WBC Thousand/uL 9 27 8 36 8 77 9 80 7 54   HEMOGLOBIN g/dL 13 6 13 6 13 1 14 3 14 6   HEMATOCRIT % 40 7 41 6 39 1 43 5 44 5   PLATELETS Thousands/uL 263 287 304 309 321   NEUTROS PCT %  --   --  64 61  --    MONOS PCT %  --   --  8 9  --      Results from last 7 days   Lab Units 12/11/20  0358 12/10/20  0455 12/09/20  0413 12/08/20  0750 12/06/20  0510 12/05/20  1254   SODIUM mmol/L 140 140 135* 140 142 137   POTASSIUM mmol/L 3 9 4 1 3 5 3 5 3 9 4 0   CHLORIDE mmol/L 105 105 106 106 107 102   CO2 mmol/L 29 24 27 24 26 26   ANION GAP mmol/L 6 11 2* 10 9 9   BUN mg/dL 18 15 19 20 13 14   CREATININE mg/dL 1 09 0 87 0 93 1 03 1 05 0 95   CALCIUM mg/dL 9 1 9 3 9 0 9 7 9 3 9 7   GLUCOSE RANDOM mg/dL 103 106 105 148* 107 138     Results from last 7 days   Lab Units 12/09/20  0413   MAGNESIUM mg/dL 2 2      Results from last 7 days   Lab Units 12/08/20  0750 12/05/20  1254   INR  1 02 0 93   PTT seconds  --  27      Results from last 7 days   Lab Units 12/05/20  1254   TROPONIN I ng/mL <0 02         ABG:    VBG:          Micro        EKG: NSR  Imaging: I have personally reviewed pertinent reports  and I have personally reviewed pertinent films in PACS   CT head wo contrast   Final Result by Chana Torres DO (12/10 1651)      The appearance of recent evolving left centrum semiovale/basal ganglia infarct  No evidence of interval hemorrhage  Workstation performed: [id number]         CT head wo contrast   Final Result by Eden Christopher MD (12/08 0805)      Evolving left basal ganglia infarct  Workstation performed: [id number]         MRI brain wo contrast   Final Result by Jackeline Manzanares MD (12/07 1102)      Acute to early subacute left basal ganglionic infarction involving the left putamen and corona radiata  Mild cerebral chronic microangiopathic disease  I personally discussed this study with Erika Bartholomew on 12/7/2020 at 11:02 AM                Workstation performed: [id number]         CT head wo contrast   Final Result by Eden Christopher MD (12/06 1709)      No acute intracranial abnormality  Workstation performed: [id number]         CTA stroke alert (head/neck)   Final Result by Mark Mcneal MD (12/05 1321)      No large vessel flow limiting stenosis  Findings were directly discussed with Bryn Cheng on 12/5/2020 1:14 PM                      Workstation performed: [id number]         CT stroke alert brain   Final Result by Mark Mcneal MD (12/05 1314)      No acute intracranial abnormality  Findings were directly discussed with PRAVIN GUERRERO on 12/5/2020 1:08 PM       Workstation performed: [id number]             Intake and Output  I/O       12/09 0701 - 12/10 0700 12/10 0701 - 12/11 0700    P  O  960 560    I V  (mL/kg) 226 9 (3 9) 179 1 (3 1)    IV Piggyback  150    Total Intake(mL/kg) 1186 9 (20 6) 889 1 (15 4)    Urine (mL/kg/hr) 575 (0 4) 850 (1)    Stool 0     Total Output 575 850    Net +611 9 +39 1          Unmeasured Urine Occurrence 2 x     Unmeasured Stool Occurrence 1 x           Height and Weights   Height: 5' 4" (162 6 cm)  IBW: 54 7 kg  Body mass index is 21 8 kg/m²  Weight (last 2 days)     None            Nutrition       Diet Orders   (From admission, onward)             Start     Ordered    12/11/20 0001  Diet NPO  Diet effective midnight     Question Answer Comment   Diet Type NPO    RD to adjust diet per protocol? Yes        12/10/20 1431                  Active Medications  Scheduled Meds:  Current Facility-Administered Medications   Medication Dose Route Frequency Provider Last Rate    acetaminophen  650 mg Oral Q4H PRN Rinku Espino PA-C      aspirin  81 mg Oral Daily LIZA Ramirez      atorvastatin  80 mg Oral QPM Rinku Espino PA-C      clopidogrel  75 mg Oral Daily Rinku Espino PA-C      diphenhydrAMINE  25 mg Oral HS PRN LIZA Ramirez      heparin (porcine)  5,000 Units Subcutaneous Q8H [address] LIZA Ramirez      levothyroxine  25 mcg Oral Daily Rinku Espino PA-C      ondansetron  4 mg Oral Q6H PRN LIZA Menendez       Continuous Infusions:     PRN Meds:   acetaminophen, 650 mg, Q4H PRN  diphenhydrAMINE, 25 mg, HS PRN  ondansetron, 4 mg, Q6H PRN        Invasive Devices Review  Invasive Devices     Peripheral Intravenous Line            Peripheral IV 12/10/20 Dorsal (posterior); Right Hand less than 1 day                Rationale for remaining devices: n/a  ---------------------------------------------------------------------------------------  Advance Directive and Living Will:      Power of :    POLST:    ---------------------------------------------------------------------------------------  Care Time Delivered:   No Critical Care time spent       LIZA Ariza      Portions of the record may have been created with voice recognition software    Occasional wrong word or "sound a like" substitutions may have occurred due to the inherent limitations of voice recognition software    Read the chart carefully and recognize, using context, where substitutions have occurred

## 2020-12-11 NOTE — NURSING NOTE
BP=99/57, Per Dr Priya Chew BP to be kept 865'G systolic  100 mcg Phenylephrine given by CRNA to increase BP post procedure

## 2020-12-11 NOTE — PLAN OF CARE
Problem: OCCUPATIONAL THERAPY ADULT  Goal: Performs self-care activities at highest level of function for planned discharge setting  See evaluation for individualized goals  Description: Treatment Interventions: ADL retraining, Visual perceptual retraining, Functional transfer training, UE strengthening/ROM, Endurance training, Cognitive reorientation, Equipment evaluation/education, Neuromuscular reeducation, Fine motor coordination activities, Compensatory technique education, Continued evaluation, Energy conservation, Activityengagement          See flowsheet documentation for full assessment, interventions and recommendations  Outcome: Progressing  Note: Limitation: Decreased ADL status, Decreased UE strength, Decreased Safe judgement during ADL, Decreased cognition, Decreased endurance, Visual deficit, Decreased fine motor control, Decreased self-care trans, Decreased high-level ADLs  Prognosis: Good  Assessment: Patient participated in Skilled OT session this date with interventions consisting of ADL re training with the use of correct body mechnaics,  therapeutic activities to: increase activity tolerance, increase dynamic sit/ stand balance during functional activity , increase postural control, increase trunk control and increase OOB/ sitting tolerance   Patient agreeable to OT treatment session, upon arrival patient was found supine in bed and in no apparent distress  In comparison to previous session, patient with improvements in functional transfers and balance  Patient requiring one step directives, frequent rest periods and ocassional safety reminders  Patient continues to be functioning below baseline level, occupational performance remains limited secondary to factors listed above and increased risk for falls and injury  From OT standpoint, recommendation at time of d/c would be Post-Acute Rehabilitation Services     Patient to benefit from continued Occupational Therapy treatment while in the hospital to address deficits as defined above and maximize level of functional independence with ADLs and functional mobility     Recommendation: (post acute rehab)  OT Discharge Recommendation: Post-Acute Rehabilitation Services  OT - OK to Discharge: (Once medically cleared  )  Rachel Miner, OT

## 2020-12-11 NOTE — PLAN OF CARE
Problem: Potential for Falls  Goal: Patient will remain free of falls  Description: INTERVENTIONS:  - Assess patient frequently for physical needs  -  Identify cognitive and physical deficits and behaviors that affect risk of falls  -  [place] fall precautions as indicated by assessment   - Educate patient/family on patient safety including physical limitations  - Instruct patient to call for assistance with activity based on assessment  - Modify environment to reduce risk of injury  - Consider OT/PT consult to assist with strengthening/mobility  Outcome: Progressing     Problem: Activity Intolerance/Impaired Mobility  Goal: Mobility/activity is maintained at optimum level for patient  Description: Interventions:  - Assess and monitor patient  barriers to mobility and need for assistive/adaptive devices  - Assess patient's emotional response to limitations  - Collaborate with interdisciplinary team and initiate plans and interventions as ordered  - Encourage independent activity per ability   - Maintain proper body alignment  - Perform active/passive rom as tolerated/ordered  - Plan activities to conserve energy   - Turn patient as appropriate  Outcome: Progressing     Problem: Communication Impairment  Goal: Ability to express needs and understand communication  Description: Assess patient's communication skills and ability to understand information  Patient will demonstrate use of effective communication techniques, alternative methods of communication and understanding even if not able to speak  - Encourage communication and provide alternate methods of communication as needed  - Collaborate with case management/ for discharge needs  - Include patient/family/caregiver in decisions related to communication    Outcome: Progressing     Problem: Potential for Aspiration  Goal: Non-ventilated patient's risk of aspiration is minimized  Description: Assess and monitor vital signs, respiratory status, and labs (WBC)  Monitor for signs of aspiration (tachypnea, cough, rales, wheezing, cyanosis, fever)  - Assess and monitor patient's ability to swallow  - Place patient up in chair to eat if possible  - HOB up at 90 degrees to eat if unable to get patient up into chair   - Supervise patient during oral intake  - Instruct patient/ family to take small bites  - Instruct patient/ family to take small single sips when taking liquids  - Follow patient-specific strategies generated by speech pathologist   Outcome: Progressing     Problem: NEUROSENSORY - ADULT  Goal: Achieves stable or improved neurological status  Description: INTERVENTIONS  - Monitor and report changes in neurological status  - Monitor vital signs such as temperature, blood pressure, glucose, and any other labs ordered   - Initiate measures to prevent increased intracranial pressure  - Monitor for seizure activity and implement precautions if appropriate      Outcome: Progressing  Goal: Achieves maximal functionality and self care  Description: INTERVENTIONS  - Monitor swallowing and airway patency with patient fatigue and changes in neurological status  - Encourage and assist patient to increase activity and self care     - Encourage visually impaired, hearing impaired and aphasic patients to use assistive/communication devices  Outcome: Progressing     Problem: CARDIOVASCULAR - ADULT  Goal: Maintains optimal cardiac output and hemodynamic stability  Description: INTERVENTIONS:  - Monitor I/O, vital signs and rhythm  - Monitor for S/S and trends of decreased cardiac output  - Administer and titrate ordered vasoactive medications to optimize hemodynamic stability  - Assess quality of pulses, skin color and temperature  - Assess for signs of decreased coronary artery perfusion  - Instruct patient to report change in severity of symptoms  Outcome: Progressing  Goal: Absence of cardiac dysrhythmias or at baseline rhythm  Description: INTERVENTIONS:  - Continuous cardiac monitoring, vital signs, obtain 12 lead EKG if ordered  - Administer antiarrhythmic and heart rate control medications as ordered  - Monitor electrolytes and administer replacement therapy as ordered  Outcome: Progressing     Problem: PAIN - ADULT  Goal: Verbalizes/displays adequate comfort level or baseline comfort level  Description: Interventions:  - Encourage patient to monitor pain and request assistance  - Assess pain using appropriate pain scale  - Administer analgesics based on type and severity of pain and evaluate response  - Implement non-pharmacological measures as appropriate and evaluate response  - Consider cultural and social influences on pain and pain management  - Notify physician/advanced practitioner if interventions unsuccessful or patient reports new pain  Outcome: Progressing     Problem: SAFETY ADULT  Goal: Patient will remain free of falls  Description: INTERVENTIONS:  - Assess patient frequently for physical needs  -  Identify cognitive and physical deficits and behaviors that affect risk of falls    -  [place] fall precautions as indicated by assessment   - Educate patient/family on patient safety including physical limitations  - Instruct patient to call for assistance with activity based on assessment  - Modify environment to reduce risk of injury  - Consider OT/PT consult to assist with strengthening/mobility  Outcome: Progressing  Goal: Maintain or return mobility status to optimal level  Description: INTERVENTIONS:  - Assess patient's baseline mobility status (ambulation, transfers, stairs, etc )    - Identify cognitive and physical deficits and behaviors that affect mobility  - Identify mobility aids required to assist with transfers and/or ambulation (gait belt, sit-to-stand, lift, walker, cane, etc )  - [place] fall precautions as indicated by assessment  - Record patient progress and toleration of activity level on Mobility SBAR; progress patient to next Phase/Stage  - Instruct patient to call for assistance with activity based on assessment  - Consider rehabilitation consult to assist with strengthening/weightbearing, etc   Outcome: Progressing     Problem: DISCHARGE PLANNING  Goal: Discharge to home or other facility with appropriate resources  Description: INTERVENTIONS:  - Identify barriers to discharge w/patient and caregiver  - Arrange for needed discharge resources and transportation as appropriate  - Identify discharge learning needs (meds, wound care, etc )  - Arrange for interpretive services to assist at discharge as needed  - Refer to Case Management Department for coordinating discharge planning if the patient needs post-hospital services based on physician/advanced practitioner order or complex needs related to functional status, cognitive ability, or social support system  Outcome: Progressing     Problem: Knowledge Deficit  Goal: Patient/family/caregiver demonstrates understanding of disease process, treatment plan, medications, and discharge instructions  Description: Complete learning assessment and assess knowledge base  Interventions:  - Provide teaching at level of understanding  - Provide teaching via preferred learning methods  Outcome: Progressing     Problem: Nutrition/Hydration-ADULT  Goal: Nutrient/Hydration intake appropriate for improving, restoring or maintaining nutritional needs  Description: Monitor and assess patient's nutrition/hydration status for malnutrition  Collaborate with interdisciplinary team and initiate plan and interventions as ordered  Monitor patient's weight and dietary intake as ordered or per policy  Utilize nutrition screening tool and intervene as necessary  Determine patient's food preferences and provide high-protein, high-caloric foods as appropriate       INTERVENTIONS:  - Monitor oral intake, urinary output, labs, and treatment plans  - Assess nutrition and hydration status and recommend course of action  - Evaluate amount of meals eaten  - Assist patient with eating if necessary   - Allow adequate time for meals  - Recommend/ encourage appropriate diets, oral nutritional supplements, and vitamin/mineral supplements  - Order, calculate, and assess calorie counts as needed  - Recommend, monitor, and adjust tube feedings and TPN/PPN based on assessed needs  - Assess need for intravenous fluids  - Provide specific nutrition/hydration education as appropriate  - Include patient/family/caregiver in decisions related to nutrition  Outcome: Progressing     Problem: Prexisting or High Potential for Compromised Skin Integrity  Goal: Skin integrity is maintained or improved  Description: INTERVENTIONS:  - Identify patients at risk for skin breakdown  - Assess and monitor skin integrity  - Assess and monitor nutrition and hydration status  - Monitor labs   - Assess for incontinence   - Turn and reposition patient  - Assist with mobility/ambulation  - Relieve pressure over bony prominences  - Avoid friction and shearing  - Provide appropriate hygiene as needed including keeping skin clean and dry  - Evaluate need for skin moisturizer/barrier cream  - Collaborate with interdisciplinary team   - Patient/family teaching  - Consider wound care consult   Outcome: Progressing

## 2020-12-11 NOTE — OCCUPATIONAL THERAPY NOTE
[address]  Treatment Note     Patient Name: Diane VALENTIN Date: 12/11/2020  Problem List  Principal Problem:    Acute CVA (cerebrovascular accident) [facility])  Active Problems:    Hypertension    QT prolongation    Hypothyroidism    Headache          12/11/20 1513   OT Last Visit   OT Visit Date 12/11/20   Note Type   Note Type Treatment   Restrictions/Precautions   Weight Bearing Precautions Per Order No   Other Precautions Multiple lines;Telemetry; Fall Risk;Visual impairment   General   Response to Previous Treatment Patient with no complaints from previous session   Pain Assessment   Pain Assessment Tool Pain Assessment not indicated - pt denies pain   Pain Score No Pain   ADL   Where Assessed Edge of bed   Grooming Assistance 4  Minimal Assistance   Grooming Deficit Setup;Verbal cueing; Increased time to complete   Grooming Comments Pt combed hair and completed oral hyigene supine in bed   UB Bathing Assistance 4  Minimal Assistance   UB Bathing Deficit Setup;Verbal cueing; Increased time to complete;Left arm   LB Bathing Assistance 4  Minimal Assistance   LB Bathing Deficit Setup;Steadying;Verbal cueing; Increased time to complete; Buttocks;Right lower leg including foot; Left lower leg including foot   UB Dressing Assistance 3  Moderate Assistance   UB Dressing Deficit Setup;Verbal cueing; Increased time to complete; Thread RUE; Thread LUE;Pull around back; Fasteners   UB Dressing Comments Pt donned hospital gown   Bed Mobility   Supine to Sit 5  Supervision   Additional items HOB elevated; Bedrails; Increased time required;Verbal cues   Sit to Supine 5  Supervision   Additional items Assist x 1;HOB elevated; Increased time required;Verbal cues   Transfers   Sit to Stand 5  Supervision   Additional items Assist x 1; Increased time required;Verbal cues   Stand to Sit 5  Supervision   Additional items Assist x 1; Increased time required;Verbal cues   Functional Mobility   Additional Comments Deferred additional functional mobility 2/2 (+) dizziness  Pt returned BTB supine in bed  BP: 201/99  ASAD Conklin made aware  After ~3 minutes of rest: BP returned to 162/78   Cognition   Overall Cognitive Status [facility]   Arousal/Participation Alert; Cooperative   Attention Within functional limits   Orientation Level Oriented X4   Memory Within functional limits   Following Commands Follows one step commands without difficulty   Comments Pt agreeable to OT session  Activity Tolerance   Activity Tolerance Patient limited by fatigue;Treatment limited secondary to medical complications (Comment)  ((+) Dizziness and BP)   Medical Staff Made Aware ASAD Conklin verbalized pt appropriate for therapy and made aware of outcomes    Assessment   Assessment Patient participated in Skilled OT session this date with interventions consisting of ADL re training with the use of correct body mechnaics,  therapeutic activities to: increase activity tolerance, increase dynamic sit/ stand balance during functional activity , increase postural control, increase trunk control and increase OOB/ sitting tolerance   Patient agreeable to OT treatment session, upon arrival patient was found supine in bed and in no apparent distress  In comparison to previous session, patient with improvements in functional transfers and balance  Patient requiring one step directives, frequent rest periods and ocassional safety reminders  Patient continues to be functioning below baseline level, occupational performance remains limited secondary to factors listed above and increased risk for falls and injury  From OT standpoint, recommendation at time of d/c would be Post-Acute Rehabilitation Services  Patient to benefit from continued Occupational Therapy treatment while in the hospital to address deficits as defined above and maximize level of functional independence with ADLs and functional mobility      Plan   Treatment Interventions ADL retraining;Functional transfer training; Endurance training; Compensatory technique education; Activityengagement   Goal Expiration Date 12/17/20   OT Treatment Day 3   OT Frequency 3-5x/wk   Recommendation   OT Discharge Recommendation Post-Acute Rehabilitation Services   OT - OK to Discharge   (Once medically cleared  )   Additional Comments  At end of session, pt supine in bed with HOB elevated and all needs met        Olinda Cespedes, OT

## 2020-12-11 NOTE — ASSESSMENT & PLAN NOTE
· Patient was weaned off vasopressors today and few hours later she began to complain of frontal headache  Neuro exam show increased dysarthria and right upper extremity weakness  BP 150s  · History of migraines  · Repeat CT head - no bleeding  · Discussed with Neurology  · Suggested giving Mg, Valproate, benadryl and Zofran  · Pt continue to c/o headache and dry heaves during treatment therefore was given fentanyl 25 mcg IV x1 and tigan x1  Ultram and zofran given for reoccurrence

## 2020-12-11 NOTE — OCCUPATIONAL THERAPY NOTE
OccupationalTherapy Cancellation Note     Patient Name: Mimi Ruano  [id number] Date: 12/11/2020  Problem List  Principal Problem:    Acute CVA (cerebrovascular accident) [facility])  Active Problems:    Hypertension    QT prolongation    Hypothyroidism    Headache            12/11/20 0735   OT Last Visit   OT Visit Date 12/11/20   Note Type   Note Type Treatment   Cancel Reasons Patient off floor/test       Chart review performed and OT treatment attempted  At this time, OT treatment cancelled due to patient off floor at procedure  OT will follow and provide skilled interventions as appropriate        Maida Banuelos MS, OTR/L

## 2020-12-11 NOTE — CASE MANAGEMENT
Chart reviewed aware of medical management  Case was discussed in multidisciplinary discharge meeting   -Present were: Deloris Hooks provider, clinical nurse coordinator, PT, CM    Clinical information supporting hospitalization:   + stroke  LUIZ was done today  - patient was not cleared for dc today, however,she is improving and downgraded to med surg  status  Other_ therapy recommendations remains Acute Rehab   Barriers to discharge:  - medical management  Discharge plan: Acute Rehab Shameka Nelson is is needed for [facility] - possible dc on Monday  Liaison from [facility] is requesting an update on speech evaluation  MD to place this order  ( CM reviewed bed status with Liaison at [facility], patients were re routed to [facility] from [facility] as that facility needed to make a COVID unit )  Bed availability may be an issue on Monday  CM updated patients son on status and reviewed our goal is [facility] pending authorization on  Monday  CM will continue to follow

## 2020-12-12 LAB
ANION GAP SERPL CALCULATED.3IONS-SCNC: 7 MMOL/L (ref 4–13)
BUN SERPL-MCNC: 16 MG/DL (ref 5–25)
CALCIUM SERPL-MCNC: 9.2 MG/DL (ref 8.3–10.1)
CHLORIDE SERPL-SCNC: 105 MMOL/L (ref 100–108)
CO2 SERPL-SCNC: 29 MMOL/L (ref 21–32)
CREAT SERPL-MCNC: 1.04 MG/DL (ref 0.6–1.3)
ERYTHROCYTE [DISTWIDTH] IN BLOOD BY AUTOMATED COUNT: 11.9 % (ref 11.6–15.1)
GFR SERPL CREATININE-BSD FRML MDRD: 54 ML/MIN/1.73SQ M
GLUCOSE SERPL-MCNC: 101 MG/DL (ref 65–140)
HCT VFR BLD AUTO: 40.2 % (ref 34.8–46.1)
HGB BLD-MCNC: 13.2 G/DL (ref 11.5–15.4)
MCH RBC QN AUTO: 30.3 PG (ref 26.8–34.3)
MCHC RBC AUTO-ENTMCNC: 32.8 G/DL (ref 31.4–37.4)
MCV RBC AUTO: 92 FL (ref 82–98)
PLATELET # BLD AUTO: 253 THOUSANDS/UL (ref 149–390)
PMV BLD AUTO: 9.3 FL (ref 8.9–12.7)
POTASSIUM SERPL-SCNC: 4.2 MMOL/L (ref 3.5–5.3)
RBC # BLD AUTO: 4.35 MILLION/UL (ref 3.81–5.12)
SODIUM SERPL-SCNC: 141 MMOL/L (ref 136–145)
WBC # BLD AUTO: 7.82 THOUSAND/UL (ref 4.31–10.16)

## 2020-12-12 PROCEDURE — 85027 COMPLETE CBC AUTOMATED: CPT | Performed by: INTERNAL MEDICINE

## 2020-12-12 PROCEDURE — 80048 BASIC METABOLIC PNL TOTAL CA: CPT | Performed by: INTERNAL MEDICINE

## 2020-12-12 PROCEDURE — 99232 SBSQ HOSP IP/OBS MODERATE 35: CPT | Performed by: PHYSICIAN ASSISTANT

## 2020-12-12 RX ADMIN — LEVOTHYROXINE SODIUM 25 MCG: 25 TABLET ORAL at 05:26

## 2020-12-12 RX ADMIN — CLOPIDOGREL 75 MG: 75 TABLET, FILM COATED ORAL at 09:15

## 2020-12-12 RX ADMIN — HEPARIN SODIUM 5000 UNITS: 5000 INJECTION INTRAVENOUS; SUBCUTANEOUS at 14:19

## 2020-12-12 RX ADMIN — ASPIRIN 81 MG: 81 TABLET, COATED ORAL at 09:16

## 2020-12-12 RX ADMIN — HEPARIN SODIUM 5000 UNITS: 5000 INJECTION INTRAVENOUS; SUBCUTANEOUS at 05:26

## 2020-12-12 RX ADMIN — ATORVASTATIN CALCIUM 80 MG: 40 TABLET, FILM COATED ORAL at 17:54

## 2020-12-12 RX ADMIN — HEPARIN SODIUM 5000 UNITS: 5000 INJECTION INTRAVENOUS; SUBCUTANEOUS at 23:20

## 2020-12-12 NOTE — PLAN OF CARE
Problem: Potential for Falls  Goal: Patient will remain free of falls  Description: INTERVENTIONS:  - Assess patient frequently for physical needs  -  Identify cognitive and physical deficits and behaviors that affect risk of falls  -  [place] fall precautions as indicated by assessment   - Educate patient/family on patient safety including physical limitations  - Instruct patient to call for assistance with activity based on assessment  - Modify environment to reduce risk of injury  - Consider OT/PT consult to assist with strengthening/mobility  Outcome: Progressing     Problem: Activity Intolerance/Impaired Mobility  Goal: Mobility/activity is maintained at optimum level for patient  Description: Interventions:  - Assess and monitor patient  barriers to mobility and need for assistive/adaptive devices  - Assess patient's emotional response to limitations  - Collaborate with interdisciplinary team and initiate plans and interventions as ordered  - Encourage independent activity per ability   - Maintain proper body alignment  - Perform active/passive rom as tolerated/ordered  - Plan activities to conserve energy   - Turn patient as appropriate  Outcome: Progressing     Problem: Communication Impairment  Goal: Ability to express needs and understand communication  Description: Assess patient's communication skills and ability to understand information  Patient will demonstrate use of effective communication techniques, alternative methods of communication and understanding even if not able to speak  - Encourage communication and provide alternate methods of communication as needed  - Collaborate with case management/ for discharge needs  - Include patient/family/caregiver in decisions related to communication    Outcome: Progressing     Problem: Potential for Aspiration  Goal: Non-ventilated patient's risk of aspiration is minimized  Description: Assess and monitor vital signs, respiratory status, and labs (WBC)  Monitor for signs of aspiration (tachypnea, cough, rales, wheezing, cyanosis, fever)  - Assess and monitor patient's ability to swallow  - Place patient up in chair to eat if possible  - HOB up at 90 degrees to eat if unable to get patient up into chair   - Supervise patient during oral intake  - Instruct patient/ family to take small bites  - Instruct patient/ family to take small single sips when taking liquids  - Follow patient-specific strategies generated by speech pathologist   Outcome: Progressing     Problem: NEUROSENSORY - ADULT  Goal: Achieves stable or improved neurological status  Description: INTERVENTIONS  - Monitor and report changes in neurological status  - Monitor vital signs such as temperature, blood pressure, glucose, and any other labs ordered   - Initiate measures to prevent increased intracranial pressure  - Monitor for seizure activity and implement precautions if appropriate      Outcome: Progressing  Goal: Achieves maximal functionality and self care  Description: INTERVENTIONS  - Monitor swallowing and airway patency with patient fatigue and changes in neurological status  - Encourage and assist patient to increase activity and self care     - Encourage visually impaired, hearing impaired and aphasic patients to use assistive/communication devices  Outcome: Progressing     Problem: CARDIOVASCULAR - ADULT  Goal: Maintains optimal cardiac output and hemodynamic stability  Description: INTERVENTIONS:  - Monitor I/O, vital signs and rhythm  - Monitor for S/S and trends of decreased cardiac output  - Administer and titrate ordered vasoactive medications to optimize hemodynamic stability  - Assess quality of pulses, skin color and temperature  - Assess for signs of decreased coronary artery perfusion  - Instruct patient to report change in severity of symptoms  Outcome: Progressing  Goal: Absence of cardiac dysrhythmias or at baseline rhythm  Description: INTERVENTIONS:  - Continuous cardiac monitoring, vital signs, obtain 12 lead EKG if ordered  - Administer antiarrhythmic and heart rate control medications as ordered  - Monitor electrolytes and administer replacement therapy as ordered  Outcome: Progressing     Problem: PAIN - ADULT  Goal: Verbalizes/displays adequate comfort level or baseline comfort level  Description: Interventions:  - Encourage patient to monitor pain and request assistance  - Assess pain using appropriate pain scale  - Administer analgesics based on type and severity of pain and evaluate response  - Implement non-pharmacological measures as appropriate and evaluate response  - Consider cultural and social influences on pain and pain management  - Notify physician/advanced practitioner if interventions unsuccessful or patient reports new pain  Outcome: Progressing     Problem: SAFETY ADULT  Goal: Patient will remain free of falls  Description: INTERVENTIONS:  - Assess patient frequently for physical needs  -  Identify cognitive and physical deficits and behaviors that affect risk of falls    -  [place] fall precautions as indicated by assessment   - Educate patient/family on patient safety including physical limitations  - Instruct patient to call for assistance with activity based on assessment  - Modify environment to reduce risk of injury  - Consider OT/PT consult to assist with strengthening/mobility  Outcome: Progressing  Goal: Maintain or return mobility status to optimal level  Description: INTERVENTIONS:  - Assess patient's baseline mobility status (ambulation, transfers, stairs, etc )    - Identify cognitive and physical deficits and behaviors that affect mobility  - Identify mobility aids required to assist with transfers and/or ambulation (gait belt, sit-to-stand, lift, walker, cane, etc )  - [place] fall precautions as indicated by assessment  - Record patient progress and toleration of activity level on Mobility SBAR; progress patient to next Phase/Stage  - Instruct patient to call for assistance with activity based on assessment  - Consider rehabilitation consult to assist with strengthening/weightbearing, etc   Outcome: Progressing     Problem: DISCHARGE PLANNING  Goal: Discharge to home or other facility with appropriate resources  Description: INTERVENTIONS:  - Identify barriers to discharge w/patient and caregiver  - Arrange for needed discharge resources and transportation as appropriate  - Identify discharge learning needs (meds, wound care, etc )  - Arrange for interpretive services to assist at discharge as needed  - Refer to Case Management Department for coordinating discharge planning if the patient needs post-hospital services based on physician/advanced practitioner order or complex needs related to functional status, cognitive ability, or social support system  Outcome: Progressing     Problem: Knowledge Deficit  Goal: Patient/family/caregiver demonstrates understanding of disease process, treatment plan, medications, and discharge instructions  Description: Complete learning assessment and assess knowledge base  Interventions:  - Provide teaching at level of understanding  - Provide teaching via preferred learning methods  Outcome: Progressing     Problem: Nutrition/Hydration-ADULT  Goal: Nutrient/Hydration intake appropriate for improving, restoring or maintaining nutritional needs  Description: Monitor and assess patient's nutrition/hydration status for malnutrition  Collaborate with interdisciplinary team and initiate plan and interventions as ordered  Monitor patient's weight and dietary intake as ordered or per policy  Utilize nutrition screening tool and intervene as necessary  Determine patient's food preferences and provide high-protein, high-caloric foods as appropriate       INTERVENTIONS:  - Monitor oral intake, urinary output, labs, and treatment plans  - Assess nutrition and hydration status and recommend course of action  - Evaluate amount of meals eaten  - Assist patient with eating if necessary   - Allow adequate time for meals  - Recommend/ encourage appropriate diets, oral nutritional supplements, and vitamin/mineral supplements  - Order, calculate, and assess calorie counts as needed  - Recommend, monitor, and adjust tube feedings and TPN/PPN based on assessed needs  - Assess need for intravenous fluids  - Provide specific nutrition/hydration education as appropriate  - Include patient/family/caregiver in decisions related to nutrition  Outcome: Progressing     Problem: Prexisting or High Potential for Compromised Skin Integrity  Goal: Skin integrity is maintained or improved  Description: INTERVENTIONS:  - Identify patients at risk for skin breakdown  - Assess and monitor skin integrity  - Assess and monitor nutrition and hydration status  - Monitor labs   - Assess for incontinence   - Turn and reposition patient  - Assist with mobility/ambulation  - Relieve pressure over bony prominences  - Avoid friction and shearing  - Provide appropriate hygiene as needed including keeping skin clean and dry  - Evaluate need for skin moisturizer/barrier cream  - Collaborate with interdisciplinary team   - Patient/family teaching  - Consider wound care consult   Outcome: Progressing

## 2020-12-12 NOTE — ASSESSMENT & PLAN NOTE
S/P being weaned off vasopressor, patient began to complain of frontal headache  Neuro exam showed increased dysarthria with our UE weakness  BP 150s  Patient does have noted history of migraine  Repeat CT head no hemorrhage  Discussed with Neurology  Patient received magnesium, valproic acid, Benadryl, and Zofran  There was continued headache and she received fentanyl, tigan, ultram, and zofran  Patient has headache now resolved on 12/12

## 2020-12-12 NOTE — PROGRESS NOTES
Progress Note - Danielle Lucero 1950, 69 y o  female MRN: [id number]  Unit/Bed#: -01 Encounter: [id number]  Primary Care Provider: Luigi Payton DO   Date and time admitted to hospital: 12/5/2020 12:48 PM    * Acute CVA (cerebrovascular accident) [facility])  Assessment & Plan  · Presented 12/5 with R sided weakness and dysarthria  · Initial CTA head and neck without large vessel flow stenosis  · Initial [address]  Not tPA candidate  Admitted on stroke pathway  · MRI brain 12/7 showed acute/subacute left basal ganglionic infarct  · 12/7 ECHO (TTE) revealed EF 55-60%, G1DD, no PFO  · 12/8 became rapid response due to worsening facial droop and R sided weakness  [address] at that time 6    · Stat head CT showed evolving left basal ganglia infarct  Likely due to hypoperfusion  · Upgraded to ICU for pressor initiation  · After 48 hours, patient weaned from [place], started on midodrine  Had headache after wean, repeat CT head no evidence of hemorrhage  BP steady now off of midodrine  · 12/11 patient downgraded to med surge level  · Goal BP 140mmHg  BP steady  Patient's home Norvasc/BB still held  · 12/11 LUIZ shows no apical thrombus  · Neurology recommends DAPT x 3 weeks, then Plavix only  Neurology to discuss risk v benefit of initiating full AC as an outpatient  · Requires repeat SLP eval   Would benefit from acute rehab  Headache  Assessment & Plan  S/P being weaned off vasopressor, patient began to complain of frontal headache  Neuro exam showed increased dysarthria with our UE weakness  BP 150s  Patient does have noted history of migraine  Repeat CT head no hemorrhage  Discussed with Neurology  Patient received magnesium, valproic acid, Benadryl, and Zofran  There was continued headache and she received fentanyl, tigan, ultram, and zofran  Patient has headache now resolved on 12/12      Hypothyroidism  Assessment & Plan  · Continue home dose Synthroid and check a TSH in the morning TSH is slightly elevated free T4 is normal     · Continue levothyroxine at home dose  · Repeat TSH in 4 weeks    QT prolongation  Assessment & Plan  · EKG with evidence of QTC of 497  Improved on the EKG reviewed to 480  · Most recent EKG 12/10     Hypertension  Assessment & Plan  · Goal BP around 140mmHg  · Midodrine has been discontinued  · Still holding home patient's home BP meds  Reintroduce of BP becomes higher  VTE Pharmacologic Prophylaxis:   Pharmacologic: Heparin  Mechanical VTE Prophylaxis in Place: Yes    Patient Centered Rounds: I have performed bedside rounds with nursing staff today  Discussions with Specialists or Other Care Team Provider:  Discussed with nursing, case management    Education and Discussions with Family / Patient:  Discussed with patient's son over the phone and full update give    Time Spent for Care: 30 minutes  More than 50% of total time spent on counseling and coordination of care as described above  Current Length of Stay: 7 day(s)    Current Patient Status: Inpatient   Certification Statement: The patient will continue to require additional inpatient hospital stay due to Needs acute rehab placement    Discharge Plan:  Pending, medically appropriate however needs acute rehab    Code Status: Level 1 - Full Code      Subjective:   Patient's headache significantly improved  Still with right upper extremity weakness  Speaking better today  No nausea or vomiting  Objective:     Vitals:   Temp (24hrs), Av 1 °F (36 7 °C), Min:98 °F (36 7 °C), Max:98 4 °F (36 9 °C)    Temp:  [98 °F (36 7 °C)-98 4 °F (36 9 °C)] 98 4 °F (36 9 °C)  HR:  [63-85] 85  Resp:  [12-66] 21  BP: (114-180)/(67-89) 151/82  SpO2:  [97 %-99 %] 99 %  Body mass index is 21 8 kg/m²  Input and Output Summary (last 24 hours):        Intake/Output Summary (Last 24 hours) at 2020 1136  Last data filed at 2020 0700  Gross per 24 hour   Intake 320 ml   Output 200 ml   Net 120 ml Physical Exam:     Physical Exam  Vitals signs and nursing note reviewed  Exam conducted with a chaperone present  Constitutional:       General: She is not in acute distress  Appearance: She is well-developed  HENT:      Head: Normocephalic and atraumatic  Eyes:      Conjunctiva/sclera: Conjunctivae normal    Neck:      Musculoskeletal: Neck supple  Cardiovascular:      Rate and Rhythm: Normal rate and regular rhythm  Heart sounds: No murmur  Pulmonary:      Effort: Pulmonary effort is normal  No respiratory distress  Breath sounds: Normal breath sounds  Abdominal:      Palpations: Abdomen is soft  Tenderness: There is no abdominal tenderness  Skin:     General: Skin is warm and dry  Neurological:      Mental Status: She is alert and oriented to person, place, and time  Mental status is at baseline  Motor: Weakness present  Coordination: Coordination abnormal          Additional Data:     Labs:    Results from last 7 days   Lab Units 12/12/20  0420  12/09/20  0413   WBC Thousand/uL 7 82   < > 8 77   HEMOGLOBIN g/dL 13 2   < > 13 1   HEMATOCRIT % 40 2   < > 39 1   PLATELETS Thousands/uL 253   < > 304   NEUTROS PCT %  --   --  64   LYMPHS PCT %  --   --  25   MONOS PCT %  --   --  8   EOS PCT %  --   --  2    < > = values in this interval not displayed  Results from last 7 days   Lab Units 12/12/20  0420   SODIUM mmol/L 141   POTASSIUM mmol/L 4 2   CHLORIDE mmol/L 105   CO2 mmol/L 29   BUN mg/dL 16   CREATININE mg/dL 1 04   ANION GAP mmol/L 7   CALCIUM mg/dL 9 2   GLUCOSE RANDOM mg/dL 101     Results from last 7 days   Lab Units 12/08/20  0750   INR  1 02     Results from last 7 days   Lab Units 12/08/20  0735 12/05/20  1250   POC GLUCOSE mg/dl 120 108     Results from last 7 days   Lab Units 12/06/20  0510   HEMOGLOBIN A1C % 5 7*               * I Have Reviewed All Lab Data Listed Above  * Additional Pertinent Lab Tests Reviewed:  All Labs Within Last 24 Hours Reviewed      Recent Cultures (last 7 days):           Last 24 Hours Medication List:   Current Facility-Administered Medications   Medication Dose Route Frequency Provider Last Rate    acetaminophen  650 mg Oral Q4H PRN Rinku Espino PA-C      aspirin  81 mg Oral Daily LIZA Ramirez      atorvastatin  80 mg Oral QPM Rinku Espino PA-C      clopidogrel  75 mg Oral Daily Rinku Espino PA-C      heparin (porcine)  5,000 Units Subcutaneous Q8H [address] LIZA Ramirez      levothyroxine  25 mcg Oral Daily Rinku Espino PA-C      ondansetron  4 mg Oral Q6H PRN LIZA Ariza          Today, Patient Was Seen By: Uvaldo Fuller PA-C    ** Please Note: Dictation voice to text software may have been used in the creation of this document   **

## 2020-12-12 NOTE — ASSESSMENT & PLAN NOTE
· Goal BP around 140mmHg  · Midodrine has been discontinued  · Still holding home patient's home BP meds  Reintroduce of BP becomes higher

## 2020-12-12 NOTE — PLAN OF CARE
Problem: Potential for Falls  Goal: Patient will remain free of falls  Description: INTERVENTIONS:  - Assess patient frequently for physical needs  -  Identify cognitive and physical deficits and behaviors that affect risk of falls  -  [place] fall precautions as indicated by assessment   - Educate patient/family on patient safety including physical limitations  - Instruct patient to call for assistance with activity based on assessment  - Modify environment to reduce risk of injury  - Consider OT/PT consult to assist with strengthening/mobility  Outcome: Progressing     Problem: Activity Intolerance/Impaired Mobility  Goal: Mobility/activity is maintained at optimum level for patient  Description: Interventions:  - Assess and monitor patient  barriers to mobility and need for assistive/adaptive devices  - Assess patient's emotional response to limitations  - Collaborate with interdisciplinary team and initiate plans and interventions as ordered  - Encourage independent activity per ability   - Maintain proper body alignment  - Perform active/passive rom as tolerated/ordered  - Plan activities to conserve energy   - Turn patient as appropriate  Outcome: Progressing     Problem: Communication Impairment  Goal: Ability to express needs and understand communication  Description: Assess patient's communication skills and ability to understand information  Patient will demonstrate use of effective communication techniques, alternative methods of communication and understanding even if not able to speak  - Encourage communication and provide alternate methods of communication as needed  - Collaborate with case management/ for discharge needs  - Include patient/family/caregiver in decisions related to communication    Outcome: Progressing     Problem: Potential for Aspiration  Goal: Non-ventilated patient's risk of aspiration is minimized  Description: Assess and monitor vital signs, respiratory status, and labs (WBC)  Monitor for signs of aspiration (tachypnea, cough, rales, wheezing, cyanosis, fever)  - Assess and monitor patient's ability to swallow  - Place patient up in chair to eat if possible  - HOB up at 90 degrees to eat if unable to get patient up into chair   - Supervise patient during oral intake  - Instruct patient/ family to take small bites  - Instruct patient/ family to take small single sips when taking liquids  - Follow patient-specific strategies generated by speech pathologist   Outcome: Progressing     Problem: NEUROSENSORY - ADULT  Goal: Achieves stable or improved neurological status  Description: INTERVENTIONS  - Monitor and report changes in neurological status  - Monitor vital signs such as temperature, blood pressure, glucose, and any other labs ordered   - Initiate measures to prevent increased intracranial pressure  - Monitor for seizure activity and implement precautions if appropriate      Outcome: Progressing  Goal: Achieves maximal functionality and self care  Description: INTERVENTIONS  - Monitor swallowing and airway patency with patient fatigue and changes in neurological status  - Encourage and assist patient to increase activity and self care     - Encourage visually impaired, hearing impaired and aphasic patients to use assistive/communication devices  Outcome: Progressing     Problem: CARDIOVASCULAR - ADULT  Goal: Maintains optimal cardiac output and hemodynamic stability  Description: INTERVENTIONS:  - Monitor I/O, vital signs and rhythm  - Monitor for S/S and trends of decreased cardiac output  - Administer and titrate ordered vasoactive medications to optimize hemodynamic stability  - Assess quality of pulses, skin color and temperature  - Assess for signs of decreased coronary artery perfusion  - Instruct patient to report change in severity of symptoms  Outcome: Progressing  Goal: Absence of cardiac dysrhythmias or at baseline rhythm  Description: INTERVENTIONS:  - Continuous cardiac monitoring, vital signs, obtain 12 lead EKG if ordered  - Administer antiarrhythmic and heart rate control medications as ordered  - Monitor electrolytes and administer replacement therapy as ordered  Outcome: Progressing     Problem: PAIN - ADULT  Goal: Verbalizes/displays adequate comfort level or baseline comfort level  Description: Interventions:  - Encourage patient to monitor pain and request assistance  - Assess pain using appropriate pain scale  - Administer analgesics based on type and severity of pain and evaluate response  - Implement non-pharmacological measures as appropriate and evaluate response  - Consider cultural and social influences on pain and pain management  - Notify physician/advanced practitioner if interventions unsuccessful or patient reports new pain  Outcome: Progressing     Problem: SAFETY ADULT  Goal: Patient will remain free of falls  Description: INTERVENTIONS:  - Assess patient frequently for physical needs  -  Identify cognitive and physical deficits and behaviors that affect risk of falls    -  [place] fall precautions as indicated by assessment   - Educate patient/family on patient safety including physical limitations  - Instruct patient to call for assistance with activity based on assessment  - Modify environment to reduce risk of injury  - Consider OT/PT consult to assist with strengthening/mobility  Outcome: Progressing  Goal: Maintain or return mobility status to optimal level  Description: INTERVENTIONS:  - Assess patient's baseline mobility status (ambulation, transfers, stairs, etc )    - Identify cognitive and physical deficits and behaviors that affect mobility  - Identify mobility aids required to assist with transfers and/or ambulation (gait belt, sit-to-stand, lift, walker, cane, etc )  - [place] fall precautions as indicated by assessment  - Record patient progress and toleration of activity level on Mobility SBAR; progress patient to next Phase/Stage  - Instruct patient to call for assistance with activity based on assessment  - Consider rehabilitation consult to assist with strengthening/weightbearing, etc   Outcome: Progressing     Problem: DISCHARGE PLANNING  Goal: Discharge to home or other facility with appropriate resources  Description: INTERVENTIONS:  - Identify barriers to discharge w/patient and caregiver  - Arrange for needed discharge resources and transportation as appropriate  - Identify discharge learning needs (meds, wound care, etc )  - Arrange for interpretive services to assist at discharge as needed  - Refer to Case Management Department for coordinating discharge planning if the patient needs post-hospital services based on physician/advanced practitioner order or complex needs related to functional status, cognitive ability, or social support system  Outcome: Progressing     Problem: Knowledge Deficit  Goal: Patient/family/caregiver demonstrates understanding of disease process, treatment plan, medications, and discharge instructions  Description: Complete learning assessment and assess knowledge base  Interventions:  - Provide teaching at level of understanding  - Provide teaching via preferred learning methods  Outcome: Progressing     Problem: Nutrition/Hydration-ADULT  Goal: Nutrient/Hydration intake appropriate for improving, restoring or maintaining nutritional needs  Description: Monitor and assess patient's nutrition/hydration status for malnutrition  Collaborate with interdisciplinary team and initiate plan and interventions as ordered  Monitor patient's weight and dietary intake as ordered or per policy  Utilize nutrition screening tool and intervene as necessary  Determine patient's food preferences and provide high-protein, high-caloric foods as appropriate       INTERVENTIONS:  - Monitor oral intake, urinary output, labs, and treatment plans  - Assess nutrition and hydration status and recommend course of action  - Evaluate amount of meals eaten  - Assist patient with eating if necessary   - Allow adequate time for meals  - Recommend/ encourage appropriate diets, oral nutritional supplements, and vitamin/mineral supplements  - Order, calculate, and assess calorie counts as needed  - Recommend, monitor, and adjust tube feedings and TPN/PPN based on assessed needs  - Assess need for intravenous fluids  - Provide specific nutrition/hydration education as appropriate  - Include patient/family/caregiver in decisions related to nutrition  Outcome: Progressing     Problem: Prexisting or High Potential for Compromised Skin Integrity  Goal: Skin integrity is maintained or improved  Description: INTERVENTIONS:  - Identify patients at risk for skin breakdown  - Assess and monitor skin integrity  - Assess and monitor nutrition and hydration status  - Monitor labs   - Assess for incontinence   - Turn and reposition patient  - Assist with mobility/ambulation  - Relieve pressure over bony prominences  - Avoid friction and shearing  - Provide appropriate hygiene as needed including keeping skin clean and dry  - Evaluate need for skin moisturizer/barrier cream  - Collaborate with interdisciplinary team   - Patient/family teaching  - Consider wound care consult   Outcome: Progressing

## 2020-12-12 NOTE — ASSESSMENT & PLAN NOTE
· Presented 12/5 with R sided weakness and dysarthria  · Initial CTA head and neck without large vessel flow stenosis  · Initial [address]  Not tPA candidate  Admitted on stroke pathway  · MRI brain 12/7 showed acute/subacute left basal ganglionic infarct  · 12/7 ECHO (TTE) revealed EF 55-60%, G1DD, no PFO  · 12/8 became rapid response due to worsening facial droop and R sided weakness  [address] at that time 6    · Stat head CT showed evolving left basal ganglia infarct  Likely due to hypoperfusion  · Upgraded to ICU for pressor initiation  · After 48 hours, patient weaned from [place], started on midodrine  Had headache after wean, repeat CT head no evidence of hemorrhage  BP steady now off of midodrine  · 12/11 patient downgraded to med surge level  · Goal BP 140mmHg  BP steady  Patient's home Norvasc/BB still held  · 12/11 LUIZ shows no apical thrombus  · Neurology recommends DAPT x 3 weeks, then Plavix only  Neurology to discuss risk v benefit of initiating full AC as an outpatient  · Requires repeat SLP eval   Would benefit from acute rehab

## 2020-12-13 LAB
ATRIAL RATE: 74 BPM
ATRIAL RATE: 80 BPM
P AXIS: 44 DEGREES
P AXIS: 55 DEGREES
PR INTERVAL: 132 MS
PR INTERVAL: 136 MS
QRS AXIS: 50 DEGREES
QRS AXIS: 55 DEGREES
QRSD INTERVAL: 78 MS
QRSD INTERVAL: 78 MS
QT INTERVAL: 382 MS
QT INTERVAL: 410 MS
QTC INTERVAL: 424 MS
QTC INTERVAL: 472 MS
T WAVE AXIS: 79 DEGREES
T WAVE AXIS: 93 DEGREES
VENTRICULAR RATE: 74 BPM
VENTRICULAR RATE: 80 BPM

## 2020-12-13 PROCEDURE — 99233 SBSQ HOSP IP/OBS HIGH 50: CPT | Performed by: NURSE PRACTITIONER

## 2020-12-13 PROCEDURE — 93010 ELECTROCARDIOGRAM REPORT: CPT | Performed by: INTERNAL MEDICINE

## 2020-12-13 RX ORDER — ATENOLOL 25 MG/1
12.5 TABLET ORAL DAILY
Status: DISCONTINUED | OUTPATIENT
Start: 2020-12-13 | End: 2020-12-13

## 2020-12-13 RX ORDER — AMLODIPINE BESYLATE 2.5 MG/1
2.5 TABLET ORAL DAILY
Status: DISCONTINUED | OUTPATIENT
Start: 2020-12-13 | End: 2020-12-17 | Stop reason: HOSPADM

## 2020-12-13 RX ADMIN — HEPARIN SODIUM 5000 UNITS: 5000 INJECTION INTRAVENOUS; SUBCUTANEOUS at 13:15

## 2020-12-13 RX ADMIN — LEVOTHYROXINE SODIUM 25 MCG: 25 TABLET ORAL at 06:26

## 2020-12-13 RX ADMIN — HEPARIN SODIUM 5000 UNITS: 5000 INJECTION INTRAVENOUS; SUBCUTANEOUS at 06:26

## 2020-12-13 RX ADMIN — ASPIRIN 81 MG: 81 TABLET, COATED ORAL at 08:47

## 2020-12-13 RX ADMIN — HEPARIN SODIUM 5000 UNITS: 5000 INJECTION INTRAVENOUS; SUBCUTANEOUS at 22:01

## 2020-12-13 RX ADMIN — CLOPIDOGREL 75 MG: 75 TABLET, FILM COATED ORAL at 08:47

## 2020-12-13 RX ADMIN — AMLODIPINE BESYLATE 2.5 MG: 2.5 TABLET ORAL at 13:15

## 2020-12-13 RX ADMIN — ATORVASTATIN CALCIUM 80 MG: 40 TABLET, FILM COATED ORAL at 17:38

## 2020-12-13 NOTE — ASSESSMENT & PLAN NOTE
· Presented 12/5 with R sided weakness and dysarthria  · Initial CTA head and neck without large vessel flow stenosis  · Initial [address]  Not tPA candidate  Admitted on stroke pathway  · MRI brain 12/7 showed acute/subacute left basal ganglionic infarct  · 12/7 ECHO (TTE) revealed EF 55-60%, G1DD, no PFO  · 12/8 became rapid response due to worsening facial droop and R sided weakness  [address] at that time 6    · Stat head CT showed evolving left basal ganglia infarct  Likely due to hypoperfusion  · Upgraded to ICU for pressor initiation  · After 48 hours, patient weaned from jer[place], started on midodrine  Had headache after wean, repeat CT head no evidence of hemorrhage  BP steady now off of midodrine  · 12/11 patient downgraded to med surge level  · Goal BP 140mmHg  BP steady  Patient's home Norvasc/BB still held  · 12/11 LUIZ shows no apical thrombus  · Neurology recommends DAPT x 3 weeks, then Plavix only  Neurology to discuss risk v benefit of initiating full AC as an outpatient  · Requires repeat SLP eval   Would benefit from acute rehab    Auth pending

## 2020-12-13 NOTE — ASSESSMENT & PLAN NOTE
· Goal BP around 140mmHg  BP has been around 150  Midodrine has been discontinued    · Will resume Amlodipine and continue to hold Atenolol

## 2020-12-13 NOTE — ASSESSMENT & PLAN NOTE
· S/P being weaned off vasopressor, patient began to complain of frontal headache  Neuro exam showed increased dysarthria with our UE weakness  BP 150s  · Repeat CT head no hemorrhage    · Currently resolved

## 2020-12-13 NOTE — PROGRESS NOTES
[facility]   Progress Note - Sherif Garcia 1950, 69 y o  female MRN: [id number]    Unit/Bed#: -01 Encounter: [id number]    Primary Care Provider: Lisa Erazo DO   Date and time admitted to hospital: 12/5/2020 12:48 PM        * Acute CVA (cerebrovascular accident) [facility])  Assessment & Plan  · Presented 12/5 with R sided weakness and dysarthria  · Initial CTA head and neck without large vessel flow stenosis  · Initial [address]  Not tPA candidate  Admitted on stroke pathway  · MRI brain 12/7 showed acute/subacute left basal ganglionic infarct  · 12/7 ECHO (TTE) revealed EF 55-60%, G1DD, no PFO  · 12/8 became rapid response due to worsening facial droop and R sided weakness  [address] at that time 6    · Stat head CT showed evolving left basal ganglia infarct  Likely due to hypoperfusion  · Upgraded to ICU for pressor initiation  · After 48 hours, patient weaned from [place], started on midodrine  Had headache after wean, repeat CT head no evidence of hemorrhage  BP steady now off of midodrine  · 12/11 patient downgraded to med surge level  · Goal BP 140mmHg  BP steady  Patient's home Norvasc/BB still held  · 12/11 LUIZ shows no apical thrombus  · Neurology recommends DAPT x 3 weeks, then Plavix only  Neurology to discuss risk v benefit of initiating full AC as an outpatient  · Requires repeat SLP eval   Would benefit from acute rehab  Auth pending       Hypertension  Assessment & Plan  · Goal BP around 140mmHg  BP has been around 150  Midodrine has been discontinued  · Will resume Amlodipine and continue to hold Atenolol     Headache  Assessment & Plan  · S/P being weaned off vasopressor, patient began to complain of frontal headache  Neuro exam showed increased dysarthria with our UE weakness  BP 150s  · Repeat CT head no hemorrhage  · Currently resolved     QT prolongation  Assessment & Plan  · EKG with evidence of QTC of 497   Improved on the EKG reviewed to 480  · Most recent EKG 12/10     Hypothyroidism  Assessment & Plan  · Continue home dose Synthroid and check a TSH in the morning TSH is slightly elevated free T4 is normal     · Continue levothyroxine at home dose  · Repeat TSH in 4 weeks      VTE Pharmacologic Prophylaxis:   Pharmacologic: Heparin  Mechanical VTE Prophylaxis in Place: Yes    Patient Centered Rounds: I have performed bedside rounds with nursing staff today  Discussions with Specialists or Other Care Team Provider: none    Education and Discussions with Family / Patient: patient and son    Time Spent for Care: 20 minutes  More than 50% of total time spent on counseling and coordination of care as described above  Current Length of Stay: 8 day(s)    Current Patient Status: Inpatient   Certification Statement: The patient will continue to require additional inpatient hospital stay due to pending auth for rehab    Discharge Plan: pending rehab placement    Code Status: Level 1 - Full Code      Subjective:   Patient denies headache  Still continues with ride-sided weakness and facial droop, and slurred speech  Patient has difficulty getting out what she wants to say however does know what she wants to say  Is frustrated  Objective:     Vitals:   Temp (24hrs), Av 9 °F (36 6 °C), Min:97 6 °F (36 4 °C), Max:98 1 °F (36 7 °C)    Temp:  [97 6 °F (36 4 °C)-98 1 °F (36 7 °C)] 97 6 °F (36 4 °C)  HR:  [74-90] 79  Resp:  [16-18] 18  BP: (123-189)/() 151/98  SpO2:  [98 %-99 %] 98 %  Body mass index is 21 8 kg/m²  Input and Output Summary (last 24 hours): Intake/Output Summary (Last 24 hours) at 2020 1241  Last data filed at 2020 0900  Gross per 24 hour   Intake 360 ml   Output --   Net 360 ml       Physical Exam:     Physical Exam  Vitals signs and nursing note reviewed  Constitutional:       Appearance: Normal appearance  HENT:      Head: Normocephalic and atraumatic     Neck:      Musculoskeletal: Normal range of motion and neck supple  Cardiovascular:      Rate and Rhythm: Normal rate and regular rhythm  Heart sounds: No murmur  Pulmonary:      Effort: Pulmonary effort is normal  No respiratory distress  Breath sounds: Normal breath sounds  Abdominal:      General: Bowel sounds are normal       Palpations: Abdomen is soft  Musculoskeletal: Normal range of motion  Right lower leg: No edema  Left lower leg: No edema  Skin:     General: Skin is warm and dry  Neurological:      Mental Status: She is alert and oriented to person, place, and time  Cranial Nerves: Cranial nerve deficit present  Motor: Weakness present  Comments: Right-sided weakness and facial droop    Psychiatric:         Mood and Affect: Mood normal          Behavior: Behavior normal          Thought Content: Thought content normal          Judgment: Judgment normal          Additional Data:     Labs:    Results from last 7 days   Lab Units 12/12/20  0420  12/09/20  0413   WBC Thousand/uL 7 82   < > 8 77   HEMOGLOBIN g/dL 13 2   < > 13 1   HEMATOCRIT % 40 2   < > 39 1   PLATELETS Thousands/uL 253   < > 304   NEUTROS PCT %  --   --  64   LYMPHS PCT %  --   --  25   MONOS PCT %  --   --  8   EOS PCT %  --   --  2    < > = values in this interval not displayed  Results from last 7 days   Lab Units 12/12/20  0420   SODIUM mmol/L 141   POTASSIUM mmol/L 4 2   CHLORIDE mmol/L 105   CO2 mmol/L 29   BUN mg/dL 16   CREATININE mg/dL 1 04   ANION GAP mmol/L 7   CALCIUM mg/dL 9 2   GLUCOSE RANDOM mg/dL 101     Results from last 7 days   Lab Units 12/08/20  0750   INR  1 02     Results from last 7 days   Lab Units 12/08/20  0735   POC GLUCOSE mg/dl 120                   * I Have Reviewed All Lab Data Listed Above  * Additional Pertinent Lab Tests Reviewed:  All Labs For Current Hospital Admission Reviewed    Imaging:    Imaging Reports Reviewed Today Include: none  Imaging Personally Reviewed by Myself Includes: none    Recent Cultures (last 7 days):           Last 24 Hours Medication List:   Current Facility-Administered Medications   Medication Dose Route Frequency Provider Last Rate    acetaminophen  650 mg Oral Q4H PRN Monique Hilario PA-C      amLODIPine  2 5 mg Oral Daily LIZA Hoffmann      aspirin  81 mg Oral Daily LIZA Ramirez      atorvastatin  80 mg Oral QPM Monique Hilario PA-C      clopidogrel  75 mg Oral Daily Monique Hilario PA-C      heparin (porcine)  5,000 Units Subcutaneous Q8H [facility] LIZA Ramirez      levothyroxine  25 mcg Oral Daily Monique Hilario PA-C      ondansetron  4 mg Oral Q6H PRN LIZA Koroma          Today, Patient Was Seen By: LIZA Hoffmann    ** Please Note: Dictation voice to text software may have been used in the creation of this document   **

## 2020-12-14 LAB
F2 GENE MUT ANL BLD/T: NORMAL
F5 GENE MUT ANL BLD/T: NORMAL

## 2020-12-14 PROCEDURE — 92610 EVALUATE SWALLOWING FUNCTION: CPT

## 2020-12-14 PROCEDURE — 97530 THERAPEUTIC ACTIVITIES: CPT

## 2020-12-14 PROCEDURE — 92507 TX SP LANG VOICE COMM INDIV: CPT

## 2020-12-14 PROCEDURE — 97110 THERAPEUTIC EXERCISES: CPT

## 2020-12-14 PROCEDURE — 99232 SBSQ HOSP IP/OBS MODERATE 35: CPT | Performed by: FAMILY MEDICINE

## 2020-12-14 PROCEDURE — 97116 GAIT TRAINING THERAPY: CPT

## 2020-12-14 RX ORDER — LANOLIN ALCOHOL/MO/W.PET/CERES
3 CREAM (GRAM) TOPICAL
Status: DISCONTINUED | OUTPATIENT
Start: 2020-12-14 | End: 2020-12-17 | Stop reason: HOSPADM

## 2020-12-14 RX ORDER — MECLIZINE HYDROCHLORIDE 25 MG/1
12.5 TABLET ORAL EVERY 8 HOURS PRN
Status: DISCONTINUED | OUTPATIENT
Start: 2020-12-14 | End: 2020-12-17 | Stop reason: HOSPADM

## 2020-12-14 RX ADMIN — ATORVASTATIN CALCIUM 80 MG: 40 TABLET, FILM COATED ORAL at 17:53

## 2020-12-14 RX ADMIN — MELATONIN TAB 3 MG 3 MG: 3 TAB at 01:10

## 2020-12-14 RX ADMIN — CLOPIDOGREL 75 MG: 75 TABLET, FILM COATED ORAL at 08:05

## 2020-12-14 RX ADMIN — HEPARIN SODIUM 5000 UNITS: 5000 INJECTION INTRAVENOUS; SUBCUTANEOUS at 22:17

## 2020-12-14 RX ADMIN — HEPARIN SODIUM 5000 UNITS: 5000 INJECTION INTRAVENOUS; SUBCUTANEOUS at 05:51

## 2020-12-14 RX ADMIN — HEPARIN SODIUM 5000 UNITS: 5000 INJECTION INTRAVENOUS; SUBCUTANEOUS at 14:24

## 2020-12-14 RX ADMIN — LEVOTHYROXINE SODIUM 25 MCG: 25 TABLET ORAL at 05:51

## 2020-12-14 RX ADMIN — AMLODIPINE BESYLATE 2.5 MG: 2.5 TABLET ORAL at 08:05

## 2020-12-14 RX ADMIN — MELATONIN TAB 3 MG 3 MG: 3 TAB at 22:18

## 2020-12-14 RX ADMIN — MECLIZINE HYDROCHLORIDE 12.5 MG: 25 TABLET ORAL at 11:42

## 2020-12-14 RX ADMIN — MECLIZINE HYDROCHLORIDE 12.5 MG: 25 TABLET ORAL at 22:17

## 2020-12-14 RX ADMIN — ASPIRIN 81 MG: 81 TABLET, COATED ORAL at 08:05

## 2020-12-14 NOTE — PROGRESS NOTES
Progress Note - Angel Riggins 1950, 69 y o  female MRN: [id number]    Unit/Bed#: -01 Encounter: [id number]    Primary Care Provider: Sammie Quintana DO   Date and time admitted to hospital: 12/5/2020 12:48 PM        * Acute CVA (cerebrovascular accident) [facility])  Assessment & Plan  · Presented 12/5 with R sided weakness and dysarthria  · Initial CTA head and neck without large vessel flow stenosis  · Initial [address]  Not tPA candidate  Admitted on stroke pathway  · MRI brain 12/7 showed acute/subacute left basal ganglionic infarct  · 12/7 ECHO (TTE) revealed EF 55-60%, G1DD, no PFO  · 12/8 became rapid response due to worsening facial droop and R sided weakness  [address] at that time 6    · Stat head CT showed evolving left basal ganglia infarct  Likely due to hypoperfusion  · Upgraded to ICU for pressor initiation  · After 48 hours, patient weaned from jeramie, started on midodrine  Had headache after wean, repeat CT head no evidence of hemorrhage  BP steady now off of midodrine  · 12/11 patient downgraded to med surge level  · Goal BP 140mmHg  Restarted back on the home Novarsc yesterday  · 12/11 LUIZ shows no apical thrombus  · Neurology recommends DAPT x 3 weeks, then Plavix only  Neurology to discuss risk v benefit of initiating full AC as an outpatient  · Requires repeat SLP eval   Would benefit from acute rehab  -authorization pending      Headache  Assessment & Plan  · S/P being weaned off vasopressor, patient began to complain of frontal headache  Neuro exam showed increased dysarthria with our UE weakness  BP 150s  · Repeat CT head no hemorrhage  · Currently resolved     Hypothyroidism  Assessment & Plan  · Continue home dose Synthroid and check a TSH in the morning TSH is slightly elevated free T4 is normal     · Continue levothyroxine at home dose  · Repeat TSH in 4 weeks    QT prolongation  Assessment & Plan  · EKG with evidence of QTC of 497   Improved on the EKG reviewed to 480  · Most recent EKG 12/10     Hypertension  Assessment & Plan  · Goal BP around 140mmHg  BP has been around 150  Midodrine has been discontinued  · Will resume Amlodipine and continue to hold Atenolol  Monitor blood pressure ,may need to restart the atenolol at a lower dose      VTE Pharmacologic Prophylaxis:   Pharmacologic: Heparin  Mechanical VTE Prophylaxis in Place: Yes    Patient Centered Rounds: I have performed bedside rounds with nursing staff today  Discussions with Specialists or Other Care Team Provider:     Education and Discussions with Family / Patient:   Attempted to call yassine García as documented in treatment team note  Appears to be a nonworking number  Time Spent for Care: 30 minutes  More than 50% of total time spent on counseling and coordination of care as described above  Current Length of Stay: 9 day(s)    Current Patient Status: Inpatient   Certification Statement: The patient will continue to require additional inpatient hospital stay due to Pending rehab    Discharge Plan:     Code Status: Level 1 - Full Code      Subjective:   Patient seen and examined  No events overnight  Patient is complaining of dizziness which has been present since admission  Patient is emotional asking about her  Dysarthria    Objective:     Vitals:   Temp (24hrs), Av 8 °F (36 6 °C), Min:97 5 °F (36 4 °C), Max:98 1 °F (36 7 °C)    Temp:  [97 5 °F (36 4 °C)-98 1 °F (36 7 °C)] 97 8 °F (36 6 °C)  HR:  [77-87] 79  Resp:  [16-18] 16  BP: (142-189)/() 145/99  SpO2:  [95 %-99 %] 98 %  Body mass index is 21 23 kg/m²  Input and Output Summary (last 24 hours): Intake/Output Summary (Last 24 hours) at 2020 0905  Last data filed at 2020 0113  Gross per 24 hour   Intake 540 ml   Output --   Net 540 ml       Physical Exam:     Physical Exam  Constitutional:       Appearance: Normal appearance  She is not ill-appearing  HENT:      Head: Normocephalic and atraumatic  Nose: Nose normal    Eyes:      General:         Right eye: No discharge  Cardiovascular:      Rate and Rhythm: Normal rate  Pulses: Normal pulses  Heart sounds: Normal heart sounds  Pulmonary:      Effort: Pulmonary effort is normal       Comments: Decreased breath sounds bilateral  Abdominal:      General: Abdomen is flat  There is no distension  Palpations: Abdomen is soft  Musculoskeletal: Normal range of motion  Skin:     General: Skin is warm  Neurological:      Mental Status: She is alert  Comments: Dysarthria  Right upper and lower extremity motor strength 4+/5   Psychiatric:         Mood and Affect: Mood normal          Additional Data:     Labs:    Results from last 7 days   Lab Units 12/12/20  0420  12/09/20  0413   WBC Thousand/uL 7 82   < > 8 77   HEMOGLOBIN g/dL 13 2   < > 13 1   HEMATOCRIT % 40 2   < > 39 1   PLATELETS Thousands/uL 253   < > 304   NEUTROS PCT %  --   --  64   LYMPHS PCT %  --   --  25   MONOS PCT %  --   --  8   EOS PCT %  --   --  2    < > = values in this interval not displayed  Results from last 7 days   Lab Units 12/12/20  0420   POTASSIUM mmol/L 4 2   CHLORIDE mmol/L 105   CO2 mmol/L 29   BUN mg/dL 16   CREATININE mg/dL 1 04   CALCIUM mg/dL 9 2     Results from last 7 days   Lab Units 12/08/20  0750   INR  1 02       * I Have Reviewed All Lab Data Listed Above  * Additional Pertinent Lab Tests Reviewed:  [address] 65 Admission Reviewed    Imaging:    Imaging Reports Reviewed Today Include:   Imaging Personally Reviewed by Myself Includes:      Recent Cultures (last 7 days):           Last 24 Hours Medication List:   Current Facility-Administered Medications   Medication Dose Route Frequency Provider Last Rate    acetaminophen  650 mg Oral Q4H PRN Jair Polo PA-C      amLODIPine  2 5 mg Oral Daily Doran Libman, CRNP      aspirin  81 mg Oral Daily LIZA Ramirez      atorvastatin  80 mg Oral QPM Jonemartín Horton PA-C      clopidogrel  75 mg Oral Daily Jone CAMI Horton      heparin (porcine)  5,000 Units Subcutaneous [facility] LIZA Ramirez      levothyroxine  25 mcg Oral Daily Jone Horton PA-C      melatonin  3 mg Oral HS PRN LIZA Mohan      ondansetron  4 mg Oral Q6H PRN LIZA Hernandes          Today, Patient Was Seen By: Kenrick Martínez MD    ** Please Note: Dictation voice to text software may have been used in the creation of this document   **

## 2020-12-14 NOTE — ASSESSMENT & PLAN NOTE
· Presented 12/5 with R sided weakness and dysarthria  · Initial CTA head and neck without large vessel flow stenosis  · Initial [address]  Not tPA candidate  Admitted on stroke pathway  · MRI brain 12/7 showed acute/subacute left basal ganglionic infarct  · 12/7 ECHO (TTE) revealed EF 55-60%, G1DD, no PFO  · 12/8 became rapid response due to worsening facial droop and R sided weakness  [address] at that time 6    · Stat head CT showed evolving left basal ganglia infarct  Likely due to hypoperfusion  · Upgraded to ICU for pressor initiation  · After 48 hours, patient weaned from jeramie, started on midodrine  Had headache after wean, repeat CT head no evidence of hemorrhage  BP steady now off of midodrine  · 12/11 patient downgraded to med surge level  · Goal BP 140mmHg  Restarted back on the home Novarsc yesterday  · 12/11 LUIZ shows no apical thrombus  · Neurology recommends DAPT x 3 weeks, then Plavix only    Neurology to discuss risk v benefit of initiating full AC as an outpatient  · Requires repeat SLP eval   Would benefit from acute rehab  -authorization pending

## 2020-12-14 NOTE — PLAN OF CARE
Problem: Potential for Falls  Goal: Patient will remain free of falls  Description: INTERVENTIONS:  - Assess patient frequently for physical needs  -  Identify cognitive and physical deficits and behaviors that affect risk of falls  -  [place] fall precautions as indicated by assessment   - Educate patient/family on patient safety including physical limitations  - Instruct patient to call for assistance with activity based on assessment  - Modify environment to reduce risk of injury  - Consider OT/PT consult to assist with strengthening/mobility  Outcome: Progressing     Problem: Activity Intolerance/Impaired Mobility  Goal: Mobility/activity is maintained at optimum level for patient  Description: Interventions:  - Assess and monitor patient  barriers to mobility and need for assistive/adaptive devices  - Assess patient's emotional response to limitations  - Collaborate with interdisciplinary team and initiate plans and interventions as ordered  - Encourage independent activity per ability   - Maintain proper body alignment  - Perform active/passive rom as tolerated/ordered  - Plan activities to conserve energy   - Turn patient as appropriate  Outcome: Progressing     Problem: Communication Impairment  Goal: Ability to express needs and understand communication  Description: Assess patient's communication skills and ability to understand information  Patient will demonstrate use of effective communication techniques, alternative methods of communication and understanding even if not able to speak  - Encourage communication and provide alternate methods of communication as needed  - Collaborate with case management/ for discharge needs  - Include patient/family/caregiver in decisions related to communication    Outcome: Progressing     Problem: Potential for Aspiration  Goal: Non-ventilated patient's risk of aspiration is minimized  Description: Assess and monitor vital signs, respiratory status, and labs (WBC)  Monitor for signs of aspiration (tachypnea, cough, rales, wheezing, cyanosis, fever)  - Assess and monitor patient's ability to swallow  - Place patient up in chair to eat if possible  - HOB up at 90 degrees to eat if unable to get patient up into chair   - Supervise patient during oral intake  - Instruct patient/ family to take small bites  - Instruct patient/ family to take small single sips when taking liquids  - Follow patient-specific strategies generated by speech pathologist   Outcome: Progressing     Problem: NEUROSENSORY - ADULT  Goal: Achieves stable or improved neurological status  Description: INTERVENTIONS  - Monitor and report changes in neurological status  - Monitor vital signs such as temperature, blood pressure, glucose, and any other labs ordered   - Initiate measures to prevent increased intracranial pressure  - Monitor for seizure activity and implement precautions if appropriate      Outcome: Progressing  Goal: Achieves maximal functionality and self care  Description: INTERVENTIONS  - Monitor swallowing and airway patency with patient fatigue and changes in neurological status  - Encourage and assist patient to increase activity and self care     - Encourage visually impaired, hearing impaired and aphasic patients to use assistive/communication devices  Outcome: Progressing     Problem: CARDIOVASCULAR - ADULT  Goal: Maintains optimal cardiac output and hemodynamic stability  Description: INTERVENTIONS:  - Monitor I/O, vital signs and rhythm  - Monitor for S/S and trends of decreased cardiac output  - Administer and titrate ordered vasoactive medications to optimize hemodynamic stability  - Assess quality of pulses, skin color and temperature  - Assess for signs of decreased coronary artery perfusion  - Instruct patient to report change in severity of symptoms  Outcome: Progressing  Goal: Absence of cardiac dysrhythmias or at baseline rhythm  Description: INTERVENTIONS:  - Continuous cardiac monitoring, vital signs, obtain 12 lead EKG if ordered  - Administer antiarrhythmic and heart rate control medications as ordered  - Monitor electrolytes and administer replacement therapy as ordered  Outcome: Progressing     Problem: PAIN - ADULT  Goal: Verbalizes/displays adequate comfort level or baseline comfort level  Description: Interventions:  - Encourage patient to monitor pain and request assistance  - Assess pain using appropriate pain scale  - Administer analgesics based on type and severity of pain and evaluate response  - Implement non-pharmacological measures as appropriate and evaluate response  - Consider cultural and social influences on pain and pain management  - Notify physician/advanced practitioner if interventions unsuccessful or patient reports new pain  Outcome: Progressing     Problem: SAFETY ADULT  Goal: Patient will remain free of falls  Description: INTERVENTIONS:  - Assess patient frequently for physical needs  -  Identify cognitive and physical deficits and behaviors that affect risk of falls    -  [place] fall precautions as indicated by assessment   - Educate patient/family on patient safety including physical limitations  - Instruct patient to call for assistance with activity based on assessment  - Modify environment to reduce risk of injury  - Consider OT/PT consult to assist with strengthening/mobility  Outcome: Progressing  Goal: Maintain or return mobility status to optimal level  Description: INTERVENTIONS:  - Assess patient's baseline mobility status (ambulation, transfers, stairs, etc )    - Identify cognitive and physical deficits and behaviors that affect mobility  - Identify mobility aids required to assist with transfers and/or ambulation (gait belt, sit-to-stand, lift, walker, cane, etc )  - [place] fall precautions as indicated by assessment  - Record patient progress and toleration of activity level on Mobility SBAR; progress patient to next Phase/Stage  - Instruct patient to call for assistance with activity based on assessment  - Consider rehabilitation consult to assist with strengthening/weightbearing, etc   Outcome: Progressing     Problem: DISCHARGE PLANNING  Goal: Discharge to home or other facility with appropriate resources  Description: INTERVENTIONS:  - Identify barriers to discharge w/patient and caregiver  - Arrange for needed discharge resources and transportation as appropriate  - Identify discharge learning needs (meds, wound care, etc )  - Arrange for interpretive services to assist at discharge as needed  - Refer to Case Management Department for coordinating discharge planning if the patient needs post-hospital services based on physician/advanced practitioner order or complex needs related to functional status, cognitive ability, or social support system  Outcome: Progressing     Problem: Knowledge Deficit  Goal: Patient/family/caregiver demonstrates understanding of disease process, treatment plan, medications, and discharge instructions  Description: Complete learning assessment and assess knowledge base  Interventions:  - Provide teaching at level of understanding  - Provide teaching via preferred learning methods  Outcome: Progressing     Problem: Nutrition/Hydration-ADULT  Goal: Nutrient/Hydration intake appropriate for improving, restoring or maintaining nutritional needs  Description: Monitor and assess patient's nutrition/hydration status for malnutrition  Collaborate with interdisciplinary team and initiate plan and interventions as ordered  Monitor patient's weight and dietary intake as ordered or per policy  Utilize nutrition screening tool and intervene as necessary  Determine patient's food preferences and provide high-protein, high-caloric foods as appropriate       INTERVENTIONS:  - Monitor oral intake, urinary output, labs, and treatment plans  - Assess nutrition and hydration status and recommend course of action  - Evaluate amount of meals eaten  - Assist patient with eating if necessary   - Allow adequate time for meals  - Recommend/ encourage appropriate diets, oral nutritional supplements, and vitamin/mineral supplements  - Order, calculate, and assess calorie counts as needed  - Recommend, monitor, and adjust tube feedings and TPN/PPN based on assessed needs  - Assess need for intravenous fluids  - Provide specific nutrition/hydration education as appropriate  - Include patient/family/caregiver in decisions related to nutrition  Outcome: Progressing     Problem: Prexisting or High Potential for Compromised Skin Integrity  Goal: Skin integrity is maintained or improved  Description: INTERVENTIONS:  - Identify patients at risk for skin breakdown  - Assess and monitor skin integrity  - Assess and monitor nutrition and hydration status  - Monitor labs   - Assess for incontinence   - Turn and reposition patient  - Assist with mobility/ambulation  - Relieve pressure over bony prominences  - Avoid friction and shearing  - Provide appropriate hygiene as needed including keeping skin clean and dry  - Evaluate need for skin moisturizer/barrier cream  - Collaborate with interdisciplinary team   - Patient/family teaching  - Consider wound care consult   Outcome: Progressing

## 2020-12-14 NOTE — SPEECH THERAPY NOTE
Speech/Language Pathology Note    Patient Name: Lilly Montana  [id number] Date: 12/14/2020     Subjective:  Pt awake and alert, eager to participate in speech therapy  Dysarthria judged to be slightly worse compared to last week's assessment  No signs of difficulty with word retrieval      Objective:  Pt seen for speech therapy targeting her moderate dysarthria  Exercises were introduced for oral facial movement to improve facial symmetry  Speech exercises also introduced to improve lingual/labial coordination and agility  AMRs and SMRs remain slow/labored at this time  Given mod cueing and demonstration (although limited due to PPE requirements) pt demonstrated accurate use of all exercises  Mirror was used for visual feedback  Strategies were discussed and practiced for improved speech intelligibility  Pt was able to independently recall slow rate, increased volume, and exaggerated articulation independently by the end of the session  Exercises were left at bedside for pt to practice independently and ST will continue with more structured practice in following sessions  Assessment:  Pt was receptive to all trained strategies and reported she will begin independent daily practice in addition to structured speech therapy  Plan/Recommendations:  Continue skilled ST for dysarthria 3-5x/week, as well as brief f/u for mild dysphagia

## 2020-12-14 NOTE — PLAN OF CARE
Problem: PHYSICAL THERAPY ADULT  Goal: Performs mobility at highest level of function for planned discharge setting  See evaluation for individualized goals  Description: Treatment/Interventions: Functional transfer training, LE strengthening/ROM, Elevations, Therapeutic exercise, Endurance training, Patient/family training, Equipment eval/education, Bed mobility, Gait training, Compensatory technique education, Spoke to nursing, OT          See flowsheet documentation for full assessment, interventions and recommendations  Outcome: Progressing  Note: Prognosis: Good  Problem List: Decreased strength, Decreased endurance, Impaired balance, Decreased mobility, Decreased coordination, Impaired judgement, Decreased safety awareness  Assessment: Pt seen for PT treatment session this date with interventions consisting of gait training w/ emphasis on improving pt's ability to ambulate level surfaces x 10ft with no AD, 20ft with RW with min A provided by therapist with RW and no AD, Therapeutic exercise consisting of: AROM 20 reps B LE in sitting position and therapeutic activity consisting of training: bed mobility, supine<>sit transfers, sit<>stand transfers, static sitting tolerance at EOB for 14 minutes w/ no UE support and toilet transfer  Pt agreeable to PT treatment session upon arrival, pt found supine in bed w/ HOB elevated, in no apparent distress, A&O x 4 and responsive  In comparison to previous session, pt with improvements in functional activity tolerance  Post session: pt returned back to recliner, all needs in reach and RN notified of session findings/recommendations Continue to recommend STR at time of d/c in order to maximize pt's functional independence and safety w/ mobility  Pt continues to be functioning below baseline level, and remains limited 2* factors listed above and including decreased functional mobility, impaired balance and decreased endurance   PT will continue to see pt while here in order to address the deficits listed above and provide interventions consistent w/ POC in effort to achieve STGs  Barriers to Discharge: Decreased caregiver support, Inaccessible home environment  Barriers to Discharge Comments: Pt requires increased assistance for mobility and lives alone  PT Discharge Recommendation: Post-Acute Rehabilitation Services     PT - OK to Discharge: Yes(when medically stable)    See flowsheet documentation for full assessment

## 2020-12-14 NOTE — ASSESSMENT & PLAN NOTE
· Goal BP around 140mmHg  BP has been around 150  Midodrine has been discontinued  · Will resume Amlodipine and continue to hold Atenolol    Monitor blood pressure ,may need to restart the atenolol at a lower dose

## 2020-12-14 NOTE — PLAN OF CARE
Problem: OCCUPATIONAL THERAPY ADULT  Goal: Performs self-care activities at highest level of function for planned discharge setting  See evaluation for individualized goals  Description: Treatment Interventions: ADL retraining, Visual perceptual retraining, Functional transfer training, UE strengthening/ROM, Endurance training, Cognitive reorientation, Equipment evaluation/education, Neuromuscular reeducation, Fine motor coordination activities, Compensatory technique education, Continued evaluation, Energy conservation, Activityengagement          See flowsheet documentation for full assessment, interventions and recommendations  Outcome: Progressing  Note: Limitation: Decreased ADL status, Decreased UE strength, Decreased Safe judgement during ADL, Decreased cognition, Decreased endurance, Visual deficit, Decreased fine motor control, Decreased self-care trans, Decreased high-level ADLs  Prognosis: Good  Assessment: Patient participated in Skilled OT session this date with interventions consisting of therapeutic exercise to: increase functional use of BUEs, increase BUE muscle strength  and  therapeutic activities to: increase activity tolerance   Patient agreeable to OT treatment session, upon arrival patient was found supine in bed  In comparison to previous session, patient with improvements in B UE TE  Patient requiring verbal cues for safety  Patient continues to be functioning below baseline level, occupational performance remains limited secondary to factors listed above and increased risk for falls and injury  From OT standpoint, recommendation at time of d/c would be Short Term Rehab  Patient to benefit from continued Occupational Therapy treatment while in the hospital to address deficits as defined above and maximize level of functional independence with ADLs and functional mobility    Recommendation: (post acute rehab)  OT Discharge Recommendation: Post-Acute Rehabilitation Services  OT - OK to Discharge: (Once medically cleared  )    Camille Adams MS, OTR/L

## 2020-12-14 NOTE — CASE MANAGEMENT
Chart reviewed aware of medical management  Case was discussed in multidisciplinary discharge meeting  Clinical information supporting hospitalization:   + Stroke    PT/OT and Speech were done  Cm spoke to [facility] and they have a bed available  If approved by [facility] is [facility], pending Auth

## 2020-12-14 NOTE — PHYSICAL THERAPY NOTE
12/14/20 1056   PT Last Visit   PT Visit Date 12/14/20   Note Type   Note Type Treatment   Pain Assessment   Pain Assessment Tool Pain Assessment not indicated - pt denies pain   Pain Score No Pain   Restrictions/Precautions   Weight Bearing Precautions Per Order No   Other Precautions Fall Risk   General   Chart Reviewed Yes   Response to Previous Treatment Patient with no complaints from previous session  Family/Caregiver Present No   Cognition   Overall Cognitive Status WFL   Arousal/Participation Alert; Responsive; Cooperative   Attention Within functional limits   Orientation Level Oriented X4   Memory Within functional limits   Following Commands Follows one step commands without difficulty   Comments pt agreeable to PT session   Bed Mobility   Supine to Sit 5  Supervision   Additional items Increased time required;Verbal cues   Sit to Supine   (pt sitting OOB  in chair post session)   Transfers   Sit to Stand   (CGA)   Additional items Assist x 1; Increased time required;Verbal cues   Stand to Sit   (CGA)   Additional items Increased time required;Verbal cues   Stand pivot 4  Minimal assistance   Additional items Assist x 1; Increased time required;Verbal cues   Toilet transfer   (CGA)   Additional items Assist x 1; Increased time required;Standard toilet;Verbal cues   Ambulation/Elevation   Gait pattern Improper Weight shift;Decreased foot clearance; Short stride   Gait Assistance 4  Minimal assist   Additional items Assist x 1;Verbal cues; Tactile cues   Assistive Device None;Rolling walker  (attempted use of RW for safety)   Distance Pt mbulated 15 with no AD, 20 ft with RW   Balance   Static Sitting Good   Dynamic Sitting Fair +   Static Standing Fair   Dynamic Standing Fair -   Ambulatory Poor +   Endurance Deficit   Endurance Deficit Yes   Endurance Deficit Description pt c/o dizziness with mobility   Activity Tolerance   Activity Tolerance Patient limited by fatigue  (dizziness)   Nurse Made Aware RN made aware of outcomes   Exercises   Heelslides Sitting;20 reps;AROM; Bilateral   Glute Sets Sitting;20 reps;AROM; Bilateral   Hip Abduction Sitting;20 reps;AROM; Bilateral   Hip Adduction Sitting;20 reps;AROM; Bilateral   Knee AROM Long Arc Quad Sitting;20 reps;AROM; Bilateral   Ankle Pumps Sitting;20 reps;AROM; Bilateral   Marching Sitting;20 reps;AROM; Bilateral   Assessment   Prognosis Good   Problem List Decreased strength;Decreased endurance; Impaired balance;Decreased mobility; Decreased coordination; Impaired judgement;Decreased safety awareness   Assessment Pt seen for PT treatment session this date with interventions consisting of gait training w/ emphasis on improving pt's ability to ambulate level surfaces x 10ft with no AD, 20ft with RW with min A provided by therapist with RW and no AD, Therapeutic exercise consisting of: AROM 20 reps B LE in sitting position and therapeutic activity consisting of training: bed mobility, supine<>sit transfers, sit<>stand transfers, static sitting tolerance at EOB for 14 minutes w/ no UE support and toilet transfer  Pt agreeable to PT treatment session upon arrival, pt found supine in bed w/ HOB elevated, in no apparent distress, A&O x 4 and responsive  In comparison to previous session, pt with improvements in functional activity tolerance  Post session: pt returned back to recliner, all needs in reach and RN notified of session findings/recommendations Continue to recommend STR at time of d/c in order to maximize pt's functional independence and safety w/ mobility  Pt continues to be functioning below baseline level, and remains limited 2* factors listed above and including decreased functional mobility, impaired balance and decreased endurance  PT will continue to see pt while here in order to address the deficits listed above and provide interventions consistent w/ POC in effort to achieve STGs  Barriers to Discharge Decreased caregiver support; Inaccessible home environment Plan   Treatment/Interventions Functional transfer training;LE strengthening/ROM; Therapeutic exercise; Endurance training;Bed mobility;Gait training;Patient/family training   Progress Progressing toward goals   PT Frequency 5x/wk   Recommendation   PT Discharge Recommendation Post-Acute Rehabilitation Services   PT - OK to Discharge Yes  (when medically stable)   Additional Comments upon conclusion, pt OOB in chair with all needs in reach

## 2020-12-14 NOTE — SPEECH THERAPY NOTE
Speech-Language Pathology Bedside Swallow Evaluation    Patient Name: Paul Lau    GKHXQ'T Date: 12/14/2020     Problem List  Principal Problem:    Acute CVA (cerebrovascular accident) [facility])  Active Problems:    Hypertension    QT prolongation    Hypothyroidism    Headache      Past Medical History  Past Medical History:   Diagnosis Date    Hypertension     TIA (transient ischemic attack)        Past Surgical History  Past Surgical History:   Procedure Laterality Date    ABLATION SOFT TISSUE         Summary  Pt was previously seen for speech/language evaluation, and reported no difficulty swallowing  Swallow evaluation now requested due to developing CVA  Pt presented today with s/s suggestive of mild oropharyngeal dysphagia  Symptoms or concerns included weakness and reduced sensation on the R side, as well as suspected mildly reduced hyolaryngeal elevation  Overall swallow is functional for tolerance of regular diet and thin liquids  Pt is capable of choosing foods from the menu that are easier for her to eat  Discussed simple strategies such as straw placement on the strong L side, and lingual sweep of R buccal cavity to check for pocketing after swallow  ST will f/u briefly to reinforce safe swallow strategies  Continued f/u needed primarily for speech/dysarthria  Risk/s for Aspiration: low    Recommended Diet: regular diet and thin liquids   Recommended Form of Meds: whole with liquid   Aspiration precautions and swallowing strategies: upright posture and small bites/sips      Current Medical Status per Dr Tayler Valentino progress note 12/14/2020  * Acute CVA (cerebrovascular accident) [facility])  Assessment & Plan  · Presented 12/5 with R sided weakness and dysarthria  · Initial CTA head and neck without large vessel flow stenosis  · Initial [address]  Not tPA candidate  Admitted on stroke pathway      · MRI brain 12/7 showed acute/subacute left basal ganglionic infarct  · 12/7 ECHO (TTE) revealed EF 55-60%, G1DD, no PFO  · 12/8 became rapid response due to worsening facial droop and R sided weakness  [address] at that time 6    · Stat head CT showed evolving left basal ganglia infarct  Likely due to hypoperfusion  · Upgraded to ICU for pressor initiation  · After 48 hours, patient weaned from jeramie, started on midodrine  Had headache after wean, repeat CT head no evidence of hemorrhage  BP steady now off of midodrine  · 12/11 patient downgraded to med surge level  · Goal BP 140mmHg  Restarted back on the home Novarsc yesterday  · 12/11 LUIZ shows no apical thrombus  · Neurology recommends DAPT x 3 weeks, then Plavix only  Neurology to discuss risk v benefit of initiating full AC as an outpatient  · Requires repeat SLP eval   Would benefit from acute rehab  -authorization pending      Special Studies:  CT head 12/10/2020 IMPRESSION:  The appearance of recent evolving left centrum semiovale/basal ganglia infarct  No evidence of interval hemorrhage  Social/Education/Vocational Hx:  Pt lives alone    Swallow Information   Current Risks for Dysphagia & Aspiration: CVA  Current Diet: regular diet and thin liquids   Baseline Diet: regular diet and thin liquids      Baseline Assessment   Behavior/Cognition: alert  Speech/Language Status: able to participate in conversation and able to follow commands  Patient Positioning: upright in bed  Pain Status/Interventions/Response to Interventions: No report of or nonverbal indications of pain  Swallow Mechanism Exam  Facial: right facial droop  Labial: decreased ROM right side  Lingual: decreased coordination  Velum: symmetrical  Mandible: adequate ROM  Dentition: lower partial, adequate dentition  Vocal quality:weak   Volitional Cough: adequate   Respiratory Status: on RA       Consistencies Assessed and Performance   Consistencies Administered: thin liquids, puree and hard solids    Oral Stage: mild  Mastication was adequate with the materials administered today   Bolus formation and transfer were functional with no significant oral residue noted  No overt s/s reduced oral control  Pt does have R side labial weakness and reduced lingual coordination but compensates well  Pharyngeal Stage: mild  Swallow Mechanics: Swallowing initiation appeared prompt  Laryngeal rise was palpated and judged to be mildly reduced to within functional limits  No coughing, throat clearing, change in vocal quality or respiratory status noted today  Esophageal Concerns: none reported    Strategies and Efficacy: L side straw placement, R lingual sweep of buccal cavity    Summary and Recommendations (see above)    Results Reviewed with: patient and MD     Treatment Recommended: brief ST f/u for dysphagia, ongoing ST for dysarthria    Frequency of treatment: 1-2x/week for dysphagia, 3-5x/week dysarthria      Dysphagia LTG  -Patient will demonstrate safe and effective oral intake (without overt s/s significant oral/pharyngeal dysphagia including s/s penetration or aspiration) for the highest appropriate diet level  Short Term Goals:  -Pt will tolerate regular diet and thin liquid with no significant s/s oral or pharyngeal dysphagia across 1-3 diagnostic sessions     -Patient will utilize trained compensatory strategies (eg L food/straw placement, lingual sweep) to reduce or eliminate s/s oral dysphagia with the least restrictive consistencies

## 2020-12-14 NOTE — CASE MANAGEMENT
Chart reviewed aware of medical management  LOS 8  Clinical information supporting hospitalization:   Acute stroke  Barriers to discharge:  - Need Current PT/OT and Speech evaluating  Discharge plan:  - Pending Auth for [facility]  CM will continue to follow plan of care

## 2020-12-14 NOTE — PLAN OF CARE
Problem: Potential for Falls  Goal: Patient will remain free of falls  Description: INTERVENTIONS:  - Assess patient frequently for physical needs  -  Identify cognitive and physical deficits and behaviors that affect risk of falls  -  [place] fall precautions as indicated by assessment   - Educate patient/family on patient safety including physical limitations  - Instruct patient to call for assistance with activity based on assessment  - Modify environment to reduce risk of injury  - Consider OT/PT consult to assist with strengthening/mobility  Outcome: Progressing     Problem: Activity Intolerance/Impaired Mobility  Goal: Mobility/activity is maintained at optimum level for patient  Description: Interventions:  - Assess and monitor patient  barriers to mobility and need for assistive/adaptive devices  - Assess patient's emotional response to limitations  - Collaborate with interdisciplinary team and initiate plans and interventions as ordered  - Encourage independent activity per ability   - Maintain proper body alignment  - Perform active/passive rom as tolerated/ordered  - Plan activities to conserve energy   - Turn patient as appropriate  Outcome: Progressing     Problem: Potential for Aspiration  Goal: Non-ventilated patient's risk of aspiration is minimized  Description: Assess and monitor vital signs, respiratory status, and labs (WBC)  Monitor for signs of aspiration (tachypnea, cough, rales, wheezing, cyanosis, fever)  - Assess and monitor patient's ability to swallow  - Place patient up in chair to eat if possible  - HOB up at 90 degrees to eat if unable to get patient up into chair   - Supervise patient during oral intake  - Instruct patient/ family to take small bites  - Instruct patient/ family to take small single sips when taking liquids    - Follow patient-specific strategies generated by speech pathologist   Outcome: Progressing     Problem: NEUROSENSORY - ADULT  Goal: Achieves stable or improved neurological status  Description: INTERVENTIONS  - Monitor and report changes in neurological status  - Monitor vital signs such as temperature, blood pressure, glucose, and any other labs ordered   - Initiate measures to prevent increased intracranial pressure  - Monitor for seizure activity and implement precautions if appropriate      Outcome: Progressing  Goal: Achieves maximal functionality and self care  Description: INTERVENTIONS  - Monitor swallowing and airway patency with patient fatigue and changes in neurological status  - Encourage and assist patient to increase activity and self care     - Encourage visually impaired, hearing impaired and aphasic patients to use assistive/communication devices  Outcome: Progressing     Problem: CARDIOVASCULAR - ADULT  Goal: Maintains optimal cardiac output and hemodynamic stability  Description: INTERVENTIONS:  - Monitor I/O, vital signs and rhythm  - Monitor for S/S and trends of decreased cardiac output  - Administer and titrate ordered vasoactive medications to optimize hemodynamic stability  - Assess quality of pulses, skin color and temperature  - Assess for signs of decreased coronary artery perfusion  - Instruct patient to report change in severity of symptoms  Outcome: Progressing  Goal: Absence of cardiac dysrhythmias or at baseline rhythm  Description: INTERVENTIONS:  - Continuous cardiac monitoring, vital signs, obtain 12 lead EKG if ordered  - Administer antiarrhythmic and heart rate control medications as ordered  - Monitor electrolytes and administer replacement therapy as ordered  Outcome: Progressing     Problem: PAIN - ADULT  Goal: Verbalizes/displays adequate comfort level or baseline comfort level  Description: Interventions:  - Encourage patient to monitor pain and request assistance  - Assess pain using appropriate pain scale  - Administer analgesics based on type and severity of pain and evaluate response  - Implement non-pharmacological measures as appropriate and evaluate response  - Consider cultural and social influences on pain and pain management  - Notify physician/advanced practitioner if interventions unsuccessful or patient reports new pain  Outcome: Progressing     Problem: SAFETY ADULT  Goal: Patient will remain free of falls  Description: INTERVENTIONS:  - Assess patient frequently for physical needs  -  Identify cognitive and physical deficits and behaviors that affect risk of falls    -  [place] fall precautions as indicated by assessment   - Educate patient/family on patient safety including physical limitations  - Instruct patient to call for assistance with activity based on assessment  - Modify environment to reduce risk of injury  - Consider OT/PT consult to assist with strengthening/mobility  Outcome: Progressing  Goal: Maintain or return mobility status to optimal level  Description: INTERVENTIONS:  - Assess patient's baseline mobility status (ambulation, transfers, stairs, etc )    - Identify cognitive and physical deficits and behaviors that affect mobility  - Identify mobility aids required to assist with transfers and/or ambulation (gait belt, sit-to-stand, lift, walker, cane, etc )  - [place] fall precautions as indicated by assessment  - Record patient progress and toleration of activity level on Mobility SBAR; progress patient to next Phase/Stage  - Instruct patient to call for assistance with activity based on assessment  - Consider rehabilitation consult to assist with strengthening/weightbearing, etc   Outcome: Progressing     Problem: DISCHARGE PLANNING  Goal: Discharge to home or other facility with appropriate resources  Description: INTERVENTIONS:  - Identify barriers to discharge w/patient and caregiver  - Arrange for needed discharge resources and transportation as appropriate  - Identify discharge learning needs (meds, wound care, etc )  - Arrange for interpretive services to assist at discharge as needed  - Refer to Case Management Department for coordinating discharge planning if the patient needs post-hospital services based on physician/advanced practitioner order or complex needs related to functional status, cognitive ability, or social support system  Outcome: Progressing     Problem: Knowledge Deficit  Goal: Patient/family/caregiver demonstrates understanding of disease process, treatment plan, medications, and discharge instructions  Description: Complete learning assessment and assess knowledge base  Interventions:  - Provide teaching at level of understanding  - Provide teaching via preferred learning methods  Outcome: Progressing     Problem: Nutrition/Hydration-ADULT  Goal: Nutrient/Hydration intake appropriate for improving, restoring or maintaining nutritional needs  Description: Monitor and assess patient's nutrition/hydration status for malnutrition  Collaborate with interdisciplinary team and initiate plan and interventions as ordered  Monitor patient's weight and dietary intake as ordered or per policy  Utilize nutrition screening tool and intervene as necessary  Determine patient's food preferences and provide high-protein, high-caloric foods as appropriate       INTERVENTIONS:  - Monitor oral intake, urinary output, labs, and treatment plans  - Assess nutrition and hydration status and recommend course of action  - Evaluate amount of meals eaten  - Assist patient with eating if necessary   - Allow adequate time for meals  - Recommend/ encourage appropriate diets, oral nutritional supplements, and vitamin/mineral supplements  - Order, calculate, and assess calorie counts as needed  - Recommend, monitor, and adjust tube feedings and TPN/PPN based on assessed needs  - Assess need for intravenous fluids  - Provide specific nutrition/hydration education as appropriate  - Include patient/family/caregiver in decisions related to nutrition  Outcome: Progressing     Problem: Prexisting or High Potential for Compromised Skin Integrity  Goal: Skin integrity is maintained or improved  Description: INTERVENTIONS:  - Identify patients at risk for skin breakdown  - Assess and monitor skin integrity  - Assess and monitor nutrition and hydration status  - Monitor labs   - Assess for incontinence   - Turn and reposition patient  - Assist with mobility/ambulation  - Relieve pressure over bony prominences  - Avoid friction and shearing  - Provide appropriate hygiene as needed including keeping skin clean and dry  - Evaluate need for skin moisturizer/barrier cream  - Collaborate with interdisciplinary team   - Patient/family teaching  - Consider wound care consult   Outcome: Progressing

## 2020-12-14 NOTE — OCCUPATIONAL THERAPY NOTE
Occupational Therapy Treatment Note      Diane Luna    12/14/2020    Principal Problem:    Acute CVA (cerebrovascular accident) [facility])  Active Problems:    Hypertension    QT prolongation    Hypothyroidism    Headache      Past Medical History:   Diagnosis Date    Hypertension     TIA (transient ischemic attack)        Past Surgical History:   Procedure Laterality Date    ABLATION SOFT TISSUE          12/14/20 1057   OT Last Visit   OT Visit Date 12/14/20   Note Type   Note Type Treatment for insurance authorization   Restrictions/Precautions   Weight Bearing Precautions Per Order No   Other Precautions Fall Risk   Pain Assessment   Pain Assessment Tool Pain Assessment not indicated - pt denies pain   Pain Score No Pain   ADL   UB Bathing Assistance 4  Minimal Assistance   LB Bathing Assistance 4  Minimal Assistance   UB Dressing Assistance 3  Moderate [address]    ([id number])   Toileting Deficit Setup;Steadying;Verbal cueing;Supervison/safety; Increased time to complete;Grab bar use   Bed Mobility   Supine to Sit 5  Supervision   Additional items Increased time required;Verbal cues   Sit to Supine   (pt sitting OOB  in chair post session)   Transfers   Sit to Stand   (CGA)   Additional items Assist x 1; Increased time required;Verbal cues   Stand to Sit   (CGA)   Additional items Increased time required;Verbal cues   Stand pivot 4  Minimal assistance   Additional items Assist x 1; Increased time required;Verbal cues   Toilet transfer   (CGA)   Additional items Assist x 1; Increased time required;Standard toilet;Verbal cues   Toilet Transfers   Toilet Transfer From [employer] Transfer Type To and from   Toilet Transfer to Standard toilet   Toilet Transfer Technique Ambulating   Toilet Transfers Contact guard   ROM- Right Upper Extremities   R Shoulder AAROM; Flexion; Extension;Horizontal ABduction  (horizontal add, pro/ret)   R Elbow AAROM;Elbow flexion;Elbow extension  (sup/pro)   R Hand AROM  (ball squeeze)   R Position Seated  (EOB)   R Weight/Reps/Sets no weight, 20 reps, 1 set   RUE ROM Comment Pt used L UE to AA R UE   ROM - Left Upper Extremities    L Shoulder AAROM; Flexion; Extension;Horizontal ABduction  (horizontal add, pro/ret)   L Elbow AAROM;Elbow flexion;Elbow extension  (sup/pro)   L Position Seated   L Weight/Reps/Sets no weight, 20 reps, 1 set   LUE ROM Comment Pt used L UE to AA R UE   Cognition   Overall Cognitive Status WFL   Arousal/Participation Alert; Responsive; Cooperative   Attention Within functional limits   Orientation Level Oriented X4   Memory Within functional limits   Following Commands Follows one step commands without difficulty   Activity Tolerance   Activity Tolerance Patient tolerated treatment well   Assessment   Assessment Patient participated in Skilled OT session this date with interventions consisting of therapeutic exercise to: increase functional use of BUEs, increase BUE muscle strength  and  therapeutic activities to: increase activity tolerance   Patient agreeable to OT treatment session, upon arrival patient was found supine in bed  In comparison to previous session, patient with improvements in B UE TE  Patient requiring verbal cues for safety  Patient continues to be functioning below baseline level, occupational performance remains limited secondary to factors listed above and increased risk for falls and injury  From OT standpoint, recommendation at time of d/c would be Short Term Rehab  Patient to benefit from continued Occupational Therapy treatment while in the hospital to address deficits as defined above and maximize level of functional independence with ADLs and functional mobility  Plan   Treatment Interventions ADL retraining;Visual perceptual retraining;Functional transfer training;UE strengthening/ROM; Endurance training;Cognitive reorientation;Equipment evaluation/education; Neuromuscular reeducation; Fine motor coordination activities; Compensatory technique education;Continued evaluation; Energy conservation; Activityengagement   Goal Expiration Date 12/17/20   OT Treatment Day 4   OT Frequency 3-5x/wk   Recommendation   OT Discharge Recommendation Post-Acute Rehabilitation Services     Camille Sarmiento MS, OTR/L

## 2020-12-15 PROCEDURE — 97116 GAIT TRAINING THERAPY: CPT

## 2020-12-15 PROCEDURE — 97110 THERAPEUTIC EXERCISES: CPT

## 2020-12-15 PROCEDURE — 99232 SBSQ HOSP IP/OBS MODERATE 35: CPT | Performed by: FAMILY MEDICINE

## 2020-12-15 RX ADMIN — HEPARIN SODIUM 5000 UNITS: 5000 INJECTION INTRAVENOUS; SUBCUTANEOUS at 15:26

## 2020-12-15 RX ADMIN — CLOPIDOGREL 75 MG: 75 TABLET, FILM COATED ORAL at 08:19

## 2020-12-15 RX ADMIN — AMLODIPINE BESYLATE 2.5 MG: 2.5 TABLET ORAL at 08:20

## 2020-12-15 RX ADMIN — LEVOTHYROXINE SODIUM 25 MCG: 25 TABLET ORAL at 05:58

## 2020-12-15 RX ADMIN — MECLIZINE HYDROCHLORIDE 12.5 MG: 25 TABLET ORAL at 08:21

## 2020-12-15 RX ADMIN — HEPARIN SODIUM 5000 UNITS: 5000 INJECTION INTRAVENOUS; SUBCUTANEOUS at 05:58

## 2020-12-15 RX ADMIN — HEPARIN SODIUM 5000 UNITS: 5000 INJECTION INTRAVENOUS; SUBCUTANEOUS at 21:37

## 2020-12-15 RX ADMIN — MECLIZINE HYDROCHLORIDE 12.5 MG: 25 TABLET ORAL at 17:55

## 2020-12-15 RX ADMIN — ATORVASTATIN CALCIUM 80 MG: 40 TABLET, FILM COATED ORAL at 17:54

## 2020-12-15 RX ADMIN — ASPIRIN 81 MG: 81 TABLET, COATED ORAL at 08:19

## 2020-12-15 NOTE — ASSESSMENT & PLAN NOTE
CHIEF COMPLAINT:  Tired    HISTORY OF PRESENT ILLNESS:  64-year-old woman who had a history of breast cancer in 2001. She had a left lumpectomy followed by radiation. She apparently had 3 cycles of neoadjuvant chemotherapy and 3 cycles after surgery. The pathology showed a 3.3 cm poorly differentiated malignancy. She was treated by Dr. Pratt. She had various adjuvant endocrine treatments but as per notes these were difficult because she was on an AI but started having periods again and then had endometrial thickening on tamoxifen.  AT this point she has not had evidence of breast cancer recurrence.  She then had issues with  postmenopausal bleeding and was following up with her gynecologist. She went for a hysterectomy in July 2013 and underwent a robotic hysterectomy with bilateral salpingo-oophorectomy. 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 She was evaluated by Dr. Da Velez and performed chemotherapy and had 6 cycles of carboplatin and Taxol. Unfortunately she had a number of delays because she broke her foot after a fall. She finally finished treatment in the fall of 2013 and restaging CTs from November 2013 showed no evidence of disease. January 6 2014 she went for the definitive surgical staging and all pathology was benign. She continues to follow with Gyn Onc and saw Dr rWight in December.    In 1/2015 she had a cardiac arrest and was taken to the [facility].  She now has a defibrillator and is doing well.      12/2016 she was hospitalized with pneumonia. She is feeling better now, just very tired. She does not have any bleeding. She tells me that her \"bowels are all screwed up\" and then she often gets very constipated. She denies dark stool or bright red blood. She does tell me that she is due for a colonoscopy sometime this year.      Nursing note reviewed and accepted.  PHYSICAL EXAMINATION:  Visit Vitals   • /71 (BP  · EKG with evidence of QTC of 497   Improved on the EKG reviewed to 480  · Most recent EKG 12/10  Location: [facility], Patient Position: Sitting, Cuff Size: Regular)   • Pulse 105   • Temp 99.3 °F (37.4 °C)   • Ht 5' 3\" (1.6 m)   • Wt 83.9 kg   • LMP 07/21/2012   • SpO2 99%   • BMI 32.77 kg/m2     Anicteric sclera  Op is clear  RRR  Abdomen is soft, not tender, no appreciable masses  No le edema  No SC or cervical nodes no axillary nodes      LABORATORY DATA:  Lab Results   Component Value Date/Time    WBC 12.1 (H) 01/19/2017 02:06 PM    RBC 3.97 (L) 01/19/2017 02:06 PM    HCT 30.3 (L) 01/19/2017 02:06 PM    HGB 8.8 (L) 01/19/2017 02:06 PM     01/19/2017 02:06 PM     01/28/2015 03:21 AM     01/16/2014 03:22 PM           Component Value Ref Range & Units Status Collected Lab   Ferritin 6 (L) 8 - 252 ng/mL Final 01/19/2017  2:06 PM ACL           IMPRESSION AND PLAN:  Breast cancer  - remote history at this point. She is up to date on mammograms and is due again in 6/3017.    Carcinosarcoma - continued follow up with gyn Onc. Scans in 9/2015 showed no disease and  was normal. She sees them every 6 months.    Iron deficiency anemia - ferritin and iron panel clearly show iron deficiency. She has no obvious source of blood loss. I have asked her to start iron supplements but she likely needs to proceed with the colonoscopy that she is due for this year.    Nancy Isaac MD

## 2020-12-15 NOTE — SOCIAL WORK
CM spoke with Jose David Walker at [facility], who sts pts prior auth through [place] is still pending  Jose David Walker to update CM     1424 CM placed call to Jose David Walker at [place] to check on the status of prior auth, as per Jose David Walker, prior [place] is still pending

## 2020-12-15 NOTE — PROGRESS NOTES
Progress Note - Denisse Right 1950, 69 y o  female MRN: [id number]    Unit/Bed#: -01 Encounter: [id number]    Primary Care Provider:  Stage, DO   Date and time admitted to hospital: 12/5/2020 12:48 PM        * Acute CVA (cerebrovascular accident) [facility])  Assessment & Plan  · Presented 12/5 with R sided weakness and dysarthria  · Initial CTA head and neck without large vessel flow stenosis  · Initial [address]  Not tPA candidate  Admitted on stroke pathway  · MRI brain 12/7 showed acute/subacute left basal ganglionic infarct  · 12/7 ECHO (TTE) revealed EF 55-60%, G1DD, no PFO  · 12/8 became rapid response due to worsening facial droop and R sided weakness  [address] at that time 6    · Stat head CT showed evolving left basal ganglia infarct  Likely due to hypoperfusion  · Upgraded to ICU for pressor initiation  · After 48 hours, patient weaned from jeramie, started on midodrine  Had headache after wean, repeat CT head no evidence of hemorrhage  BP steady now off of midodrine  · 12/11 patient downgraded to med surge level  · Goal BP 140mmHg  Restarted back on the home Novarsc  · 12/11 LUIZ shows no apical thrombus  · Neurology recommends DAPT x 3 weeks, then Plavix only  Neurology to discuss risk v benefit of initiating full AC as an outpatient  · Awaiting for authorization for placement      Headache  Assessment & Plan  · S/P being weaned off vasopressor, patient began to complain of frontal headache  Neuro exam showed increased dysarthria with our UE weakness  BP 150s  · Repeat CT head no hemorrhage  · Currently resolved     Hypothyroidism  Assessment & Plan  · Continue home dose Synthroid    · Continue levothyroxine at home dose  · Repeat TSH in 4 weeks    QT prolongation  Assessment & Plan  · EKG with evidence of QTC of 497  Improved on the EKG reviewed to 480  · Most recent EKG 12/10     Hypertension  Assessment & Plan  · Goal BP around 140mmHg     · Blood pressure is much better controlled  · Continue current treatment        VTE Pharmacologic Prophylaxis:   Pharmacologic: Heparin  Mechanical VTE Prophylaxis in Place: Yes    Patient Centered Rounds: I have performed bedside rounds with nursing staff today  Education and Discussions with Family / Patient:  Yes with patient's son over the phone    Time Spent for Care: 30 minutes  More than 50% of total time spent on counseling and coordination of care as described above  Current Length of Stay: 10 day(s)    Current Patient Status: Inpatient   Certification Statement: The patient will continue to require additional inpatient hospital stay due to Acute CVA    Discharge Plan / Estimated Discharge Date: To be determined      Code Status: Level 1 - Full Code      Subjective:   Seen and evaluated during the round  Patient denies any significant complaint this visit  Objective:     Vitals:   Temp (24hrs), Av 1 °F (36 7 °C), Min:97 7 °F (36 5 °C), Max:98 3 °F (36 8 °C)    Temp:  [97 7 °F (36 5 °C)-98 3 °F (36 8 °C)] 98 3 °F (36 8 °C)  HR:  [73-87] 81  Resp:  [16-18] 16  BP: (127-188)/() 127/78  SpO2:  [96 %-100 %] 98 %  Body mass index is 21 68 kg/m²  Input and Output Summary (last 24 hours): Intake/Output Summary (Last 24 hours) at 12/15/2020 1518  Last data filed at 12/15/2020 1201  Gross per 24 hour   Intake 440 ml   Output --   Net 440 ml       Physical Exam:     Physical Exam  Constitutional:       Appearance: She is not diaphoretic  HENT:      Nose: Nose normal  No congestion or rhinorrhea  Mouth/Throat:      Mouth: Mucous membranes are moist       Pharynx: No oropharyngeal exudate or posterior oropharyngeal erythema  Eyes:      General: No scleral icterus  Extraocular Movements: Extraocular movements intact  Conjunctiva/sclera: Conjunctivae normal       Pupils: Pupils are equal, round, and reactive to light  Neck:      Musculoskeletal: Normal range of motion     Cardiovascular: Rate and Rhythm: Normal rate  Heart sounds: No gallop  Pulmonary:      Effort: Pulmonary effort is normal       Breath sounds: No rales  Chest:      Chest wall: No tenderness  Abdominal:      General: Abdomen is flat  Bowel sounds are normal  There is no distension  Palpations: There is no mass  Tenderness: There is no abdominal tenderness  There is no right CVA tenderness or left CVA tenderness  Musculoskeletal: Normal range of motion  General: No swelling  Right lower leg: No edema  Left lower leg: No edema  Lymphadenopathy:      Cervical: No cervical adenopathy  Skin:     General: Skin is warm  Neurological:      Mental Status: She is alert and oriented to person, place, and time  Cranial Nerves: No cranial nerve deficit  Sensory: No sensory deficit  Coordination: Coordination normal    Psychiatric:         Mood and Affect: Mood normal            Additional Data:     Labs:    Results from last 7 days   Lab Units 12/12/20  0420  12/09/20  0413   WBC Thousand/uL 7 82   < > 8 77   HEMOGLOBIN g/dL 13 2   < > 13 1   HEMATOCRIT % 40 2   < > 39 1   PLATELETS Thousands/uL 253   < > 304   NEUTROS PCT %  --   --  64   LYMPHS PCT %  --   --  25   MONOS PCT %  --   --  8   EOS PCT %  --   --  2    < > = values in this interval not displayed  Results from last 7 days   Lab Units 12/12/20  0420   POTASSIUM mmol/L 4 2   CHLORIDE mmol/L 105   CO2 mmol/L 29   BUN mg/dL 16   CREATININE mg/dL 1 04   CALCIUM mg/dL 9 2           * I Have Reviewed All Lab Data Listed Above  * Additional Pertinent Lab Tests Reviewed:  All Labs Within Last 24 Hours Reviewed      Last 24 Hours Medication List:   Current Facility-Administered Medications   Medication Dose Route Frequency Provider Last Rate    acetaminophen  650 mg Oral Q4H PRN July Arndt PA-C      amLODIPine  2 5 mg Oral Daily LIZA Burk      aspirin  81 mg Oral Daily LIZA Sterling      atorvastatin  80 mg Oral QPM Monique Hilario PA-C      clopidogrel  75 mg Oral Daily Monique Hilario PA-C      heparin (porcine)  5,000 Units Subcutaneous Q8H [address] LIZA Ramirez      levothyroxine  25 mcg Oral Daily Monique Hilario PA-C      meclizine  12 5 mg Oral Q8H PRN Brie Moore MD      melatonin  3 mg Oral HS PRN LIZA Mohan      ondansetron  4 mg Oral Q6H [facility]          Today, Patient Was Seen By: Bry Cross MD    ** Please Note: Dragon 360 Dictation voice to text software may have been used in the creation of this document   **

## 2020-12-15 NOTE — SOCIAL WORK
CM received calls from both Pt's sons Aleene Dandy and Amee Rushing, did update and explain that Yuliana Lipscomb is working on [place] from [place]  Also Pt would use v for transport  CM will update when auth obtained

## 2020-12-15 NOTE — ASSESSMENT & PLAN NOTE
· Presented 12/5 with R sided weakness and dysarthria  · Initial CTA head and neck without large vessel flow stenosis  · Initial [address]  Not tPA candidate  Admitted on stroke pathway  · MRI brain 12/7 showed acute/subacute left basal ganglionic infarct  · 12/7 ECHO (TTE) revealed EF 55-60%, G1DD, no PFO  · 12/8 became rapid response due to worsening facial droop and R sided weakness  [address] at that time 6    · Stat head CT showed evolving left basal ganglia infarct  Likely due to hypoperfusion  · Upgraded to ICU for pressor initiation  · After 48 hours, patient weaned from jeramie, started on midodrine  Had headache after wean, repeat CT head no evidence of hemorrhage  BP steady now off of midodrine  · 12/11 patient downgraded to med surge level  · Goal BP 140mmHg  Restarted back on the home Novarsc  · 12/11 LUIZ shows no apical thrombus  · Neurology recommends DAPT x 3 weeks, then Plavix only    Neurology to discuss risk v benefit of initiating full AC as an outpatient  · Awaiting for authorization for placement

## 2020-12-15 NOTE — PHYSICAL THERAPY NOTE
Physical Therapy Treatment Note       12/15/20 1340   PT Last Visit   PT Visit Date 12/15/20   Note Type   Note Type Treatment   Pain Assessment   Pain Assessment Tool Pain Assessment not indicated - pt denies pain   Pain Score No Pain   Restrictions/Precautions   Weight Bearing Precautions Per Order No   Other Precautions Fall Risk; Chair Alarm; Bed Alarm   General   Chart Reviewed Yes   Response to Previous Treatment Patient unable to report, no changes reported from family or staff   Family/Caregiver Present No   Cognition   Overall Cognitive Status [facility]   Arousal/Participation Alert; Responsive; Cooperative   Attention Attends with cues to redirect   Orientation Level Oriented X4   Memory Within functional limits   Following Commands Follows one step commands without difficulty   Comments pt agreeable to PT session   Subjective   Subjective "I feel ok today"   Bed Mobility   Supine to Sit 6  Modified independent   Additional items HOB elevated; Bedrails   Transfers   Sit to Stand 5  Supervision  (SBA)   Additional items Assist x 1; Increased time required;Verbal cues   Stand to Sit 5  Supervision  (SBA)   Additional items Assist x 1; Armrests; Impulsive   Toilet transfer Unable to assess  (CGA for safety 2/2 lateral corrected LOB)   Ambulation/Elevation   Gait pattern Decreased foot clearance;Narrow GARRY; Short stride; Excessively slow   Gait Assistance 4  Minimal assist  (CGA for safety/ HHA)   Additional items Assist x 1;Verbal cues; Tactile cues   Assistive Device None   Distance 12 ft, 25 ft, 150 ft   Stair Management Assistance Not tested   Balance   Static Sitting Good   Dynamic Sitting Fair +   Static Standing Fair   Dynamic Standing Fair -   Ambulatory Fair -   Endurance Deficit   Endurance Deficit Yes   Activity Tolerance   Activity Tolerance Patient tolerated treatment well   Nurse Made Aware Yes, ASAD Orozco verbalized pt appropriate for PT session, made aware of outcomes/recs   Exercises   Heelslides Sitting;20 reps;AROM; Right;Left   Hip Abduction Sitting;20 reps;AROM; Bilateral   Hip Adduction Sitting;20 reps;AROM; Bilateral   Knee AROM Long Arc Quad Sitting;20 reps;AROM; Right;Left   Marching Sitting;20 reps;AROM; Right;Left   Assessment   Prognosis Good   Problem List Decreased strength;Decreased endurance; Impaired balance;Decreased mobility; Decreased coordination; Impaired judgement;Decreased safety awareness   Assessment Pt seen for PT treatment session this date with interventions consisting of gait training w/ emphasis on improving pt's ability to ambulate level surfaces x 12 ft, 25 ft with CGA provided by therapist with no AD, Therapeutic exercise consisting of: AROM 20 reps B LE in sitting position and therapeutic activity consisting of training: bed mobility, supine<>sit transfers, sit<>stand transfers, static standing tolerance for 2-3 minutes w/ B UE support and vc and tactile cues for static standing posture faciliation  Pt agreeable to PT treatment session upon arrival, pt found supine in bed w/ HOB elevated, in no apparent distress, A&O x 4 and responsive  In comparison to previous session, pt with improvements in continued tolerance to increased OOB activity and gt distance  Post session: pt returned back to recliner, all needs in reach and RN notified of session findings/recommendations  Continue to recommend STR at time of d/c in order to maximize pt's functional independence and safety w/ mobility  Pt continues to be functioning below baseline level, and remains limited 2* factors listed above  PT will continue to see pt while here in order to address the deficits listed above and provide interventions consistent w/ POC in effort to achieve STGs  Barriers to Discharge Decreased caregiver support; Inaccessible home environment   Goals   Patient Goals "to sit up for awhile"   STG Expiration Date 12/17/20   Short Term Goal #1 goals remain appropriate   PT Treatment Day 4   Plan   Treatment/Interventions Functional transfer training;LE strengthening/ROM; Elevations; Therapeutic exercise; Endurance training;Patient/family training;Equipment eval/education; Bed mobility;Gait training; Compensatory technique education;Spoke to nursing   Progress Progressing toward goals   PT Frequency 5x/wk   Recommendation   PT Discharge Recommendation Post-Acute Rehabilitation Services   PT - OK to Discharge Yes  (when medically cleared, if to STR)       Hussein Mejia PT    Time of PT treatment session: 9123-0511

## 2020-12-15 NOTE — PLAN OF CARE
Problem: Potential for Falls  Goal: Patient will remain free of falls  Description: INTERVENTIONS:  - Assess patient frequently for physical needs  -  Identify cognitive and physical deficits and behaviors that affect risk of falls  -  [place] fall precautions as indicated by assessment   - Educate patient/family on patient safety including physical limitations  - Instruct patient to call for assistance with activity based on assessment  - Modify environment to reduce risk of injury  - Consider OT/PT consult to assist with strengthening/mobility  Outcome: Progressing     Problem: Activity Intolerance/Impaired Mobility  Goal: Mobility/activity is maintained at optimum level for patient  Description: Interventions:  - Assess and monitor patient  barriers to mobility and need for assistive/adaptive devices  - Assess patient's emotional response to limitations  - Collaborate with interdisciplinary team and initiate plans and interventions as ordered  - Encourage independent activity per ability   - Maintain proper body alignment  - Perform active/passive rom as tolerated/ordered  - Plan activities to conserve energy   - Turn patient as appropriate  Outcome: Progressing     Problem: Communication Impairment  Goal: Ability to express needs and understand communication  Description: Assess patient's communication skills and ability to understand information  Patient will demonstrate use of effective communication techniques, alternative methods of communication and understanding even if not able to speak  - Encourage communication and provide alternate methods of communication as needed  - Collaborate with case management/ for discharge needs  - Include patient/family/caregiver in decisions related to communication    Outcome: Progressing     Problem: Potential for Aspiration  Goal: Non-ventilated patient's risk of aspiration is minimized  Description: Assess and monitor vital signs, respiratory status, and labs (WBC)  Monitor for signs of aspiration (tachypnea, cough, rales, wheezing, cyanosis, fever)  - Assess and monitor patient's ability to swallow  - Place patient up in chair to eat if possible  - HOB up at 90 degrees to eat if unable to get patient up into chair   - Supervise patient during oral intake  - Instruct patient/ family to take small bites  - Instruct patient/ family to take small single sips when taking liquids  - Follow patient-specific strategies generated by speech pathologist   Outcome: Progressing     Problem: NEUROSENSORY - ADULT  Goal: Achieves stable or improved neurological status  Description: INTERVENTIONS  - Monitor and report changes in neurological status  - Monitor vital signs such as temperature, blood pressure, glucose, and any other labs ordered   - Initiate measures to prevent increased intracranial pressure  - Monitor for seizure activity and implement precautions if appropriate      Outcome: Progressing  Goal: Achieves maximal functionality and self care  Description: INTERVENTIONS  - Monitor swallowing and airway patency with patient fatigue and changes in neurological status  - Encourage and assist patient to increase activity and self care     - Encourage visually impaired, hearing impaired and aphasic patients to use assistive/communication devices  Outcome: Progressing     Problem: CARDIOVASCULAR - ADULT  Goal: Maintains optimal cardiac output and hemodynamic stability  Description: INTERVENTIONS:  - Monitor I/O, vital signs and rhythm  - Monitor for S/S and trends of decreased cardiac output  - Administer and titrate ordered vasoactive medications to optimize hemodynamic stability  - Assess quality of pulses, skin color and temperature  - Assess for signs of decreased coronary artery perfusion  - Instruct patient to report change in severity of symptoms  Outcome: Progressing  Goal: Absence of cardiac dysrhythmias or at baseline rhythm  Description: INTERVENTIONS:  - Continuous cardiac monitoring, vital signs, obtain 12 lead EKG if ordered  - Administer antiarrhythmic and heart rate control medications as ordered  - Monitor electrolytes and administer replacement therapy as ordered  Outcome: Progressing     Problem: PAIN - ADULT  Goal: Verbalizes/displays adequate comfort level or baseline comfort level  Description: Interventions:  - Encourage patient to monitor pain and request assistance  - Assess pain using appropriate pain scale  - Administer analgesics based on type and severity of pain and evaluate response  - Implement non-pharmacological measures as appropriate and evaluate response  - Consider cultural and social influences on pain and pain management  - Notify physician/advanced practitioner if interventions unsuccessful or patient reports new pain  Outcome: Progressing     Problem: SAFETY ADULT  Goal: Patient will remain free of falls  Description: INTERVENTIONS:  - Assess patient frequently for physical needs  -  Identify cognitive and physical deficits and behaviors that affect risk of falls    -  [place] fall precautions as indicated by assessment   - Educate patient/family on patient safety including physical limitations  - Instruct patient to call for assistance with activity based on assessment  - Modify environment to reduce risk of injury  - Consider OT/PT consult to assist with strengthening/mobility  Outcome: Progressing  Goal: Maintain or return mobility status to optimal level  Description: INTERVENTIONS:  - Assess patient's baseline mobility status (ambulation, transfers, stairs, etc )    - Identify cognitive and physical deficits and behaviors that affect mobility  - Identify mobility aids required to assist with transfers and/or ambulation (gait belt, sit-to-stand, lift, walker, cane, etc )  - [place] fall precautions as indicated by assessment  - Record patient progress and toleration of activity level on Mobility SBAR; progress patient to next Phase/Stage  - Instruct patient to call for assistance with activity based on assessment  - Consider rehabilitation consult to assist with strengthening/weightbearing, etc   Outcome: Progressing     Problem: DISCHARGE PLANNING  Goal: Discharge to home or other facility with appropriate resources  Description: INTERVENTIONS:  - Identify barriers to discharge w/patient and caregiver  - Arrange for needed discharge resources and transportation as appropriate  - Identify discharge learning needs (meds, wound care, etc )  - Arrange for interpretive services to assist at discharge as needed  - Refer to Case Management Department for coordinating discharge planning if the patient needs post-hospital services based on physician/advanced practitioner order or complex needs related to functional status, cognitive ability, or social support system  Outcome: Progressing     Problem: Knowledge Deficit  Goal: Patient/family/caregiver demonstrates understanding of disease process, treatment plan, medications, and discharge instructions  Description: Complete learning assessment and assess knowledge base  Interventions:  - Provide teaching at level of understanding  - Provide teaching via preferred learning methods  Outcome: Progressing     Problem: Nutrition/Hydration-ADULT  Goal: Nutrient/Hydration intake appropriate for improving, restoring or maintaining nutritional needs  Description: Monitor and assess patient's nutrition/hydration status for malnutrition  Collaborate with interdisciplinary team and initiate plan and interventions as ordered  Monitor patient's weight and dietary intake as ordered or per policy  Utilize nutrition screening tool and intervene as necessary  Determine patient's food preferences and provide high-protein, high-caloric foods as appropriate       INTERVENTIONS:  - Monitor oral intake, urinary output, labs, and treatment plans  - Assess nutrition and hydration status and recommend course of action  - Evaluate amount of meals eaten  - Assist patient with eating if necessary   - Allow adequate time for meals  - Recommend/ encourage appropriate diets, oral nutritional supplements, and vitamin/mineral supplements  - Order, calculate, and assess calorie counts as needed  - Recommend, monitor, and adjust tube feedings and TPN/PPN based on assessed needs  - Assess need for intravenous fluids  - Provide specific nutrition/hydration education as appropriate  - Include patient/family/caregiver in decisions related to nutrition  Outcome: Progressing     Problem: Prexisting or High Potential for Compromised Skin Integrity  Goal: Skin integrity is maintained or improved  Description: INTERVENTIONS:  - Identify patients at risk for skin breakdown  - Assess and monitor skin integrity  - Assess and monitor nutrition and hydration status  - Monitor labs   - Assess for incontinence   - Turn and reposition patient  - Assist with mobility/ambulation  - Relieve pressure over bony prominences  - Avoid friction and shearing  - Provide appropriate hygiene as needed including keeping skin clean and dry  - Evaluate need for skin moisturizer/barrier cream  - Collaborate with interdisciplinary team   - Patient/family teaching  - Consider wound care consult   Outcome: Progressing

## 2020-12-15 NOTE — ASSESSMENT & PLAN NOTE
· Goal BP around 140mmHg     · Blood pressure is much better controlled  · Continue current treatment

## 2020-12-15 NOTE — PLAN OF CARE
Problem: Potential for Falls  Goal: Patient will remain free of falls  Description: INTERVENTIONS:  - Assess patient frequently for physical needs  -  Identify cognitive and physical deficits and behaviors that affect risk of falls  -  [place] fall precautions as indicated by assessment   - Educate patient/family on patient safety including physical limitations  - Instruct patient to call for assistance with activity based on assessment  - Modify environment to reduce risk of injury  - Consider OT/PT consult to assist with strengthening/mobility  Outcome: Progressing     Problem: Activity Intolerance/Impaired Mobility  Goal: Mobility/activity is maintained at optimum level for patient  Description: Interventions:  - Assess and monitor patient  barriers to mobility and need for assistive/adaptive devices  - Assess patient's emotional response to limitations  - Collaborate with interdisciplinary team and initiate plans and interventions as ordered  - Encourage independent activity per ability   - Maintain proper body alignment  - Perform active/passive rom as tolerated/ordered  - Plan activities to conserve energy   - Turn patient as appropriate  Outcome: Progressing     Problem: Communication Impairment  Goal: Ability to express needs and understand communication  Description: Assess patient's communication skills and ability to understand information  Patient will demonstrate use of effective communication techniques, alternative methods of communication and understanding even if not able to speak  - Encourage communication and provide alternate methods of communication as needed  - Collaborate with case management/ for discharge needs  - Include patient/family/caregiver in decisions related to communication    Outcome: Progressing     Problem: Potential for Aspiration  Goal: Non-ventilated patient's risk of aspiration is minimized  Description: Assess and monitor vital signs, respiratory status, and labs (WBC)  Monitor for signs of aspiration (tachypnea, cough, rales, wheezing, cyanosis, fever)  - Assess and monitor patient's ability to swallow  - Place patient up in chair to eat if possible  - HOB up at 90 degrees to eat if unable to get patient up into chair   - Supervise patient during oral intake  - Instruct patient/ family to take small bites  - Instruct patient/ family to take small single sips when taking liquids  - Follow patient-specific strategies generated by speech pathologist   Outcome: Progressing     Problem: NEUROSENSORY - ADULT  Goal: Achieves stable or improved neurological status  Description: INTERVENTIONS  - Monitor and report changes in neurological status  - Monitor vital signs such as temperature, blood pressure, glucose, and any other labs ordered   - Initiate measures to prevent increased intracranial pressure  - Monitor for seizure activity and implement precautions if appropriate      Outcome: Progressing  Goal: Achieves maximal functionality and self care  Description: INTERVENTIONS  - Monitor swallowing and airway patency with patient fatigue and changes in neurological status  - Encourage and assist patient to increase activity and self care     - Encourage visually impaired, hearing impaired and aphasic patients to use assistive/communication devices  Outcome: Progressing     Problem: PAIN - ADULT  Goal: Verbalizes/displays adequate comfort level or baseline comfort level  Description: Interventions:  - Encourage patient to monitor pain and request assistance  - Assess pain using appropriate pain scale  - Administer analgesics based on type and severity of pain and evaluate response  - Implement non-pharmacological measures as appropriate and evaluate response  - Consider cultural and social influences on pain and pain management  - Notify physician/advanced practitioner if interventions unsuccessful or patient reports new pain  Outcome: Progressing     Problem: SAFETY ADULT  Goal: Patient will remain free of falls  Description: INTERVENTIONS:  - Assess patient frequently for physical needs  -  Identify cognitive and physical deficits and behaviors that affect risk of falls    -  [place] fall precautions as indicated by assessment   - Educate patient/family on patient safety including physical limitations  - Instruct patient to call for assistance with activity based on assessment  - Modify environment to reduce risk of injury  - Consider OT/PT consult to assist with strengthening/mobility  Outcome: Progressing  Goal: Maintain or return mobility status to optimal level  Description: INTERVENTIONS:  - Assess patient's baseline mobility status (ambulation, transfers, stairs, etc )    - Identify cognitive and physical deficits and behaviors that affect mobility  - Identify mobility aids required to assist with transfers and/or ambulation (gait belt, sit-to-stand, lift, walker, cane, etc )  - [place] fall precautions as indicated by assessment  - Record patient progress and toleration of activity level on Mobility SBAR; progress patient to next Phase/Stage  - Instruct patient to call for assistance with activity based on assessment  - Consider rehabilitation consult to assist with strengthening/weightbearing, etc   Outcome: Progressing     Problem: DISCHARGE PLANNING  Goal: Discharge to home or other facility with appropriate resources  Description: INTERVENTIONS:  - Identify barriers to discharge w/patient and caregiver  - Arrange for needed discharge resources and transportation as appropriate  - Identify discharge learning needs (meds, wound care, etc )  - Arrange for interpretive services to assist at discharge as needed  - Refer to Case Management Department for coordinating discharge planning if the patient needs post-hospital services based on physician/advanced practitioner order or complex needs related to functional status, cognitive ability, or social support system  Outcome: Progressing     Problem: Nutrition/Hydration-ADULT  Goal: Nutrient/Hydration intake appropriate for improving, restoring or maintaining nutritional needs  Description: Monitor and assess patient's nutrition/hydration status for malnutrition  Collaborate with interdisciplinary team and initiate plan and interventions as ordered  Monitor patient's weight and dietary intake as ordered or per policy  Utilize nutrition screening tool and intervene as necessary  Determine patient's food preferences and provide high-protein, high-caloric foods as appropriate  INTERVENTIONS:  - Monitor oral intake, urinary output, labs, and treatment plans  - Assess nutrition and hydration status and recommend course of action  - Evaluate amount of meals eaten  - Assist patient with eating if necessary   - Allow adequate time for meals  - Recommend/ encourage appropriate diets, oral nutritional supplements, and vitamin/mineral supplements  - Order, calculate, and assess calorie counts as needed  - Recommend, monitor, and adjust tube feedings and TPN/PPN based on assessed needs  - Assess need for intravenous fluids  - Provide specific nutrition/hydration education as appropriate  - Include patient/family/caregiver in decisions related to nutrition  Outcome: Progressing     Problem: Knowledge Deficit  Goal: Patient/family/caregiver demonstrates understanding of disease process, treatment plan, medications, and discharge instructions  Description: Complete learning assessment and assess knowledge base    Interventions:  - Provide teaching at level of understanding  - Provide teaching via preferred learning methods  Outcome: Progressing     Problem: Prexisting or High Potential for Compromised Skin Integrity  Goal: Skin integrity is maintained or improved  Description: INTERVENTIONS:  - Identify patients at risk for skin breakdown  - Assess and monitor skin integrity  - Assess and monitor nutrition and hydration status  - Monitor labs   - Assess for incontinence   - Turn and reposition patient  - Assist with mobility/ambulation  - Relieve pressure over bony prominences  - Avoid friction and shearing  - Provide appropriate hygiene as needed including keeping skin clean and dry  - Evaluate need for skin moisturizer/barrier cream  - Collaborate with interdisciplinary team   - Patient/family teaching  - Consider wound care consult   Outcome: Progressing

## 2020-12-15 NOTE — UTILIZATION REVIEW
Continued Stay Review    Date:12/15                          Current Patient Class: Inpatient  Current Level of Care: ICU  HPI:70 y o  female initially admitted on 12/10  Assessment/Plan:  12/14 Speech therapy note: low risk for aspiration  Recommend regular diet with thin liquids  12/14 UPdate: recommended by PT/OT subacute rehab  Accepted at rehab pending insurance auth  Restarted home norvasc 12/13  Neurology recommends DAPT for 3 weeks, then plavix only  12/11 UPdate:  Neosynephrine weaned to off  She devoloped headache, slight regression of right sided weakness  Repeat CT shows no hemorrhage  Pertinent Labs/Diagnostic Results:   12/11 LUIZ: LEFT VENTRICLE: Size was normal  Systolic function was normal  Ejection fraction was estimated in the range of 55 % to 65 %  There were no regional wall motion abnormalities  Wall thickness was normal  No evidence of apical thrombus      RIGHT VENTRICLE: The size was normal  Systolic function was normal      LEFT ATRIUM: Size was normal  APPENDAGE: The appendage was small  No thrombus was identified  There was no spontaneous echo contrast ("smoke") in the appendage      ATRIAL SEPTUM: No defect or patent foramen ovale was identified  A bubble study was performed  There was no right-to-left shunt, with provocative maneuvers to increase right atrial pressure      RIGHT ATRIUM: Size was normal      MITRAL VALVE: Valve structure was normal  There was normal leaflet separation  DOPPLER: There was no evidence for stenosis  There was no significant regurgitation      AORTIC VALVE: The valve was trileaflet  Leaflets exhibited normal thickness and normal cuspal separation  DOPPLER: There was no evidence for stenosis  There was no significant regurgitation      TRICUSPID VALVE: The valve structure was normal  There was normal leaflet separation  DOPPLER: There was no evidence for stenosis   There was no significant regurgitation      PULMONIC VALVE: Leaflets exhibited normal thickness, no calcification, and normal cuspal separation  DOPPLER: There was no significant regurgitation      AORTA: Mild plaque seen in the aorta  The root exhibited normal size      PULMONARY VEINS: The left upper pulmonary veins were   DOPPLER: Doppler flow pattern was normal in the pulmonary vein(s)        Results from last 7 days   Lab Units 12/12/20  0420 12/11/20  0358 12/10/20  0455 12/09/20  0413   WBC Thousand/uL 7 82 9 27 8 36 8 77   HEMOGLOBIN g/dL 13 2 13 6 13 6 13 1   HEMATOCRIT % 40 2 40 7 41 6 39 1   PLATELETS Thousands/uL 253 263 287 304   NEUTROS ABS Thousands/µL  --   --   --  5 60         Results from last 7 days   Lab Units 12/12/20  0420 12/11/20  0358 12/10/20  0455 12/09/20  0413   SODIUM mmol/L 141 140 140 135*   POTASSIUM mmol/L 4 2 3 9 4 1 3 5   CHLORIDE mmol/L 105 105 105 106   CO2 mmol/L 29 29 24 27   ANION GAP mmol/L 7 6 11 2*   BUN mg/dL 16 18 15 19   CREATININE mg/dL 1 04 1 09 0 87 0 93   EGFR ml/min/1 73sq m 54 51 67 62   CALCIUM mg/dL 9 2 9 1 9 3 9 0   MAGNESIUM mg/dL  --   --   --  2 2             Results from last 7 days   Lab Units 12/12/20  0420 12/11/20  0358 12/10/20  0455 12/09/20  0413   GLUCOSE RANDOM mg/dL 101 103 106 105     Vital Signs:  Time  Temp Pulse Resp BP  MAP (mmHg)  SpO2  O2 Device  Patient Position - Orthostatic VS   12/15/20 0800  98 3 °F (36 8 °C) 81 16 127/78  92  98 %  None (Room air)  Sitting   12/15/20 0451  97 7 °F (36 5 °C) 73 18 144/86  100  96 %  None (Room air)  Lying   12/14/20 2200  98 1 °F (36 7 °C) 87 16 188/98Abnormal   83  98 %  None (Room air)  --   12/14/20 1603  98 1 °F (36 7 °C) 84 18 152/101Abnormal   113  100 %  None (Room air)  Lying   12/14/20 0800  97 8 °F (36 6 °C) 79 16 145/99  120  98 %  None (Room air)  Lying   12/14/20 0413  97 5 °F (36 4 °C) 77 17 142/94  105  95 %  --  Lying   12/13/20 2000  98 1 °F (36 7 °C) 87 16 177/107Abnormal   124  99 %  None (Room air)  Lying   12/13/20 1500  98 °F (36 7 °C) 77 16 189/100Abnormal Medications:   Scheduled Medications:  amLODIPine, 2 5 mg, Oral, Daily  aspirin, 81 mg, Oral, Daily  atorvastatin, 80 mg, Oral, QPM  clopidogrel, 75 mg, Oral, Daily  heparin (porcine), 5,000 Units, Subcutaneous, Q8H DAMION  levothyroxine, 25 mcg, Oral, Daily      Continuous IV Infusions:     PRN Meds:  acetaminophen, 650 mg, Oral, Q4H PRN  meclizine, 12 5 mg, Oral, Q8H PRN  melatonin, 3 mg, Oral, HS PRN  ondansetron, 4 mg, Oral, Q6H PRN        Discharge Plan: TBD      Network Utilization Review Department  [email or web address]  ATTENTION: Please call with any questions or concerns to [phone] and carefully listen to the prompts so that you are directed to the right person  All voicemails are confidential   Khari Mock all requests for admission clinical reviews, approved or denied determinations and any other requests to dedicated fax number below belonging to the campus where the patient is receiving treatment   List of dedicated fax numbers for the Facilities:  [address] DENIALS (Administrative/Medical Necessity) [phone]   [address] (Maternity/NICU/Pediatrics) [phone] [address] [facility] [phone]   Adilene Membreno [phone]   Shari Jim [phone]   Charanjit Mcfarland [phone]   [address] [facility] [address] [facility] [phone]   [facility]  [phone]   [address] [facility] [address]  [address] [facility] [address] [facility] [phone]

## 2020-12-16 ENCOUNTER — APPOINTMENT (INPATIENT)
Dept: RADIOLOGY | Facility: HOSPITAL | Age: 70
DRG: 065 | End: 2020-12-16
Payer: COMMERCIAL

## 2020-12-16 LAB
FLUAV RNA RESP QL NAA+PROBE: NEGATIVE
FLUBV RNA RESP QL NAA+PROBE: NEGATIVE
RSV RNA RESP QL NAA+PROBE: NEGATIVE
SARS-COV-2 RNA RESP QL NAA+PROBE: NEGATIVE

## 2020-12-16 PROCEDURE — 73502 X-RAY EXAM HIP UNI 2-3 VIEWS: CPT

## 2020-12-16 PROCEDURE — 99232 SBSQ HOSP IP/OBS MODERATE 35: CPT | Performed by: FAMILY MEDICINE

## 2020-12-16 PROCEDURE — 0241U HB NFCT DS VIR RESP RNA 4 TRGT: CPT | Performed by: FAMILY MEDICINE

## 2020-12-16 RX ADMIN — ATORVASTATIN CALCIUM 80 MG: 40 TABLET, FILM COATED ORAL at 17:00

## 2020-12-16 RX ADMIN — HEPARIN SODIUM 5000 UNITS: 5000 INJECTION INTRAVENOUS; SUBCUTANEOUS at 15:25

## 2020-12-16 RX ADMIN — ASPIRIN 81 MG: 81 TABLET, COATED ORAL at 09:06

## 2020-12-16 RX ADMIN — MELATONIN TAB 3 MG 3 MG: 3 TAB at 21:31

## 2020-12-16 RX ADMIN — HEPARIN SODIUM 5000 UNITS: 5000 INJECTION INTRAVENOUS; SUBCUTANEOUS at 21:30

## 2020-12-16 RX ADMIN — CLOPIDOGREL 75 MG: 75 TABLET, FILM COATED ORAL at 09:06

## 2020-12-16 RX ADMIN — HEPARIN SODIUM 5000 UNITS: 5000 INJECTION INTRAVENOUS; SUBCUTANEOUS at 06:01

## 2020-12-16 RX ADMIN — AMLODIPINE BESYLATE 2.5 MG: 2.5 TABLET ORAL at 09:06

## 2020-12-16 RX ADMIN — MECLIZINE HYDROCHLORIDE 12.5 MG: 25 TABLET ORAL at 09:05

## 2020-12-16 RX ADMIN — ACETAMINOPHEN 650 MG: 325 TABLET ORAL at 17:00

## 2020-12-16 RX ADMIN — LEVOTHYROXINE SODIUM 25 MCG: 25 TABLET ORAL at 06:01

## 2020-12-16 NOTE — PLAN OF CARE
Pt is ready for d/c to [facility] rehab  Rehab is attempting to get authorization for rehab from [employer]  Pt able to ambulate independently in room with steady gait  Pt's speech also slightly clearer than previous week  Continuing to monitor pt and intervene as needed until d/c to rehab  Problem: Potential for Falls  Goal: Patient will remain free of falls  Description: INTERVENTIONS:  - Assess patient frequently for physical needs  -  Identify cognitive and physical deficits and behaviors that affect risk of falls  -  [place] fall precautions as indicated by assessment   - Educate patient/family on patient safety including physical limitations  - Instruct patient to call for assistance with activity based on assessment  - Modify environment to reduce risk of injury  - Consider OT/PT consult to assist with strengthening/mobility  12/16/2020 0428 by Curtis Ashford RN  Outcome: Adequate for Discharge  12/16/2020 0428 by Curtis Ashford RN  Outcome: Adequate for Discharge     Problem: Activity Intolerance/Impaired Mobility  Goal: Mobility/activity is maintained at optimum level for patient  Description: Interventions:  - Assess and monitor patient  barriers to mobility and need for assistive/adaptive devices  - Assess patient's emotional response to limitations  - Collaborate with interdisciplinary team and initiate plans and interventions as ordered  - Encourage independent activity per ability   - Maintain proper body alignment  - Perform active/passive rom as tolerated/ordered  - Plan activities to conserve energy   - Turn patient as appropriate  12/16/2020 0428 by Curtis Ashford RN  Outcome: Adequate for Discharge  12/16/2020 0428 by Curtis Ashford RN  Outcome: Adequate for Discharge     Problem: Communication Impairment  Goal: Ability to express needs and understand communication  Description: Assess patient's communication skills and ability to understand information  Patient will demonstrate use of effective communication techniques, alternative methods of communication and understanding even if not able to speak  - Encourage communication and provide alternate methods of communication as needed  - Collaborate with case management/ for discharge needs  - Include patient/family/caregiver in decisions related to communication  12/16/2020 0428 by Clemencia Crow RN  Outcome: Adequate for Discharge  12/16/2020 0428 by Clemencia Crow RN  Outcome: Adequate for Discharge     Problem: Potential for Aspiration  Goal: Non-ventilated patient's risk of aspiration is minimized  Description: Assess and monitor vital signs, respiratory status, and labs (WBC)  Monitor for signs of aspiration (tachypnea, cough, rales, wheezing, cyanosis, fever)  - Assess and monitor patient's ability to swallow  - Place patient up in chair to eat if possible  - HOB up at 90 degrees to eat if unable to get patient up into chair   - Supervise patient during oral intake  - Instruct patient/ family to take small bites  - Instruct patient/ family to take small single sips when taking liquids    - Follow patient-specific strategies generated by speech pathologist   12/16/2020 0428 by Clemencia Crow RN  Outcome: Adequate for Discharge  12/16/2020 0428 by Clemencia Crow RN  Outcome: Adequate for Discharge     Problem: NEUROSENSORY - ADULT  Goal: Achieves stable or improved neurological status  Description: INTERVENTIONS  - Monitor and report changes in neurological status  - Monitor vital signs such as temperature, blood pressure, glucose, and any other labs ordered   - Initiate measures to prevent increased intracranial pressure  - Monitor for seizure activity and implement precautions if appropriate      12/16/2020 0428 by Clemencia Crow RN  Outcome: Adequate for Discharge  12/16/2020 0428 by Clemencia Crow RN  Outcome: Adequate for Discharge  Goal: Achieves maximal functionality and self care  Description: INTERVENTIONS  - Monitor swallowing and airway patency with patient fatigue and changes in neurological status  - Encourage and assist patient to increase activity and self care     - Encourage visually impaired, hearing impaired and aphasic patients to use assistive/communication devices  12/16/2020 0428 by Stormy Dean RN  Outcome: Adequate for Discharge  12/16/2020 0428 by Stormy Dean RN  Outcome: Adequate for Discharge     Problem: CARDIOVASCULAR - ADULT  Goal: Maintains optimal cardiac output and hemodynamic stability  Description: INTERVENTIONS:  - Monitor I/O, vital signs and rhythm  - Monitor for S/S and trends of decreased cardiac output  - Administer and titrate ordered vasoactive medications to optimize hemodynamic stability  - Assess quality of pulses, skin color and temperature  - Assess for signs of decreased coronary artery perfusion  - Instruct patient to report change in severity of symptoms  12/16/2020 0428 by Stormy Dean RN  Outcome: Adequate for Discharge  12/16/2020 0428 by Stormy Dean RN  Outcome: Adequate for Discharge  Goal: Absence of cardiac dysrhythmias or at baseline rhythm  Description: INTERVENTIONS:  - Continuous cardiac monitoring, vital signs, obtain 12 lead EKG if ordered  - Administer antiarrhythmic and heart rate control medications as ordered  - Monitor electrolytes and administer replacement therapy as ordered  12/16/2020 0428 by Stormy Dean RN  Outcome: Adequate for Discharge  12/16/2020 0428 by Stormy Dean RN  Outcome: Adequate for Discharge     Problem: PAIN - ADULT  Goal: Verbalizes/displays adequate comfort level or baseline comfort level  Description: Interventions:  - Encourage patient to monitor pain and request assistance  - Assess pain using appropriate pain scale  - Administer analgesics based on type and severity of pain and evaluate response  - Implement non-pharmacological measures as appropriate and evaluate response  - Consider cultural and social influences on pain and pain management  - Notify physician/advanced practitioner if interventions unsuccessful or patient reports new pain  12/16/2020 0428 by Zaria Merlos RN  Outcome: Adequate for Discharge  12/16/2020 0428 by Zaria Merlos RN  Outcome: Adequate for Discharge     Problem: SAFETY ADULT  Goal: Patient will remain free of falls  Description: INTERVENTIONS:  - Assess patient frequently for physical needs  -  Identify cognitive and physical deficits and behaviors that affect risk of falls    -  [place] fall precautions as indicated by assessment   - Educate patient/family on patient safety including physical limitations  - Instruct patient to call for assistance with activity based on assessment  - Modify environment to reduce risk of injury  - Consider OT/PT consult to assist with strengthening/mobility  12/16/2020 0428 by Zaria Merlos RN  Outcome: Adequate for Discharge  12/16/2020 0428 by Zaria Merlos RN  Outcome: Adequate for Discharge  Goal: Maintain or return mobility status to optimal level  Description: INTERVENTIONS:  - Assess patient's baseline mobility status (ambulation, transfers, stairs, etc )    - Identify cognitive and physical deficits and behaviors that affect mobility  - Identify mobility aids required to assist with transfers and/or ambulation (gait belt, sit-to-stand, lift, walker, cane, etc )  - [place] fall precautions as indicated by assessment  - Record patient progress and toleration of activity level on Mobility SBAR; progress patient to next Phase/Stage  - Instruct patient to call for assistance with activity based on assessment  - Consider rehabilitation consult to assist with strengthening/weightbearing, etc   12/16/2020 0428 by Zaria Merlos RN  Outcome: Adequate for Discharge  12/16/2020 0428 by Zaria Merlos RN  Outcome: Adequate for Discharge     Problem: DISCHARGE PLANNING  Goal: Discharge to home or other facility with appropriate resources  Description: INTERVENTIONS:  - Identify barriers to discharge w/patient and caregiver  - Arrange for needed discharge resources and transportation as appropriate  - Identify discharge learning needs (meds, wound care, etc )  - Arrange for interpretive services to assist at discharge as needed  - Refer to Case Management Department for coordinating discharge planning if the patient needs post-hospital services based on physician/advanced practitioner order or complex needs related to functional status, cognitive ability, or social support system  12/16/2020 0428 by Margy Sanders RN  Outcome: Adequate for Discharge  12/16/2020 0428 by Margy Sanders RN  Outcome: Adequate for Discharge     Problem: Knowledge Deficit  Goal: Patient/family/caregiver demonstrates understanding of disease process, treatment plan, medications, and discharge instructions  Description: Complete learning assessment and assess knowledge base  Interventions:  - Provide teaching at level of understanding  - Provide teaching via preferred learning methods  12/16/2020 0428 by Margy Sanders RN  Outcome: Adequate for Discharge  12/16/2020 0428 by Margy Sanders RN  Outcome: Adequate for Discharge     Problem: Nutrition/Hydration-ADULT  Goal: Nutrient/Hydration intake appropriate for improving, restoring or maintaining nutritional needs  Description: Monitor and assess patient's nutrition/hydration status for malnutrition  Collaborate with interdisciplinary team and initiate plan and interventions as ordered  Monitor patient's weight and dietary intake as ordered or per policy  Utilize nutrition screening tool and intervene as necessary  Determine patient's food preferences and provide high-protein, high-caloric foods as appropriate       INTERVENTIONS:  - Monitor oral intake, urinary output, labs, and treatment plans  - Assess nutrition and hydration status and recommend course of action  - Evaluate amount of meals eaten  - Assist patient with eating if necessary   - Allow adequate time for meals  - Recommend/ encourage appropriate diets, oral nutritional supplements, and vitamin/mineral supplements  - Order, calculate, and assess calorie counts as needed  - Recommend, monitor, and adjust tube feedings and TPN/PPN based on assessed needs  - Assess need for intravenous fluids  - Provide specific nutrition/hydration education as appropriate  - Include patient/family/caregiver in decisions related to nutrition  12/16/2020 0428 by Emili Mayer RN  Outcome: Adequate for Discharge  12/16/2020 0428 by Emili Mayer RN  Outcome: Adequate for Discharge     Problem: Prexisting or High Potential for Compromised Skin Integrity  Goal: Skin integrity is maintained or improved  Description: INTERVENTIONS:  - Identify patients at risk for skin breakdown  - Assess and monitor skin integrity  - Assess and monitor nutrition and hydration status  - Monitor labs   - Assess for incontinence   - Turn and reposition patient  - Assist with mobility/ambulation  - Relieve pressure over bony prominences  - Avoid friction and shearing  - Provide appropriate hygiene as needed including keeping skin clean and dry  - Evaluate need for skin moisturizer/barrier cream  - Collaborate with interdisciplinary team   - Patient/family teaching  - Consider wound care consult   12/16/2020 0428 by Emili Mayer RN  Outcome: Adequate for Discharge  12/16/2020 0428 by Emili Mayer RN  Outcome: Adequate for Discharge

## 2020-12-16 NOTE — ASSESSMENT & PLAN NOTE
· Goal BP around 140mmHg  · Blood pressure is much better controlled  · Continue current treatment with Norvasc    Avoid hypotension

## 2020-12-16 NOTE — CASE MANAGEMENT
0900--CM called Critical access hospital Supervisor  Vanita [phone] to find out where we are in the [address] [place] [address] for Acute Rehab  Per Alberto Dove the review has the referral    10:00 Alberto Dove provided me a phone number for Devon Whitley  to see if all clinical was reviewed, and to see if Delores Meigs was obtained  CM left a message for her to call me back  1200 CM called back to [employer] and left a message as CM did not hear back from Reviewer  Await authorization  Clare Maldonado 85 by Med Director at [facility]  Peer to Peer can be done [phone] called and left a message with patient's sons_Gilbert and was able to reach [place]   Lilo is aware of the appeal process- He is aware the Peer to Peer will be done by the MD     We discussed back up plans:  Choices are TSU at [facility]  2  [facility]  3  [facility]  4  [address]  Await P2 P decision  ( MD called and is waiting a call back from Critical access hospital to discuss case with the Medical Director)    8438- Peer to Peer was done  Remains denied  CM contacted \[facility] [place] [facility]\"" and discussed this case with Yareli, she is reviewing the clinical at this time  Await determination

## 2020-12-16 NOTE — ASSESSMENT & PLAN NOTE
· Presented 12/5 with R sided weakness and dysarthria  · Initial CTA head and neck without large vessel flow stenosis  · Initial [address]  Not tPA candidate  Admitted on stroke pathway  · MRI brain 12/7 showed acute/subacute left basal ganglionic infarct  · 12/7 ECHO (TTE) revealed EF 55-60%, G1DD, no PFO  · 12/8 became rapid response due to worsening facial droop and R sided weakness  [address] at that time 6    · Stat head CT showed evolving left basal ganglia infarct  Likely due to hypoperfusion  · Upgraded to ICU for pressor initiation  · After 48 hours, patient weaned from jeramie, started on midodrine  Had headache after wean, repeat CT head no evidence of hemorrhage  BP steady now off of midodrine  · 12/11 patient downgraded to med surge level  · Goal BP 140mmHg  Restarted back on Novarsc  · 12/11 LUIZ shows no apical thrombus  · Neurology recommends DAPT x 3 weeks, then Plavix only  Neurology to discuss risk v benefit of initiating full AC as an outpatient  · Patient was refused acute rehab  Appeal decision  · Patient was up previously active 30-year-old female who used to walk 2-3 miles per day with her dog and was completely independent and drove a car as well  No use of any assistive devices for walking prior to the stroke  Currently has right-sided weakness and also dysarthria and trouble clearing her throat due to saliva    In my opinion she requires acute rehab and she has good prognosis

## 2020-12-16 NOTE — CASE MANAGEMENT
TSU at [facility] has accepted  for Start of Care 12/12  Yareli the liaison has spoken with son Corona Quintana  CM called Frontline number for [employer] at [phone] and spoke with Claudia Elder- Case built for [facility] for subacute care  Pending reference number is:   [id number]  Per Lopez Cooper is auto approved for 5 days 12/17-21  Clinical needs to be faxed to [phone]    CM called clinical  reviewer for [employer]  with reference number of case for approval as she has all the clinical information  CM called and updated son Rex Mondragon  He is aware:  1  P2 P was done and still denied acute rehab  2  Plan is TSU tomorrow afternoon when the roads are safe  3  Reviewed the DC IMM with him, he verbalized understanding ( CM will place form on the chart in the AM)  4  Discussed WC transport is an OOP expense and patient/ family will be billed for this, he verbalized understanding      Plan dc tomorrow to [facility]

## 2020-12-16 NOTE — NURSING NOTE
On rounds this am, RN asked how many times she has voided since last evening at 7pm   Pt stated she hasn't voided once since 7pm   Pt educated on dehydration and importance of drinking fluids and staying hydrated  Pt with dry lips and oral mucosa  Pt stated she will drink more  Will continue to monitor

## 2020-12-16 NOTE — PROGRESS NOTES
Progress Note - Manuel Hammond 1950, 69 y o  female MRN: [id number]    Unit/Bed#: -01 Encounter: [id number]    Primary Care Provider: Christopher Craven DO   Date and time admitted to hospital: 12/5/2020 12:48 PM        * Acute CVA (cerebrovascular accident) [facility])  Assessment & Plan  · Presented 12/5 with R sided weakness and dysarthria  · Initial CTA head and neck without large vessel flow stenosis  · Initial [address]  Not tPA candidate  Admitted on stroke pathway  · MRI brain 12/7 showed acute/subacute left basal ganglionic infarct  · 12/7 ECHO (TTE) revealed EF 55-60%, G1DD, no PFO  · 12/8 became rapid response due to worsening facial droop and R sided weakness  [address] at that time 6    · Stat head CT showed evolving left basal ganglia infarct  Likely due to hypoperfusion  · Upgraded to ICU for pressor initiation  · After 48 hours, patient weaned from jeramie, started on midodrine  Had headache after wean, repeat CT head no evidence of hemorrhage  BP steady now off of midodrine  · 12/11 patient downgraded to med surge level  · Goal BP 140mmHg  Restarted back on Novarsc  · 12/11 LUIZ shows no apical thrombus  · Neurology recommends DAPT x 3 weeks, then Plavix only  Neurology to discuss risk v benefit of initiating full AC as an outpatient  · Patient was refused acute rehab  Appeal decision  · Patient was up previously active 43-year-old female who used to walk 2-3 miles per day with her dog and was completely independent and drove a car as well  No use of any assistive devices for walking prior to the stroke  Currently has right-sided weakness and also dysarthria and trouble clearing her throat due to saliva  In my opinion she requires acute rehab and she has good prognosis      Headache  Assessment & Plan  · S/P being weaned off vasopressor, patient began to complain of frontal headache  Neuro exam showed increased dysarthria with our UE weakness  BP 150s    · Repeat CT head no hemorrhage  · Currently resolved     Hypothyroidism (acquired)  Assessment & Plan  · Continue home dose Synthroid    · Continue levothyroxine at home dose  · Repeat TSH in 4 weeks    QT prolongation  Assessment & Plan  · EKG with evidence of QTC of 497  Improved on the EKG reviewed to 480  · Most recent EKG 12/10     Hypertension, essential  Assessment & Plan  · Goal BP around 140mmHg  · Blood pressure is much better controlled  · Continue current treatment with Norvasc  Avoid hypotension      VTE Pharmacologic Prophylaxis:   Pharmacologic: Heparin  Mechanical VTE Prophylaxis in Place: Yes    Patient Centered Rounds: I have performed bedside rounds with nursing staff today  Discussions with Specialists or Other Care Team Provider:  None    Education and Discussions with Family / Patient:  Discussed with patient bedside and called the son    Time Spent for Care: 30 minutes  More than 50% of total time spent on counseling and coordination of care as described above  Current Length of Stay: 11 day(s)    Current Patient Status: Inpatient   Certification Statement: The patient will continue to require additional inpatient hospital stay due to Acute stroke    Discharge Plan:  Pending progress  Await placement for rehab    Code Status: Level 1 - Full Code      Subjective:   Patient denies any chest pain or shortness of breath or abdominal pain she is complaining of weakness of her right side and also difficulty with her speech  Noticed to be clearing her mouth twice with saliva  No drooling noted  Oriented to person and place and time    Objective:     Vitals:   Temp (24hrs), Av °F (36 7 °C), Min:97 4 °F (36 3 °C), Max:98 3 °F (36 8 °C)    Temp:  [97 4 °F (36 3 °C)-98 3 °F (36 8 °C)] 97 4 °F (36 3 °C)  HR:  [76-95] 95  Resp:  [16-18] 18  BP: (147-161)/() 149/97  SpO2:  [98 %-100 %] 98 %  Body mass index is 21 68 kg/m²  Input and Output Summary (last 24 hours):        Intake/Output Summary (Last 24 hours) at 12/16/2020 1433  Last data filed at 12/16/2020 0901  Gross per 24 hour   Intake 920 ml   Output --   Net 920 ml       Physical Exam:     Physical Exam  Vitals signs and nursing note reviewed  Constitutional:       Appearance: Normal appearance  HENT:      Head: Normocephalic and atraumatic  Right Ear: External ear normal       Left Ear: External ear normal       Nose: Nose normal       Mouth/Throat:      Pharynx: Oropharynx is clear  Neck:      Musculoskeletal: Normal range of motion and neck supple  Cardiovascular:      Rate and Rhythm: Normal rate and regular rhythm  Heart sounds: Normal heart sounds  Pulmonary:      Effort: Pulmonary effort is normal       Breath sounds: Normal breath sounds  Abdominal:      General: Bowel sounds are normal       Palpations: Abdomen is soft  Tenderness: There is no abdominal tenderness  Musculoskeletal:      Comments: Power is 4 x 5 right arm and right leg  5 x 5 in the left arm and left leg  Skin:     General: Skin is warm and dry  Capillary Refill: Capillary refill takes less than 2 seconds  Neurological:      General: No focal deficit present  Mental Status: She is alert and oriented to person, place, and time  Comments: Dysarthria noted   Psychiatric:         Mood and Affect: Mood normal            Additional Data:     Labs:    Results from last 7 days   Lab Units 12/12/20  0420   WBC Thousand/uL 7 82   HEMOGLOBIN g/dL 13 2   HEMATOCRIT % 40 2   PLATELETS Thousands/uL 253     Results from last 7 days   Lab Units 12/12/20  0420   SODIUM mmol/L 141   POTASSIUM mmol/L 4 2   CHLORIDE mmol/L 105   CO2 mmol/L 29   BUN mg/dL 16   CREATININE mg/dL 1 04   ANION GAP mmol/L 7   CALCIUM mg/dL 9 2   GLUCOSE RANDOM mg/dL 101                           * I Have Reviewed All Lab Data Listed Above  * Additional Pertinent Lab Tests Reviewed:  Naina 66 Admission Reviewed    Imaging:    Imaging Reports Reviewed Today Include:  None  Imaging Personally Reviewed by Myself Includes:  None    Recent Cultures (last 7 days):           Last 24 Hours Medication List:   Current Facility-Administered Medications   Medication Dose Route Frequency Provider Last Rate    acetaminophen  650 mg Oral Q4H PRN LIZA Ramirez      amLODIPine  2 5 mg Oral Daily Susanna GoldbergLIZA      aspirin  81 mg Oral Daily Raya Jonesella, LIZA      atorvastatin  80 mg Oral QPM Raya JonesellaLIZA      clopidogrel  75 mg Oral Daily LIZA Ramirez      heparin (porcine)  5,000 Units Subcutaneous Q8H [address] Raya Rao, LIZA      levothyroxine  25 mcg Oral Daily LIZA Ramirez      meclizine  12 5 mg Oral Q8H PRN Dela Rubinstein, MD      melatonin  3 mg Oral HS PRN LIZA Mohan      ondansetron  4 mg Oral Q6H PRN LIZA Trotter          Today, Patient Was Seen By: Marianna Romero MD    ** Please Note: Dictation voice to text software may have been used in the creation of this document   **

## 2020-12-17 VITALS
RESPIRATION RATE: 18 BRPM | HEART RATE: 86 BPM | WEIGHT: 126.32 LBS | DIASTOLIC BLOOD PRESSURE: 100 MMHG | BODY MASS INDEX: 21.57 KG/M2 | HEIGHT: 64 IN | OXYGEN SATURATION: 100 % | TEMPERATURE: 97.8 F | SYSTOLIC BLOOD PRESSURE: 155 MMHG

## 2020-12-17 PROBLEM — R51.9 HEADACHE: Status: RESOLVED | Noted: 2020-12-10 | Resolved: 2020-12-17
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
## 2020-12-17 PROCEDURE — 99239 HOSP IP/OBS DSCHRG MGMT >30: CPT | Performed by: FAMILY MEDICINE

## 2020-12-17 RX ORDER — LANOLIN ALCOHOL/MO/W.PET/CERES
3 CREAM (GRAM) TOPICAL
Refills: 0
Start: 2020-12-17

## 2020-12-17 RX ORDER — ACETAMINOPHEN 325 MG/1
650 TABLET ORAL EVERY 6 HOURS PRN
Refills: 0
Start: 2020-12-17

## 2020-12-17 RX ORDER — ASPIRIN 81 MG/1
81 TABLET ORAL DAILY
Refills: 0
Start: 2020-12-18 | End: 2021-01-01

## 2020-12-17 RX ORDER — CLOPIDOGREL BISULFATE 75 MG/1
75 TABLET ORAL DAILY
Refills: 0
Start: 2020-12-18

## 2020-12-17 RX ORDER — ATORVASTATIN CALCIUM 80 MG/1
80 TABLET, FILM COATED ORAL EVERY EVENING
Refills: 0
Start: 2020-12-17

## 2020-12-17 RX ORDER — MECLIZINE HCL 12.5 MG/1
12.5 TABLET ORAL EVERY 8 HOURS PRN
Qty: 30 TABLET | Refills: 0
Start: 2020-12-17 | End: 2022-07-27

## 2020-12-17 RX ORDER — DOCUSATE SODIUM 100 MG/1
100 CAPSULE, LIQUID FILLED ORAL 2 TIMES DAILY
Qty: 10 CAPSULE | Refills: 0
Start: 2020-12-17

## 2020-12-17 RX ORDER — DOCUSATE SODIUM 100 MG/1
100 CAPSULE, LIQUID FILLED ORAL 2 TIMES DAILY
Status: DISCONTINUED | OUTPATIENT
Start: 2020-12-17 | End: 2020-12-17 | Stop reason: HOSPADM

## 2020-12-17 RX ADMIN — HEPARIN SODIUM 5000 UNITS: 5000 INJECTION INTRAVENOUS; SUBCUTANEOUS at 07:34

## 2020-12-17 RX ADMIN — LEVOTHYROXINE SODIUM 25 MCG: 25 TABLET ORAL at 07:34

## 2020-12-17 RX ADMIN — ASPIRIN 81 MG: 81 TABLET, COATED ORAL at 09:20

## 2020-12-17 RX ADMIN — HEPARIN SODIUM 5000 UNITS: 5000 INJECTION INTRAVENOUS; SUBCUTANEOUS at 14:20

## 2020-12-17 RX ADMIN — DOCUSATE SODIUM 100 MG: 100 CAPSULE ORAL at 15:29

## 2020-12-17 RX ADMIN — AMLODIPINE BESYLATE 2.5 MG: 2.5 TABLET ORAL at 09:20

## 2020-12-17 RX ADMIN — CLOPIDOGREL 75 MG: 75 TABLET, FILM COATED ORAL at 09:20

## 2020-12-17 NOTE — DISCHARGE SUMMARY
Discharge- Prema Arana 1950, 69 y o  female MRN: [id number]    Unit/Bed#: -01 Encounter: [id number]    Primary Care Provider: Leda Pittman DO   Date and time admitted to hospital: 12/5/2020 12:48 PM        * Acute CVA (cerebrovascular accident) [facility])  Assessment & Plan  · Presented 12/5 with R sided weakness and dysarthria  · Initial CTA head and neck without large vessel flow stenosis  · Initial [address]  Not tPA candidate  Admitted on stroke pathway  · MRI brain 12/7 showed acute/subacute left basal ganglionic infarct  · 12/7 ECHO (TTE) revealed EF 55-60%, G1DD, no PFO  · 12/8 became rapid response due to worsening facial droop and R sided weakness  [address] at that time 6    · Stat head CT showed evolving left basal ganglia infarct  Likely due to hypoperfusion  · Upgraded to ICU for pressor initiation  · After 48 hours, patient weaned from jeramie, started on midodrine  Had headache after wean, repeat CT head no evidence of hemorrhage  BP steady now off of midodrine  · 12/11 patient downgraded to med surge level  · Goal BP 140mmHg  Restarted back on Novarsc  · 12/11 LUIZ shows no apical thrombus  · Neurology recommends DAPT x 3 weeks, then Plavix only  Neurology to discuss risk v benefit of initiating full AC as an outpatient  · Patient was refused acute rehab  Appeal was unsuccessful  · Patient was up previously active 19-year-old female who used to walk 2-3 miles per day with her dog and was completely independent and drove a car as well  No use of any assistive devices for walking prior to the stroke  Currently has right-sided weakness and also dysarthria and trouble clearing her throat due to saliva  · Discharge to subacute rehab today      [address]  Patient had a fall while independently going to the bathroom yesterday  Mild tenderness noted on the left side however no weakness noted  Normal range of motion of all of the joints noted    X-ray of the left hip done which is negative for any fracture  Continue physical therapy and occupational therapy and advised the patient to not get out of bed by herself    Headache  Assessment & Plan  · S/P being weaned off vasopressor, patient began to complain of frontal headache  Neuro exam showed increased dysarthria with our UE weakness  BP 150s  · Repeat CT head no hemorrhage  · Currently resolved     Hypothyroidism (acquired)  Assessment & Plan  · Continue home dose Synthroid    · Continue levothyroxine at home dose  · Repeat TSH in 4 weeks    QT prolongation  Assessment & Plan  · EKG with evidence of QTC of 497  Improved on the EKG reviewed to 480  · Most recent EKG 12/10     Hypertension, essential  Assessment & Plan  · Goal BP around 140mmHg  · Blood pressure is much better controlled  · Continue current treatment with Norvasc  Avoid hypotension      Discharging Physician / Practitioner: Tita Crawford MD  PCP: Madyson Davis DO  Admission Date:   Admission Orders (From admission, onward)     Ordered        12/05/20 1348  Inpatient Admission  Once                   Discharge Date: 12/17/20    Resolved Problems  Date Reviewed: 12/17/2020          Resolved    Hypertensive urgency 12/8/2020     Resolved by  Lynwood Brunner, PA-C    Migraines 12/8/2020     Resolved by  Lynwood Brunner, PA-C          Consultations During Hospital Stay:  · Neurology, physical therapy and occupational therapy  · Speech therapy    Procedures Performed:   · Stroke protocol    Significant Findings / Test Results:   Ct Head Wo Contrast    Result Date: 12/10/2020  Impression: The appearance of recent evolving left centrum semiovale/basal ganglia infarct  No evidence of interval hemorrhage  Workstation performed: [id number]     Ct Head Wo Contrast    Result Date: 12/8/2020  Impression: Evolving left basal ganglia infarct   Workstation performed: [id number]     Ct Head Wo Contrast    Result Date: 12/6/2020  Impression: No acute intracranial abnormality  Workstation performed: [id number]     Mri Brain Wo Contrast    Result Date: 12/7/2020  Impression: Acute to early subacute left basal ganglionic infarction involving the left putamen and corona radiata  Mild cerebral chronic microangiopathic disease  I personally discussed this study with Stephanie Benito on 12/7/2020 at 11:02 AM  Workstation performed: [id number]     Ct Stroke Alert Brain    Result Date: 12/5/2020  Impression: No acute intracranial abnormality  Findings were directly discussed with Oriana Elder on 12/5/2020 1:08 PM  Workstation performed: [id number]     Cta Stroke Alert (head/neck)    Result Date: 12/5/2020  Impression: No large vessel flow limiting stenosis  Findings were directly discussed with Oriana Elder on 12/5/2020 1:14 PM  Workstation performed: [id number]     Incidental Findings:   · None     Test Results Pending at Discharge (will require follow up): · None     Outpatient Tests Requested:  · None    Complications:  None    Reason for Admission:  Right-sided weakness and right facial droop    Hospital Course:     Mimi Ruano is a 69 y o  female patient who originally presented to the hospital on 12/5/2020 due to right-sided weakness and right facial droop  Patient states that she is usually an active person and walks 2-3 miles per day  Had an episode of right-sided weakness and stroke alert was called and also found to have dysarthria and facial droop  Stroke protocol was called NIH was 2  Received aspirin and Plavix however next day she had worsening of her symptoms and repeat CT brain was done  MRI of the brain done later on showed acute/subacute left basal ganglionic infarct  She is currently requiring rehab which she will be transferred to subacute rehab as she was night acute rehab  Still has some dysarthria, right-sided weakness  Did sustain a fall yesterday however fortunately no fractures reported    Was advised to walk and get out of bed with assistance  Avoid hypotension as that worsens her symptoms  Blood pressure is well controlled on current regimen  Will continue dual antiplatelet therapy for 3 weeks and then Plavix alone          Please see above list of diagnoses and related plan for additional information  Condition at Discharge: good     Discharge Day Visit / Exam:     Subjective:  Patient denies any chest pain or shortness of breath or abdominal pain  She is complaining of weakness on her right side and mild pain on her left side  Vitals: Blood Pressure: 136/85 (12/17/20 0700)  Pulse: 80 (12/17/20 0700)  Temperature: 97 7 °F (36 5 °C) (12/17/20 0700)  Temp Source: Oral (12/17/20 0700)  Respirations: 18 (12/17/20 0700)  Height: 5' 4" (162 6 cm) (12/11/20 0709)  Weight - Scale: 57 3 kg (126 lb 5 2 oz) (12/15/20 0451)  SpO2: 98 % (12/17/20 0700)  Exam:   Physical Exam  Vitals signs and nursing note reviewed  Constitutional:       Appearance: Normal appearance  HENT:      Head: Normocephalic and atraumatic  Right Ear: External ear normal       Left Ear: External ear normal       Nose: Nose normal       Mouth/Throat:      Pharynx: Oropharynx is clear  Neck:      Musculoskeletal: Normal range of motion and neck supple  Cardiovascular:      Rate and Rhythm: Normal rate and regular rhythm  Heart sounds: Normal heart sounds  Pulmonary:      Effort: Pulmonary effort is normal       Breath sounds: Normal breath sounds  Abdominal:      General: Bowel sounds are normal       Palpations: Abdomen is soft  Tenderness: There is no abdominal tenderness  Musculoskeletal:      Comments: Weakness noted on the right arm and right leg   Skin:     General: Skin is warm and dry  Capillary Refill: Capillary refill takes less than 2 seconds  Neurological:      General: No focal deficit present  Mental Status: She is alert and oriented to person, place, and time        Comments: Dysarthria noted   Psychiatric:         Mood and Affect: Mood normal          Discussion with Family:  Discussed with son    Discharge instructions/Information to patient and family:   See after visit summary for information provided to patient and family  Provisions for Follow-Up Care:  See after visit summary for information related to follow-up care and any pertinent home health orders  Disposition:     Other [place] [facility]    For Discharges to   [address] [facility]:   · Not Applicable to this Patient - Not Applicable to this Patient    Planned Readmission:  None     Discharge Statement:  I spent 35 minutes discharging the patient  This time was spent on the day of discharge  I had direct contact with the patient on the day of discharge  Greater than 50% of the total time was spent examining patient, answering all patient questions, arranging and discussing plan of care with patient as well as directly providing post-discharge instructions  Additional time then spent on discharge activities  Discharge Medications:  See after visit summary for reconciled discharge medications provided to patient and family        ** Please Note: This note has been constructed using a voice recognition system **

## 2020-12-17 NOTE — CASE MANAGEMENT
Called at 0800 for transport to [facility] [place]  Earliest time is [id number] with [facility] transport  CM called [place] [facility] and Corey Geiger was confirm with case reference now being Auth number [id number]  NR is needed 12/23 to [facility] [phone] and fax to [phone]  This information was provided to [facility] faxed AVS/DC Summary and COVID results to [facility] at [phone] is aware of time of transportation and will get back to me with room assignment and faxed  CM updated son Francia Cruz of time of dc  Son reminded of OOP expense for [facility] transport is patient and family transport  Nursing was updated with time of transport-- Disk of CT and MRI was provided to them to assure they will go with the patient

## 2020-12-17 NOTE — PLAN OF CARE
Problem: Potential for Falls  Goal: Patient will remain free of falls  Description: INTERVENTIONS:  - Assess patient frequently for physical needs  -  Identify cognitive and physical deficits and behaviors that affect risk of falls  -  [place] fall precautions as indicated by assessment   - Educate patient/family on patient safety including physical limitations  - Instruct patient to call for assistance with activity based on assessment  - Modify environment to reduce risk of injury  - Consider OT/PT consult to assist with strengthening/mobility  Outcome: Adequate for Discharge     Problem: Activity Intolerance/Impaired Mobility  Goal: Mobility/activity is maintained at optimum level for patient  Description: Interventions:  - Assess and monitor patient  barriers to mobility and need for assistive/adaptive devices  - Assess patient's emotional response to limitations  - Collaborate with interdisciplinary team and initiate plans and interventions as ordered  - Encourage independent activity per ability   - Maintain proper body alignment  - Perform active/passive rom as tolerated/ordered  - Plan activities to conserve energy   - Turn patient as appropriate  Outcome: Adequate for Discharge     Problem: Communication Impairment  Goal: Ability to express needs and understand communication  Description: Assess patient's communication skills and ability to understand information  Patient will demonstrate use of effective communication techniques, alternative methods of communication and understanding even if not able to speak  - Encourage communication and provide alternate methods of communication as needed  - Collaborate with case management/ for discharge needs  - Include patient/family/caregiver in decisions related to communication    Outcome: Adequate for Discharge     Problem: Potential for Aspiration  Goal: Non-ventilated patient's risk of aspiration is minimized  Description: Assess and monitor vital signs, respiratory status, and labs (WBC)  Monitor for signs of aspiration (tachypnea, cough, rales, wheezing, cyanosis, fever)  - Assess and monitor patient's ability to swallow  - Place patient up in chair to eat if possible  - HOB up at 90 degrees to eat if unable to get patient up into chair   - Supervise patient during oral intake  - Instruct patient/ family to take small bites  - Instruct patient/ family to take small single sips when taking liquids  - Follow patient-specific strategies generated by speech pathologist   Outcome: Adequate for Discharge     Problem: NEUROSENSORY - ADULT  Goal: Achieves stable or improved neurological status  Description: INTERVENTIONS  - Monitor and report changes in neurological status  - Monitor vital signs such as temperature, blood pressure, glucose, and any other labs ordered   - Initiate measures to prevent increased intracranial pressure  - Monitor for seizure activity and implement precautions if appropriate      Outcome: Adequate for Discharge  Goal: Achieves maximal functionality and self care  Description: INTERVENTIONS  - Monitor swallowing and airway patency with patient fatigue and changes in neurological status  - Encourage and assist patient to increase activity and self care     - Encourage visually impaired, hearing impaired and aphasic patients to use assistive/communication devices  Outcome: Adequate for Discharge     Problem: CARDIOVASCULAR - ADULT  Goal: Maintains optimal cardiac output and hemodynamic stability  Description: INTERVENTIONS:  - Monitor I/O, vital signs and rhythm  - Monitor for S/S and trends of decreased cardiac output  - Administer and titrate ordered vasoactive medications to optimize hemodynamic stability  - Assess quality of pulses, skin color and temperature  - Assess for signs of decreased coronary artery perfusion  - Instruct patient to report change in severity of symptoms  Outcome: Adequate for Discharge  Goal: Absence of cardiac dysrhythmias or at baseline rhythm  Description: INTERVENTIONS:  - Continuous cardiac monitoring, vital signs, obtain 12 lead EKG if ordered  - Administer antiarrhythmic and heart rate control medications as ordered  - Monitor electrolytes and administer replacement therapy as ordered  Outcome: Adequate for Discharge     Problem: PAIN - ADULT  Goal: Verbalizes/displays adequate comfort level or baseline comfort level  Description: Interventions:  - Encourage patient to monitor pain and request assistance  - Assess pain using appropriate pain scale  - Administer analgesics based on type and severity of pain and evaluate response  - Implement non-pharmacological measures as appropriate and evaluate response  - Consider cultural and social influences on pain and pain management  - Notify physician/advanced practitioner if interventions unsuccessful or patient reports new pain  Outcome: Adequate for Discharge     Problem: SAFETY ADULT  Goal: Patient will remain free of falls  Description: INTERVENTIONS:  - Assess patient frequently for physical needs  -  Identify cognitive and physical deficits and behaviors that affect risk of falls    -  [place] fall precautions as indicated by assessment   - Educate patient/family on patient safety including physical limitations  - Instruct patient to call for assistance with activity based on assessment  - Modify environment to reduce risk of injury  - Consider OT/PT consult to assist with strengthening/mobility  Outcome: Adequate for Discharge  Goal: Maintain or return mobility status to optimal level  Description: INTERVENTIONS:  - Assess patient's baseline mobility status (ambulation, transfers, stairs, etc )    - Identify cognitive and physical deficits and behaviors that affect mobility  - Identify mobility aids required to assist with transfers and/or ambulation (gait belt, sit-to-stand, lift, walker, cane, etc )  - [place] fall precautions as indicated by assessment  - Record patient progress and toleration of activity level on Mobility SBAR; progress patient to next Phase/Stage  - Instruct patient to call for assistance with activity based on assessment  - Consider rehabilitation consult to assist with strengthening/weightbearing, etc   Outcome: Adequate for Discharge     Problem: DISCHARGE PLANNING  Goal: Discharge to home or other facility with appropriate resources  Description: INTERVENTIONS:  - Identify barriers to discharge w/patient and caregiver  - Arrange for needed discharge resources and transportation as appropriate  - Identify discharge learning needs (meds, wound care, etc )  - Arrange for interpretive services to assist at discharge as needed  - Refer to Case Management Department for coordinating discharge planning if the patient needs post-hospital services based on physician/advanced practitioner order or complex needs related to functional status, cognitive ability, or social support system  Outcome: Adequate for Discharge     Problem: Knowledge Deficit  Goal: Patient/family/caregiver demonstrates understanding of disease process, treatment plan, medications, and discharge instructions  Description: Complete learning assessment and assess knowledge base  Interventions:  - Provide teaching at level of understanding  - Provide teaching via preferred learning methods  Outcome: Adequate for Discharge     Problem: Nutrition/Hydration-ADULT  Goal: Nutrient/Hydration intake appropriate for improving, restoring or maintaining nutritional needs  Description: Monitor and assess patient's nutrition/hydration status for malnutrition  Collaborate with interdisciplinary team and initiate plan and interventions as ordered  Monitor patient's weight and dietary intake as ordered or per policy  Utilize nutrition screening tool and intervene as necessary   Determine patient's food preferences and provide high-protein, high-caloric foods as appropriate       INTERVENTIONS:  - Monitor oral intake, urinary output, labs, and treatment plans  - Assess nutrition and hydration status and recommend course of action  - Evaluate amount of meals eaten  - Assist patient with eating if necessary   - Allow adequate time for meals  - Recommend/ encourage appropriate diets, oral nutritional supplements, and vitamin/mineral supplements  - Order, calculate, and assess calorie counts as needed  - Recommend, monitor, and adjust tube feedings and TPN/PPN based on assessed needs  - Assess need for intravenous fluids  - Provide specific nutrition/hydration education as appropriate  - Include patient/family/caregiver in decisions related to nutrition  Outcome: Adequate for Discharge     Problem: Prexisting or High Potential for Compromised Skin Integrity  Goal: Skin integrity is maintained or improved  Description: INTERVENTIONS:  - Identify patients at risk for skin breakdown  - Assess and monitor skin integrity  - Assess and monitor nutrition and hydration status  - Monitor labs   - Assess for incontinence   - Turn and reposition patient  - Assist with mobility/ambulation  - Relieve pressure over bony prominences  - Avoid friction and shearing  - Provide appropriate hygiene as needed including keeping skin clean and dry  - Evaluate need for skin moisturizer/barrier cream  - Collaborate with interdisciplinary team   - Patient/family teaching  - Consider wound care consult   Outcome: Adequate for Discharge

## 2020-12-17 NOTE — NURSING NOTE
Pt awake alert and oriented on discharge  Assisted getting dressed and belongings packed, sent with pt on discharge  Pt assisted to wheelchair and discharge to rehab facility with w/c [facility] transport

## 2020-12-17 NOTE — ASSESSMENT & PLAN NOTE
Patient had a fall while independently going to the bathroom yesterday  Mild tenderness noted on the left side however no weakness noted  Normal range of motion of all of the joints noted  X-ray of the left hip done which is negative for any fracture    Continue physical therapy and occupational therapy and advised the patient to not get out of bed by herself

## 2020-12-17 NOTE — PLAN OF CARE
Problem: Potential for Falls  Goal: Patient will remain free of falls  Description: INTERVENTIONS:  - Assess patient frequently for physical needs  -  Identify cognitive and physical deficits and behaviors that affect risk of falls  -  [place] fall precautions as indicated by assessment   - Educate patient/family on patient safety including physical limitations  - Instruct patient to call for assistance with activity based on assessment  - Modify environment to reduce risk of injury  - Consider OT/PT consult to assist with strengthening/mobility  12/17/2020 0007 by Colette Taylor RN  Outcome: Progressing  12/17/2020 0007 by Colette Taylor RN  Outcome: Progressing     Problem: Activity Intolerance/Impaired Mobility  Goal: Mobility/activity is maintained at optimum level for patient  Description: Interventions:  - Assess and monitor patient  barriers to mobility and need for assistive/adaptive devices  - Assess patient's emotional response to limitations  - Collaborate with interdisciplinary team and initiate plans and interventions as ordered  - Encourage independent activity per ability   - Maintain proper body alignment  - Perform active/passive rom as tolerated/ordered  - Plan activities to conserve energy   - Turn patient as appropriate  12/17/2020 0007 by Colette Taylor RN  Outcome: Progressing  12/17/2020 0007 by Colette Taylor RN  Outcome: Progressing     Problem: Communication Impairment  Goal: Ability to express needs and understand communication  Description: Assess patient's communication skills and ability to understand information  Patient will demonstrate use of effective communication techniques, alternative methods of communication and understanding even if not able to speak  - Encourage communication and provide alternate methods of communication as needed  - Collaborate with case management/ for discharge needs    - Include patient/family/caregiver in decisions related to communication  12/17/2020 0007 by Kami Latif RN  Outcome: Progressing  12/17/2020 0007 by Kami Ltaif RN  Outcome: Progressing     Problem: Potential for Aspiration  Goal: Non-ventilated patient's risk of aspiration is minimized  Description: Assess and monitor vital signs, respiratory status, and labs (WBC)  Monitor for signs of aspiration (tachypnea, cough, rales, wheezing, cyanosis, fever)  - Assess and monitor patient's ability to swallow  - Place patient up in chair to eat if possible  - HOB up at 90 degrees to eat if unable to get patient up into chair   - Supervise patient during oral intake  - Instruct patient/ family to take small bites  - Instruct patient/ family to take small single sips when taking liquids  - Follow patient-specific strategies generated by speech pathologist   12/17/2020 0007 by Kami Latif RN  Outcome: Progressing  12/17/2020 0007 by Kami Latif RN  Outcome: Progressing     Problem: NEUROSENSORY - ADULT  Goal: Achieves stable or improved neurological status  Description: INTERVENTIONS  - Monitor and report changes in neurological status  - Monitor vital signs such as temperature, blood pressure, glucose, and any other labs ordered   - Initiate measures to prevent increased intracranial pressure  - Monitor for seizure activity and implement precautions if appropriate      12/17/2020 0007 by Kami Latif RN  Outcome: Progressing  12/17/2020 0007 by Kami Latif RN  Outcome: Progressing  Goal: Achieves maximal functionality and self care  Description: INTERVENTIONS  - Monitor swallowing and airway patency with patient fatigue and changes in neurological status  - Encourage and assist patient to increase activity and self care     - Encourage visually impaired, hearing impaired and aphasic patients to use assistive/communication devices  12/17/2020 0007 by Kami Latif RN  Outcome: Progressing  12/17/2020 0007 by Kami Latif RN  Outcome: Progressing     Problem: CARDIOVASCULAR - ADULT  Goal: Maintains optimal cardiac output and hemodynamic stability  Description: INTERVENTIONS:  - Monitor I/O, vital signs and rhythm  - Monitor for S/S and trends of decreased cardiac output  - Administer and titrate ordered vasoactive medications to optimize hemodynamic stability  - Assess quality of pulses, skin color and temperature  - Assess for signs of decreased coronary artery perfusion  - Instruct patient to report change in severity of symptoms  12/17/2020 0007 by Renée Hernandez RN  Outcome: Progressing  12/17/2020 0007 by Renée Hernandez RN  Outcome: Progressing  Goal: Absence of cardiac dysrhythmias or at baseline rhythm  Description: INTERVENTIONS:  - Continuous cardiac monitoring, vital signs, obtain 12 lead EKG if ordered  - Administer antiarrhythmic and heart rate control medications as ordered  - Monitor electrolytes and administer replacement therapy as ordered  12/17/2020 0007 by Renée Hernandez RN  Outcome: Progressing  12/17/2020 0007 by Renée Hernandez RN  Outcome: Progressing     Problem: PAIN - ADULT  Goal: Verbalizes/displays adequate comfort level or baseline comfort level  Description: Interventions:  - Encourage patient to monitor pain and request assistance  - Assess pain using appropriate pain scale  - Administer analgesics based on type and severity of pain and evaluate response  - Implement non-pharmacological measures as appropriate and evaluate response  - Consider cultural and social influences on pain and pain management  - Notify physician/advanced practitioner if interventions unsuccessful or patient reports new pain  12/17/2020 0007 by Renée Hernandez RN  Outcome: Progressing  12/17/2020 0007 by Renée Hernandez RN  Outcome: Progressing     Problem: SAFETY ADULT  Goal: Patient will remain free of falls  Description: INTERVENTIONS:  - Assess patient frequently for physical needs  -  Identify cognitive and physical deficits and behaviors that affect risk of falls    -  [place] fall precautions as indicated by assessment   - Educate patient/family on patient safety including physical limitations  - Instruct patient to call for assistance with activity based on assessment  - Modify environment to reduce risk of injury  - Consider OT/PT consult to assist with strengthening/mobility  12/17/2020 0007 by Pat Michel RN  Outcome: Progressing  12/17/2020 0007 by Pat Michel RN  Outcome: Progressing  Goal: Maintain or return mobility status to optimal level  Description: INTERVENTIONS:  - Assess patient's baseline mobility status (ambulation, transfers, stairs, etc )    - Identify cognitive and physical deficits and behaviors that affect mobility  - Identify mobility aids required to assist with transfers and/or ambulation (gait belt, sit-to-stand, lift, walker, cane, etc )  - [place] fall precautions as indicated by assessment  - Record patient progress and toleration of activity level on Mobility SBAR; progress patient to next Phase/Stage  - Instruct patient to call for assistance with activity based on assessment  - Consider rehabilitation consult to assist with strengthening/weightbearing, etc   12/17/2020 0007 by Pat Michel RN  Outcome: Progressing  12/17/2020 0007 by Pat Michel RN  Outcome: Progressing     Problem: DISCHARGE PLANNING  Goal: Discharge to home or other facility with appropriate resources  Description: INTERVENTIONS:  - Identify barriers to discharge w/patient and caregiver  - Arrange for needed discharge resources and transportation as appropriate  - Identify discharge learning needs (meds, wound care, etc )  - Arrange for interpretive services to assist at discharge as needed  - Refer to Case Management Department for coordinating discharge planning if the patient needs post-hospital services based on physician/advanced practitioner order or complex needs related to functional status, cognitive ability, or social support system  12/17/2020 0007 by Esme Trinidad RN  Outcome: Progressing  12/17/2020 0007 by Esme Trinidad RN  Outcome: Progressing     Problem: Knowledge Deficit  Goal: Patient/family/caregiver demonstrates understanding of disease process, treatment plan, medications, and discharge instructions  Description: Complete learning assessment and assess knowledge base  Interventions:  - Provide teaching at level of understanding  - Provide teaching via preferred learning methods  12/17/2020 0007 by Esme Trinidad RN  Outcome: Progressing  12/17/2020 0007 by Esme Trinidad RN  Outcome: Progressing     Problem: Nutrition/Hydration-ADULT  Goal: Nutrient/Hydration intake appropriate for improving, restoring or maintaining nutritional needs  Description: Monitor and assess patient's nutrition/hydration status for malnutrition  Collaborate with interdisciplinary team and initiate plan and interventions as ordered  Monitor patient's weight and dietary intake as ordered or per policy  Utilize nutrition screening tool and intervene as necessary  Determine patient's food preferences and provide high-protein, high-caloric foods as appropriate       INTERVENTIONS:  - Monitor oral intake, urinary output, labs, and treatment plans  - Assess nutrition and hydration status and recommend course of action  - Evaluate amount of meals eaten  - Assist patient with eating if necessary   - Allow adequate time for meals  - Recommend/ encourage appropriate diets, oral nutritional supplements, and vitamin/mineral supplements  - Order, calculate, and assess calorie counts as needed  - Recommend, monitor, and adjust tube feedings and TPN/PPN based on assessed needs  - Assess need for intravenous fluids  - Provide specific nutrition/hydration education as appropriate  - Include patient/family/caregiver in decisions related to nutrition  12/17/2020 0007 by Esme Trinidad RN  Outcome: Progressing  12/17/2020 0007 by Esme Trinidad RN  Outcome: Progressing     Problem: Prexisting or High Potential for Compromised Skin Integrity  Goal: Skin integrity is maintained or improved  Description: INTERVENTIONS:  - Identify patients at risk for skin breakdown  - Assess and monitor skin integrity  - Assess and monitor nutrition and hydration status  - Monitor labs   - Assess for incontinence   - Turn and reposition patient  - Assist with mobility/ambulation  - Relieve pressure over bony prominences  - Avoid friction and shearing  - Provide appropriate hygiene as needed including keeping skin clean and dry  - Evaluate need for skin moisturizer/barrier cream  - Collaborate with interdisciplinary team   - Patient/family teaching  - Consider wound care consult   12/17/2020 0007 by Meggan Zapien RN  Outcome: Progressing  12/17/2020 0007 by Meggan Zapien RN  Outcome: Progressing

## 2020-12-17 NOTE — ASSESSMENT & PLAN NOTE
· Presented 12/5 with R sided weakness and dysarthria  · Initial CTA head and neck without large vessel flow stenosis  · Initial [address]  Not tPA candidate  Admitted on stroke pathway  · MRI brain 12/7 showed acute/subacute left basal ganglionic infarct  · 12/7 ECHO (TTE) revealed EF 55-60%, G1DD, no PFO  · 12/8 became rapid response due to worsening facial droop and R sided weakness  [address] at that time 6    · Stat head CT showed evolving left basal ganglia infarct  Likely due to hypoperfusion  · Upgraded to ICU for pressor initiation  · After 48 hours, patient weaned from jeramie, started on midodrine  Had headache after wean, repeat CT head no evidence of hemorrhage  BP steady now off of midodrine  · 12/11 patient downgraded to med surge level  · Goal BP 140mmHg  Restarted back on Novarsc  · 12/11 LUIZ shows no apical thrombus  · Neurology recommends DAPT x 3 weeks, then Plavix only  Neurology to discuss risk v benefit of initiating full AC as an outpatient  · Patient was refused acute rehab  Appeal was unsuccessful  · Patient was up previously active 69-year-old female who used to walk 2-3 miles per day with her dog and was completely independent and drove a car as well  No use of any assistive devices for walking prior to the stroke    Currently has right-sided weakness and also dysarthria and trouble clearing her throat due to saliva  · Discharge to subacute rehab today

## 2020-12-18 NOTE — UTILIZATION REVIEW
Notification of Discharge  This is a Notification of Discharge from our facility [address]  Please be advised that this patient has been discharge from our facility  Below you will find the admission and discharge date and time including the patients disposition  PRESENTATION DATE: 12/5/2020 12:48 PM  OBS ADMISSION DATE:   IP ADMISSION DATE: 12/5/20 1348   DISCHARGE DATE: 12/17/2020  5:48 PM  DISPOSITION: Non [facility] Non [facility]   Admission Orders listed below:  Admission Orders (From admission, onward)     Ordered        12/05/20 1348  Inpatient Admission  Once                   Please contact the UR Department if additional information is required to close this patient's authorization/case  [facility] Utilization Review Department  Main: [phone] x carefully listen to the prompts  All voicemails are confidential   [email or web address]  Send all requests for admission clinical reviews, approved or denied determinations and any other requests to dedicated fax number below belonging to the campus where the patient is receiving treatment   List of dedicated fax numbers:  [address] DENIALS (Administrative/Medical Necessity) [phone]   [address] (Maternity/NICU/Pediatrics) [phone]   Rickeyakira Trann [phone]   [facility] [phone]   Hina Sierran [phone]    [facility] [address] [place] [address] [phone]   [facility]  [phone]   [address] [facility] [address]  [address] [facility] [address] [facility] [phone]

## 2020-12-23 ENCOUNTER — TELEPHONE (OUTPATIENT)
Dept: NEUROLOGY | Facility: CLINIC | Age: 70
End: 2020-12-23

## 2021-02-23 ENCOUNTER — DOCTOR'S OFFICE (OUTPATIENT)
Dept: URBAN - NONMETROPOLITAN AREA CLINIC 1 | Facility: CLINIC | Age: 71
Setting detail: OPHTHALMOLOGY
End: 2021-02-23
Payer: MEDICARE

## 2021-02-23 DIAGNOSIS — G43.809: ICD-10-CM

## 2021-02-23 DIAGNOSIS — H04.123: ICD-10-CM

## 2021-02-23 DIAGNOSIS — H43.813: ICD-10-CM

## 2021-02-23 DIAGNOSIS — I63.9: ICD-10-CM

## 2021-02-23 DIAGNOSIS — H25.013: ICD-10-CM

## 2021-02-23 PROCEDURE — 92083 EXTENDED VISUAL FIELD XM: CPT | Performed by: OPTOMETRIST

## 2021-02-23 PROCEDURE — 92014 COMPRE OPH EXAM EST PT 1/>: CPT | Performed by: OPTOMETRIST

## 2021-02-23 ASSESSMENT — TONOMETRY
OD_IOP_MMHG: 16
OS_IOP_MMHG: 16

## 2021-02-23 ASSESSMENT — LID POSITION - COMMENTS: OS_COMMENTS: +VE EVERTED PUNCTUM LUL

## 2021-02-23 ASSESSMENT — CONFRONTATIONAL VISUAL FIELD TEST (CVF)
OD_FINDINGS: FULL
OS_FINDINGS: FULL

## 2021-02-23 ASSESSMENT — SUPERFICIAL PUNCTATE KERATITIS (SPK)
OS_SPK: +1
OD_SPK: T

## 2021-02-23 ASSESSMENT — LID POSITION - DERMATOCHALASIS
OS_DERMATOCHALASIS: LUL 1+
OD_DERMATOCHALASIS: RUL 1+

## 2021-02-23 ASSESSMENT — LID EXAM ASSESSMENTS: OS_TRICHIASIS: LUL 1+

## 2021-02-24 ENCOUNTER — OPTICAL OFFICE (OUTPATIENT)
Dept: URBAN - NONMETROPOLITAN AREA CLINIC 4 | Facility: CLINIC | Age: 71
Setting detail: OPHTHALMOLOGY
End: 2021-02-24
Payer: COMMERCIAL

## 2021-02-24 DIAGNOSIS — H52.11: ICD-10-CM

## 2021-02-24 PROCEDURE — S0500 DISPOS CONT LENS: HCPCS | Performed by: OPTOMETRIST

## 2021-03-09 ENCOUNTER — APPOINTMENT (EMERGENCY)
Dept: CT IMAGING | Facility: HOSPITAL | Age: 71
DRG: 103 | End: 2021-03-09
Payer: COMMERCIAL

## 2021-03-09 ENCOUNTER — APPOINTMENT (EMERGENCY)
Dept: RADIOLOGY | Facility: HOSPITAL | Age: 71
DRG: 103 | End: 2021-03-09
Payer: COMMERCIAL

## 2021-03-09 ENCOUNTER — HOSPITAL ENCOUNTER (INPATIENT)
Facility: HOSPITAL | Age: 71
LOS: 1 days | Discharge: HOME | DRG: 103 | End: 2021-03-11
Attending: EMERGENCY MEDICINE | Admitting: FAMILY MEDICINE
Payer: COMMERCIAL

## 2021-03-09 DIAGNOSIS — I10 HYPERTENSION, ESSENTIAL: ICD-10-CM

## 2021-03-09 DIAGNOSIS — G43.711 INTRACTABLE CHRONIC MIGRAINE WITHOUT AURA AND WITH STATUS MIGRAINOSUS: ICD-10-CM

## 2021-03-09 DIAGNOSIS — Z86.73 HISTORY OF CVA (CEREBROVASCULAR ACCIDENT): ICD-10-CM

## 2021-03-09 DIAGNOSIS — I16.0 HYPERTENSIVE URGENCY: ICD-10-CM

## 2021-03-09 DIAGNOSIS — R51.9 HEADACHE: Primary | ICD-10-CM

## 2021-03-09 DIAGNOSIS — Z23 ENCOUNTER FOR IMMUNIZATION: ICD-10-CM

## 2021-03-09 LAB
ANION GAP SERPL CALCULATED.3IONS-SCNC: 12 MMOL/L (ref 4–13)
APTT PPP: 22 SECONDS (ref 23–37)
BUN SERPL-MCNC: 12 MG/DL (ref 5–25)
CALCIUM SERPL-MCNC: 9.4 MG/DL (ref 8.3–10.1)
CHLORIDE SERPL-SCNC: 103 MMOL/L (ref 100–108)
CO2 SERPL-SCNC: 25 MMOL/L (ref 21–32)
CREAT SERPL-MCNC: 0.88 MG/DL (ref 0.6–1.3)
ERYTHROCYTE [DISTWIDTH] IN BLOOD BY AUTOMATED COUNT: 11.8 % (ref 11.6–15.1)
FLUAV RNA RESP QL NAA+PROBE: NEGATIVE
FLUBV RNA RESP QL NAA+PROBE: NEGATIVE
GFR SERPL CREATININE-BSD FRML MDRD: 66 ML/MIN/1.73SQ M
GLUCOSE SERPL-MCNC: 140 MG/DL (ref 65–140)
HCT VFR BLD AUTO: 41.9 % (ref 34.8–46.1)
HGB BLD-MCNC: 14.2 G/DL (ref 11.5–15.4)
INR PPP: 1.03 (ref 0.84–1.19)
MCH RBC QN AUTO: 30.2 PG (ref 26.8–34.3)
MCHC RBC AUTO-ENTMCNC: 33.9 G/DL (ref 31.4–37.4)
MCV RBC AUTO: 89 FL (ref 82–98)
PLATELET # BLD AUTO: 300 THOUSANDS/UL (ref 149–390)
PMV BLD AUTO: 9.2 FL (ref 8.9–12.7)
POTASSIUM SERPL-SCNC: 3.6 MMOL/L (ref 3.5–5.3)
PROTHROMBIN TIME: 13.3 SECONDS (ref 11.6–14.5)
RBC # BLD AUTO: 4.7 MILLION/UL (ref 3.81–5.12)
RSV RNA RESP QL NAA+PROBE: NEGATIVE
SARS-COV-2 RNA RESP QL NAA+PROBE: NEGATIVE
SODIUM SERPL-SCNC: 140 MMOL/L (ref 136–145)
TROPONIN I SERPL-MCNC: <0.02 NG/ML
WBC # BLD AUTO: 11.09 THOUSAND/UL (ref 4.31–10.16)

## 2021-03-09 PROCEDURE — 71045 X-RAY EXAM CHEST 1 VIEW: CPT

## 2021-03-09 PROCEDURE — 99291 CRITICAL CARE FIRST HOUR: CPT | Performed by: EMERGENCY MEDICINE

## 2021-03-09 PROCEDURE — 36415 COLL VENOUS BLD VENIPUNCTURE: CPT | Performed by: EMERGENCY MEDICINE

## 2021-03-09 PROCEDURE — 84484 ASSAY OF TROPONIN QUANT: CPT | Performed by: EMERGENCY MEDICINE

## 2021-03-09 PROCEDURE — 70498 CT ANGIOGRAPHY NECK: CPT

## 2021-03-09 PROCEDURE — 85730 THROMBOPLASTIN TIME PARTIAL: CPT | Performed by: EMERGENCY MEDICINE

## 2021-03-09 PROCEDURE — 96374 THER/PROPH/DIAG INJ IV PUSH: CPT

## 2021-03-09 PROCEDURE — 82948 REAGENT STRIP/BLOOD GLUCOSE: CPT

## 2021-03-09 PROCEDURE — 99291 CRITICAL CARE FIRST HOUR: CPT

## 2021-03-09 PROCEDURE — 85610 PROTHROMBIN TIME: CPT | Performed by: EMERGENCY MEDICINE

## 2021-03-09 PROCEDURE — 70496 CT ANGIOGRAPHY HEAD: CPT

## 2021-03-09 PROCEDURE — G1004 CDSM NDSC: HCPCS

## 2021-03-09 PROCEDURE — 80048 BASIC METABOLIC PNL TOTAL CA: CPT | Performed by: EMERGENCY MEDICINE

## 2021-03-09 PROCEDURE — 85027 COMPLETE CBC AUTOMATED: CPT | Performed by: EMERGENCY MEDICINE

## 2021-03-09 PROCEDURE — 93005 ELECTROCARDIOGRAM TRACING: CPT

## 2021-03-09 PROCEDURE — 99220 PR INITIAL OBSERVATION CARE/DAY 70 MINUTES: CPT | Performed by: NURSE PRACTITIONER

## 2021-03-09 PROCEDURE — 0241U HB NFCT DS VIR RESP RNA 4 TRGT: CPT | Performed by: EMERGENCY MEDICINE

## 2021-03-09 RX ORDER — LABETALOL 20 MG/4 ML (5 MG/ML) INTRAVENOUS SYRINGE
20 ONCE
Status: COMPLETED | OUTPATIENT
Start: 2021-03-09 | End: 2021-03-09

## 2021-03-09 RX ORDER — ZOLPIDEM TARTRATE 5 MG/1
5 TABLET ORAL
Status: DISCONTINUED | OUTPATIENT
Start: 2021-03-09 | End: 2021-03-11 | Stop reason: HOSPADM

## 2021-03-09 RX ORDER — AMLODIPINE BESYLATE 2.5 MG/1
2.5 TABLET ORAL DAILY
Status: DISCONTINUED | OUTPATIENT
Start: 2021-03-10 | End: 2021-03-10

## 2021-03-09 RX ORDER — ATORVASTATIN CALCIUM 40 MG/1
80 TABLET, FILM COATED ORAL EVERY EVENING
Status: DISCONTINUED | OUTPATIENT
Start: 2021-03-09 | End: 2021-03-11 | Stop reason: HOSPADM

## 2021-03-09 RX ORDER — ONDANSETRON 2 MG/ML
INJECTION INTRAMUSCULAR; INTRAVENOUS
Status: COMPLETED
Start: 2021-03-09 | End: 2021-03-09

## 2021-03-09 RX ORDER — METOCLOPRAMIDE HYDROCHLORIDE 5 MG/ML
10 INJECTION INTRAMUSCULAR; INTRAVENOUS ONCE
Status: COMPLETED | OUTPATIENT
Start: 2021-03-09 | End: 2021-03-09

## 2021-03-09 RX ORDER — LEVOTHYROXINE SODIUM 0.03 MG/1
25 TABLET ORAL DAILY
Status: DISCONTINUED | OUTPATIENT
Start: 2021-03-10 | End: 2021-03-11 | Stop reason: HOSPADM

## 2021-03-09 RX ORDER — CLOPIDOGREL BISULFATE 75 MG/1
75 TABLET ORAL DAILY
Status: DISCONTINUED | OUTPATIENT
Start: 2021-03-10 | End: 2021-03-11 | Stop reason: HOSPADM

## 2021-03-09 RX ORDER — ASPIRIN 81 MG/1
81 TABLET, CHEWABLE ORAL DAILY
Status: DISCONTINUED | OUTPATIENT
Start: 2021-03-10 | End: 2021-03-11 | Stop reason: HOSPADM

## 2021-03-09 RX ORDER — ONDANSETRON 2 MG/ML
4 INJECTION INTRAMUSCULAR; INTRAVENOUS EVERY 8 HOURS PRN
Status: DISCONTINUED | OUTPATIENT
Start: 2021-03-09 | End: 2021-03-11 | Stop reason: HOSPADM

## 2021-03-09 RX ORDER — KETOROLAC TROMETHAMINE 30 MG/ML
15 INJECTION, SOLUTION INTRAMUSCULAR; INTRAVENOUS ONCE
Status: COMPLETED | OUTPATIENT
Start: 2021-03-09 | End: 2021-03-09

## 2021-03-09 RX ORDER — MAGNESIUM SULFATE HEPTAHYDRATE 40 MG/ML
2 INJECTION, SOLUTION INTRAVENOUS ONCE
Status: COMPLETED | OUTPATIENT
Start: 2021-03-09 | End: 2021-03-09

## 2021-03-09 RX ORDER — ACETAMINOPHEN 325 MG/1
650 TABLET ORAL EVERY 6 HOURS PRN
Status: DISCONTINUED | OUTPATIENT
Start: 2021-03-09 | End: 2021-03-10

## 2021-03-09 RX ADMIN — ONDANSETRON 4 MG: 2 INJECTION INTRAMUSCULAR; INTRAVENOUS at 18:11

## 2021-03-09 RX ADMIN — ZOLPIDEM TARTRATE 5 MG: 5 TABLET, FILM COATED ORAL at 22:49

## 2021-03-09 RX ADMIN — KETOROLAC TROMETHAMINE 15 MG: 30 INJECTION, SOLUTION INTRAMUSCULAR; INTRAVENOUS at 19:41

## 2021-03-09 RX ADMIN — LABETALOL 20 MG/4 ML (5 MG/ML) INTRAVENOUS SYRINGE 20 MG: at 18:11

## 2021-03-09 RX ADMIN — ATORVASTATIN CALCIUM 80 MG: 40 TABLET, FILM COATED ORAL at 22:49

## 2021-03-09 RX ADMIN — SODIUM CHLORIDE 1000 ML: 0.9 INJECTION, SOLUTION INTRAVENOUS at 19:39

## 2021-03-09 RX ADMIN — IOHEXOL 85 ML: 350 INJECTION, SOLUTION INTRAVENOUS at 18:08

## 2021-03-09 RX ADMIN — MAGNESIUM SULFATE HEPTAHYDRATE 2 G: 40 INJECTION, SOLUTION INTRAVENOUS at 19:57

## 2021-03-09 RX ADMIN — METOCLOPRAMIDE HYDROCHLORIDE 10 MG: 5 INJECTION INTRAMUSCULAR; INTRAVENOUS at 19:44

## 2021-03-09 NOTE — ED PROVIDER NOTES
History  Chief Complaint   Patient presents with   [address] 20     pt woke up with nausea and vomitting today  HA and R sided weakness started with increase in slurred speech at noon  pt states hx of R sided effects from previous stroke  Patient with history of prior stroke in December, history of hypertension, went to bed last night normal, woke up this morning with significant dizziness  Then at approximately noon she developed a frontal headache diffusely  She complained of generalized weakness and shakiness as well as somewhat increased left-sided weakness from baseline  She denies visual change  She denies chest or abdominal pain  She denies shortness of breath  History provided by:  Patient and relative   used: No    Headache  Pain location:  Frontal  Quality:  Dull  Radiates to:  Does not radiate  Onset quality:  Gradual  Duration:  5 hours  Timing:  Constant  Progression:  Worsening  Chronicity:  New  Relieved by:  Nothing  Worsened by:  Light  Ineffective treatments:  None tried  Associated symptoms: dizziness, focal weakness, nausea, photophobia, vomiting and weakness    Associated symptoms: no abdominal pain, no blurred vision, no congestion, no cough, no diarrhea, no drainage, no ear pain, no eye pain, no facial pain, no fever, no hearing loss, no loss of balance, no myalgias, no near-syncope, no neck pain, no neck stiffness, no numbness, no paresthesias, no seizures, no sore throat, no syncope, no URI and no visual change        Prior to Admission Medications   Prescriptions Last Dose Informant Patient Reported?  Taking?   acetaminophen (TYLENOL) 325 mg tablet   No No   Sig: Take 2 tablets (650 mg total) by mouth every 6 (six) hours as needed for mild pain, headaches or fever   amLODIPine (NORVASC) 2 5 mg tablet   Yes No   Sig: Take 2 5 mg by mouth daily   aspirin (ECOTRIN LOW STRENGTH) 81 mg EC tablet   No No   Sig: Take 1 tablet (81 mg total) by mouth daily for 14 days   atenolol (TENORMIN) 50 mg tablet   Yes No   Sig: Take 50 mg by mouth daily   atorvastatin (LIPITOR) 80 mg tablet   No No   Sig: Take 1 tablet (80 mg total) by mouth every evening   clopidogrel (PLAVIX) 75 mg tablet   No No   Sig: Take 1 tablet (75 mg total) by mouth daily   docusate sodium (COLACE) 100 mg capsule   No No   Sig: Take 1 capsule (100 mg total) by mouth 2 (two) times a day   fluticasone (FLONASE) 50 mcg/act nasal spray   Yes No   Si spray into each nostril daily as needed for rhinitis   levothyroxine 25 mcg tablet   Yes No   Sig: Take 25 mcg by mouth daily   meclizine (ANTIVERT) 12 5 MG tablet   No No   Sig: Take 1 tablet (12 5 mg total) by mouth every 8 (eight) hours as needed for dizziness   melatonin 3 mg   No No   Sig: Take 1 tablet (3 mg total) by mouth daily at bedtime as needed (insomnia)      Facility-Administered Medications: None       Past Medical History:   Diagnosis Date    Hypertension     TIA (transient ischemic attack)        Past Surgical History:   Procedure Laterality Date    ABLATION SOFT TISSUE         No family history on file  I have reviewed and agree with the history as documented  E-Cigarette/Vaping    E-Cigarette Use Never User      E-Cigarette/Vaping Substances     Social History     Tobacco Use    Smoking status: Never Smoker    Smokeless tobacco: Never Used   Substance Use Topics    Alcohol use: Not Currently    Drug use: Not Currently       Review of Systems   Constitutional: Negative for chills and fever  HENT: Negative for congestion, ear pain, hearing loss, postnasal drip, sore throat, trouble swallowing and voice change  Eyes: Positive for photophobia  Negative for blurred vision, pain and discharge  Respiratory: Negative for cough, shortness of breath and wheezing  Cardiovascular: Negative for chest pain, palpitations, syncope and near-syncope  Gastrointestinal: Positive for nausea and vomiting   Negative for abdominal pain, blood in stool, constipation and diarrhea  Genitourinary: Negative for dysuria, flank pain, frequency and hematuria  Musculoskeletal: Negative for joint swelling, myalgias, neck pain and neck stiffness  Skin: Negative for rash and wound  Neurological: Positive for dizziness, focal weakness, weakness and headaches  Negative for seizures, syncope, facial asymmetry, numbness, paresthesias and loss of balance  Psychiatric/Behavioral: Negative for hallucinations, self-injury and suicidal ideas  All other systems reviewed and are negative  Physical Exam  Physical Exam  Vitals signs and nursing note reviewed  Constitutional:       General: She is not in acute distress  Appearance: She is well-developed  HENT:      Head: Normocephalic and atraumatic  Right Ear: External ear normal       Left Ear: External ear normal    Eyes:      Conjunctiva/sclera: Conjunctivae normal       Pupils: Pupils are equal, round, and reactive to light  Neck:      Musculoskeletal: Normal range of motion and neck supple  Cardiovascular:      Rate and Rhythm: Normal rate and regular rhythm  Heart sounds: Normal heart sounds  No murmur  Pulmonary:      Effort: Pulmonary effort is normal       Breath sounds: Normal breath sounds  No wheezing or rales  Abdominal:      General: Bowel sounds are normal  There is no distension  Palpations: Abdomen is soft  Tenderness: There is no abdominal tenderness  There is no guarding or rebound  Musculoskeletal: Normal range of motion  General: No deformity  Skin:     General: Skin is warm and dry  Findings: No rash  Neurological:      General: No focal deficit present  Mental Status: She is alert and oriented to person, place, and time  Cranial Nerves: No cranial nerve deficit  Comments: Awake and alert and oriented x4  Cranial nerves 2-12 normal   Extraocular movements normal without nystagmus  Speech is fluent    No expressive or receptive aphasia  There is minimal left-sided arm drift and minimal left-sided leg drift  Finger-nose testing is slow  NIH stroke scale score 3 due to the above     Psychiatric:         Behavior: Behavior normal          Vital Signs  ED Triage Vitals   Temperature Pulse Respirations Blood Pressure SpO2   03/09/21 1740 03/09/21 1740 03/09/21 1740 03/09/21 1740 03/09/21 1740   98 °F (36 7 °C) 92 18 (!) 206/116 99 %      Temp Source Heart Rate Source Patient Position - Orthostatic VS BP Location FiO2 (%)   03/09/21 1740 03/09/21 1740 03/09/21 1740 03/09/21 1935 --   Temporal Monitor Lying Right arm       Pain Score       03/09/21 1800       Worst Possible Pain           Vitals:    03/09/21 1830 03/09/21 1845 03/09/21 1900 03/09/21 1935   BP: 154/79 139/86 154/85 155/79   Pulse: 79 80 80 81   Patient Position - Orthostatic VS: Lying Lying Lying Lying         Visual Acuity  Visual Acuity      Most Recent Value   L Pupil Size (mm)  3   R Pupil Size (mm)  3          ED Medications  Medications   sodium chloride 0 9 % bolus 1,000 mL (1,000 mL Intravenous New Bag 3/9/21 1939)   magnesium sulfate 2 g/50 mL IVPB (premix) 2 g (2 g Intravenous New Bag 3/9/21 1957)   Labetalol HCl (NORMODYNE) injection 20 mg (20 mg Intravenous Given 3/9/21 1811)   ondansetron (ZOFRAN) 4 mg/2 mL injection **ADS Override Pull** (4 mg Injection Given 3/9/21 1811)   iohexol (OMNIPAQUE) 350 MG/ML injection (SINGLE-DOSE) 100 mL (85 mL Intravenous Given 3/9/21 1808)   ketorolac (TORADOL) injection 15 mg (15 mg Intravenous Given 3/9/21 1941)   metoclopramide (REGLAN) injection 10 mg (10 mg Intravenous Given 3/9/21 1944)       Diagnostic Studies  Results Reviewed     Procedure Component Value Units Date/Time    COVID19, Influenza A/B, RSV PCR, [facility] [[id number]]  (Normal) Collected: 03/09/21 1749    Lab Status: Final result Specimen: Nares from Nasopharyngeal Swab Updated: 03/09/21 1907     SARS-CoV-2 Negative     INFLUENZA A PCR Negative     INFLUENZA B PCR Negative     RSV PCR Negative    Narrative: This test has been authorized by FDA under an EUA (Emergency Use Assay) for use by authorized laboratories  Clinical caution and judgement should be used with the interpretation of these results with consideration of the clinical impression and other laboratory testing  Testing reported as "Positive" or "Negative" has been proven to be accurate according to standard laboratory validation requirements  All testing is performed with control materials showing appropriate reactivity at standard intervals  Nenita Cos [[id number]] Collected: 03/09/21 1903    Lab Status: In process Specimen: Blood from Arm, Right Updated: 03/09/21 1906    APTT [[id number]] Collected: 03/09/21 1903    Lab Status:  In process Specimen: Blood from Arm, Right Updated: 03/09/21 1906    Troponin I [[id number]]  (Normal) Collected: 03/09/21 1749    Lab Status: Final result Specimen: Blood from Arm, Right Updated: 03/09/21 1818     Troponin I <0 02 ng/mL     Basic metabolic panel [[id number]] Collected: 03/09/21 1749    Lab Status: Final result Specimen: Blood from Arm, Right Updated: 03/09/21 1809     Sodium 140 mmol/L      Potassium 3 6 mmol/L      Chloride 103 mmol/L      CO2 25 mmol/L      ANION GAP 12 mmol/L      BUN 12 mg/dL      Creatinine 0 88 mg/dL      Glucose 140 mg/dL      Calcium 9 4 mg/dL      eGFR 66 ml/min/1 73sq m     Narrative:      Cassius guidelines for Chronic Kidney Disease (CKD):     Stage 1 with normal or high GFR (GFR > 90 mL/min/1 73 square meters)    Stage 2 Mild CKD (GFR = 60-89 mL/min/1 73 square meters)    Stage 3A Moderate CKD (GFR = 45-59 mL/min/1 73 square meters)    Stage 3B Moderate CKD (GFR = 30-44 mL/min/1 73 square meters)    Stage 4 Severe CKD (GFR = 15-29 mL/min/1 73 square meters)    Stage 5 End Stage CKD (GFR <15 mL/min/1 73 square meters)  Note: GFR calculation is accurate only with a steady state creatinine    CBC and Platelet [[id number]]  (Abnormal) Collected: 03/09/21 1749    Lab Status: Final result Specimen: Blood from Arm, Right Updated: 03/09/21 1756     WBC 11 09 Thousand/uL      RBC 4 70 Million/uL      Hemoglobin 14 2 g/dL      Hematocrit 41 9 %      MCV 89 fL      MCH 30 2 pg      MCHC 33 9 g/dL      RDW 11 8 %      Platelets 594 Thousands/uL      MPV 9 2 fL                  CTA stroke alert (head/neck)   Final Result by Rosalie Xie MD (03/09 1947)         1  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  Distal branches are patent  2   No stenosis, dissection or occlusion of the carotid or vertebral arteries  3   Small hiatal hernia and evidence of probable reflux  Findings were directly discussed with Osmin Swain on 3/9/2021 6:03 PM                      Workstation performed: [id number]         CT stroke alert brain   Final Result by Rosalie Xie MD (03/09 1947)      No evidence of acute vascular territorial infarction, intracranial hemorrhage or mass effect  Findings were directly discussed with Osmin Swain on 3/9/2021 5:58 PM       Workstation performed: [id number]         X-ray chest 1 view portable    (Results Pending)              Procedures  ECG 12 Lead Documentation Only    Date/Time: 3/9/2021 6:14 PM  Performed by: Tea Torres MD  Authorized by: Tea Torres MD     ECG reviewed by me, the ED Provider: yes    Patient location:  ED  Previous ECG:     Previous ECG:  Compared to current    Comparison ECG info:  Cutaneous somewhat longer      Similarity:  Changes noted  Interpretation:     Interpretation: non-specific    Rate:     ECG rate:  85    ECG rate assessment: normal    Rhythm:     Rhythm: sinus rhythm    Ectopy:     Ectopy: none    QRS:     QRS axis:  Normal    QRS intervals:  Normal  Conduction:     Conduction: normal    ST segments:     ST segments:  Normal  T waves:     T waves: normal    Comments:      Minimally prolonged QT with ,   CriticalCare Time  Performed by: Jeanette Anderson MD  Authorized by: Jeanette Anderson MD     Critical care provider statement:     Critical care time (minutes):  45    Critical care time was exclusive of:  Separately billable procedures and treating other patients    Critical care was necessary to treat or prevent imminent or life-threatening deterioration of the following conditions: Stroke alert  Critical care was time spent personally by me on the following activities:  Obtaining history from patient or surrogate, development of treatment plan with patient or surrogate, discussions with consultants, evaluation of patient's response to treatment, examination of patient, ordering and performing treatments and interventions, ordering and review of laboratory studies, ordering and review of radiographic studies, re-evaluation of patient's condition and review of old charts    I assumed direction of critical care for this patient from another provider in my specialty: no               ED Course  ED Course as of Mar 09 2002   Tue Mar 09, 2021   1747 Discussed with stroke neurologist, will follow imaging and re-evaluate      1824 Per radiologist, questionable stenotic lesion of the posterior left cerebral artery    Discussed with stroke neurologist, recommends that I discuss with interventional neurologist       1342 Discussed with Dr Femi Chapa, neuro interventional, does not believe intervention is indicated at this time                    Stroke Assessment     Row Name 03/09/21 1738             NIH Stroke Scale    Interval  Baseline      Level of Consciousness (1a )  0      LOC Questions (1b )  0      LOC Commands (1c )  0      Best Gaze (2 )  0      Visual (3 )  0      Facial Palsy (4 )  0      Motor Arm, Left (5a )  1      Motor Arm, Right (5b )  0      Motor Leg, Left (6a )  1      Motor Leg, Right (6b )  0      Limb Ataxia (7 )  1 Sensory (8 )  0      Best Language (9 )  0      Dysarthria (10 )  0      Extinction and Inattention (11 ) (Formerly Neglect)  0      Total  3                                  MDM  Number of Diagnoses or Management Options  Headache:      Amount and/or Complexity of Data Reviewed  Clinical lab tests: ordered and reviewed  Tests in the radiology section of CPT®: ordered and reviewed  Decide to obtain previous medical records or to obtain history from someone other than the patient: yes  Review and summarize past medical records: yes  Independent visualization of images, tracings, or specimens: yes        Disposition  Final diagnoses:   Headache     Time reflects when diagnosis was documented in both MDM as applicable and the Disposition within this note     Time User Action Codes Description Comment    3/9/2021  7:24 PM Gloria Keenan [R51 [id number]] Headache       ED Disposition     ED Disposition Condition Date/Time Comment    Admit Stable Tue Mar 9, 2021  7:24 PM Case was discussed with Dwayne and the patient's admission status was agreed to be Admission Status: observation status to the service of Dr Max Turk   Follow-up Information    None         Patient's Medications   Discharge Prescriptions    No medications on file     No discharge procedures on file      PDMP Review     None          ED Provider  Electronically Signed by           Janie Piedra MD  03/09/21 2002

## 2021-03-10 ENCOUNTER — APPOINTMENT (INPATIENT)
Dept: MRI IMAGING | Facility: HOSPITAL | Age: 71
DRG: 103 | End: 2021-03-10
Payer: COMMERCIAL

## 2021-03-10 PROBLEM — G45.9 TIA (TRANSIENT ISCHEMIC ATTACK): Status: ACTIVE | Noted: 2020-12-10

## 2021-03-10 PROBLEM — G43.711 INTRACTABLE CHRONIC MIGRAINE WITHOUT AURA AND WITH STATUS MIGRAINOSUS: Status: ACTIVE | Noted: 2021-03-10

## 2021-03-10 LAB
ANION GAP SERPL CALCULATED.3IONS-SCNC: 10 MMOL/L (ref 4–13)
BASOPHILS # BLD AUTO: 0.06 THOUSANDS/ΜL (ref 0–0.1)
BASOPHILS NFR BLD AUTO: 1 % (ref 0–1)
BUN SERPL-MCNC: 16 MG/DL (ref 5–25)
CALCIUM SERPL-MCNC: 8.8 MG/DL (ref 8.3–10.1)
CHLORIDE SERPL-SCNC: 106 MMOL/L (ref 100–108)
CHOLEST SERPL-MCNC: 108 MG/DL (ref 50–200)
CO2 SERPL-SCNC: 24 MMOL/L (ref 21–32)
CREAT SERPL-MCNC: 0.94 MG/DL (ref 0.6–1.3)
EOSINOPHIL # BLD AUTO: 0.15 THOUSAND/ΜL (ref 0–0.61)
EOSINOPHIL NFR BLD AUTO: 2 % (ref 0–6)
ERYTHROCYTE [DISTWIDTH] IN BLOOD BY AUTOMATED COUNT: 12.1 % (ref 11.6–15.1)
EST. AVERAGE GLUCOSE BLD GHB EST-MCNC: 134 MG/DL
GFR SERPL CREATININE-BSD FRML MDRD: 61 ML/MIN/1.73SQ M
GLUCOSE P FAST SERPL-MCNC: 98 MG/DL (ref 65–99)
GLUCOSE SERPL-MCNC: 98 MG/DL (ref 65–140)
HBA1C MFR BLD: 6.3 %
HCT VFR BLD AUTO: 36.1 % (ref 34.8–46.1)
HDLC SERPL-MCNC: 41 MG/DL
HGB BLD-MCNC: 12 G/DL (ref 11.5–15.4)
IMM GRANULOCYTES # BLD AUTO: 0.04 THOUSAND/UL (ref 0–0.2)
IMM GRANULOCYTES NFR BLD AUTO: 0 % (ref 0–2)
LDLC SERPL CALC-MCNC: 47 MG/DL (ref 0–100)
LYMPHOCYTES # BLD AUTO: 1.87 THOUSANDS/ΜL (ref 0.6–4.47)
LYMPHOCYTES NFR BLD AUTO: 21 % (ref 14–44)
MCH RBC QN AUTO: 30.5 PG (ref 26.8–34.3)
MCHC RBC AUTO-ENTMCNC: 33.2 G/DL (ref 31.4–37.4)
MCV RBC AUTO: 92 FL (ref 82–98)
MONOCYTES # BLD AUTO: 0.81 THOUSAND/ΜL (ref 0.17–1.22)
MONOCYTES NFR BLD AUTO: 9 % (ref 4–12)
NEUTROPHILS # BLD AUTO: 6.01 THOUSANDS/ΜL (ref 1.85–7.62)
NEUTS SEG NFR BLD AUTO: 67 % (ref 43–75)
NRBC BLD AUTO-RTO: 0 /100 WBCS
PLATELET # BLD AUTO: 272 THOUSANDS/UL (ref 149–390)
PMV BLD AUTO: 9.3 FL (ref 8.9–12.7)
POTASSIUM SERPL-SCNC: 3.5 MMOL/L (ref 3.5–5.3)
RBC # BLD AUTO: 3.94 MILLION/UL (ref 3.81–5.12)
SODIUM SERPL-SCNC: 140 MMOL/L (ref 136–145)
TRIGL SERPL-MCNC: 102 MG/DL
WBC # BLD AUTO: 8.94 THOUSAND/UL (ref 4.31–10.16)

## 2021-03-10 PROCEDURE — 83036 HEMOGLOBIN GLYCOSYLATED A1C: CPT | Performed by: NURSE PRACTITIONER

## 2021-03-10 PROCEDURE — 97163 PT EVAL HIGH COMPLEX 45 MIN: CPT

## 2021-03-10 PROCEDURE — 99204 OFFICE O/P NEW MOD 45 MIN: CPT | Performed by: PSYCHIATRY & NEUROLOGY

## 2021-03-10 PROCEDURE — 85025 COMPLETE CBC W/AUTO DIFF WBC: CPT | Performed by: NURSE PRACTITIONER

## 2021-03-10 PROCEDURE — 80048 BASIC METABOLIC PNL TOTAL CA: CPT | Performed by: NURSE PRACTITIONER

## 2021-03-10 PROCEDURE — 80061 LIPID PANEL: CPT | Performed by: NURSE PRACTITIONER

## 2021-03-10 PROCEDURE — 99232 SBSQ HOSP IP/OBS MODERATE 35: CPT | Performed by: FAMILY MEDICINE

## 2021-03-10 PROCEDURE — 92522 EVALUATE SPEECH PRODUCTION: CPT

## 2021-03-10 PROCEDURE — 97116 GAIT TRAINING THERAPY: CPT

## 2021-03-10 PROCEDURE — 97167 OT EVAL HIGH COMPLEX 60 MIN: CPT

## 2021-03-10 PROCEDURE — 70551 MRI BRAIN STEM W/O DYE: CPT

## 2021-03-10 PROCEDURE — G1004 CDSM NDSC: HCPCS

## 2021-03-10 PROCEDURE — 92610 EVALUATE SWALLOWING FUNCTION: CPT

## 2021-03-10 RX ORDER — SODIUM CHLORIDE 9 MG/ML
125 INJECTION, SOLUTION INTRAVENOUS CONTINUOUS
Status: DISCONTINUED | OUTPATIENT
Start: 2021-03-10 | End: 2021-03-10

## 2021-03-10 RX ORDER — MAGNESIUM SULFATE HEPTAHYDRATE 40 MG/ML
2 INJECTION, SOLUTION INTRAVENOUS
Status: COMPLETED | OUTPATIENT
Start: 2021-03-10 | End: 2021-03-10

## 2021-03-10 RX ORDER — KETOROLAC TROMETHAMINE 30 MG/ML
15 INJECTION, SOLUTION INTRAMUSCULAR; INTRAVENOUS EVERY 6 HOURS PRN
Status: DISCONTINUED | OUTPATIENT
Start: 2021-03-10 | End: 2021-03-11 | Stop reason: HOSPADM

## 2021-03-10 RX ORDER — KETOROLAC TROMETHAMINE 30 MG/ML
15 INJECTION, SOLUTION INTRAMUSCULAR; INTRAVENOUS ONCE
Status: COMPLETED | OUTPATIENT
Start: 2021-03-10 | End: 2021-03-10

## 2021-03-10 RX ORDER — METOCLOPRAMIDE HYDROCHLORIDE 5 MG/ML
10 INJECTION INTRAMUSCULAR; INTRAVENOUS EVERY 6 HOURS SCHEDULED
Status: COMPLETED | OUTPATIENT
Start: 2021-03-10 | End: 2021-03-11

## 2021-03-10 RX ORDER — LORAZEPAM 2 MG/ML
0.5 INJECTION INTRAMUSCULAR ONCE
Status: COMPLETED | OUTPATIENT
Start: 2021-03-10 | End: 2021-03-10

## 2021-03-10 RX ORDER — ACETAMINOPHEN 325 MG/1
650 TABLET ORAL EVERY 8 HOURS
Status: DISCONTINUED | OUTPATIENT
Start: 2021-03-10 | End: 2021-03-11 | Stop reason: HOSPADM

## 2021-03-10 RX ORDER — KETOROLAC TROMETHAMINE 30 MG/ML
15 INJECTION, SOLUTION INTRAMUSCULAR; INTRAVENOUS DAILY
Status: DISCONTINUED | OUTPATIENT
Start: 2021-03-11 | End: 2021-03-10

## 2021-03-10 RX ORDER — DEXAMETHASONE SODIUM PHOSPHATE 4 MG/ML
4 INJECTION, SOLUTION INTRA-ARTICULAR; INTRALESIONAL; INTRAMUSCULAR; INTRAVENOUS; SOFT TISSUE ONCE
Status: COMPLETED | OUTPATIENT
Start: 2021-03-10 | End: 2021-03-10

## 2021-03-10 RX ORDER — MAGNESIUM SULFATE HEPTAHYDRATE 40 MG/ML
2 INJECTION, SOLUTION INTRAVENOUS
Status: DISCONTINUED | OUTPATIENT
Start: 2021-03-10 | End: 2021-03-10

## 2021-03-10 RX ADMIN — ACETAMINOPHEN 650 MG: 325 TABLET ORAL at 06:19

## 2021-03-10 RX ADMIN — LORAZEPAM 0.5 MG: 2 INJECTION INTRAMUSCULAR; INTRAVENOUS at 15:23

## 2021-03-10 RX ADMIN — ASPIRIN 81 MG: 81 TABLET, CHEWABLE ORAL at 08:32

## 2021-03-10 RX ADMIN — METOCLOPRAMIDE HYDROCHLORIDE 10 MG: 5 INJECTION INTRAMUSCULAR; INTRAVENOUS at 17:01

## 2021-03-10 RX ADMIN — KETOROLAC TROMETHAMINE 15 MG: 30 INJECTION, SOLUTION INTRAMUSCULAR; INTRAVENOUS at 10:59

## 2021-03-10 RX ADMIN — SODIUM CHLORIDE 125 ML/HR: 0.9 INJECTION, SOLUTION INTRAVENOUS at 11:54

## 2021-03-10 RX ADMIN — ACETAMINOPHEN 650 MG: 325 TABLET ORAL at 22:24

## 2021-03-10 RX ADMIN — DEXAMETHASONE SODIUM PHOSPHATE 4 MG: 4 INJECTION, SOLUTION INTRA-ARTICULAR; INTRALESIONAL; INTRAMUSCULAR; INTRAVENOUS; SOFT TISSUE at 16:56

## 2021-03-10 RX ADMIN — ACETAMINOPHEN 650 MG: 325 TABLET ORAL at 14:22

## 2021-03-10 RX ADMIN — AMLODIPINE BESYLATE 2.5 MG: 2.5 TABLET ORAL at 08:32

## 2021-03-10 RX ADMIN — METOCLOPRAMIDE HYDROCHLORIDE 10 MG: 5 INJECTION INTRAMUSCULAR; INTRAVENOUS at 11:54

## 2021-03-10 RX ADMIN — MAGNESIUM SULFATE HEPTAHYDRATE 2 G: 40 INJECTION, SOLUTION INTRAVENOUS at 12:00

## 2021-03-10 RX ADMIN — CLOPIDOGREL BISULFATE 75 MG: 75 TABLET ORAL at 08:32

## 2021-03-10 RX ADMIN — METOCLOPRAMIDE HYDROCHLORIDE 10 MG: 5 INJECTION INTRAMUSCULAR; INTRAVENOUS at 23:33

## 2021-03-10 RX ADMIN — MAGNESIUM SULFATE HEPTAHYDRATE 2 G: 40 INJECTION, SOLUTION INTRAVENOUS at 17:03

## 2021-03-10 RX ADMIN — LEVOTHYROXINE SODIUM 25 MCG: 25 TABLET ORAL at 05:19

## 2021-03-10 RX ADMIN — ATORVASTATIN CALCIUM 80 MG: 40 TABLET, FILM COATED ORAL at 17:00

## 2021-03-10 NOTE — SPEECH THERAPY NOTE
Speech-Language Pathology Assessment  Bedside Swallow Evaluation 4630 - 9120  Motor Speech Evaluation 0854 - 5903      Patient Name: Bigg Thornton    Today's Date: 3/10/2021     Problem List  Principal Problem:    TIA (transient ischemic attack)  Active Problems:    History of CVA (cerebrovascular accident)    Hypertensive urgency    Hypertension, essential    Hypothyroidism (acquired)      Past Medical History  Past Medical History:   Diagnosis Date    Hypertension     Stroke ([address] )     TIA (transient ischemic attack)        Past Surgical History  Past Surgical History:   Procedure Laterality Date    ABLATION SOFT TISSUE         BEDSIDE SWALLOW EVALUATION    Summary  Pt seen by ST for mild dysphagia and dysarthria during previous admission (12/7/21) due to CVA  She presented today again with s/s suggestive of mild oropharyngeal dysphagia  Symptoms or concerns included slow/careful mastication and suspected mildly decreased hyolaryngeal elevation upon palpation  Despite mild dysphagia swallow is functional for safe tolerance of regular diet and thin liquids  She may benefit from ST f/u to reinforce strategies and exercises for improved oral phase/swallow efficiency  Pt reported she is attending speech/swallow therapy as an outpatient, and VitalStim is being used with facial placement to reduce anterior spillage/leakage  No spillage noted during today's evaluation  Will also f/u for pending MRI to r/o new neuro event  Risk/s for Aspiration: suspect low    Recommended Diet: regular diet and thin liquids   Recommended Form of Meds: whole with liquid   Aspiration precautions and swallowing strategies: upright posture, small bites/sips and alternating bites and sips      Current Medical Status per H&P 3/9/21  Bigg Thornton is a 69 y o  female with history of essential hypertension, insomnia, CVA who presents with headache    She was found to have an elevated blood pressure, and was treated with labetalol in the ER  In December of 2020, she had a rapid response for a left basal gangliar stroke  She was admitted to the ICU for pressor support  Today she had headache which was gradual onset, located in her forehead, described as dull and aching, non-radiating, constant, and getting worse  She said she felt dizzy and nauseated  She thought she might have felt some left-sided weakness  She said it was worsened by activity and light  She denies fever or chills, congestion, shortness of breath, chest pain, vomiting, abdominal pain, dysuria, back pain, or syncope  Current Precautions:  Fall, Aspiration     Special Studies:  CXR 3/9/21 IMPRESSION:  No acute cardiopulmonary disease  CTA Head/neck 3/9/21 IMPRESSION:  1  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  Distal branches are patent  2   No stenosis, dissection or occlusion of the carotid or vertebral arteries  3   Small hiatal hernia and evidence of probable reflux    MRI pending      Social/Education/Vocational Hx:  Pt lives alone    Swallow Information   Current Risks for Dysphagia & Aspiration: CVA, known history of dysphagia and dysarthria  Current Diet: regular diet and thin liquids   Baseline Diet: regular diet and thin liquids      Baseline Assessment   Behavior/Cognition: alert  Speech/Language Status: able to participate in conversation and able to follow commands  Patient Positioning: upright in chair  Pain Status/Interventions/Response to Interventions: Report of headache, MD aware       Swallow Mechanism Exam  Facial: right facial weakness  Labial: decreased ROM right side  Lingual: decreased coordination and agility  Velum: symmetrical  Mandible: adequate ROM  Dentition: adequate  Vocal quality:weak   Volitional Cough: adequate   Respiratory Status: on RA     Consistencies Assessed and Performance   Consistencies Administered: thin liquids, puree and hard solids  Materials administered included water, apple sauce, cookies    Oral Stage: mild  Mastication was slow/careful but adequate with the materials administered today  Bolus formation and transfer were functional with no significant oral residue noted  No overt s/s reduced oral control  Pharyngeal Stage: mild  Swallow Mechanics: Swallowing initiation appeared prompt  Laryngeal rise was palpated and judged to be mildly reduced  No coughing, throat clearing, change in vocal quality or respiratory status noted today  Esophageal Concerns: none reported      Summary and Recommendations (see above)    Results Reviewed with: patient     Treatment Recommended: yes     Frequency of treatment: 2-3x/week      Dysphagia LTG  -Patient will demonstrate safe and effective oral intake (without overt s/s significant oral/pharyngeal dysphagia including s/s penetration or aspiration) for the highest appropriate diet level  Short Term Goals:  -Pt will tolerate regular diet and thin liquid with no significant s/s oral or pharyngeal dysphagia across 1-3 diagnostic sessions     -Patient will utilize trained compensatory strategies (eg  R/L food/straw placement, lingual sweep, use of puree/liquid to promote oral clearance) to reduce or eliminate s/s oral dysphagia with the least restrictive consistencies  MOTOR SPEECH EVALUATION    Impressions:  Pt presents with mild-mod dysarthria secondary to R facial weakness from CVA  Initial CVA occurred in December 2020, pt is currently admitted with migraine and workup to r/o acute CVA  She does have apparent dysarthria and weak vocal quality, may be due to migraine/fatigue vs new CVA  Pt attends OP speech therapy for dysarthria and dysphagia  ST will f/u with pt during her inpatient stay         Therapy Prognosis: good      Goals:  LONG TERM GOALS:  -The patient will demonstrate intelligible speech in all activities of daily living including dynamic conversation  -The patient will independently utilize compensatory strategies to maximize communication in all ADLs  SHORT TERM GOALS:  -Patient will demonstrate adequate lingual strength, ROM and control for  fricatives and affricates and produce 20 understandable words, phrases, sentences) related to basic personal and medical needs    -Patient will use trained strategies (eg slowed rate, over articulation, increased oral opening, writing key word, increased loudness, phrasing)  to improve speech intelligibility in word,  phrases, sentences and  conversation with 90% accuracy         Vowel prolongation: /a/ (Normal 15-20 seconds): 10 seconds      Diadochokinesis:    puh puh puh (normal 3-5 5 repetitions/second): reduced rate    tuh tuh tuh (normal should exceed 25 reps in 10 seconds): reduced rate and articulatoty precision    [employer] (normal should exceed 25 reps in 10 seconds): reduced rate    puh tuh kuh (or buttercup)- (normal should exceed 8 reps in 10 seconds): Reduced rate and articulation, effortful production    Articulation:  Single words: WFL    Multisyllabic words: Mildly impaired    Count 1-20, then backwards from 20-1: WFL sequencing    Oral Motor Skills:  Facial symmetry: R weakness  Labial: Reduced ROM R  Lingual: Reduced coordination  Palatal function: WFL  Voice: weak     Oral apraxia: no    Articulation in connected speech: reduced    Conversation:  Articulatory groping  Imprecise articulation  Decreased breath support for speech/adequate volume

## 2021-03-10 NOTE — UTILIZATION REVIEW
OBSERVATION 3/9/21 @ 1925 CONVERTED TO INPATIENT 3/10/21 @ 1446 DUE TO  CONTINUED HEADACHE, CONTINUED WEAKNESS AND FATIGUE REQUIRING IV PAIN MEDICATIONS, INTERVENTION  Initial Clinical Review    Admission: Date/Time/Statement:     03/10/21 1449  Inpatient Admission Once     Question Answer Comment   Level of Care Med Surg    Estimated length of stay More than 2 Midnights    Certification I certify that inpatient services are medically necessary for this patient for a duration of greater than two midnights  See H&P and MD Progress Notes for additional information about the patient's course of treatment  Intractable migraine headache       03/10/21 1448       ED Arrival Information     Expected Arrival Acuity Means of Arrival Escorted By Service Admission Type    - 3/9/2021 17:10 Immediate Walk-In Family Member Hospitalist Emergency    Arrival Complaint    nausea, shaky, severe headach        Chief Complaint   Patient presents with    STROKE Alert     pt woke up with nausea and vomitting today  HA and R sided weakness started with increase in slurred speech at noon  pt states hx of R sided effects from previous stroke  Assessment/Plan: 69year old female to the ED from home with complaints of nausea, vomiting, significant dizziness upon waking, which progressed to diffuse frontal headache  Admitted under observation for TIA, hypertensive urgency,   CT head show no abnormality  CTA head/neck shows: 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  As per ED discussion with interventional neurology, no intervention needed at this time    ON arrival, GCS 15   Given IV toradol, reglan, mag in the ED  Check MRI  Neuro consult  BP very elevated on arrival   Treated with IV labetalol  3/10 UPdate: TIA   NIHSS 0   COntinue with neuro checks  Neurology consult  CHeck MRI  Continues with head pain  Repeat IV mag, toradol, reglan  Add dexamethasone     3/10 Neurology consult: Continues with headache  Given Toradol, Reglan, repeat IV mag  Continue with IV fluids     ED Triage Vitals   Temperature Pulse Respirations Blood Pressure SpO2   03/09/21 1740 03/09/21 1740 03/09/21 1740 03/09/21 1740 03/09/21 1740   98 °F (36 7 °C) 92 18 (!) 206/116 99 %      Temp Source Heart Rate Source Patient Position - Orthostatic VS BP Location FiO2 (%)   03/09/21 1740 03/09/21 1740 03/09/21 1740 03/09/21 1915 --   Temporal Monitor Lying Left arm       Pain Score       03/09/21 1800       Worst Possible Pain          Wt Readings from Last 1 Encounters:   03/09/21 56 2 kg (123 lb 14 4 oz)     Additional Vital Signs:  Date/Time  Temp  Pulse  Resp  BP  MAP (mmHg)  SpO2  O2 Device Patient Position - Orthostatic VS   03/10/21 06:21:54  97 9 °F (36 6 °C)  71  17  125/70  88  95 %  -- Lying   03/10/21 05:02:27  98 4 °F (36 9 °C)  75  14  --  --  97 %  -- --   03/10/21 04:24:07  --  72  16  109/61  77  97 %  -- Lying   03/10/21 0219  98 °F (36 7 °C)  69  17  106/69  --  96 %  -- Lying   03/10/21 0120  --  67  20  109/65  80  96 %  -- Lying   03/10/21 0019  --  65  --  116/66  83  97 %  -- --   03/09/21 23:18:59  98 6 °F (37 °C)  83  14  141/85  104  97 %  -- Lying   03/09/21 20:29:45  98 3 °F (36 8 °C)  80  18  144/90  108  96 %  -- --   03/09/21 2000  --  82  22  168/102Abnormal   129  96 %  None (Room air) Lying   03/09/21 1945  --  82  20  179/100Abnormal   132  97 %  None (Room air) Lying   03/09/21 1935  97 °F (36 1 °C)Abnormal   81  25Abnormal   155/79  --  97 %  None (Room air) Lying   03/09/21 1930  --  82  22  176/90Abnormal   123  97 %  -- Lying   03/09/21 1915  --  82  31Abnormal   169/96  126  95 %  None (Room air) Lying   03/09/21 1900  --  80  24Abnormal   154/85  --  97 %  -- Lying   03/09/21 1845  --  80  24Abnormal   139/86  --  98 %  -- Lying   03/09/21 1830  --  79  27Abnormal   154/79  --  97 %  -- Lying   03/09/21 1815  --  85  20  146/75  --  100 %  -- Lying   03/09/21 1800 --  90  20  203/100Abnormal   --  100 %  -- Lying   03/09/21 1740  98 °F (36 7 °C)  92  18  206/116Abnormal             Pertinent Labs/Diagnostic Test Results:   3/10 MRI: Early chronic infarct identified within the left lentiform nucleus extending into the corona radiata corresponds to the infarct seen on prior MRI dated 12/7/2020    3/9 CT head/neck: 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   Distal branches are patent   No stenosis, dissection or occlusion of the carotid or vertebral arteries   Small hiatal hernia and evidence of probable reflux  3/9 CT stroke alert:   No evidence of acute vascular territorial infarction, intracranial hemorrhage or mass effect     Results from last 7 days   Lab Units 03/09/21  1749   SARS-COV-2  Negative     Results from last 7 days   Lab Units 03/10/21  0502 03/09/21  1749   WBC Thousand/uL 8 94 11 09*   HEMOGLOBIN g/dL 12 0 14 2   HEMATOCRIT % 36 1 41 9   PLATELETS Thousands/uL 272 300   NEUTROS ABS Thousands/µL 6 01  --          Results from last 7 days   Lab Units 03/10/21  0502 03/09/21  1749   SODIUM mmol/L 140 140   POTASSIUM mmol/L 3 5 3 6   CHLORIDE mmol/L 106 103   CO2 mmol/L 24 25   ANION GAP mmol/L 10 12   BUN mg/dL 16 12   CREATININE mg/dL 0 94 0 88   EGFR ml/min/1 73sq m 61 66   CALCIUM mg/dL 8 8 9 4             Results from last 7 days   Lab Units 03/10/21  0502 03/09/21  1749   GLUCOSE RANDOM mg/dL 98 140         Results from last 7 days   Lab Units 03/09/21  1749   TROPONIN I ng/mL <0 02         Results from last 7 days   Lab Units 03/09/21  1903   PROTIME seconds 13 3   INR  1 03   PTT seconds 22*       Results from last 7 days   Lab Units 03/09/21  1749   INFLUENZA A PCR  Negative   INFLUENZA B PCR  Negative   RSV PCR  Negative       ED Treatment:   Medication Administration from 03/09/2021 1707 to 03/09/2021 2028       Date/Time Order Dose Route Action     03/09/2021 1811 Labetalol HCl (Elliot Raygoza) injection 20 mg 20 mg Intravenous Given     03/09/2021 1811 ondansetron (ZOFRAN) 4 mg/2 mL injection **ADS Override Pull** 4 mg Injection Given     03/09/2021 1939 sodium chloride 0 9 % bolus 1,000 mL 1,000 mL Intravenous New Bag     03/09/2021 1957 magnesium sulfate 2 g/50 mL IVPB (premix) 2 g 2 g Intravenous New Bag     03/09/2021 1941 ketorolac (TORADOL) injection 15 mg 15 mg Intravenous Given     03/09/2021 1944 metoclopramide (REGLAN) injection 10 mg 10 mg Intravenous Given        Past Medical History:   Diagnosis Date    Hypertension     Stroke ([facility] [address] )     TIA (transient ischemic attack)        Admitting Diagnosis: Headache [R51 9]  Age/Sex: 69 y o  female  Admission Orders:  Neuro checks: Every 1 hour x 4 hours, then every 2 hours x 8 hours, then every 4 hours x 72 hours  NIHSS every 24 hours  Vitals every 4 hours  SCDS  TEle   Up with assist    HGB A1C    Scheduled Medications:  amLODIPine, 2 5 mg, Oral, Daily  aspirin, 81 mg, Oral, Daily  atorvastatin, 80 mg, Oral, QPM  clopidogrel, 75 mg, Oral, Daily  levothyroxine, 25 mcg, Oral, Daily      Continuous IV Infusions:     PRN Meds:  acetaminophen, 650 mg, Oral, Q6H PRN  ondansetron, 4 mg, Intravenous, Q8H PRN  zolpidem, 5 mg, Oral, HS PRN        IP CONSULT TO NEUROLOGY  IP CONSULT TO CASE MANAGEMENT    Network Utilization Review Department  ATTENTION: Please call with any questions or concerns to [phone] and carefully listen to the prompts so that you are directed to the right person  All voicemails are confidential   Peterson Odell all requests for admission clinical reviews, approved or denied determinations and any other requests to dedicated fax number below belonging to the campus where the patient is receiving treatment   List of dedicated fax numbers for the Facilities:  [address] DENIALS (Administrative/Medical Necessity) [phone]   [address] (Maternity/NICU/Pediatrics) [phone] 5000 [facility] Oliva Chi [phone]   [address] [phone]   Mini [address] 1090 (  [place] "Christelle" 103) [address] [place] [address] [address] [facility] [phone]   [place] [address]2751 [phone]

## 2021-03-10 NOTE — OCCUPATIONAL THERAPY NOTE
Occupational Therapy Evaluation     Patient Name: Abimael LIN Date: 3/10/2021  Problem List  Principal Problem:    TIA (transient ischemic attack)  Active Problems:    History of CVA (cerebrovascular accident)    Hypertensive urgency    Hypertension, essential    Hypothyroidism (acquired)    Past Medical History  Past Medical History:   Diagnosis Date    Hypertension     Stroke ([address] )     TIA (transient ischemic attack)      Past Surgical History  Past Surgical History:   Procedure Laterality Date    ABLATION SOFT TISSUE             03/10/21 3849   Note Type   Note type Evaluation   Restrictions/Precautions   Other Precautions Chair Alarm; Bed Alarm; Fall Risk   Pain Assessment   Pain Assessment Tool 0-10   Pain Score 3   Pain Location/Orientation Location: Head   Home Living   Type of Home House  (1 DAMION)   Home Layout One level;Performs ADLs on one level; Able to live on main level with bedroom/bathroom   Bathroom Shower/Tub Tub/shower unit  (Pt sits in tub to bathe)   Bathroom Toilet Raised   Bathroom Equipment Grab bars in shower  (Pt has shower chair but does not use)   Home Equipment Walker;Cane  (does not use)   Additional Comments Pt reports living in a 1 story home with 1 DAMION  Pt ambualtes with no AD at baseline although has a RW and SPC for PRN use   Prior Function   Level of Stewart Independent with ADLs and functional mobility   Lives With Alone   Receives Help From Family  (sons prn)   ADL Assistance Independent   IADLs Independent   Falls in the last 6 months 0   Comments Pt rpeorts completing ADLs, light IADLs, and functional ambulation @ Mod I  Pt has good assistance from family for heavy IADLs and community mobility   ADL   Where Assessed Edge of bed   Grooming Assistance 5  Supervision/Setup   Grooming Deficit Verbal cueing;Supervision/safety; Increased time to complete   UB Dressing Assistance 5  Supervision/Setup   UB Dressing Deficit Setup   LB Dressing Assistance (Steadying Assist)   LB Dressing Deficit Steadying;Verbal cueing;Supervision/safety; Increased time to complete; Don/doff R sock; Don/doff L sock; Thread RLE into pants; Thread LLE into pants;Pull up over hips   Toileting Assistance    ([address] )   Toileting Deficit Steadying;Verbal cueing;Supervison/safety; Increased time to complete;Grab bar use;Clothing management up;Clothing management down;Perineal hygiene   Additional Comments Pt completing ADL tasks while seated at EOB  UB Dressing @ S after set-up  LB Dressing @ [address]  for CM around waist  Pt completing toileting tasks @ [address]  for CM around waist while standing with no AD  Pt completing standing at sinksdie to complete hand hygiene and oral care @ S with increased time  Bed Mobility   Supine to Sit 7  Independent   Additional Comments HOB flat no bedrails   Transfers   Sit to Stand   (SBA)   Additional items Increased time required;Verbal cues   Stand to Sit   (SBA)   Additional items Increased time required;Verbal cues   Stand pivot   (Steadying Assist)   Additional items Increased time required;Verbal cues   Additional Comments Pt completing functional transfers with use of no AD for UB support  SBA for STS and Steadying Assist for SPT  Balance   Static Sitting Normal   Dynamic Sitting Good   Static Standing Fair   Dynamic Standing Fair -   Activity Tolerance   Activity Tolerance Patient tolerated treatment well   Medical Staff Made Aware Spoke with Jennifer LEONE with RN   RUE Assessment   RUE Assessment WNL   LUE Assessment   LUE Assessment WNL   Hand Function   Gross Motor Coordination Functional   Fine Motor Coordination Functional   Sensation   Light Touch No apparent deficits   Additional Comments Pt with previous CVA in December 2020  Pt with R handed FM deficits from late CVA although remains functional  Increased time to complete FM tasks although able to complete independently     Maranda Common Assessment   Patient Visual Report   ("my vision is a little blurred")   Cognition   Overall Cognitive Status WFL   Arousal/Participation Alert; Responsive; Cooperative   Attention Attends with cues to redirect   Orientation Level Oriented X4   Memory Within functional limits   Following Commands Follows one step commands without difficulty   Assessment   Limitation Decreased ADL status; Decreased UE ROM; Decreased Safe judgement during ADL;Decreased endurance;Decreased fine motor control;Decreased self-care trans;Decreased high-level ADLs   Prognosis Good   Assessment Pt is a 70 y/o F admitted to [address] [place] [address] 3/9/2021 d/t experiencing head aches and dizziness  Dx: TIA  Pt with PMHx impacting performance during functional tasks including: HTN, TIA, CVA  Pt reports living in a 1 story home with 1 DAMION  Pt lives alone and reports completing ADLs, IADLs, and functional ambulation @ I  Pt has assistance from her family for IADLs and community mobility PRN  On evaluation, Pt A&Ox4  Pt completing bed mobility @ I  UB Dressing @ S after se-tup  LB Dressing @ Steadying Assist  PT completing STS @ SBA with no AD  SPT @ Steadying Assist with no AD for UB support  Pt then competing toileting tasks @ Steadying Assist  Pt completing hand hygiene and oral care @ S whlie standing at sinkside with no LOB noted  Pt BUE ROM and MS WFL  Pt presents with decrease activity tolerance, decrease standing balance, decrease performance during ADL tasks, decrease UB MS, decrease generalized strength, decrease activity engagement, decrease performance during functional transfers and decrease FM control  Pt would benefit from continued acute OT services to address deficits as well as Out Patient OT upon d/c from [address] [place] [address]  Plan   Treatment Interventions ADL retraining;Functional transfer training;UE strengthening/ROM; Endurance training;Patient/family training;Equipment evaluation/education; Neuromuscular reeducation; Fine motor coordination activities; Compensatory technique education;Continued evaluation; Energy conservation; Activityengagement   Goal Expiration Date 03/20/21   OT Frequency 3-5x/wk   Recommendation   Recommendation   (OutPatient OT)   OT Discharge Recommendation Return to previous environment with social support  (Out Patient OT)   AM-PAC Daily Activity Inpatient   Lower Body Dressing 3   Bathing 3   Toileting 3   Upper Body Dressing 4   Grooming 3   Eating 4   Daily Activity Raw Score 20   Daily Activity Standardized Score (Calc for Raw Score >=11) 42 03   AM-PAC Applied Cognition Inpatient   Following a Speech/Presentation 4   Understanding Ordinary Conversation 4   Taking Medications 4   Remembering Where Things Are Placed or Put Away 4   Remembering List of 4-5 Errands 4   Taking Care of Complicated Tasks 4   Applied Cognition Raw Score 24   Applied Cognition Standardized Score 62 21     The patient's raw score on the AM-PAC Daily Activity inpatient short form is 20, standardized score is 42 03, greater than 39 4  Patients at this level are likely to benefit from DC to home  Please refer to the recommendation of the Occupational Therapist for safe DC planning  Pt goals to be met by 3/20/2021    1  Pt will demonstrate ability to complete LB dressing @ Mod I in order to increase safety and independence during meaningful tasks  2    3  Pt will demonstrate ability to complete toileting tasks including CM and pericare @ Mod I in order to increase safety and independence during meaningful tasks  4  Pt will demonstrate ability to complete EOB, chair, toilet/commode transfers @ Mod i in order to increase performance and participation during functional tasks  5  Pt will demonstrate ability to stand for 10+ minutes while maintaining G balance with use of least restrictive deivce for UB support PRN    6  Pt will demonstrate ability to tolerate 30-35 minute OT session with no vc'ing for deep breathing or use of energy conservation techniques in order to increase activity tolerance during functional tasks  7  Pt will demonstrate Good carryover of use of energy conservation/compensatory strategies during ADLs and functional tasks in order to increase safety and reduce risk for falls  8  Pt will demonstrate Good attention and participation in continued evaluation of functional ambulation house hold distances in order to assist with safe d/c planning  9  Pt will attend to continued cognitive assessments 100% of the time in order to provide most appropriate d/c recommendations  10  Pt will follow 100% simple 2-step commands and be A&O x4 consistently with environmental cues to increase participation in functional activities  11  Pt will identify 3 areas of interest/hobbies and 1 intervention on how to incorporate into daily life in order to increase interaction with environment and peers as well as increase participation in meaningful tasks  12  Pt will demonstrate 100% carryover of BUE HEP in order to increase BUE MS and increase performance during functional tasks upon d/c home      Jurgen Curry OTR/L

## 2021-03-10 NOTE — QUICK NOTE
Stroke alert called: 5 40 PM  Neurology response immediate  LKW: last night  Patient woke up this morning with dizziness progressively developed dysarthria and mild left sided weakness, she has baseline right sided weakness from prior stroke  NIHSS at least a 6 per ED exam    CT H not acute  CTA H/N with no LVO on my read, per radiology severe L PCA stenosis cannot exclude thrombus  No IV tPA-- as out of window  ED physician d/w neurointervention and this is similar to patients prior CTA and this would not cause her left sided deficits     A/P  Concern for new ischemia right subcortical or brainstem    Admit under stroke protocol  Permissive HTN  ASA now  MRI brain routine    D/w ED physician at time of alert

## 2021-03-10 NOTE — PLAN OF CARE
Problem: OCCUPATIONAL THERAPY ADULT  Goal: Performs self-care activities at highest level of function for planned discharge setting  See evaluation for individualized goals  Description: Treatment Interventions: ADL retraining, Functional transfer training, UE strengthening/ROM, Endurance training, Patient/family training, Equipment evaluation/education, Neuromuscular reeducation, Fine motor coordination activities, Compensatory technique education, Continued evaluation, Energy conservation, Activityengagement          See flowsheet documentation for full assessment, interventions and recommendations  Note: Limitation: Decreased ADL status, Decreased UE ROM, Decreased Safe judgement during ADL, Decreased endurance, Decreased fine motor control, Decreased self-care trans, Decreased high-level ADLs  Prognosis: Good  Assessment: Pt is a 70 y/o F admitted to [address] [place] [address] 3/9/2021 d/t experiencing head aches and dizziness  Dx: TIA  Pt with PMHx impacting performance during functional tasks including: HTN, TIA, CVA  Pt reports living in a 1 story home with 1 DAMION  Pt lives alone and reports completing ADLs, IADLs, and functional ambulation @ I  Pt has assistance from her family for IADLs and community mobility PRN  On evaluation, Pt A&Ox4  Pt completing bed mobility @ I  UB Dressing @ S after se-tup  LB Dressing @ Steadying Assist  PT completing STS @ SBA with no AD  SPT @ Steadying Assist with no AD for UB support  Pt then competing toileting tasks @ Steadying Assist  Pt completing hand hygiene and oral care @ S whlie standing at sinkside with no LOB noted  Pt BUE ROM and MS WFL  Pt presents with decrease activity tolerance, decrease standing balance, decrease performance during ADL tasks, decrease UB MS, decrease generalized strength, decrease activity engagement, decrease performance during functional transfers and decrease FM control   Pt would benefit from continued acute OT services to address deficits as well as Out Patient OT upon d/c from [address] [place] [address]    Recommendation: (OutPatient OT)  OT Discharge Recommendation: Return to previous environment with social support(Out Patient OT)

## 2021-03-10 NOTE — SOCIAL WORK
Current LOS 1 day  CM CONSULT FOR ISCHEMIC STROKE  CM completed chart review  Pt case discussed with attending Dr Ignacio Badillo  Pt admitted with TIA  Neurology consult pending  MRI pending  (CTA/CT/CXR all completed yesterday)  PT/OT evals pending  Speech eval pending  CM met with patient at the bedside,baseline information was obtained  CM discussed the role of CM in helping the patient develop a discharge plan and assist the patient in carry out their plan  Pt lives alone in a 1 SH, 3 DAMION  Pt completes ADLs independently  Pt ambulates independently No AD (does have a RW and Cane)  No DME  Pt is retired  Pt does not drive  Pt uses STS during the day and family available in evening  Pt has hx of STR at [facility] (December 2020) x2weeks  Pt has hx of HHC with [facility] following d/c from rehab  No current HHC  Pt has been attending OP therapy at [facility]  Pt denies hx of MH or D&A      PCP: Dr Acosta Nicely  Preferred [facility]: [facility] [address] in [place]       Contact: Macy Munoz (son) [phone]  Pt states son Si Nurse is POA -[phone]  Transportation at time of d/c      CM reviewed OBS notice (none on chart)  Pt verbalized understanding and signed form for chart  CM provided pt with copy of form  CM will continue to follow pt medical course and assist with d/c planning as needed

## 2021-03-10 NOTE — ASSESSMENT & PLAN NOTE
· Presented for headache-found to be hypertensive  · History of left basal ganglia infarct December 2020  · CTA head stroke alert: No evidence of acute vascular territorial infarction, intracranial hemorrhage or mass effect  · CTA head neck: 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  Distal branches are patent   No stenosis, dissection or occlusion of the carotid or vertebral arteries   · ER physician consulted Neurology who recommended consultation with interventional neurologist-no intervention indicated at this time  · Was treated for headache with ketorolac magnesium and Reglan  · Admit to stroke pathway  · Neuro checks  · MRI  · Echocardiogram completed December 2020:  No echocardiographic evidence for a cardiac source of embolism   · Neurology consult  · Aspirin  · Continue atorvastatin

## 2021-03-10 NOTE — PHYSICAL THERAPY NOTE
PHYSICAL THERAPY EVALUATION  NAME:  Denisse Osuna  DATE: 03/10/21    AGE:   69 y o  Mrn:   [id number]  ADMIT DX:  Headache [R51 [id number]]    Past Medical History:   Diagnosis Date    Hypertension     Stroke ([address] )     TIA (transient ischemic attack)      Length Of Stay: 0  Performed at least 2 patient identifiers during session: Name and Birthday  PHYSICAL THERAPY EVALUATION :      03/10/21 0933   PT Last Visit   PT Visit Date 03/10/21   Note Type   Note type Evaluation   Pain Assessment   Pain Assessment Tool 0-10   Pain Score 3   Pain Location/Orientation Location: Head   Home Living   Type of Home House  (1 DAMION)   Home Layout One level   Bathroom Shower/Tub Tub/shower unit  (bathes )   Bathroom Toilet Raised   Bathroom Equipment Grab bars in shower; Shower chair  (hasn't been using shower chair)   Home Equipment Walker;Cane  (hasn't been using)   Additional Comments Reports living in a 1Sh with 1 DAMION from [facility]  Reports was using a walker and cane since returning home from admission due to CVA, but recently hasn't been using an AD for mobility  Prior Function   Level of [place] Independent with ADLs and functional mobility   Lives With Alone   Receives Help From Family  (sons prn)   ADL Assistance Independent   IADLs Independent  (+ driving)   Falls in the last 6 months 0   Comments Reports being independent with mobility PTA  Independent with ADLs and IADLs  Restrictions/Precautions   Other Precautions Chair Alarm; Bed Alarm;Pain; Fall Risk   Cognition   Orientation Level Oriented X4   Following Commands Follows one step commands without difficulty   Comments slowed speech noted   RLE Assessment   RLE Assessment WFL  (4+/5)   LLE Assessment   LLE Assessment WFL  (4/5 )   Coordination   Movements are Fluid and Coordinated 1  (alt toe tapping)   Light Touch   RLE Light Touch Grossly intact   LLE Light Touch Grossly intact   Bed Mobility   Supine to Sit 7  Independent   Additional Comments HOB flat without bedrail   Transfers   Sit to Stand   (stand by assistance)   Additional items Increased time required;Verbal cues   Stand to Sit   (stand by assistance)   Additional items Assist x 1; Increased time required;Verbal cues   Stand pivot   (steadying assistance)   Additional items Assist x 1; Increased time required;Verbal cues   Additional Comments no AD  sit<>stand with stand by assistancce with increased time  spt without AD with steadying assistance with increased time wiht patient reaching for external support  Ambulation/Elevation   Gait pattern Wide GARRY; Excessively slow; Short stride  (minimal arm swing)   Gait Assistance   (steadying assistance  minAx1 1x due to LOB R)   Additional items Assist x 1;Verbal cues   Assistive Device None   Distance 25'x1 without AD with steadying assistance with LOB 1x to right requiring minAx1 to recover 1x  t reaching for external support  decreased step length and arm swing  Balance   Static Sitting Normal   Dynamic Sitting Good   Static Standing Fair   Dynamic Standing Fair -   Ambulatory Poor +   Endurance Deficit   Endurance Deficit Yes   Endurance Deficit Description fatigue   Activity Tolerance   Activity Tolerance Patient limited by fatigue   Medical Staff Made Aware Dwaine SHERMAN   Nurse Made Aware RN   Assessment   Prognosis Good   Problem List Decreased strength;Decreased endurance;Decreased mobility; Impaired balance; Impaired judgement;Decreased safety awareness;Pain   Barriers to Discharge Decreased caregiver support   Barriers to Discharge Comments lives alone   Goals   Patient Goals "Go home"   STG Expiration Date 03/20/21   PT Treatment Day 1   Plan   Treatment/Interventions Functional transfer training;LE strengthening/ROM; Therapeutic exercise; Endurance training;Elevations; Patient/family training;Equipment eval/education; Bed mobility;Gait training; Compensatory technique education;Spoke to nursing;Spoke to case management;OT   PT Frequency Other (Comment)  (3-5x/week)   Recommendation   PT Discharge Recommendation Return to previous environment with social support; Other (Comment)  (OPPT services)   Equipment Recommended   (spc-pt has)   PT - OK to Discharge   (when medically cleared)   Additional Comments pt sitting in recliner chair with all needs wihtin reach and posey alarm on   AM-PAC Basic Mobility Inpatient   Turning in Bed Without Bedrails 4   Lying on Back to Sitting on Edge of Flat Bed 4   Moving Bed to Chair 3   Standing Up From Chair 3   Walk in Room 3   Climb 3-5 Stairs 3   Basic Mobility Inpatient Raw Score 20   Basic Mobility Standardized Score 43 99     (Please find full objective findings from PT assessment regarding body systems outlined above)  Assessment: Pt is a 69 y o  female seen for PT evaluation s/p admission to [address] [place] [address] on 3/9/2021 with TIA (transient ischemic attack)  Order placed for PT services  Upon evaluation: Pt is presenting with impaired functional mobility due to pain, decreased strength, decreased endurance, impaired balance, gait deviations, decreased safety awareness, impaired judgment and fall risk requiring stand by to steadying assistance for transfers and steadying to minimal assistance for ambulation with out AD  Pt's clinical presentation is currently unstable/unpredictable given the functional mobility deficits above, especially weakness, decreased endurance, gait deviations, pain, decreased activity tolerance, decreased functional mobility tolerance, decreased safety awareness and impaired judgement, coupled with fall risks as indicated by AM-PAC 6-Clicks: 43/63 as well as impaired balance, polypharmacy, impaired judgement and decreased safety awareness and combined with medical complications of hypertension , abnormal WBCs, multiple readmissions, fear/retreat and need for input for mobility technique/safety    Pt's PMHx and comorbidities that may affect physical performance and progress include: HTN, CVA and hypothyroidism  Personal factors affecting pt at time of IE include: step(s) to enter environment, limited home support, inability to navigate level surfaces without external assistance and inability to ambulate household distances  Pt will benefit from continued skilled PT services to address deficits as defined above and to maximize level of functional mobility to facilitate return toward PLOF and improved QOL  From PT/mobility standpoint, recommendation at time of d/c would be OP PT, home with family support and with cane pending progress in order to reduce fall risk and maximize pt's functional independence and consistency with mobility in order to facilitate return to PLOF  Recommend trial with cane next 1-2 sessions and ther ex next 1-2 sessions  The patient's AM-PAC Basic Mobility Inpatient Short Form Raw Score is 20, Standardized Score is 43 99  A standardized score greater than 42 9 suggests the patient may benefit from discharge to home  Please also refer to the recommendation of the Physical Therapist for safe discharge planning  Goals: Pt will: Pt will perform transfers with modified I to increase Indep in home environment, decrease burden of care and decrease risk for falls and prepare for ambulation  Pt will ambulate with LRAD for >/= 250' with  modified I  to increase Indep in home environment, decrease burden of care, decrease risk for falls, improve activity tolerance and improve gait quality and to access home environment  Pt will complete >/= 1 step with/without AD with modified I to increase Indep in home alone environment, return to home with DAMION and improve activity tolerance  Pt will increase B LE strength >/= 1/2 MMT grade to facilitate functional mobility  Pt will participate in objective balance assessment to determine baseline fall risk          Stanley Fowler, PT,DPT     PHYSICAL THERAPY TREATMENT NOTE  Time In: 3390  Time Out:1000  Total Time: 8'      S:  "I wasn't using a cane "  O:  Sit<>stand with stand by assistance  Spt without AD with steadying to stand by assistance  Dynamic standing balance without UE support 5' with Supervision  Ambulated 10'x2 without AD with steadying assistance  Initiated use of spc  Ambulated 180'x1 with close supervision with spc in L hand with slow ari, decreased step length, decreased R arm swing, wide GARRY and decreased heel/toe pattern  Cues for increased step length and R arm swing  A:  Pt requires increased time to complete mobility  She requires steadying assistance without AD and close supervision with spc without needing to reach for external support and increased step length noted  She is limited by decreased balance and endurance  She will continue to benefit from PT services to maximize LOF  P:  Recommend LE strengthening, dynamic standing balance, gait training with LRAD, stair climbing       Silvia Eth, PT, DPT

## 2021-03-10 NOTE — ASSESSMENT & PLAN NOTE
· Presented with headache blood pressure initially 206/116  · Treated with labetalol blood pressure 144/90  · History of left basal ganglia infarct December 2020 thought to be due to hypoperfusion-was transferred to ICU for pressors  · Would use caution with antihypertensives-SBP goal 140-200

## 2021-03-10 NOTE — ASSESSMENT & PLAN NOTE
· History of left basal ganglia infarct in 2020 thought to be due to hypoperfusion  · Was transferred to ICU for pressors  · At baseline right-sided deficit 4/5 strength  · Would use caution with antihypertensives-SBP goal 140-200

## 2021-03-10 NOTE — TELEMEDICINE
TeleConsultation - Neurology   Abimael Douglas 69 y o  female MRN: [id number]  Unit/Bed#: -01 Encounter: [id number]      REQUIRED DOCUMENTATION:     1  This service was provided via Telemedicine  2  Provider located at [facility] AND [facility]  3  TeleMed provider: Anthony Granados DO   4  Identify all parties in room with patient during tele consult:  Pt and RNErendira Hernandez  5  After connecting through televideo, patient was identified by name and date of birth and assistant checked wristband  Patient was then informed that this was a Telemedicine visit and that the exam was being conducted confidentially over secure lines  My office door was closed  No one else was in the room  Patient acknowledged consent and understanding of privacy and security of the Telemedicine visit, and gave us permission to have the assistant stay in the room in order to assist with the history and to conduct the exam   I informed the patient that I have reviewed their record in Epic and presented the opportunity for them to ask any questions regarding the visit today  The patient agreed to participate  Assessment/Plan     Ms Abimael Douglas is a pleasant 70 yo female with recent subcortical ischemic infarct 12/20 with residual dysarthria and right sided paresis seen in consultation for new onset dizziness and mild left sided weakness yesterday in the setting of a severe migrainous headache (remote hx of this)  - Would like to rule out new acute ischemic infarct  - Admitted under stroke protocol as recommended  - MRI brain pending  - Permissive HTN for 24 hours recommended yesterday, if no acute stroke on mri and after 24 hours can come down slowly to around 140-160s SBP  Avoid hypotension  - ASA 81 and Plavix 75 mg x 21 days then pending mri brain will decide plavix switch or note   C/w atorvastatin  - ECHO pending  - HgbA1C mildly elevated 6 3 (from 5 7 12/20)-- recommend follow up with PCP and start DM medication if appropriate then  - Therapies    Headache treatment: migrainous  - Toradol 15 IV q24 hrs- lower dosing as she is on DUAP and bleed risk  - Scheduled reglan q8h  - Mag given yesterday evening will repeat today  - Scheduled Tylenol  - IVF  - Avoiding depacon given severe shortage here    Will follow up  D/w primary team- Dr Osorio Matter  Follow up with stroke neurology OP preferably physician within 6-8 weeks of discharge    History of Present Illness     Reason for Consult / Principal Problem: stroke alert yesterday  Hx and PE limited by: teleconsult  HPI: Yodit Elise is a 69 y o  female with HTN, hx BG ischemic infarct 12/20 with residual right sided paresis dysarthria (currently working with speech therapy), remote history migraines in her 25s, who presents with dizziness upon waking up yesterday morning  Tells me she went to bed feeling like her normal self  States as the day progressed she developed a severe headache mild left sided weakness worse in the left leg  She states her speech and right sided weakness is similar to how its been since last stroke  States she is working with therapies  She is compliant with her ASA and statin    She states with her last stroke she had a severe headache similar to now  She states toradol works, reglan and magnesium given yesterday were helpful  CT H not acute  CTA H/N with similar PCA P2 stenosis as last CTA    MRI brain pending  Bps improved 140s SBP vs 200s yesterday on admit    She states she still has some mild LLE numbness otherwise no new deficits other than that already noted from prior stroke on right side    Consult to Neurology  Consult performed by: Kerry Luciano DO  Consult ordered by: Ritchie Li MD           Review of Systems  10 point ROS reviewed and negative other than that noted above  Historical Information   Past Medical History:   Diagnosis Date    Hypertension     [facility]roke [facility])     TIA (transient ischemic attack)      Past Surgical History:   Procedure Laterality Date    ABLATION SOFT TISSUE       Social History   Social History     Substance and Sexual Activity   Alcohol Use Not Currently    Frequency: Never     Social History     Substance and Sexual Activity   Drug Use Not Currently     E-Cigarette/Vaping    E-Cigarette Use Never User      E-Cigarette/Vaping Substances     Social History     Tobacco Use   Smoking Status Never Smoker   Smokeless Tobacco Never Used     Family History: non-contributory    Review of previous medical records was  completed  Meds/Allergies   all current active meds have been reviewed    No Known Allergies    Objective   Vitals:Blood pressure 143/88, pulse 77, temperature 98 3 °F (36 8 °C), temperature source Oral, resp  rate 18, height 5' 1" (1 549 m), weight 56 2 kg (123 lb 14 4 oz), SpO2 98 %  ,Body mass index is 23 41 kg/m²  Intake/Output Summary (Last 24 hours) at 3/10/2021 1138  Last data filed at 3/10/2021 0837  Gross per 24 hour   Intake --   Output 150 ml   Net -150 ml       Invasive Devices: Invasive Devices     Peripheral Intravenous Line            Peripheral IV 03/09/21 Right Antecubital 1 day                Physical Exam  Neurologic Exam     Gen: in distress, icepack over head nauseated, headache and photophobia  HEENT: NCAT, EOMI  Resp: Symmetric chest rise bl  Skin: no rashes on visualized portion of exam  Ext: no edema    AO X 4, mild dysarthria no difficulty with naming no aphasia, can follow two and three step commands can repeat  CN 2-12 grossly intact except right facial droop  Motor: mild right UE pronator drift otherwise no drift noted  Sensation: Intact but reduced sensation right face and bl legs distal  Cerebellar: No dysmetria b/l- slower to do this but no actual dysmetria noted  Gait: deferred    Lab Results: I have personally reviewed pertinent reports      Imaging Studies: I have personally reviewed pertinent films in PACS  EKG, Pathology, and Other Studies: I have personally reviewed pertinent reports  Code Status: Level 1 - Full Code  Advance Directive and Living Will:      Power of :    POLST:      Counseling / Coordination of Care  Total time spent today 40  minutes  Greater than 50% of total time was spent with the patient and / or family counseling and / or coordination of care   A description of the counseling / coordination of care: as detailed above

## 2021-03-10 NOTE — PROGRESS NOTES
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  Distal branches are patent  No stenosis, dissection or occlusion of the carotid or vertebral arteries   · ER physician consulted Neurology who recommended consultation with interventional neurologist-no intervention indicated at this time  · Was treated for headache with ketorolac magnesium and Reglan  · Admit to stroke pathway  Current NIH score is 0  · Neuro checks  · MRI  · Echocardiogram completed December 2020:  No echocardiographic evidence for a cardiac source of embolism   · Neurology consult  · Aspirin  · Continue atorvastatin    Intractable chronic migraine without aura and with status migrainosus  Assessment & Plan  Patient complains of 8-10 x 10 headache which is behind both the eyes  Patient received course of magnesium, Toradol, Reglan and no improvement noted of headache  Will repeat a course of IV magnesium, Toradol, Reglan an add dexamethasone 4 mg IV x1 and see there is any improvement of her headache  Hypertensive urgency  Assessment & Plan  · Presented with headache blood pressure initially 206/116  · Treated with labetalol     Blood pressure now better controlled  · History of left basal ganglia infarct December 2020 thought to be due to hypoperfusion-was transferred to ICU for pressors  · Would use caution with antihypertensives-SBP goal 140-200    History of CVA (cerebrovascular accident)  Assessment & Plan  · History of left basal ganglia infarct in 2020 thought to be due to hypoperfusion  · Was transferred to ICU for pressors  · At baseline right-sided deficit 4/5 strength  · Would use caution with antihypertensives-SBP goal 140-200      Hypothyroidism (acquired)  Assessment & Plan  · Last TSH 2020:  4 289  · Continue levothyroxine    Hypertension, essential  Assessment & Plan  · Initially hypertensive treated with labetalol  · Continue amlodipine  · Monitor blood pressure      VTE Pharmacologic Prophylaxis:   Pharmacologic: Pharmacologic VTE Prophylaxis contraindicated due to Low risk  Mechanical VTE Prophylaxis in Place: Yes    Patient Centered Rounds: I have performed bedside rounds with nursing staff today  Discussions with Specialists or Other Care Team Provider:  Discussed with Neurology    Education and Discussions with Family / Patient:  Discussed with patient at bedside    Time Spent for Care: 30 minutes  More than 50% of total time spent on counseling and coordination of care as described above  Current Length of Stay: 0 day(s)    Current Patient Status: Inpatient   Certification Statement: The patient will continue to require additional inpatient hospital stay due to Intractable migraine headache and TIA    Discharge Plan:  Needs IV medications for pain control for severe intractable headache  Code Status: Level 1 - Full Code      Subjective:   Patient denies any chest pain or shortness of breath or abdominal pain but is complaining of 8-10 x 10 headache which starts behind both of her eyes  Complains of feeling weak and fatigued  Objective:     Vitals:   Temp (24hrs), Av 1 °F (36 7 °C), Min:97 °F (36 1 °C), Max:98 6 °F (37 °C)    Temp:  [97 °F (36 1 °C)-98 6 °F (37 °C)] 98 3 °F (36 8 °C)  HR:  [65-92] 84  Resp:  [14-31] 16  BP: (106-206)/() 144/87  SpO2:  [95 %-100 %] 97 %  Body mass index is 23 41 kg/m²  Input and Output Summary (last 24 hours): Intake/Output Summary (Last 24 hours) at 3/10/2021 1455  Last data filed at 3/10/2021 0837  Gross per 24 hour   Intake --   Output 150 ml   Net -150 ml       Physical Exam:     Physical Exam  Vitals signs and nursing note reviewed  Constitutional:       Appearance: Normal appearance  HENT:      Head: Normocephalic and atraumatic  Right Ear: External ear normal       Left Ear: External ear normal       Nose: Nose normal       Mouth/Throat:      Pharynx: Oropharynx is clear  Neck:      Musculoskeletal: Normal range of motion and neck supple  Cardiovascular:      Rate and Rhythm: Normal rate and regular rhythm  Heart sounds: Normal heart sounds  Pulmonary:      Effort: Pulmonary effort is normal       Breath sounds: Normal breath sounds  Abdominal:      General: Bowel sounds are normal       Palpations: Abdomen is soft  Tenderness: There is no abdominal tenderness  Musculoskeletal: Normal range of motion  Skin:     General: Skin is warm and dry  Capillary Refill: Capillary refill takes less than 2 seconds  Neurological:      General: No focal deficit present  Mental Status: She is alert and oriented to person, place, and time     Psychiatric:         Mood and Affect: Mood normal            Additional Data:     Labs:    Results from last 7 days   Lab Units 03/10/21  0502   WBC Thousand/uL 8 94   HEMOGLOBIN g/dL 12 0   HEMATOCRIT % 36 1   PLATELETS Thousands/uL 272   NEUTROS PCT % 67   LYMPHS PCT % 21   MONOS PCT % 9   EOS PCT % 2     Results from last 7 days   Lab Units 03/10/21  0502   SODIUM mmol/L 140   POTASSIUM mmol/L 3 5   CHLORIDE mmol/L 106   CO2 mmol/L 24   BUN mg/dL 16   CREATININE mg/dL 0 94   ANION GAP mmol/L 10   CALCIUM mg/dL 8 8   GLUCOSE RANDOM mg/dL 98     Results from last 7 days   Lab Units 03/09/21  1903   INR  1 03         Results from last 7 days   Lab Units 03/10/21  0502   HEMOGLOBIN A1C % 6 3* * I Have Reviewed All Lab Data Listed Above  * Additional Pertinent Lab Tests Reviewed: [address] Admission Reviewed    Imaging:    Imaging Reports Reviewed Today Include:  Chest x-ray and CTA head and neck  Imaging Personally Reviewed by Myself Includes:  Chest x-ray and CTA head and neck    Recent Cultures (last 7 days):           Last 24 Hours Medication List:   Current Facility-Administered Medications   Medication Dose Route Frequency Provider Last Rate    acetaminophen  650 mg Oral Q8H Dee Nam, DO      aspirin  81 mg Oral Daily Anibal Katerina, CRNP      atorvastatin  80 mg Oral QPM Anibal Katerina, CRNP      clopidogrel  75 mg Oral Daily Anibal Katerina, CRNP      dexamethasone  4 mg Intravenous Once Sonia Galeas MD      ketorolac  15 mg Intravenous Q6H PRN Sonia Galeas MD      levothyroxine  25 mcg Oral Daily Anibal Katerina, CRNP      LORazepam  0 5 mg Intravenous Once Sonia Galeas MD      magnesium sulfate  2 g Intravenous After Breakfast Deeasad Nam,       metoclopramide  10 mg Intravenous Q6H [address] Deenino Nam DO      ondansetron  4 mg Intravenous Q8H PRN Anibal Katerina, CRNP      zolpidem  5 mg Oral HS PRN Anibal Katerina, CRNP          Today, Patient Was Seen By: Sonia Galeas MD    ** Please Note: Dictation voice to text software may have been used in the creation of this document   **

## 2021-03-10 NOTE — PLAN OF CARE
Problem: Potential for Falls  Goal: Patient will remain free of falls  Description: INTERVENTIONS:  - Assess patient frequently for physical needs  -  Identify cognitive and physical deficits and behaviors that affect risk of falls  -  [place] fall precautions as indicated by assessment   - Educate patient/family on patient safety including physical limitations  - Instruct patient to call for assistance with activity based on assessment  - Modify environment to reduce risk of injury  - Consider OT/PT consult to assist with strengthening/mobility  Outcome: Progressing     Problem: NEUROSENSORY - ADULT  Goal: Achieves stable or improved neurological status  Description: INTERVENTIONS  - Monitor and report changes in neurological status  - Monitor vital signs such as temperature, blood pressure, glucose, and any other labs ordered   - Initiate measures to prevent increased intracranial pressure  - Monitor for seizure activity and implement precautions if appropriate      Outcome: Progressing  Goal: Remains free of injury related to seizures activity  Description: INTERVENTIONS  - Maintain airway, patient safety  and administer oxygen as ordered  - Monitor patient for seizure activity, document and report duration and description of seizure to physician/advanced practitioner  - If seizure occurs,  ensure patient safety during seizure  - Reorient patient post seizure  - Seizure pads on all 4 side rails  - Instruct patient/family to notify RN of any seizure activity including if an aura is experienced  - Instruct patient/family to call for assistance with activity based on nursing assessment  - Administer anti-seizure medications if ordered    Outcome: Progressing  Goal: Achieves maximal functionality and self care  Description: INTERVENTIONS  - Monitor swallowing and airway patency with patient fatigue and changes in neurological status  - Encourage and assist patient to increase activity and self care     - Encourage visually impaired, hearing impaired and aphasic patients to use assistive/communication devices  Outcome: Progressing     Problem: CARDIOVASCULAR - ADULT  Goal: Maintains optimal cardiac output and hemodynamic stability  Description: INTERVENTIONS:  - Monitor I/O, vital signs and rhythm  - Monitor for S/S and trends of decreased cardiac output  - Administer and titrate ordered vasoactive medications to optimize hemodynamic stability  - Assess quality of pulses, skin color and temperature  - Assess for signs of decreased coronary artery perfusion  - Instruct patient to report change in severity of symptoms  Outcome: Progressing  Goal: Absence of cardiac dysrhythmias or at baseline rhythm  Description: INTERVENTIONS:  - Continuous cardiac monitoring, vital signs, obtain 12 lead EKG if ordered  - Administer antiarrhythmic and heart rate control medications as ordered  - Monitor electrolytes and administer replacement therapy as ordered  Outcome: Progressing     Problem: METABOLIC, FLUID AND ELECTROLYTES - ADULT  Goal: Electrolytes maintained within normal limits  Description: INTERVENTIONS:  - Monitor labs and assess patient for signs and symptoms of electrolyte imbalances  - Administer electrolyte replacement as ordered  - Monitor response to electrolyte replacements, including repeat lab results as appropriate  - Instruct patient on fluid and nutrition as appropriate  Outcome: Progressing  Goal: Fluid balance maintained  Description: INTERVENTIONS:  - Monitor labs   - Monitor I/O and WT  - Instruct patient on fluid and nutrition as appropriate  - Assess for signs & symptoms of volume excess or deficit  Outcome: Progressing  Goal: Glucose maintained within target range  Description: INTERVENTIONS:  - Monitor Blood Glucose as ordered  - Assess for signs and symptoms of hyperglycemia and hypoglycemia  - Administer ordered medications to maintain glucose within target range  - Assess nutritional intake and initiate nutrition service referral as needed  Outcome: Progressing     Problem: Neurological Deficit  Goal: Neurological status is stable or improving  Description: Interventions:  - Monitor and assess patient's level of consciousness, motor function, sensory function, and level of assistance needed for ADLs  - Monitor and report changes from baseline  Collaborate with interdisciplinary team to initiate plan and implement interventions as ordered  - Provide and maintain a safe environment  - Consider seizure precautions  - Consider fall precautions  - Consider aspiration precautions  - Consider bleeding precautions  Outcome: Progressing     Problem: Activity Intolerance/Impaired Mobility  Goal: Mobility/activity is maintained at optimum level for patient  Description: Interventions:  - Assess and monitor patient  barriers to mobility and need for assistive/adaptive devices  - Assess patient's emotional response to limitations  - Collaborate with interdisciplinary team and initiate plans and interventions as ordered  - Encourage independent activity per ability   - Maintain proper body alignment  - Perform active/passive rom as tolerated/ordered  - Plan activities to conserve energy   - Turn patient as appropriate  Outcome: Progressing     Problem: Communication Impairment  Goal: Ability to express needs and understand communication  Description: Assess patient's communication skills and ability to understand information  Patient will demonstrate use of effective communication techniques, alternative methods of communication and understanding even if not able to speak  - Encourage communication and provide alternate methods of communication as needed  - Collaborate with case management/ for discharge needs  - Include patient/family/caregiver in decisions related to communication    Outcome: Progressing     Problem: Potential for Aspiration  Goal: Non-ventilated patient's risk of aspiration is minimized  Description: Assess and monitor vital signs, respiratory status, and labs (WBC)  Monitor for signs of aspiration (tachypnea, cough, rales, wheezing, cyanosis, fever)  - Assess and monitor patient's ability to swallow  - Place patient up in chair to eat if possible  - HOB up at 90 degrees to eat if unable to get patient up into chair   - Supervise patient during oral intake  - Instruct patient/ family to take small bites  - Instruct patient/ family to take small single sips when taking liquids  - Follow patient-specific strategies generated by speech pathologist   Outcome: Progressing  Goal: Ventilated patient's risk of aspiration is minimized  Description: Assess and monitor vital signs, respiratory status, airway cuff pressure, and labs (WBC)  Monitor for signs of aspiration (tachypnea, cough, rales, wheezing, cyanosis, fever)  - Elevate head of bed 30 degrees if patient has tube feeding   - Monitor tube feeding  Outcome: Progressing     Problem: Nutrition  Goal: Nutrition/Hydration status is improving  Description: Monitor and assess patient's nutrition/hydration status for malnutrition (ex- brittle hair, bruises, dry skin, pale skin and conjunctiva, muscle wasting, smooth red tongue, and disorientation)  Collaborate with interdisciplinary team and initiate plan and interventions as ordered  Monitor patient's weight and dietary intake as ordered or per policy  Utilize nutrition screening tool and intervene per policy  Determine patient's food preferences and provide high-protein, high-caloric foods as appropriate  - Assist patient with eating   - Allow adequate time for meals   - Encourage patient to take dietary supplement as ordered  - Collaborate with clinical nutritionist   - Include patient/family/caregiver in decisions related to nutrition    Outcome: Progressing     Problem: PAIN - ADULT  Goal: Verbalizes/displays adequate comfort level or baseline comfort level  Description: Interventions:  - Encourage patient to monitor pain and request assistance  - Assess pain using appropriate pain scale  - Administer analgesics based on type and severity of pain and evaluate response  - Implement non-pharmacological measures as appropriate and evaluate response  - Consider cultural and social influences on pain and pain management  - Notify physician/advanced practitioner if interventions unsuccessful or patient reports new pain  Outcome: Progressing     Problem: INFECTION - ADULT  Goal: Absence or prevention of progression during hospitalization  Description: INTERVENTIONS:  - Assess and monitor for signs and symptoms of infection  - Monitor lab/diagnostic results  - Monitor all insertion sites, i e  indwelling lines, tubes, and drains  - Monitor endotracheal if appropriate and nasal secretions for changes in amount and color  - [place] appropriate cooling/warming therapies per order  - Administer medications as ordered  - Instruct and encourage patient and family to use good hand hygiene technique  - Identify and instruct in appropriate isolation precautions for identified infection/condition  Outcome: Progressing  Goal: Absence of fever/infection during neutropenic period  Description: INTERVENTIONS:  - Monitor WBC    Outcome: Progressing     Problem: SAFETY ADULT  Goal: Patient will remain free of falls  Description: INTERVENTIONS:  - Assess patient frequently for physical needs  -  Identify cognitive and physical deficits and behaviors that affect risk of falls    -  [place] fall precautions as indicated by assessment   - Educate patient/family on patient safety including physical limitations  - Instruct patient to call for assistance with activity based on assessment  - Modify environment to reduce risk of injury  - Consider OT/PT consult to assist with strengthening/mobility  Outcome: Progressing  Goal: Maintain or return to baseline ADL function  Description: INTERVENTIONS:  -  Assess patient's ability to carry out ADLs; assess patient's baseline for ADL function and identify physical deficits which impact ability to perform ADLs (bathing, care of mouth/teeth, toileting, grooming, dressing, etc )  - Assess/evaluate cause of self-care deficits   - Assess range of motion  - Assess patient's mobility; develop plan if impaired  - Assess patient's need for assistive devices and provide as appropriate  - Encourage maximum independence but intervene and supervise when necessary  - Involve family in performance of ADLs  - Assess for home care needs following discharge   - Consider OT consult to assist with ADL evaluation and planning for discharge  - Provide patient education as appropriate  Outcome: Progressing  Goal: Maintain or return mobility status to optimal level  Description: INTERVENTIONS:  - Assess patient's baseline mobility status (ambulation, transfers, stairs, etc )    - Identify cognitive and physical deficits and behaviors that affect mobility  - Identify mobility aids required to assist with transfers and/or ambulation (gait belt, sit-to-stand, lift, walker, cane, etc )  - [place] fall precautions as indicated by assessment  - Record patient progress and toleration of activity level on Mobility SBAR; progress patient to next Phase/Stage  - Instruct patient to call for assistance with activity based on assessment  - Consider rehabilitation consult to assist with strengthening/weightbearing, etc   Outcome: Progressing     Problem: DISCHARGE PLANNING  Goal: Discharge to home or other facility with appropriate resources  Description: INTERVENTIONS:  - Identify barriers to discharge w/patient and caregiver  - Arrange for needed discharge resources and transportation as appropriate  - Identify discharge learning needs (meds, wound care, etc )  - Arrange for interpretive services to assist at discharge as needed  - Refer to Case Management Department for coordinating discharge planning if the patient needs post-hospital services based on physician/advanced practitioner order or complex needs related to functional status, cognitive ability, or social support system  Outcome: Progressing     Problem: Knowledge Deficit  Goal: Patient/family/caregiver demonstrates understanding of disease process, treatment plan, medications, and discharge instructions  Description: Complete learning assessment and assess knowledge base  Interventions:  - Provide teaching at level of understanding  - Provide teaching via preferred learning methods  Outcome: Progressing     Problem: Nutrition/Hydration-ADULT  Goal: Nutrient/Hydration intake appropriate for improving, restoring or maintaining nutritional needs  Description: Monitor and assess patient's nutrition/hydration status for malnutrition  Collaborate with interdisciplinary team and initiate plan and interventions as ordered  Monitor patient's weight and dietary intake as ordered or per policy  Utilize nutrition screening tool and intervene as necessary  Determine patient's food preferences and provide high-protein, high-caloric foods as appropriate       INTERVENTIONS:  - Monitor oral intake, urinary output, labs, and treatment plans  - Assess nutrition and hydration status and recommend course of action  - Evaluate amount of meals eaten  - Assist patient with eating if necessary   - Allow adequate time for meals  - Recommend/ encourage appropriate diets, oral nutritional supplements, and vitamin/mineral supplements  - Order, calculate, and assess calorie counts as needed  - Recommend, monitor, and adjust tube feedings and TPN/PPN based on assessed needs  - Assess need for intravenous fluids  - Provide specific nutrition/hydration education as appropriate  - Include patient/family/caregiver in decisions related to nutrition  Outcome: Progressing

## 2021-03-10 NOTE — ASSESSMENT & PLAN NOTE
· Presented with headache blood pressure initially 206/116  · Treated with labetalol     Blood pressure now better controlled  · History of left basal ganglia infarct December 2020 thought to be due to hypoperfusion-was transferred to ICU for pressors  · Would use caution with antihypertensives-SBP goal 140-200

## 2021-03-10 NOTE — ASSESSMENT & PLAN NOTE
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

## 2021-03-10 NOTE — H&P
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  Distal branches are patent   No stenosis, dissection or occlusion of the carotid or vertebral arteries   · ER physician consulted Neurology who recommended consultation with interventional neurologist-no intervention indicated at this time  · Was treated for headache with ketorolac magnesium and Reglan  · Admit to stroke pathway  · Neuro checks  · MRI  · Echocardiogram completed December 2020:  No echocardiographic evidence for a cardiac source of embolism   · Neurology consult  · Aspirin  · Continue atorvastatin    Hypertensive urgency  Assessment & Plan  · Presented with headache blood pressure initially 206/116  · Treated with labetalol blood pressure 144/90  · History of left basal ganglia infarct December 2020 thought to be due to hypoperfusion-was transferred to ICU for pressors  · Would use caution with antihypertensives-SBP goal 140-200    History of CVA (cerebrovascular accident)  Assessment & Plan  · History of left basal ganglia infarct in 2020 thought to be due to hypoperfusion  · Was transferred to ICU for pressors  · At baseline right-sided deficit 4/5 strength  · Would use caution with antihypertensives-SBP goal 140-200      Hypothyroidism (acquired)  Assessment & Plan  · Last TSH 12/2020:  4 289  · Continue levothyroxine    Hypertension, essential  Assessment & Plan  · Initially hypertensive treated with labetalol  · Continue amlodipine  · Monitor blood pressure    VTE Prophylaxis: Pharmacologic VTE Prophylaxis contraindicated due to VTE score 2  / sequential compression device   Code Status:  Full  POLST: POLST form is not discussed and not completed at this time  Discussion with family:  Patient    Anticipated Length of Stay:  Patient will be admitted on an Observation basis with an anticipated length of stay of  less than 2 midnights  Justification for Hospital Stay:  Per plan above    Total Time for Visit, including Counseling / Coordination of Care: 45 minutes  Greater than 50% of this total time spent on direct patient counseling and coordination of care  Chief Complaint:   Headache    History of Present Illness:    Paul Lau is a 69 y o  female with history of essential hypertension, insomnia, CVA who presents with headache  She was found to have an elevated blood pressure, and was treated with labetalol in the ER  In December of 2020, she had a rapid response for a left basal gangliar stroke  She was admitted to the ICU for pressor support  Today she had headache which was gradual onset, located in her forehead, described as dull and aching, non-radiating, constant, and getting worse  She said she felt dizzy and nauseated  She thought she might have felt some left-sided weakness  She said it was worsened by activity and light  She denies fever or chills, congestion, shortness of breath, chest pain, vomiting, abdominal pain, dysuria, back pain, or syncope  Review of Systems:    Review of Systems   Constitutional: Negative for chills and fever  Respiratory: Negative for cough and shortness of breath  Cardiovascular: Negative for chest pain, palpitations and leg swelling  Gastrointestinal: Negative for abdominal distention, abdominal pain, constipation, diarrhea, nausea and vomiting  Genitourinary: Negative for dysuria and frequency  Musculoskeletal: Negative for arthralgias and myalgias  Neurological: Positive for dizziness and headaches  Negative for syncope and weakness  All other systems reviewed and are negative  Past Medical and Surgical History:     Past Medical History:   Diagnosis Date    Hypertension     Stroke ([address] )     TIA (transient ischemic attack)        Past Surgical History:   Procedure Laterality Date    ABLATION SOFT TISSUE         Meds/Allergies:    Prior to Admission medications    Medication Sig Start Date End Date Taking?  Authorizing Provider   acetaminophen (TYLENOL) 325 mg tablet Take 2 tablets (650 mg total) by mouth every 6 (six) hours as needed for mild pain, headaches or fever 12/17/20   Lindsey Cervantes MD   amLODIPine (NORVASC) 2 5 mg tablet Take 2 5 mg by mouth daily    Historical Provider, MD   aspirin (ECOTRIN LOW STRENGTH) 81 mg EC tablet Take 1 tablet (81 mg total) by mouth daily for 14 days 12/18/20 1/1/21  Lindsey Cervantes MD   atenolol (TENORMIN) 50 mg tablet Take 50 mg by mouth daily    Historical Provider, MD   atorvastatin (LIPITOR) 80 mg tablet Take 1 tablet (80 mg total) by mouth every evening 12/17/20   Lindsey Cervantes MD   clopidogrel (PLAVIX) 75 mg tablet Take 1 tablet (75 mg total) by mouth daily 12/18/20   Lindsey Cervantes MD   docusate sodium (COLACE) 100 mg capsule Take 1 capsule (100 mg total) by mouth 2 (two) times a day 12/17/20   Jacki Salas PA-C   fluticasone (FLONASE) 50 mcg/act nasal spray 1 spray into each nostril daily as needed for rhinitis    Historical Provider, MD   levothyroxine 25 mcg tablet Take 25 mcg by mouth daily    Historical Provider, MD   meclizine (ANTIVERT) 12 5 MG tablet Take 1 tablet (12 5 mg total) by mouth every 8 (eight) hours as needed for dizziness 12/17/20   Lindsey Cervantes MD   melatonin 3 mg Take 1 tablet (3 mg total) by mouth daily at bedtime as needed (insomnia) 12/17/20   Lindsey Cervantes MD     I have reviewed home medications with patient personally  Allergies: No Known Allergies    Social History:     Marital Status: Unknown   Occupation:  Retired  Patient Pre-hospital Living Situation:  Home  Patient Pre-hospital Level of Mobility:  Independent (sons live nearby)  Patient Pre-hospital Diet Restrictions:  Cardiac  Substance Use History:   Social History     Substance and Sexual Activity   Alcohol Use Not Currently    Frequency: Never     Social History     Tobacco Use   Smoking Status Never Smoker   Smokeless Tobacco Never Used     Social History     Substance and Sexual Activity   Drug Use Not Currently       Family History:    non-contributory    Physical Exam:     Vitals:   Blood Pressure: 109/65 (03/10/21 0120)  Pulse: 67 (03/10/21 0120)  Temperature: 98 6 °F (37 °C) (03/09/21 2318)  Temp Source: Oral (03/09/21 2029)  Respirations: 14 (03/09/21 2318)  Height: 5' 1" (154 9 cm) (03/09/21 2041)  Weight - Scale: 56 2 kg (123 lb 14 4 oz) (03/09/21 1740)  SpO2: 96 % (03/10/21 0120)    Physical Exam  Vitals signs and nursing note reviewed  Constitutional:       Appearance: She is ill-appearing  HENT:      Head: Normocephalic and atraumatic  Mouth/Throat:      Mouth: Mucous membranes are dry  Pharynx: Oropharynx is clear  Eyes:      Extraocular Movements: Extraocular movements intact  Pupils: Pupils are unequal       Comments: Right pupil 2 mm, round and reactive  Left pupil 3 mm round and reactive  Neck:      Musculoskeletal: Normal range of motion and neck supple  Cardiovascular:      Rate and Rhythm: Normal rate  Rhythm irregular  Occasional extrasystoles are present  Pulses: Normal pulses  Heart sounds: Normal heart sounds  Pulmonary:      Effort: Pulmonary effort is normal       Breath sounds: Normal breath sounds  Abdominal:      General: Abdomen is flat  Bowel sounds are normal       Palpations: Abdomen is soft  Musculoskeletal: Normal range of motion  Skin:     General: Skin is warm and dry  Capillary Refill: Capillary refill takes less than 2 seconds  Coloration: Skin is pale  Neurological:      General: No focal deficit present  Mental Status: She is alert and oriented to person, place, and time  GCS: GCS eye subscore is 4  GCS verbal subscore is 5  GCS motor subscore is 6  Motor: Weakness present  Comments: Mild right hand grasp and right left left weakness         Additional Data:     Lab Results: I have personally reviewed pertinent reports  Results from last 7 days   Lab Units 03/09/21  1749   WBC Thousand/uL 11 09*   HEMOGLOBIN g/dL 14 2   HEMATOCRIT % 41 9   PLATELETS Thousands/uL 300     Results from last 7 days   Lab Units 03/09/21  1749   SODIUM mmol/L 140   POTASSIUM mmol/L 3 6   CHLORIDE mmol/L 103   CO2 mmol/L 25   BUN mg/dL 12   CREATININE mg/dL 0 88   ANION GAP mmol/L 12   CALCIUM mg/dL 9 4   GLUCOSE RANDOM mg/dL 140     Results from last 7 days   Lab Units 03/09/21  1903   INR  1 03                   Imaging: I have personally reviewed pertinent reports  CTA stroke alert (head/neck)   Final Result by Missy Butler MD (03/09 1947)         1  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  Distal branches are patent  2   No stenosis, dissection or occlusion of the carotid or vertebral arteries  3   Small hiatal hernia and evidence of probable reflux  Findings were directly discussed with Celina Rosado on 3/9/2021 6:03 PM                      Workstation performed: [id number]         CT stroke alert brain   Final Result by Missy Butler MD (03/09 1947)      No evidence of acute vascular territorial infarction, intracranial hemorrhage or mass effect           Findings were directly discussed with Celina Rosado on 3/9/2021 5:58 PM       Workstation performed: [id number]         X-ray chest 1 view portable    (Results Pending)   MRI Inpatient Order    (Results Pending)       EKG, Pathology, and Other Studies Reviewed on Admission:   · EKG:  NSR rate of 85    Allscripts / Epic Records Reviewed: Yes     ** Please Note: This note has been constructed using a voice recognition system   **

## 2021-03-10 NOTE — PLAN OF CARE
Problem: PHYSICAL THERAPY ADULT  Goal: Performs mobility at highest level of function for planned discharge setting  See evaluation for individualized goals  Description: Treatment/Interventions: Functional transfer training, LE strengthening/ROM, Therapeutic exercise, Endurance training, Elevations, Patient/family training, Equipment eval/education, Bed mobility, Gait training, Compensatory technique education, Spoke to nursing, Spoke to case management, OT  Equipment Recommended: (spc-pt has)       See flowsheet documentation for full assessment, interventions and recommendations  Note: Prognosis: Good  Problem List: Decreased strength, Decreased endurance, Decreased mobility, Impaired balance, Impaired judgement, Decreased safety awareness, Pain  Assessment: Pt is a 69 y o  female seen for PT evaluation s/p admission to [address] [place] [address] on 3/9/2021 with TIA (transient ischemic attack)  Order placed for PT services  Upon evaluation: Pt is presenting with impaired functional mobility due to pain, decreased strength, decreased endurance, impaired balance, gait deviations, decreased safety awareness, impaired judgment and fall risk requiring stand by to steadying assistance for transfers and steadying to minimal assistance for ambulation with out AD  Pt's clinical presentation is currently unstable/unpredictable given the functional mobility deficits above, especially weakness, decreased endurance, gait deviations, pain, decreased activity tolerance, decreased functional mobility tolerance, decreased safety awareness and impaired judgement, coupled with fall risks as indicated by AM-PAC 6-Clicks: 98/08 as well as impaired balance, polypharmacy, impaired judgement and decreased safety awareness and combined with medical complications of hypertension , abnormal WBCs, multiple readmissions, fear/retreat and need for input for mobility technique/safety    Pt's PMHx and comorbidities that may affect physical performance and progress include: HTN, CVA and hypothyroidism  Personal factors affecting pt at time of IE include: step(s) to enter environment, limited home support, inability to navigate level surfaces without external assistance and inability to ambulate household distances  Pt will benefit from continued skilled PT services to address deficits as defined above and to maximize level of functional mobility to facilitate return toward PLOF and improved QOL  From PT/mobility standpoint, recommendation at time of d/c would be OP PT, home with family support and with cane pending progress in order to reduce fall risk and maximize pt's functional independence and consistency with mobility in order to facilitate return to PLOF  Recommend trial with cane next 1-2 sessions and ther ex next 1-2 sessions  Barriers to Discharge: Decreased caregiver support  Barriers to Discharge Comments: lives alone  PT Discharge Recommendation: Return to previous environment with social support, Other (Comment)(OPPT services)     PT - OK to Discharge: (when medically cleared)    See flowsheet documentation for full assessment

## 2021-03-10 NOTE — PLAN OF CARE
Problem: Potential for Falls  Goal: Patient will remain free of falls  Description: INTERVENTIONS:  - Assess patient frequently for physical needs  -  Identify cognitive and physical deficits and behaviors that affect risk of falls  -  [place] fall precautions as indicated by assessment   - Educate patient/family on patient safety including physical limitations  - Instruct patient to call for assistance with activity based on assessment  - Modify environment to reduce risk of injury  - Consider OT/PT consult to assist with strengthening/mobility  Outcome: Progressing     Problem: NEUROSENSORY - ADULT  Goal: Achieves stable or improved neurological status  Description: INTERVENTIONS  - Monitor and report changes in neurological status  - Monitor vital signs such as temperature, blood pressure, glucose, and any other labs ordered   - Initiate measures to prevent increased intracranial pressure  - Monitor for seizure activity and implement precautions if appropriate      Outcome: Progressing  Goal: Remains free of injury related to seizures activity  Description: INTERVENTIONS  - Maintain airway, patient safety  and administer oxygen as ordered  - Monitor patient for seizure activity, document and report duration and description of seizure to physician/advanced practitioner  - If seizure occurs,  ensure patient safety during seizure  - Reorient patient post seizure  - Seizure pads on all 4 side rails  - Instruct patient/family to notify RN of any seizure activity including if an aura is experienced  - Instruct patient/family to call for assistance with activity based on nursing assessment  - Administer anti-seizure medications if ordered    Outcome: Progressing  Goal: Achieves maximal functionality and self care  Description: INTERVENTIONS  - Monitor swallowing and airway patency with patient fatigue and changes in neurological status  - Encourage and assist patient to increase activity and self care     - Encourage visually impaired, hearing impaired and aphasic patients to use assistive/communication devices  Outcome: Progressing     Problem: CARDIOVASCULAR - ADULT  Goal: Maintains optimal cardiac output and hemodynamic stability  Description: INTERVENTIONS:  - Monitor I/O, vital signs and rhythm  - Monitor for S/S and trends of decreased cardiac output  - Administer and titrate ordered vasoactive medications to optimize hemodynamic stability  - Assess quality of pulses, skin color and temperature  - Assess for signs of decreased coronary artery perfusion  - Instruct patient to report change in severity of symptoms  Outcome: Progressing  Goal: Absence of cardiac dysrhythmias or at baseline rhythm  Description: INTERVENTIONS:  - Continuous cardiac monitoring, vital signs, obtain 12 lead EKG if ordered  - Administer antiarrhythmic and heart rate control medications as ordered  - Monitor electrolytes and administer replacement therapy as ordered  Outcome: Progressing     Problem: METABOLIC, FLUID AND ELECTROLYTES - ADULT  Goal: Electrolytes maintained within normal limits  Description: INTERVENTIONS:  - Monitor labs and assess patient for signs and symptoms of electrolyte imbalances  - Administer electrolyte replacement as ordered  - Monitor response to electrolyte replacements, including repeat lab results as appropriate  - Instruct patient on fluid and nutrition as appropriate  Outcome: Progressing  Goal: Fluid balance maintained  Description: INTERVENTIONS:  - Monitor labs   - Monitor I/O and WT  - Instruct patient on fluid and nutrition as appropriate  - Assess for signs & symptoms of volume excess or deficit  Outcome: Progressing  Goal: Glucose maintained within target range  Description: INTERVENTIONS:  - Monitor Blood Glucose as ordered  - Assess for signs and symptoms of hyperglycemia and hypoglycemia  - Administer ordered medications to maintain glucose within target range  - Assess nutritional intake and initiate nutrition service referral as needed  Outcome: Progressing     Problem: Neurological Deficit  Goal: Neurological status is stable or improving  Description: Interventions:  - Monitor and assess patient's level of consciousness, motor function, sensory function, and level of assistance needed for ADLs  - Monitor and report changes from baseline  Collaborate with interdisciplinary team to initiate plan and implement interventions as ordered  - Provide and maintain a safe environment  - Consider seizure precautions  - Consider fall precautions  - Consider aspiration precautions  - Consider bleeding precautions  Outcome: Progressing     Problem: Activity Intolerance/Impaired Mobility  Goal: Mobility/activity is maintained at optimum level for patient  Description: Interventions:  - Assess and monitor patient  barriers to mobility and need for assistive/adaptive devices  - Assess patient's emotional response to limitations  - Collaborate with interdisciplinary team and initiate plans and interventions as ordered  - Encourage independent activity per ability   - Maintain proper body alignment  - Perform active/passive rom as tolerated/ordered  - Plan activities to conserve energy   - Turn patient as appropriate  Outcome: Progressing     Problem: Communication Impairment  Goal: Ability to express needs and understand communication  Description: Assess patient's communication skills and ability to understand information  Patient will demonstrate use of effective communication techniques, alternative methods of communication and understanding even if not able to speak  - Encourage communication and provide alternate methods of communication as needed  - Collaborate with case management/ for discharge needs  - Include patient/family/caregiver in decisions related to communication    Outcome: Progressing     Problem: Potential for Aspiration  Goal: Non-ventilated patient's risk of aspiration is minimized  Description: Assess and monitor vital signs, respiratory status, and labs (WBC)  Monitor for signs of aspiration (tachypnea, cough, rales, wheezing, cyanosis, fever)  - Assess and monitor patient's ability to swallow  - Place patient up in chair to eat if possible  - HOB up at 90 degrees to eat if unable to get patient up into chair   - Supervise patient during oral intake  - Instruct patient/ family to take small bites  - Instruct patient/ family to take small single sips when taking liquids  - Follow patient-specific strategies generated by speech pathologist   Outcome: Progressing  Goal: Ventilated patient's risk of aspiration is minimized  Description: Assess and monitor vital signs, respiratory status, airway cuff pressure, and labs (WBC)  Monitor for signs of aspiration (tachypnea, cough, rales, wheezing, cyanosis, fever)  - Elevate head of bed 30 degrees if patient has tube feeding   - Monitor tube feeding  Outcome: Progressing     Problem: Nutrition  Goal: Nutrition/Hydration status is improving  Description: Monitor and assess patient's nutrition/hydration status for malnutrition (ex- brittle hair, bruises, dry skin, pale skin and conjunctiva, muscle wasting, smooth red tongue, and disorientation)  Collaborate with interdisciplinary team and initiate plan and interventions as ordered  Monitor patient's weight and dietary intake as ordered or per policy  Utilize nutrition screening tool and intervene per policy  Determine patient's food preferences and provide high-protein, high-caloric foods as appropriate  - Assist patient with eating   - Allow adequate time for meals   - Encourage patient to take dietary supplement as ordered  - Collaborate with clinical nutritionist   - Include patient/family/caregiver in decisions related to nutrition    Outcome: Progressing     Problem: PAIN - ADULT  Goal: Verbalizes/displays adequate comfort level or baseline comfort level  Description: Interventions:  - Encourage patient to monitor pain and request assistance  - Assess pain using appropriate pain scale  - Administer analgesics based on type and severity of pain and evaluate response  - Implement non-pharmacological measures as appropriate and evaluate response  - Consider cultural and social influences on pain and pain management  - Notify physician/advanced practitioner if interventions unsuccessful or patient reports new pain  Outcome: Progressing     Problem: INFECTION - ADULT  Goal: Absence or prevention of progression during hospitalization  Description: INTERVENTIONS:  - Assess and monitor for signs and symptoms of infection  - Monitor lab/diagnostic results  - Monitor all insertion sites, i e  indwelling lines, tubes, and drains  - Monitor endotracheal if appropriate and nasal secretions for changes in amount and color  - [place] appropriate cooling/warming therapies per order  - Administer medications as ordered  - Instruct and encourage patient and family to use good hand hygiene technique  - Identify and instruct in appropriate isolation precautions for identified infection/condition  Outcome: Progressing  Goal: Absence of fever/infection during neutropenic period  Description: INTERVENTIONS:  - Monitor WBC    Outcome: Progressing     Problem: SAFETY ADULT  Goal: Patient will remain free of falls  Description: INTERVENTIONS:  - Assess patient frequently for physical needs  -  Identify cognitive and physical deficits and behaviors that affect risk of falls    -  [place] fall precautions as indicated by assessment   - Educate patient/family on patient safety including physical limitations  - Instruct patient to call for assistance with activity based on assessment  - Modify environment to reduce risk of injury  - Consider OT/PT consult to assist with strengthening/mobility  Outcome: Progressing  Goal: Maintain or return to baseline ADL function  Description: INTERVENTIONS:  -  Assess patient's ability to carry out ADLs; assess patient's baseline for ADL function and identify physical deficits which impact ability to perform ADLs (bathing, care of mouth/teeth, toileting, grooming, dressing, etc )  - Assess/evaluate cause of self-care deficits   - Assess range of motion  - Assess patient's mobility; develop plan if impaired  - Assess patient's need for assistive devices and provide as appropriate  - Encourage maximum independence but intervene and supervise when necessary  - Involve family in performance of ADLs  - Assess for home care needs following discharge   - Consider OT consult to assist with ADL evaluation and planning for discharge  - Provide patient education as appropriate  Outcome: Progressing  Goal: Maintain or return mobility status to optimal level  Description: INTERVENTIONS:  - Assess patient's baseline mobility status (ambulation, transfers, stairs, etc )    - Identify cognitive and physical deficits and behaviors that affect mobility  - Identify mobility aids required to assist with transfers and/or ambulation (gait belt, sit-to-stand, lift, walker, cane, etc )  - [place] fall precautions as indicated by assessment  - Record patient progress and toleration of activity level on Mobility SBAR; progress patient to next Phase/Stage  - Instruct patient to call for assistance with activity based on assessment  - Consider rehabilitation consult to assist with strengthening/weightbearing, etc   Outcome: Progressing     Problem: DISCHARGE PLANNING  Goal: Discharge to home or other facility with appropriate resources  Description: INTERVENTIONS:  - Identify barriers to discharge w/patient and caregiver  - Arrange for needed discharge resources and transportation as appropriate  - Identify discharge learning needs (meds, wound care, etc )  - Arrange for interpretive services to assist at discharge as needed  - Refer to Case Management Department for coordinating discharge planning if the patient needs post-hospital services based on physician/advanced practitioner order or complex needs related to functional status, cognitive ability, or social support system  Outcome: Progressing     Problem: Knowledge Deficit  Goal: Patient/family/caregiver demonstrates understanding of disease process, treatment plan, medications, and discharge instructions  Description: Complete learning assessment and assess knowledge base  Interventions:  - Provide teaching at level of understanding  - Provide teaching via preferred learning methods  Outcome: Progressing     Problem: Nutrition/Hydration-ADULT  Goal: Nutrient/Hydration intake appropriate for improving, restoring or maintaining nutritional needs  Description: Monitor and assess patient's nutrition/hydration status for malnutrition  Collaborate with interdisciplinary team and initiate plan and interventions as ordered  Monitor patient's weight and dietary intake as ordered or per policy  Utilize nutrition screening tool and intervene as necessary  Determine patient's food preferences and provide high-protein, high-caloric foods as appropriate       INTERVENTIONS:  - Monitor oral intake, urinary output, labs, and treatment plans  - Assess nutrition and hydration status and recommend course of action  - Evaluate amount of meals eaten  - Assist patient with eating if necessary   - Allow adequate time for meals  - Recommend/ encourage appropriate diets, oral nutritional supplements, and vitamin/mineral supplements  - Order, calculate, and assess calorie counts as needed  - Recommend, monitor, and adjust tube feedings and TPN/PPN based on assessed needs  - Assess need for intravenous fluids  - Provide specific nutrition/hydration education as appropriate  - Include patient/family/caregiver in decisions related to nutrition  Outcome: Progressing

## 2021-03-10 NOTE — PLAN OF CARE
Problem: PHYSICAL THERAPY ADULT  Goal: Performs mobility at highest level of function for planned discharge setting  See evaluation for individualized goals  Description: Treatment/Interventions: Functional transfer training, LE strengthening/ROM, Therapeutic exercise, Endurance training, Elevations, Patient/family training, Equipment eval/education, Bed mobility, Gait training, Compensatory technique education, Spoke to nursing, Spoke to case management, OT  Equipment Recommended: (spc-pt has)       See flowsheet documentation for full assessment, interventions and recommendations  [id number] by Davis Villegas PT  Outcome: Progressing  Note: Prognosis: Good  Problem List: Decreased strength, Decreased endurance, Decreased mobility, Impaired balance, Impaired judgement, Decreased safety awareness, Pain  Pt requires increased time to complete mobility  She requires steadying assistance without AD and close supervision with spc without needing to reach for external support and increased step length noted  She is limited by decreased balance and endurance  She will continue to benefit from PT services to maximize LOF  Barriers to Discharge: Decreased caregiver support  Barriers to Discharge Comments: lives alone  PT Discharge Recommendation: Return to previous environment with social support, Other (Comment)(OPPT services)     PT - OK to Discharge: (when medically cleared)    See flowsheet documentation for full assessment

## 2021-03-11 VITALS
DIASTOLIC BLOOD PRESSURE: 81 MMHG | WEIGHT: 123.9 LBS | TEMPERATURE: 98.5 F | HEART RATE: 71 BPM | SYSTOLIC BLOOD PRESSURE: 149 MMHG | BODY MASS INDEX: 23.39 KG/M2 | HEIGHT: 61 IN | RESPIRATION RATE: 18 BRPM | OXYGEN SATURATION: 99 %

## 2021-03-11 LAB — GLUCOSE SERPL-MCNC: 128 MG/DL (ref 65–140)

## 2021-03-11 PROCEDURE — 92526 ORAL FUNCTION THERAPY: CPT

## 2021-03-11 PROCEDURE — 99239 HOSP IP/OBS DSCHRG MGMT >30: CPT | Performed by: PHYSICIAN ASSISTANT

## 2021-03-11 RX ORDER — AMLODIPINE BESYLATE 2.5 MG/1
5 TABLET ORAL DAILY
Qty: 30 TABLET | Refills: 0 | Status: SHIPPED | OUTPATIENT
Start: 2021-03-11 | End: 2021-03-11 | Stop reason: SDUPTHER

## 2021-03-11 RX ORDER — AMLODIPINE BESYLATE 2.5 MG/1
2.5 TABLET ORAL DAILY
Qty: 30 TABLET | Refills: 0 | Status: SHIPPED | OUTPATIENT
Start: 2021-03-11

## 2021-03-11 RX ORDER — DIVALPROEX SODIUM 250 MG/1
250 TABLET, DELAYED RELEASE ORAL
Qty: 14 TABLET | Refills: 0 | Status: SHIPPED | OUTPATIENT
Start: 2021-03-11 | End: 2021-03-25

## 2021-03-11 RX ORDER — ONDANSETRON 4 MG/1
4 TABLET, FILM COATED ORAL EVERY 12 HOURS PRN
Qty: 10 TABLET | Refills: 0 | Status: SHIPPED | OUTPATIENT
Start: 2021-03-11

## 2021-03-11 RX ORDER — POLYETHYLENE GLYCOL 3350 17 G/17G
17 POWDER, FOR SOLUTION ORAL DAILY PRN
Status: DISCONTINUED | OUTPATIENT
Start: 2021-03-11 | End: 2021-03-11 | Stop reason: HOSPADM

## 2021-03-11 RX ADMIN — METOCLOPRAMIDE HYDROCHLORIDE 10 MG: 5 INJECTION INTRAMUSCULAR; INTRAVENOUS at 05:52

## 2021-03-11 RX ADMIN — POLYETHYLENE GLYCOL 3350 17 G: 17 POWDER, FOR SOLUTION ORAL at 11:15

## 2021-03-11 RX ADMIN — LEVOTHYROXINE SODIUM 25 MCG: 25 TABLET ORAL at 05:52

## 2021-03-11 RX ADMIN — METOCLOPRAMIDE HYDROCHLORIDE 10 MG: 5 INJECTION INTRAMUSCULAR; INTRAVENOUS at 11:15

## 2021-03-11 RX ADMIN — CLOPIDOGREL BISULFATE 75 MG: 75 TABLET ORAL at 09:37

## 2021-03-11 RX ADMIN — ACETAMINOPHEN 650 MG: 325 TABLET ORAL at 15:43

## 2021-03-11 RX ADMIN — ASPIRIN 81 MG: 81 TABLET, CHEWABLE ORAL at 09:37

## 2021-03-11 RX ADMIN — ACETAMINOPHEN 650 MG: 325 TABLET ORAL at 05:52

## 2021-03-11 NOTE — ASSESSMENT & PLAN NOTE
· Initially hypertensive treated with labetalol  · Continue amlodipine  · Monitor blood pressure  · Patient has PCP follow-up next week  Asked patient to do twice daily blood pressure readings and bring log to PCP

## 2021-03-11 NOTE — ASSESSMENT & PLAN NOTE
· History of left basal ganglia infarct in 2020 thought to be due to hypoperfusion  · Was transferred to ICU for pressors  · At baseline right-sided deficit 4/5 strength  · Can now approach eu-tension

## 2021-03-11 NOTE — DISCHARGE SUMMARY
[address]  Discharge- Peder Mage 1950, 69 y o  female MRN: [id number]  Unit/Bed#: -01 Encounter: [id number]  Primary Care Provider: Salima Section,    Date and time admitted to hospital: 3/9/2021  5:37 PM    * Intractable chronic migraine without aura and with status migrainosus  Assessment & Plan  Patient complained of 8-10 x 10 headache which is behind both the eyes  Patient received course of magnesium, Toradol, Reglan and no improvement noted of headache  Will repeat a course of IV magnesium, Toradol, Reglan an add dexamethasone 4 mg IV x1 and see there is any improvement of her headache  Patient with significant improvement of her migraine    Discussed with Neurology 03/11/2021  Patient can be discharged with Mag-Ox 400 mg daily and Depakote 250 mg q h s  X2 weeks  Discontinue Mag-Ox if patient develops diarrhea  Discussed with patient and son  History of CVA (cerebrovascular accident)  Assessment & Plan  · History of left basal ganglia infarct in 2020 thought to be due to hypoperfusion  · Was transferred to ICU for pressors  · At baseline right-sided deficit 4/5 strength  · Can now approach eu-tension      TIA (transient ischemic attack)  Assessment & Plan  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  Distal branches are patent   No stenosis, dissection or occlusion of the carotid or vertebral arteries   · ER physician consulted Neurology who recommended consultation with interventional neurologist-no intervention indicated at this time  · Was treated for headache with ketorolac magnesium and Reglan  · Admit to stroke pathway  Current NIH score is 0  · Neuro checks  · MRI  · Echocardiogram completed December 2020:  No echocardiographic evidence for a cardiac source of embolism   · Neurology consult appreciated - discussed with Neurology 03/11/2021  Patient can be discharged with Mag-Ox 400 mg daily and Depakote 250 mg q h s  X2 weeks  Discontinue Mag-Ox if patient develops diarrhea  · Aspirin  · Continue atorvastatin    Hypothyroidism (acquired)  Assessment & Plan  · Last TSH 12/2020:  4 289  · Continue levothyroxine    Hypertension, essential  Assessment & Plan  · Initially hypertensive treated with labetalol  · Continue amlodipine  · Monitor blood pressure  · Patient has PCP follow-up next week  Asked patient to do twice daily blood pressure readings and bring log to PCP  Hypertensive urgency  Assessment & Plan  · Presented with headache blood pressure initially 206/116  · Treated with labetalol   Blood pressure now better controlled  · History of left basal ganglia infarct December 2020 thought to be due to hypoperfusion-was transferred to ICU for pressors  · Would use caution with antihypertensives-SBP goal 140-200    Discharging Physician / Practitioner: Uvaldo Fuller PA-C  PCP: Santhosh Carter DO  Admission Date:   Admission Orders (From admission, onward)     Ordered        03/10/21 1448  Inpatient Admission  Once         03/09/21 1925  Place in Observation  Once                   Discharge Date: 03/11/21    Resolved Problems  Date Reviewed: 3/11/2021    None          Consultations During Hospital Stay:  · Neurology    Procedures Performed:   · None    Significant Findings / Test Results:   · MRI shows prior CVA but no new CVA  Incidental Findings:   · None     Test Results Pending at Discharge (will require follow up):    · None     Outpatient Tests Requested:  · None    Complications:  None    Reason for Admission:  Headache, rule out CVA    Hospital Course:     Brii Almaguer is a 69 y o  female patient with past medical history of HTN and prior CVA who originally presented to the hospital on 3/9/2021 due to headache  Patient also presented with hypertensive urgency  She had a recent CVA in December 2020 a which time she was admitted to ICU for pressor support  She has residual right-sided weakness and slurred speech  The patient noted gradual onset of headache on 03/09  Location was behind her eyes bilaterally and she described the pain as dull and aching  This was associated with dizziness nausea  Upon presentation to the ED, patient admitted on stroke pathway given history  She was seen in conjunction with neurology consultation and given migraine cocktail as well  Patient with gradual improvement during this hospitalization  MRI did not show new stroke  Was assessed by PT/OT who recommended return to prior living environment  On 03/11/2021, patient notes significant improvement of migraine however it is partially present  Discharge regimen discussed with Neurology  Will send on Depakote 250 mg q h s  for 2 weeks as well as Mag-Ox which should be discontinued if patient develops diarrhea  Discussed with patient and her son Ida Lee who are agreeable to plan of care stated  Patient interested in return home later today  No further inpatient needs at this time  Please see above list of diagnoses and related plan for additional information  Condition at Discharge: good     Discharge Day Visit / Exam:     Subjective:  Migraine only 3/10 and is manageable  No vision changes  No shortness of breath  No chest pain  Able to ambulate without any change from prior  Tolerating diet  Slight nausea    Vitals: Blood Pressure: 145/82 (03/11/21 0726)  Pulse: 78 (03/11/21 0726)  Temperature: 98 3 °F (36 8 °C) (03/11/21 0726)  Temp Source: Axillary (03/10/21 1755)  Respirations: 18 (03/11/21 0726)  Height: 5' 1" (154 9 cm) (03/09/21 2041)  Weight - Scale: 56 2 kg (123 lb 14 4 oz) (03/09/21 1740)  SpO2: 99 % (03/11/21 0726)  Exam:   Physical Exam  Vitals signs and nursing note reviewed  Exam conducted with a chaperone present  Constitutional:       General: She is not in acute distress  Appearance: She is well-developed  Comments: Pleasant, fully conversational   Alert and oriented  HENT:      Head: Normocephalic and atraumatic  Eyes:      Conjunctiva/sclera: Conjunctivae normal    Neck:      Musculoskeletal: Neck supple  Cardiovascular:      Rate and Rhythm: Normal rate and regular rhythm  Heart sounds: No murmur  Pulmonary:      Effort: Pulmonary effort is normal  No respiratory distress  Breath sounds: Normal breath sounds  Abdominal:      Palpations: Abdomen is soft  Tenderness: There is no abdominal tenderness  Skin:     General: Skin is warm and dry  Neurological:      Mental Status: She is alert  Mental status is at baseline  Comments: Patient with slight slurring of speech and right-sided weakness which is her baseline       Discussion with Family:  Discussed over the phone on speaker phone in patient's room with yassine Thomas  Discharge instructions/Information to patient and family:   See after visit summary for information provided to patient and family  Provisions for Follow-Up Care:  See after visit summary for information related to follow-up care and any pertinent home health orders  Disposition:     Home    For Discharges to [facility]F:   · Not Applicable to this Patient - Not Applicable to this Patient    Planned Readmission: none     Discharge Statement:  I spent 45 minutes discharging the patient  This time was spent on the day of discharge  I had direct contact with the patient on the day of discharge   Greater than 50% of the total time was spent examining patient, answering all patient questions, arranging and discussing plan of care with patient as well as directly providing post-discharge instructions  Additional time then spent on discharge activities  Discharge Medications:  See after visit summary for reconciled discharge medications provided to patient and family        ** Please Note: This note has been constructed using a voice recognition system **

## 2021-03-11 NOTE — ASSESSMENT & PLAN NOTE
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  Distal branches are patent  No stenosis, dissection or occlusion of the carotid or vertebral arteries   · ER physician consulted Neurology who recommended consultation with interventional neurologist-no intervention indicated at this time  · Was treated for headache with ketorolac magnesium and Reglan  · Admit to stroke pathway  Current NIH score is 0  · Neuro checks  · MRI  · Echocardiogram completed December 2020:  No echocardiographic evidence for a cardiac source of embolism   · Neurology consult appreciated - discussed with Neurology 03/11/2021  Patient can be discharged with Mag-Ox 400 mg daily and Depakote 250 mg q h s  X2 weeks  Discontinue Mag-Ox if patient develops diarrhea    · Aspirin  · Continue atorvastatin

## 2021-03-11 NOTE — PLAN OF CARE
Problem: Potential for Falls  Goal: Patient will remain free of falls  Description: INTERVENTIONS:  - Assess patient frequently for physical needs  -  Identify cognitive and physical deficits and behaviors that affect risk of falls  -  [place] fall precautions as indicated by assessment   - Educate patient/family on patient safety including physical limitations  - Instruct patient to call for assistance with activity based on assessment  - Modify environment to reduce risk of injury  - Consider OT/PT consult to assist with strengthening/mobility  Outcome: Progressing     Problem: NEUROSENSORY - ADULT  Goal: Achieves stable or improved neurological status  Description: INTERVENTIONS  - Monitor and report changes in neurological status  - Monitor vital signs such as temperature, blood pressure, glucose, and any other labs ordered   - Initiate measures to prevent increased intracranial pressure  - Monitor for seizure activity and implement precautions if appropriate      Outcome: Progressing  Goal: Remains free of injury related to seizures activity  Description: INTERVENTIONS  - Maintain airway, patient safety  and administer oxygen as ordered  - Monitor patient for seizure activity, document and report duration and description of seizure to physician/advanced practitioner  - If seizure occurs,  ensure patient safety during seizure  - Reorient patient post seizure  - Seizure pads on all 4 side rails  - Instruct patient/family to notify RN of any seizure activity including if an aura is experienced  - Instruct patient/family to call for assistance with activity based on nursing assessment  - Administer anti-seizure medications if ordered    Outcome: Progressing  Goal: Achieves maximal functionality and self care  Description: INTERVENTIONS  - Monitor swallowing and airway patency with patient fatigue and changes in neurological status  - Encourage and assist patient to increase activity and self care     - Encourage visually impaired, hearing impaired and aphasic patients to use assistive/communication devices  Outcome: Progressing     Problem: CARDIOVASCULAR - ADULT  Goal: Maintains optimal cardiac output and hemodynamic stability  Description: INTERVENTIONS:  - Monitor I/O, vital signs and rhythm  - Monitor for S/S and trends of decreased cardiac output  - Administer and titrate ordered vasoactive medications to optimize hemodynamic stability  - Assess quality of pulses, skin color and temperature  - Assess for signs of decreased coronary artery perfusion  - Instruct patient to report change in severity of symptoms  Outcome: Progressing  Goal: Absence of cardiac dysrhythmias or at baseline rhythm  Description: INTERVENTIONS:  - Continuous cardiac monitoring, vital signs, obtain 12 lead EKG if ordered  - Administer antiarrhythmic and heart rate control medications as ordered  - Monitor electrolytes and administer replacement therapy as ordered  Outcome: Progressing     Problem: METABOLIC, FLUID AND ELECTROLYTES - ADULT  Goal: Electrolytes maintained within normal limits  Description: INTERVENTIONS:  - Monitor labs and assess patient for signs and symptoms of electrolyte imbalances  - Administer electrolyte replacement as ordered  - Monitor response to electrolyte replacements, including repeat lab results as appropriate  - Instruct patient on fluid and nutrition as appropriate  Outcome: Progressing  Goal: Fluid balance maintained  Description: INTERVENTIONS:  - Monitor labs   - Monitor I/O and WT  - Instruct patient on fluid and nutrition as appropriate  - Assess for signs & symptoms of volume excess or deficit  Outcome: Progressing  Goal: Glucose maintained within target range  Description: INTERVENTIONS:  - Monitor Blood Glucose as ordered  - Assess for signs and symptoms of hyperglycemia and hypoglycemia  - Administer ordered medications to maintain glucose within target range  - Assess nutritional intake and initiate nutrition service referral as needed  Outcome: Progressing     Problem: Neurological Deficit  Goal: Neurological status is stable or improving  Description: Interventions:  - Monitor and assess patient's level of consciousness, motor function, sensory function, and level of assistance needed for ADLs  - Monitor and report changes from baseline  Collaborate with interdisciplinary team to initiate plan and implement interventions as ordered  - Provide and maintain a safe environment  - Consider seizure precautions  - Consider fall precautions  - Consider aspiration precautions  - Consider bleeding precautions  Outcome: Progressing     Problem: Activity Intolerance/Impaired Mobility  Goal: Mobility/activity is maintained at optimum level for patient  Description: Interventions:  - Assess and monitor patient  barriers to mobility and need for assistive/adaptive devices  - Assess patient's emotional response to limitations  - Collaborate with interdisciplinary team and initiate plans and interventions as ordered  - Encourage independent activity per ability   - Maintain proper body alignment  - Perform active/passive rom as tolerated/ordered  - Plan activities to conserve energy   - Turn patient as appropriate  Outcome: Progressing     Problem: Communication Impairment  Goal: Ability to express needs and understand communication  Description: Assess patient's communication skills and ability to understand information  Patient will demonstrate use of effective communication techniques, alternative methods of communication and understanding even if not able to speak  - Encourage communication and provide alternate methods of communication as needed  - Collaborate with case management/ for discharge needs  - Include patient/family/caregiver in decisions related to communication    Outcome: Progressing     Problem: Potential for Aspiration  Goal: Non-ventilated patient's risk of aspiration is minimized  Description: Assess and monitor vital signs, respiratory status, and labs (WBC)  Monitor for signs of aspiration (tachypnea, cough, rales, wheezing, cyanosis, fever)  - Assess and monitor patient's ability to swallow  - Place patient up in chair to eat if possible  - HOB up at 90 degrees to eat if unable to get patient up into chair   - Supervise patient during oral intake  - Instruct patient/ family to take small bites  - Instruct patient/ family to take small single sips when taking liquids  - Follow patient-specific strategies generated by speech pathologist   Outcome: Progressing  Goal: Ventilated patient's risk of aspiration is minimized  Description: Assess and monitor vital signs, respiratory status, airway cuff pressure, and labs (WBC)  Monitor for signs of aspiration (tachypnea, cough, rales, wheezing, cyanosis, fever)  - Elevate head of bed 30 degrees if patient has tube feeding   - Monitor tube feeding  Outcome: Progressing     Problem: Nutrition  Goal: Nutrition/Hydration status is improving  Description: Monitor and assess patient's nutrition/hydration status for malnutrition (ex- brittle hair, bruises, dry skin, pale skin and conjunctiva, muscle wasting, smooth red tongue, and disorientation)  Collaborate with interdisciplinary team and initiate plan and interventions as ordered  Monitor patient's weight and dietary intake as ordered or per policy  Utilize nutrition screening tool and intervene per policy  Determine patient's food preferences and provide high-protein, high-caloric foods as appropriate  - Assist patient with eating   - Allow adequate time for meals   - Encourage patient to take dietary supplement as ordered  - Collaborate with clinical nutritionist   - Include patient/family/caregiver in decisions related to nutrition    Outcome: Progressing     Problem: PAIN - ADULT  Goal: Verbalizes/displays adequate comfort level or baseline comfort level  Description: Interventions:  - Encourage patient to monitor pain and request assistance  - Assess pain using appropriate pain scale  - Administer analgesics based on type and severity of pain and evaluate response  - Implement non-pharmacological measures as appropriate and evaluate response  - Consider cultural and social influences on pain and pain management  - Notify physician/advanced practitioner if interventions unsuccessful or patient reports new pain  Outcome: Progressing     Problem: INFECTION - ADULT  Goal: Absence or prevention of progression during hospitalization  Description: INTERVENTIONS:  - Assess and monitor for signs and symptoms of infection  - Monitor lab/diagnostic results  - Monitor all insertion sites, i e  indwelling lines, tubes, and drains  - Monitor endotracheal if appropriate and nasal secretions for changes in amount and color  - [place] appropriate cooling/warming therapies per order  - Administer medications as ordered  - Instruct and encourage patient and family to use good hand hygiene technique  - Identify and instruct in appropriate isolation precautions for identified infection/condition  Outcome: Progressing  Goal: Absence of fever/infection during neutropenic period  Description: INTERVENTIONS:  - Monitor WBC    Outcome: Progressing     Problem: SAFETY ADULT  Goal: Patient will remain free of falls  Description: INTERVENTIONS:  - Assess patient frequently for physical needs  -  Identify cognitive and physical deficits and behaviors that affect risk of falls    -  [place] fall precautions as indicated by assessment   - Educate patient/family on patient safety including physical limitations  - Instruct patient to call for assistance with activity based on assessment  - Modify environment to reduce risk of injury  - Consider OT/PT consult to assist with strengthening/mobility  Outcome: Progressing  Goal: Maintain or return to baseline ADL function  Description: INTERVENTIONS:  -  Assess patient's ability to carry out ADLs; assess patient's baseline for ADL function and identify physical deficits which impact ability to perform ADLs (bathing, care of mouth/teeth, toileting, grooming, dressing, etc )  - Assess/evaluate cause of self-care deficits   - Assess range of motion  - Assess patient's mobility; develop plan if impaired  - Assess patient's need for assistive devices and provide as appropriate  - Encourage maximum independence but intervene and supervise when necessary  - Involve family in performance of ADLs  - Assess for home care needs following discharge   - Consider OT consult to assist with ADL evaluation and planning for discharge  - Provide patient education as appropriate  Outcome: Progressing  Goal: Maintain or return mobility status to optimal level  Description: INTERVENTIONS:  - Assess patient's baseline mobility status (ambulation, transfers, stairs, etc )    - Identify cognitive and physical deficits and behaviors that affect mobility  - Identify mobility aids required to assist with transfers and/or ambulation (gait belt, sit-to-stand, lift, walker, cane, etc )  - [place] fall precautions as indicated by assessment  - Record patient progress and toleration of activity level on Mobility SBAR; progress patient to next Phase/Stage  - Instruct patient to call for assistance with activity based on assessment  - Consider rehabilitation consult to assist with strengthening/weightbearing, etc   Outcome: Progressing     Problem: DISCHARGE PLANNING  Goal: Discharge to home or other facility with appropriate resources  Description: INTERVENTIONS:  - Identify barriers to discharge w/patient and caregiver  - Arrange for needed discharge resources and transportation as appropriate  - Identify discharge learning needs (meds, wound care, etc )  - Arrange for interpretive services to assist at discharge as needed  - Refer to Case Management Department for coordinating discharge planning if the patient needs post-hospital services based on physician/advanced practitioner order or complex needs related to functional status, cognitive ability, or social support system  Outcome: Progressing     Problem: Knowledge Deficit  Goal: Patient/family/caregiver demonstrates understanding of disease process, treatment plan, medications, and discharge instructions  Description: Complete learning assessment and assess knowledge base  Interventions:  - Provide teaching at level of understanding  - Provide teaching via preferred learning methods  Outcome: Progressing     Problem: Nutrition/Hydration-ADULT  Goal: Nutrient/Hydration intake appropriate for improving, restoring or maintaining nutritional needs  Description: Monitor and assess patient's nutrition/hydration status for malnutrition  Collaborate with interdisciplinary team and initiate plan and interventions as ordered  Monitor patient's weight and dietary intake as ordered or per policy  Utilize nutrition screening tool and intervene as necessary  Determine patient's food preferences and provide high-protein, high-caloric foods as appropriate       INTERVENTIONS:  - Monitor oral intake, urinary output, labs, and treatment plans  - Assess nutrition and hydration status and recommend course of action  - Evaluate amount of meals eaten  - Assist patient with eating if necessary   - Allow adequate time for meals  - Recommend/ encourage appropriate diets, oral nutritional supplements, and vitamin/mineral supplements  - Order, calculate, and assess calorie counts as needed  - Recommend, monitor, and adjust tube feedings and TPN/PPN based on assessed needs  - Assess need for intravenous fluids  - Provide specific nutrition/hydration education as appropriate  - Include patient/family/caregiver in decisions related to nutrition  Outcome: Progressing

## 2021-03-11 NOTE — DISCHARGE INSTRUCTIONS
Please check blood pressure twice daily and bring log to your primary care provider next week  You may benefit to follow with Neurology provider  If helpful, discuss with your primary care provider who can send a referral to someone in your area

## 2021-03-11 NOTE — NURSING NOTE
Discharge instructions and medications reviewed with pt  IV d/c'd  No written scripts  All questions answered

## 2021-03-11 NOTE — PLAN OF CARE
Problem: Potential for Falls  Goal: Patient will remain free of falls  Description: INTERVENTIONS:  - Assess patient frequently for physical needs  -  Identify cognitive and physical deficits and behaviors that affect risk of falls    -  [place] fall precautions as indicated by assessment   - Educate patient/family on patient safety including physical limitations  - Instruct patient to call for assistance with activity based on assessment  - Modify environment to reduce risk of injury  - Consider OT/PT consult to assist with strengthening/mobility  3/11/2021 1423 by Lary Hunt RN  Outcome: Completed  3/11/2021 0944 by Lary Hunt RN  Outcome: Progressing     Problem: NEUROSENSORY - ADULT  Goal: Achieves stable or improved neurological status  Description: INTERVENTIONS  - Monitor and report changes in neurological status  - Monitor vital signs such as temperature, blood pressure, glucose, and any other labs ordered   - Initiate measures to prevent increased intracranial pressure  - Monitor for seizure activity and implement precautions if appropriate      3/11/2021 1423 by Lary Hunt RN  Outcome: Completed  3/11/2021 0944 by Lary Hunt RN  Outcome: Progressing  Goal: Remains free of injury related to seizures activity  Description: INTERVENTIONS  - Maintain airway, patient safety  and administer oxygen as ordered  - Monitor patient for seizure activity, document and report duration and description of seizure to physician/advanced practitioner  - If seizure occurs,  ensure patient safety during seizure  - Reorient patient post seizure  - Seizure pads on all 4 side rails  - Instruct patient/family to notify RN of any seizure activity including if an aura is experienced  - Instruct patient/family to call for assistance with activity based on nursing assessment  - Administer anti-seizure medications if ordered    3/11/2021 1423 by Lary Hunt RN  Outcome: Completed  3/11/2021 0944 by Edu Galicia ASAD Nguyen  Outcome: Progressing  Goal: Achieves maximal functionality and self care  Description: INTERVENTIONS  - Monitor swallowing and airway patency with patient fatigue and changes in neurological status  - Encourage and assist patient to increase activity and self care     - Encourage visually impaired, hearing impaired and aphasic patients to use assistive/communication devices  3/11/2021 1423 by Anna Valentino RN  Outcome: Completed  3/11/2021 0944 by Anna Valentino RN  Outcome: Progressing     Problem: CARDIOVASCULAR - ADULT  Goal: Maintains optimal cardiac output and hemodynamic stability  Description: INTERVENTIONS:  - Monitor I/O, vital signs and rhythm  - Monitor for S/S and trends of decreased cardiac output  - Administer and titrate ordered vasoactive medications to optimize hemodynamic stability  - Assess quality of pulses, skin color and temperature  - Assess for signs of decreased coronary artery perfusion  - Instruct patient to report change in severity of symptoms  3/11/2021 1423 by Anna Valentino RN  Outcome: Completed  3/11/2021 0944 by Anna Valentino RN  Outcome: Progressing  Goal: Absence of cardiac dysrhythmias or at baseline rhythm  Description: INTERVENTIONS:  - Continuous cardiac monitoring, vital signs, obtain 12 lead EKG if ordered  - Administer antiarrhythmic and heart rate control medications as ordered  - Monitor electrolytes and administer replacement therapy as ordered  3/11/2021 1423 by Anna Valentino RN  Outcome: Completed  3/11/2021 0944 by Anna Valentino RN  Outcome: Progressing     Problem: METABOLIC, FLUID AND ELECTROLYTES - ADULT  Goal: Electrolytes maintained within normal limits  Description: INTERVENTIONS:  - Monitor labs and assess patient for signs and symptoms of electrolyte imbalances  - Administer electrolyte replacement as ordered  - Monitor response to electrolyte replacements, including repeat lab results as appropriate  - Instruct patient on fluid and nutrition as appropriate  3/11/2021 1423 by Jesus Rodas RN  Outcome: Completed  3/11/2021 0944 by Jesus Rodas RN  Outcome: Progressing  Goal: Fluid balance maintained  Description: INTERVENTIONS:  - Monitor labs   - Monitor I/O and WT  - Instruct patient on fluid and nutrition as appropriate  - Assess for signs & symptoms of volume excess or deficit  3/11/2021 1423 by Jesus Rodas RN  Outcome: Completed  3/11/2021 0944 by Jesus Rodas RN  Outcome: Progressing  Goal: Glucose maintained within target range  Description: INTERVENTIONS:  - Monitor Blood Glucose as ordered  - Assess for signs and symptoms of hyperglycemia and hypoglycemia  - Administer ordered medications to maintain glucose within target range  - Assess nutritional intake and initiate nutrition service referral as needed  3/11/2021 1423 by Jesus Rodas RN  Outcome: Completed  3/11/2021 0944 by Jesus Rodas RN  Outcome: Progressing     Problem: Neurological Deficit  Goal: Neurological status is stable or improving  Description: Interventions:  - Monitor and assess patient's level of consciousness, motor function, sensory function, and level of assistance needed for ADLs  - Monitor and report changes from baseline  Collaborate with interdisciplinary team to initiate plan and implement interventions as ordered  - Provide and maintain a safe environment  - Consider seizure precautions  - Consider fall precautions  - Consider aspiration precautions  - Consider bleeding precautions  3/11/2021 1423 by Jesus Rodas RN  Outcome: Completed  3/11/2021 0944 by Jesus Rodas RN  Outcome: Progressing     Problem: Activity Intolerance/Impaired Mobility  Goal: Mobility/activity is maintained at optimum level for patient  Description: Interventions:  - Assess and monitor patient  barriers to mobility and need for assistive/adaptive devices  - Assess patient's emotional response to limitations    - Collaborate with interdisciplinary team and initiate plans and interventions as ordered  - Encourage independent activity per ability   - Maintain proper body alignment  - Perform active/passive rom as tolerated/ordered  - Plan activities to conserve energy   - Turn patient as appropriate  3/11/2021 1423 by Alysha Fowler RN  Outcome: Completed  3/11/2021 0944 by Alysha Fowler RN  Outcome: Progressing     Problem: Communication Impairment  Goal: Ability to express needs and understand communication  Description: Assess patient's communication skills and ability to understand information  Patient will demonstrate use of effective communication techniques, alternative methods of communication and understanding even if not able to speak  - Encourage communication and provide alternate methods of communication as needed  - Collaborate with case management/ for discharge needs  - Include patient/family/caregiver in decisions related to communication  3/11/2021 1423 by Alysha Fowler RN  Outcome: Completed  3/11/2021 0944 by Alysha Fowler RN  Outcome: Progressing     Problem: Potential for Aspiration  Goal: Non-ventilated patient's risk of aspiration is minimized  Description: Assess and monitor vital signs, respiratory status, and labs (WBC)  Monitor for signs of aspiration (tachypnea, cough, rales, wheezing, cyanosis, fever)  - Assess and monitor patient's ability to swallow  - Place patient up in chair to eat if possible  - HOB up at 90 degrees to eat if unable to get patient up into chair   - Supervise patient during oral intake  - Instruct patient/ family to take small bites  - Instruct patient/ family to take small single sips when taking liquids    - Follow patient-specific strategies generated by speech pathologist   3/11/2021 1423 by Alysha Fowler RN  Outcome: Completed  3/11/2021 0944 by Alysha Fowler RN  Outcome: Progressing  Goal: Ventilated patient's risk of aspiration is minimized  Description: Assess and monitor vital signs, respiratory status, airway cuff pressure, and labs (WBC)  Monitor for signs of aspiration (tachypnea, cough, rales, wheezing, cyanosis, fever)  - Elevate head of bed 30 degrees if patient has tube feeding   - Monitor tube feeding  3/11/2021 1423 by Ashlyn Delacruz RN  Outcome: Completed  3/11/2021 0944 by Ashlyn Delacruz RN  Outcome: Progressing     Problem: Nutrition  Goal: Nutrition/Hydration status is improving  Description: Monitor and assess patient's nutrition/hydration status for malnutrition (ex- brittle hair, bruises, dry skin, pale skin and conjunctiva, muscle wasting, smooth red tongue, and disorientation)  Collaborate with interdisciplinary team and initiate plan and interventions as ordered  Monitor patient's weight and dietary intake as ordered or per policy  Utilize nutrition screening tool and intervene per policy  Determine patient's food preferences and provide high-protein, high-caloric foods as appropriate  - Assist patient with eating   - Allow adequate time for meals   - Encourage patient to take dietary supplement as ordered  - Collaborate with clinical nutritionist   - Include patient/family/caregiver in decisions related to nutrition    3/11/2021 1423 by Ashlyn Delacruz RN  Outcome: Completed  3/11/2021 0944 by Ashlyn Delacruz RN  Outcome: Progressing     Problem: PAIN - ADULT  Goal: Verbalizes/displays adequate comfort level or baseline comfort level  Description: Interventions:  - Encourage patient to monitor pain and request assistance  - Assess pain using appropriate pain scale  - Administer analgesics based on type and severity of pain and evaluate response  - Implement non-pharmacological measures as appropriate and evaluate response  - Consider cultural and social influences on pain and pain management  - Notify physician/advanced practitioner if interventions unsuccessful or patient reports new pain  3/11/2021 1423 by Ashlyn Delacruz RN  Outcome: Completed  3/11/2021 0944 by Nelly Amaya RN  Outcome: Progressing     Problem: INFECTION - ADULT  Goal: Absence or prevention of progression during hospitalization  Description: INTERVENTIONS:  - Assess and monitor for signs and symptoms of infection  - Monitor lab/diagnostic results  - Monitor all insertion sites, i e  indwelling lines, tubes, and drains  - Monitor endotracheal if appropriate and nasal secretions for changes in amount and color  - [place] appropriate cooling/warming therapies per order  - Administer medications as ordered  - Instruct and encourage patient and family to use good hand hygiene technique  - Identify and instruct in appropriate isolation precautions for identified infection/condition  3/11/2021 1423 by Nelly Aamya RN  Outcome: Completed  3/11/2021 0944 by Nelly Amaya RN  Outcome: Progressing  Goal: Absence of fever/infection during neutropenic period  Description: INTERVENTIONS:  - Monitor WBC    3/11/2021 1423 by Nelly Amaya RN  Outcome: Completed  3/11/2021 0944 by Nelly Amaya RN  Outcome: Progressing     Problem: SAFETY ADULT  Goal: Patient will remain free of falls  Description: INTERVENTIONS:  - Assess patient frequently for physical needs  -  Identify cognitive and physical deficits and behaviors that affect risk of falls    -  [place] fall precautions as indicated by assessment   - Educate patient/family on patient safety including physical limitations  - Instruct patient to call for assistance with activity based on assessment  - Modify environment to reduce risk of injury  - Consider OT/PT consult to assist with strengthening/mobility  3/11/2021 1423 by Nelly Amaya RN  Outcome: Completed  3/11/2021 0944 by Nelly Amaya RN  Outcome: Progressing  Goal: Maintain or return to baseline ADL function  Description: INTERVENTIONS:  -  Assess patient's ability to carry out ADLs; assess patient's baseline for ADL function and identify physical deficits which impact ability to perform ADLs (bathing, care of mouth/teeth, toileting, grooming, dressing, etc )  - Assess/evaluate cause of self-care deficits   - Assess range of motion  - Assess patient's mobility; develop plan if impaired  - Assess patient's need for assistive devices and provide as appropriate  - Encourage maximum independence but intervene and supervise when necessary  - Involve family in performance of ADLs  - Assess for home care needs following discharge   - Consider OT consult to assist with ADL evaluation and planning for discharge  - Provide patient education as appropriate  3/11/2021 1423 by Von Gates RN  Outcome: Completed  3/11/2021 0944 by Von Gates RN  Outcome: Progressing  Goal: Maintain or return mobility status to optimal level  Description: INTERVENTIONS:  - Assess patient's baseline mobility status (ambulation, transfers, stairs, etc )    - Identify cognitive and physical deficits and behaviors that affect mobility  - Identify mobility aids required to assist with transfers and/or ambulation (gait belt, sit-to-stand, lift, walker, cane, etc )  - [place] fall precautions as indicated by assessment  - Record patient progress and toleration of activity level on Mobility SBAR; progress patient to next Phase/Stage  - Instruct patient to call for assistance with activity based on assessment  - Consider rehabilitation consult to assist with strengthening/weightbearing, etc   3/11/2021 1423 by Von Gates RN  Outcome: Completed  3/11/2021 0944 by Von Gates RN  Outcome: Progressing     Problem: DISCHARGE PLANNING  Goal: Discharge to home or other facility with appropriate resources  Description: INTERVENTIONS:  - Identify barriers to discharge w/patient and caregiver  - Arrange for needed discharge resources and transportation as appropriate  - Identify discharge learning needs (meds, wound care, etc )  - Arrange for interpretive services to assist at discharge as needed  - Refer to Case Management Department for coordinating discharge planning if the patient needs post-hospital services based on physician/advanced practitioner order or complex needs related to functional status, cognitive ability, or social support system  3/11/2021 1423 by Jorden Ko RN  Outcome: Completed  3/11/2021 0944 by Jorden Ko RN  Outcome: Progressing     Problem: Knowledge Deficit  Goal: Patient/family/caregiver demonstrates understanding of disease process, treatment plan, medications, and discharge instructions  Description: Complete learning assessment and assess knowledge base  Interventions:  - Provide teaching at level of understanding  - Provide teaching via preferred learning methods  3/11/2021 1423 by Jorden Ko RN  Outcome: Completed  3/11/2021 0944 by Jorden Ko RN  Outcome: Progressing     Problem: Nutrition/Hydration-ADULT  Goal: Nutrient/Hydration intake appropriate for improving, restoring or maintaining nutritional needs  Description: Monitor and assess patient's nutrition/hydration status for malnutrition  Collaborate with interdisciplinary team and initiate plan and interventions as ordered  Monitor patient's weight and dietary intake as ordered or per policy  Utilize nutrition screening tool and intervene as necessary  Determine patient's food preferences and provide high-protein, high-caloric foods as appropriate       INTERVENTIONS:  - Monitor oral intake, urinary output, labs, and treatment plans  - Assess nutrition and hydration status and recommend course of action  - Evaluate amount of meals eaten  - Assist patient with eating if necessary   - Allow adequate time for meals  - Recommend/ encourage appropriate diets, oral nutritional supplements, and vitamin/mineral supplements  - Order, calculate, and assess calorie counts as needed  - Recommend, monitor, and adjust tube feedings and TPN/PPN based on assessed needs  - Assess need for intravenous fluids  - Provide specific nutrition/hydration education as appropriate  - Include patient/family/caregiver in decisions related to nutrition  3/11/2021 1423 by Ashlyn Delacruz, RN  Outcome: Completed  3/11/2021 0944 by Ashlyn Delacruz, RN  Outcome: Progressing

## 2021-03-11 NOTE — ASSESSMENT & PLAN NOTE
Patient complained of 8-10 x 10 headache which is behind both the eyes  Patient received course of magnesium, Toradol, Reglan and no improvement noted of headache  Will repeat a course of IV magnesium, Toradol, Reglan an add dexamethasone 4 mg IV x1 and see there is any improvement of her headache  Patient with significant improvement of her migraine    Discussed with Neurology 03/11/2021  Patient can be discharged with Mag-Ox 400 mg daily and Depakote 250 mg q h s  X2 weeks  Discontinue Mag-Ox if patient develops diarrhea  Discussed with patient and son

## 2021-03-11 NOTE — PLAN OF CARE
Problem: Potential for Falls  Goal: Patient will remain free of falls  Description: INTERVENTIONS:  - Assess patient frequently for physical needs  -  Identify cognitive and physical deficits and behaviors that affect risk of falls  -  [place] fall precautions as indicated by assessment   - Educate patient/family on patient safety including physical limitations  - Instruct patient to call for assistance with activity based on assessment  - Modify environment to reduce risk of injury  - Consider OT/PT consult to assist with strengthening/mobility  Outcome: Progressing     Problem: NEUROSENSORY - ADULT  Goal: Achieves stable or improved neurological status  Description: INTERVENTIONS  - Monitor and report changes in neurological status  - Monitor vital signs such as temperature, blood pressure, glucose, and any other labs ordered   - Initiate measures to prevent increased intracranial pressure  - Monitor for seizure activity and implement precautions if appropriate      Outcome: Progressing  Goal: Remains free of injury related to seizures activity  Description: INTERVENTIONS  - Maintain airway, patient safety  and administer oxygen as ordered  - Monitor patient for seizure activity, document and report duration and description of seizure to physician/advanced practitioner  - If seizure occurs,  ensure patient safety during seizure  - Reorient patient post seizure  - Seizure pads on all 4 side rails  - Instruct patient/family to notify RN of any seizure activity including if an aura is experienced  - Instruct patient/family to call for assistance with activity based on nursing assessment  - Administer anti-seizure medications if ordered    Outcome: Progressing  Goal: Achieves maximal functionality and self care  Description: INTERVENTIONS  - Monitor swallowing and airway patency with patient fatigue and changes in neurological status  - Encourage and assist patient to increase activity and self care     - Encourage visually impaired, hearing impaired and aphasic patients to use assistive/communication devices  Outcome: Progressing     Problem: CARDIOVASCULAR - ADULT  Goal: Maintains optimal cardiac output and hemodynamic stability  Description: INTERVENTIONS:  - Monitor I/O, vital signs and rhythm  - Monitor for S/S and trends of decreased cardiac output  - Administer and titrate ordered vasoactive medications to optimize hemodynamic stability  - Assess quality of pulses, skin color and temperature  - Assess for signs of decreased coronary artery perfusion  - Instruct patient to report change in severity of symptoms  Outcome: Progressing  Goal: Absence of cardiac dysrhythmias or at baseline rhythm  Description: INTERVENTIONS:  - Continuous cardiac monitoring, vital signs, obtain 12 lead EKG if ordered  - Administer antiarrhythmic and heart rate control medications as ordered  - Monitor electrolytes and administer replacement therapy as ordered  Outcome: Progressing     Problem: METABOLIC, FLUID AND ELECTROLYTES - ADULT  Goal: Electrolytes maintained within normal limits  Description: INTERVENTIONS:  - Monitor labs and assess patient for signs and symptoms of electrolyte imbalances  - Administer electrolyte replacement as ordered  - Monitor response to electrolyte replacements, including repeat lab results as appropriate  - Instruct patient on fluid and nutrition as appropriate  Outcome: Progressing  Goal: Fluid balance maintained  Description: INTERVENTIONS:  - Monitor labs   - Monitor I/O and WT  - Instruct patient on fluid and nutrition as appropriate  - Assess for signs & symptoms of volume excess or deficit  Outcome: Progressing  Goal: Glucose maintained within target range  Description: INTERVENTIONS:  - Monitor Blood Glucose as ordered  - Assess for signs and symptoms of hyperglycemia and hypoglycemia  - Administer ordered medications to maintain glucose within target range  - Assess nutritional intake and initiate nutrition service referral as needed  Outcome: Progressing     Problem: Neurological Deficit  Goal: Neurological status is stable or improving  Description: Interventions:  - Monitor and assess patient's level of consciousness, motor function, sensory function, and level of assistance needed for ADLs  - Monitor and report changes from baseline  Collaborate with interdisciplinary team to initiate plan and implement interventions as ordered  - Provide and maintain a safe environment  - Consider seizure precautions  - Consider fall precautions  - Consider aspiration precautions  - Consider bleeding precautions  Outcome: Progressing     Problem: Activity Intolerance/Impaired Mobility  Goal: Mobility/activity is maintained at optimum level for patient  Description: Interventions:  - Assess and monitor patient  barriers to mobility and need for assistive/adaptive devices  - Assess patient's emotional response to limitations  - Collaborate with interdisciplinary team and initiate plans and interventions as ordered  - Encourage independent activity per ability   - Maintain proper body alignment  - Perform active/passive rom as tolerated/ordered  - Plan activities to conserve energy   - Turn patient as appropriate  Outcome: Progressing     Problem: Communication Impairment  Goal: Ability to express needs and understand communication  Description: Assess patient's communication skills and ability to understand information  Patient will demonstrate use of effective communication techniques, alternative methods of communication and understanding even if not able to speak  - Encourage communication and provide alternate methods of communication as needed  - Collaborate with case management/ for discharge needs  - Include patient/family/caregiver in decisions related to communication    Outcome: Progressing     Problem: Potential for Aspiration  Goal: Non-ventilated patient's risk of aspiration is minimized  Description: Assess and monitor vital signs, respiratory status, and labs (WBC)  Monitor for signs of aspiration (tachypnea, cough, rales, wheezing, cyanosis, fever)  - Assess and monitor patient's ability to swallow  - Place patient up in chair to eat if possible  - HOB up at 90 degrees to eat if unable to get patient up into chair   - Supervise patient during oral intake  - Instruct patient/ family to take small bites  - Instruct patient/ family to take small single sips when taking liquids  - Follow patient-specific strategies generated by speech pathologist   Outcome: Progressing  Goal: Ventilated patient's risk of aspiration is minimized  Description: Assess and monitor vital signs, respiratory status, airway cuff pressure, and labs (WBC)  Monitor for signs of aspiration (tachypnea, cough, rales, wheezing, cyanosis, fever)  - Elevate head of bed 30 degrees if patient has tube feeding   - Monitor tube feeding  Outcome: Progressing     Problem: Nutrition  Goal: Nutrition/Hydration status is improving  Description: Monitor and assess patient's nutrition/hydration status for malnutrition (ex- brittle hair, bruises, dry skin, pale skin and conjunctiva, muscle wasting, smooth red tongue, and disorientation)  Collaborate with interdisciplinary team and initiate plan and interventions as ordered  Monitor patient's weight and dietary intake as ordered or per policy  Utilize nutrition screening tool and intervene per policy  Determine patient's food preferences and provide high-protein, high-caloric foods as appropriate  - Assist patient with eating   - Allow adequate time for meals   - Encourage patient to take dietary supplement as ordered  - Collaborate with clinical nutritionist   - Include patient/family/caregiver in decisions related to nutrition    Outcome: Progressing     Problem: PAIN - ADULT  Goal: Verbalizes/displays adequate comfort level or baseline comfort level  Description: Interventions:  - Encourage patient to monitor pain and request assistance  - Assess pain using appropriate pain scale  - Administer analgesics based on type and severity of pain and evaluate response  - Implement non-pharmacological measures as appropriate and evaluate response  - Consider cultural and social influences on pain and pain management  - Notify physician/advanced practitioner if interventions unsuccessful or patient reports new pain  Outcome: Progressing     Problem: INFECTION - ADULT  Goal: Absence or prevention of progression during hospitalization  Description: INTERVENTIONS:  - Assess and monitor for signs and symptoms of infection  - Monitor lab/diagnostic results  - Monitor all insertion sites, i e  indwelling lines, tubes, and drains  - Monitor endotracheal if appropriate and nasal secretions for changes in amount and color  - [place] appropriate cooling/warming therapies per order  - Administer medications as ordered  - Instruct and encourage patient and family to use good hand hygiene technique  - Identify and instruct in appropriate isolation precautions for identified infection/condition  Outcome: Progressing  Goal: Absence of fever/infection during neutropenic period  Description: INTERVENTIONS:  - Monitor WBC    Outcome: Progressing     Problem: SAFETY ADULT  Goal: Patient will remain free of falls  Description: INTERVENTIONS:  - Assess patient frequently for physical needs  -  Identify cognitive and physical deficits and behaviors that affect risk of falls    -  [place] fall precautions as indicated by assessment   - Educate patient/family on patient safety including physical limitations  - Instruct patient to call for assistance with activity based on assessment  - Modify environment to reduce risk of injury  - Consider OT/PT consult to assist with strengthening/mobility  Outcome: Progressing  Goal: Maintain or return to baseline ADL function  Description: INTERVENTIONS:  -  Assess patient's ability to carry out ADLs; assess patient's baseline for ADL function and identify physical deficits which impact ability to perform ADLs (bathing, care of mouth/teeth, toileting, grooming, dressing, etc )  - Assess/evaluate cause of self-care deficits   - Assess range of motion  - Assess patient's mobility; develop plan if impaired  - Assess patient's need for assistive devices and provide as appropriate  - Encourage maximum independence but intervene and supervise when necessary  - Involve family in performance of ADLs  - Assess for home care needs following discharge   - Consider OT consult to assist with ADL evaluation and planning for discharge  - Provide patient education as appropriate  Outcome: Progressing  Goal: Maintain or return mobility status to optimal level  Description: INTERVENTIONS:  - Assess patient's baseline mobility status (ambulation, transfers, stairs, etc )    - Identify cognitive and physical deficits and behaviors that affect mobility  - Identify mobility aids required to assist with transfers and/or ambulation (gait belt, sit-to-stand, lift, walker, cane, etc )  - [place] fall precautions as indicated by assessment  - Record patient progress and toleration of activity level on Mobility SBAR; progress patient to next Phase/Stage  - Instruct patient to call for assistance with activity based on assessment  - Consider rehabilitation consult to assist with strengthening/weightbearing, etc   Outcome: Progressing     Problem: DISCHARGE PLANNING  Goal: Discharge to home or other facility with appropriate resources  Description: INTERVENTIONS:  - Identify barriers to discharge w/patient and caregiver  - Arrange for needed discharge resources and transportation as appropriate  - Identify discharge learning needs (meds, wound care, etc )  - Arrange for interpretive services to assist at discharge as needed  - Refer to Case Management Department for coordinating discharge planning if the patient needs post-hospital services based on physician/advanced practitioner order or complex needs related to functional status, cognitive ability, or social support system  Outcome: Progressing     Problem: Knowledge Deficit  Goal: Patient/family/caregiver demonstrates understanding of disease process, treatment plan, medications, and discharge instructions  Description: Complete learning assessment and assess knowledge base  Interventions:  - Provide teaching at level of understanding  - Provide teaching via preferred learning methods  Outcome: Progressing     Problem: Nutrition/Hydration-ADULT  Goal: Nutrient/Hydration intake appropriate for improving, restoring or maintaining nutritional needs  Description: Monitor and assess patient's nutrition/hydration status for malnutrition  Collaborate with interdisciplinary team and initiate plan and interventions as ordered  Monitor patient's weight and dietary intake as ordered or per policy  Utilize nutrition screening tool and intervene as necessary  Determine patient's food preferences and provide high-protein, high-caloric foods as appropriate       INTERVENTIONS:  - Monitor oral intake, urinary output, labs, and treatment plans  - Assess nutrition and hydration status and recommend course of action  - Evaluate amount of meals eaten  - Assist patient with eating if necessary   - Allow adequate time for meals  - Recommend/ encourage appropriate diets, oral nutritional supplements, and vitamin/mineral supplements  - Order, calculate, and assess calorie counts as needed  - Recommend, monitor, and adjust tube feedings and TPN/PPN based on assessed needs  - Assess need for intravenous fluids  - Provide specific nutrition/hydration education as appropriate  - Include patient/family/caregiver in decisions related to nutrition  Outcome: Progressing

## 2021-03-11 NOTE — SPEECH THERAPY NOTE
Speech/Language Pathology Note    Patient Name: Denisse Osuna  YBDTI'E Date: 3/11/2021      Subjective:  Pt awake and alert, sitting upright in chair at bedside  Objective:  Pt seen for diagnostic swallow therapy  Diet is regular with thin liquids  Lunch today included grilled chicken breast, which pt did have some difficulty masticating  Discussed diet textures with pt, however she prefers to continue regular diet and is able to cut foods before eating  Pt did have mild dry cough intermittently during session, which she feels may be due to post nasal mucous  Mastication appeared complete despite being slowed, and oral clearance was complete without pocketing  Vocal quality is slightly improved today with increased volume noted  Assessment:  Pt is being discharged shortly  Okay to continue regular diet and thin liquids, and f/u with speech therapy as outpatient  MRI brain 3/10/21 IMPRESSION:  Early chronic infarct identified within the left lentiform nucleus extending into the corona radiata corresponds to the infarct seen on prior MRI dated 12/7/2020  No new ischemia identified  Plan/Recommendations:  Pt has f/u scheduled with her primary SLP upon d/c from acute care

## 2021-03-12 LAB
ATRIAL RATE: 85 BPM
P AXIS: 75 DEGREES
PR INTERVAL: 140 MS
QRS AXIS: 5 DEGREES
QRSD INTERVAL: 82 MS
QT INTERVAL: 414 MS
QTC INTERVAL: 492 MS
T WAVE AXIS: 86 DEGREES
VENTRICULAR RATE: 85 BPM

## 2021-03-12 PROCEDURE — 93010 ELECTROCARDIOGRAM REPORT: CPT | Performed by: INTERNAL MEDICINE

## 2021-03-12 NOTE — UTILIZATION REVIEW
Notification of Discharge  This is a Notification of Discharge from our facility [address]  Please be advised that this patient has been discharge from our facility  Below you will find the admission and discharge date and time including the patients disposition  PRESENTATION DATE: 3/9/2021  5:37 PM  OBS ADMISSION DATE:   IP ADMISSION DATE: 3/10/21 1448   DISCHARGE DATE: 3/11/2021  4:05 PM  DISPOSITION: Home/Self Care Home/Self Care   Admission Orders listed below:  Admission Orders (From admission, onward)     Ordered        03/10/21 1448  Inpatient Admission  Once         03/09/21 1925  Place in Observation  Once                   Please contact the UR Department if additional information is required to close this patient's authorization/case  [email or web address] Utilization Review Department  Main: [phone] x carefully listen to the prompts  All voicemails are confidential   [email or web address]  Send all requests for admission clinical reviews, approved or denied determinations and any other requests to dedicated fax number below belonging to the campus where the patient is receiving treatment   List of dedicated fax numbers:  [address] DENIALS (Administrative/Medical Necessity) [phone]   [address] [place] [address] (Maternity/NICU/Pediatrics) [phone]   [facility] [phone]   130 [facility] [phone]   [address] [place] [address] [phone]   145 [facility] 1525 [facility] [phone]   1101 [facility] [phone] 2205 [facility]  2401 [facility] 1000 [address] [facility] [phone]

## 2021-03-17 ENCOUNTER — TELEPHONE (OUTPATIENT)
Dept: NEUROLOGY | Facility: CLINIC | Age: 71
End: 2021-03-17

## 2021-03-17 NOTE — TELEPHONE ENCOUNTER
1ST ATTEMPT Called and spoke to pt's son Naye Ortega  He is currently at work and states he will call back after work  I provided my name and number       GSL/Stroke Alert/[employer]    NOTE FROM CHART:  Reason for Consult / Principal Problem: stroke alert yesterday  Follow up with stroke neurology OP preferably physician within 6-8 weeks of discharge

## 2021-03-23 NOTE — TELEPHONE ENCOUNTER
3RD ATTEMPT Spoke to pt's son Kavon Mcgill, he states pt is seeing a Neurologist at [address] and if they feel a second opinion is needed they will call back to sched       GSL/Stroke Alert/[employer]

## 2021-08-24 ENCOUNTER — DOCTOR'S OFFICE (OUTPATIENT)
Dept: URBAN - NONMETROPOLITAN AREA CLINIC 1 | Facility: CLINIC | Age: 71
Setting detail: OPHTHALMOLOGY
End: 2021-08-24
Payer: MEDICARE

## 2021-08-24 DIAGNOSIS — H43.813: ICD-10-CM

## 2021-08-24 DIAGNOSIS — H40.023: ICD-10-CM

## 2021-08-24 DIAGNOSIS — G43.809: ICD-10-CM

## 2021-08-24 DIAGNOSIS — H25.013: ICD-10-CM

## 2021-08-24 DIAGNOSIS — H02.834: ICD-10-CM

## 2021-08-24 DIAGNOSIS — H04.123: ICD-10-CM

## 2021-08-24 DIAGNOSIS — H02.831: ICD-10-CM

## 2021-08-24 DIAGNOSIS — I63.9: ICD-10-CM

## 2021-08-24 PROBLEM — H02.054 TRICHIASIS WITHOUT ENTROPION; LEFT UPPER LID: Status: RESOLVED | Noted: 2020-01-21 | Resolved: 2021-08-24

## 2021-08-24 PROCEDURE — 92083 EXTENDED VISUAL FIELD XM: CPT | Performed by: OPTOMETRIST

## 2021-08-24 PROCEDURE — 92014 COMPRE OPH EXAM EST PT 1/>: CPT | Performed by: OPTOMETRIST

## 2021-08-24 PROCEDURE — 92250 FUNDUS PHOTOGRAPHY W/I&R: CPT | Performed by: OPTOMETRIST

## 2021-08-24 ASSESSMENT — REFRACTION_MANIFEST
OS_CYLINDER: -0.75
OS_CYLINDER: -0.75
OS_AXIS: 070
OD_ADD: +2.50
OD_VA2: 20/25+2
OU_VA: 20/25
OS_SPHERE: -1.25
OU_VA: 20/25
OD_VA1: 20/25+2
OD_CYLINDER: -0.50
OS_ADD: +2.50
OS_ADD: +2.50
OD_VA2: 20/25+2
OD_AXIS: 085
OD_VA1: 20/25+2
OS_AXIS: 070
OS_VA2: 20/25+2
OS_VA2: 20/25+2
OD_AXIS: 085
OS_VA1: 20/25+2
OS_VA1: 20/25+2
OS_SPHERE: -1.25
OD_SPHERE: -1.50
OD_SPHERE: -1.50
OD_ADD: +2.50
OD_CYLINDER: -0.50

## 2021-08-24 ASSESSMENT — SPHEQUIV_DERIVED
OS_SPHEQUIV: -0.75
OD_SPHEQUIV: -1.75
OS_SPHEQUIV: -1.5
OS_SPHEQUIV: -1.625
OD_SPHEQUIV: -1.625
OD_SPHEQUIV: -1.75
OS_SPHEQUIV: -1.625
OD_SPHEQUIV: -1

## 2021-08-24 ASSESSMENT — REFRACTION_CURRENTRX
OD_SPHERE: -2.75
OD_SPHERE: -2.75
OS_ADD: +2.50
OD_SPHERE: -1.75
OD_ADD: +2.50
OS_AXIS: 092
OD_OVR_VA: 20/
OD_AXIS: 091
OD_AXIS: 091
OD_VPRISM_DIRECTION: PROGS
OD_CYLINDER: -0.75
OD_OVR_VA: 20/
OS_SPHERE: -2.50
OS_VPRISM_DIRECTION: PROGS
OS_OVR_VA: 20/
OS_SPHERE: -0.75
OS_AXIS: 092
OD_AXIS: 096
OD_ADD: +2.50
OS_CYLINDER: -0.50
OS_SPHERE: -2.50
OS_OVR_VA: 20/
OD_CYLINDER: -0.75
OS_VPRISM_DIRECTION: PROGS
OD_CYLINDER: -0.75
OS_CYLINDER: SPH
OS_SPHERE: -0.75
OD_SPHERE: -1.75
OD_VPRISM_DIRECTION: PROGS
OD_OVR_VA: 20/
OD_AXIS: 096
OS_ADD: +2.50
OD_OVR_VA: 20/
OD_CYLINDER: -0.75
OS_CYLINDER: -0.50
OS_OVR_VA: 20/
OS_OVR_VA: 20/
OS_CYLINDER: SPH

## 2021-08-24 ASSESSMENT — LID POSITION - DERMATOCHALASIS
OD_DERMATOCHALASIS: RUL 1+
OS_DERMATOCHALASIS: LUL 1+

## 2021-08-24 ASSESSMENT — LID POSITION - COMMENTS: OS_COMMENTS: +VE EVERTED PUNCTUM LUL

## 2021-08-24 ASSESSMENT — CONFRONTATIONAL VISUAL FIELD TEST (CVF)
OD_FINDINGS: FULL
OS_FINDINGS: FULL

## 2021-08-24 ASSESSMENT — REFRACTION_AUTOREFRACTION
OS_SPHERE: -1.25
OS_CYLINDER: -0.50
OD_CYLINDER: -0.75
OD_AXIS: 089
OS_AXIS: 055
OS_CYLINDER: -0.50
OS_AXIS: 052
OD_AXIS: 085
OD_SPHERE: -0.50
OD_CYLINDER: -1.00
OS_SPHERE: -0.50
OD_SPHERE: -1.25

## 2021-08-24 ASSESSMENT — LID EXAM ASSESSMENTS: OS_TRICHIASIS: LUL 1+

## 2021-08-24 ASSESSMENT — TONOMETRY
OS_IOP_MMHG: 16
OD_IOP_MMHG: 15

## 2021-08-24 ASSESSMENT — VISUAL ACUITY
OS_BCVA: 20/25-2
OS_BCVA: 20/25
OD_BCVA: 20/25-1
OD_BCVA: 20/25-1

## 2021-08-24 ASSESSMENT — SUPERFICIAL PUNCTATE KERATITIS (SPK)
OD_SPK: T
OS_SPK: +1

## 2021-11-22 ENCOUNTER — OPTICAL OFFICE (OUTPATIENT)
Dept: URBAN - NONMETROPOLITAN AREA CLINIC 4 | Facility: CLINIC | Age: 71
Setting detail: OPHTHALMOLOGY
End: 2021-11-22
Payer: COMMERCIAL

## 2021-11-22 ENCOUNTER — DOCTOR'S OFFICE (OUTPATIENT)
Dept: URBAN - NONMETROPOLITAN AREA CLINIC 1 | Facility: CLINIC | Age: 71
Setting detail: OPHTHALMOLOGY
End: 2021-11-22
Payer: MEDICARE

## 2021-11-22 DIAGNOSIS — H04.123: ICD-10-CM

## 2021-11-22 DIAGNOSIS — H25.013: ICD-10-CM

## 2021-11-22 DIAGNOSIS — H02.834: ICD-10-CM

## 2021-11-22 DIAGNOSIS — I63.9: ICD-10-CM

## 2021-11-22 DIAGNOSIS — H43.813: ICD-10-CM

## 2021-11-22 DIAGNOSIS — H02.831: ICD-10-CM

## 2021-11-22 DIAGNOSIS — H52.13: ICD-10-CM

## 2021-11-22 DIAGNOSIS — H40.023: ICD-10-CM

## 2021-11-22 DIAGNOSIS — G43.809: ICD-10-CM

## 2021-11-22 PROCEDURE — 92133 CPTRZD OPH DX IMG PST SGM ON: CPT | Performed by: OPTOMETRIST

## 2021-11-22 PROCEDURE — S0500 DISPOS CONT LENS: HCPCS | Performed by: OPTOMETRIST

## 2021-11-22 PROCEDURE — 92014 COMPRE OPH EXAM EST PT 1/>: CPT | Performed by: OPTOMETRIST

## 2021-11-22 ASSESSMENT — REFRACTION_CURRENTRX
OD_VPRISM_DIRECTION: PROGS
OD_ADD: +2.50
OS_OVR_VA: 20/
OD_OVR_VA: 20/
OS_SPHERE: -2.50
OD_CYLINDER: -0.75
OD_SPHERE: -2.75
OD_CYLINDER: -0.75
OS_AXIS: 092
OS_SPHERE: -0.75
OS_ADD: +2.50
OD_AXIS: 096
OS_OVR_VA: 20/
OS_CYLINDER: -0.50
OS_VPRISM_DIRECTION: PROGS
OD_AXIS: 091
OS_CYLINDER: SPH
OD_SPHERE: -1.75
OD_OVR_VA: 20/

## 2021-11-22 ASSESSMENT — REFRACTION_MANIFEST
OD_SPHERE: -1.50
OD_VA1: 20/25+2
OD_ADD: +2.50
OS_ADD: +2.50
OS_VA2: 20/25+2
OU_VA: 20/25
OD_VA2: 20/25+2
OS_VA1: 20/25+2
OS_CYLINDER: -0.75
OD_AXIS: 085
OS_AXIS: 070
OD_CYLINDER: -0.50
OS_SPHERE: -1.25

## 2021-11-22 ASSESSMENT — SUPERFICIAL PUNCTATE KERATITIS (SPK)
OD_SPK: T
OS_SPK: +1

## 2021-11-22 ASSESSMENT — CONFRONTATIONAL VISUAL FIELD TEST (CVF)
OS_FINDINGS: FULL
OD_FINDINGS: FULL

## 2021-11-22 ASSESSMENT — REFRACTION_AUTOREFRACTION
OS_SPHERE: -0.50
OD_AXIS: 085
OD_CYLINDER: -1.00
OS_CYLINDER: -0.50
OS_AXIS: 055
OD_SPHERE: -0.50

## 2021-11-22 ASSESSMENT — VISUAL ACUITY
OD_BCVA: 20/25-1
OS_BCVA: 20/25

## 2021-11-22 ASSESSMENT — SPHEQUIV_DERIVED
OS_SPHEQUIV: -0.75
OS_SPHEQUIV: -1.625
OD_SPHEQUIV: -1.75
OD_SPHEQUIV: -1

## 2021-11-22 ASSESSMENT — LID POSITION - COMMENTS: OS_COMMENTS: +VE EVERTED PUNCTUM LUL

## 2021-11-22 ASSESSMENT — LID POSITION - DERMATOCHALASIS
OD_DERMATOCHALASIS: RUL 1+
OS_DERMATOCHALASIS: LUL 1+

## 2021-11-22 ASSESSMENT — LID EXAM ASSESSMENTS: OS_TRICHIASIS: LUL 1+

## 2022-05-23 ENCOUNTER — DOCTOR'S OFFICE (OUTPATIENT)
Dept: URBAN - NONMETROPOLITAN AREA CLINIC 1 | Facility: CLINIC | Age: 72
Setting detail: OPHTHALMOLOGY
End: 2022-05-23
Payer: MEDICARE

## 2022-05-23 ENCOUNTER — DOCTOR'S OFFICE (OUTPATIENT)
Dept: URBAN - NONMETROPOLITAN AREA CLINIC 1 | Facility: CLINIC | Age: 72
Setting detail: OPHTHALMOLOGY
End: 2022-05-23
Payer: COMMERCIAL

## 2022-05-23 DIAGNOSIS — H52.13: ICD-10-CM

## 2022-05-23 DIAGNOSIS — H40.023: ICD-10-CM

## 2022-05-23 DIAGNOSIS — H52.4: ICD-10-CM

## 2022-05-23 PROBLEM — G43.809: Status: ACTIVE | Noted: 2018-01-05

## 2022-05-23 PROBLEM — H02.834 DERMATOCHALASIS; RIGHT UPPER LID, LEFT UPPER LID: Status: ACTIVE | Noted: 2021-02-23

## 2022-05-23 PROBLEM — I63.9: Status: ACTIVE | Noted: 2021-02-23

## 2022-05-23 PROBLEM — H25.013 CORTICAL CATARACT; BOTH EYES: Status: ACTIVE | Noted: 2019-01-08

## 2022-05-23 PROBLEM — H02.831 DERMATOCHALASIS; RIGHT UPPER LID, LEFT UPPER LID: Status: ACTIVE | Noted: 2021-02-23

## 2022-05-23 PROBLEM — H43.813 POSTERIOR VITREOUS DETACHMENT; BOTH EYES: Status: ACTIVE | Noted: 2021-02-23

## 2022-05-23 PROCEDURE — 92014 COMPRE OPH EXAM EST PT 1/>: CPT | Performed by: OPTOMETRIST

## 2022-05-23 PROCEDURE — 92083 EXTENDED VISUAL FIELD XM: CPT | Performed by: OPTOMETRIST

## 2022-05-23 PROCEDURE — 92310 CONTACT LENS FITTING OU: CPT | Performed by: OPTOMETRIST

## 2022-05-23 ASSESSMENT — REFRACTION_CURRENTRX
OD_CYLINDER: -0.75
OD_OVR_VA: 20/
OS_SPHERE: -2.50
OS_SPHERE: -1.00
OD_AXIS: 086
OD_OVR_VA: 20/
OS_AXIS: 054
OS_CYLINDER: -0.75
OD_SPHERE: -2.75
OS_CYLINDER: SPH
OS_VPRISM_DIRECTION: PROGS
OD_AXIS: 096
OD_SPHERE: -1.75
OS_OVR_VA: 20/
OS_OVR_VA: 20/
OD_CYLINDER: -0.75
OD_VPRISM_DIRECTION: PROGS

## 2022-05-23 ASSESSMENT — CONFRONTATIONAL VISUAL FIELD TEST (CVF)
OS_FINDINGS: FULL
OD_FINDINGS: FULL

## 2022-05-23 ASSESSMENT — VISUAL ACUITY
OS_BCVA: 20/25+2
OD_BCVA: 20/20

## 2022-05-23 ASSESSMENT — SPHEQUIV_DERIVED
OD_SPHEQUIV: -1.25
OS_SPHEQUIV: -1.25
OD_SPHEQUIV: -1.125
OS_SPHEQUIV: -1

## 2022-05-23 ASSESSMENT — REFRACTION_MANIFEST
OD_AXIS: 095
OD_CYLINDER: -1.00
OD_VA1: 20/25+2
OS_ADD: +2.50
OS_VA2: 20/25+2
OD_SPHERE: -0.75
OU_VA: 20/25
OS_SPHERE: -0.75
OD_ADD: +2.50
OS_VA1: 20/25+2
OD_VA2: 20/25+2
OS_AXIS: 070
OS_CYLINDER: -1.00

## 2022-05-23 ASSESSMENT — TONOMETRY
OS_IOP_MMHG: 16
OD_IOP_MMHG: 16

## 2022-05-23 ASSESSMENT — SUPERFICIAL PUNCTATE KERATITIS (SPK)
OD_SPK: T
OS_SPK: +1

## 2022-05-23 ASSESSMENT — REFRACTION_AUTOREFRACTION
OD_CYLINDER: -1.25
OS_AXIS: 067
OD_AXIS: 095
OS_SPHERE: -0.50
OD_SPHERE: -0.50
OS_CYLINDER: -1.00

## 2022-05-23 ASSESSMENT — LID POSITION - DERMATOCHALASIS
OS_DERMATOCHALASIS: LUL 1+
OD_DERMATOCHALASIS: RUL 1+

## 2022-05-23 ASSESSMENT — LID POSITION - COMMENTS: OS_COMMENTS: +VE EVERTED PUNCTUM LUL

## 2022-06-06 ENCOUNTER — OPTICAL OFFICE (OUTPATIENT)
Dept: URBAN - NONMETROPOLITAN AREA CLINIC 4 | Facility: CLINIC | Age: 72
Setting detail: OPHTHALMOLOGY
End: 2022-06-06
Payer: COMMERCIAL

## 2022-06-06 DIAGNOSIS — H52.11: ICD-10-CM

## 2022-06-06 PROCEDURE — S0500 DISPOS CONT LENS: HCPCS | Performed by: OPTOMETRIST

## 2022-07-27 ENCOUNTER — APPOINTMENT (EMERGENCY)
Dept: CT IMAGING | Facility: HOSPITAL | Age: 72
End: 2022-07-27
Payer: COMMERCIAL

## 2022-07-27 ENCOUNTER — HOSPITAL ENCOUNTER (EMERGENCY)
Facility: HOSPITAL | Age: 72
Discharge: HOME | End: 2022-07-27
Attending: EMERGENCY MEDICINE
Payer: COMMERCIAL

## 2022-07-27 VITALS
TEMPERATURE: 99.1 F | BODY MASS INDEX: 23.17 KG/M2 | OXYGEN SATURATION: 97 % | HEIGHT: 62 IN | DIASTOLIC BLOOD PRESSURE: 84 MMHG | WEIGHT: 125.88 LBS | RESPIRATION RATE: 20 BRPM | HEART RATE: 81 BPM | SYSTOLIC BLOOD PRESSURE: 148 MMHG

## 2022-07-27 DIAGNOSIS — R42 VERTIGO: Primary | ICD-10-CM

## 2022-07-27 DIAGNOSIS — I10 HYPERTENSION, UNSPECIFIED TYPE: ICD-10-CM

## 2022-07-27 LAB
ALBUMIN SERPL BCP-MCNC: 4 G/DL (ref 3.5–5)
ALP SERPL-CCNC: 66 U/L (ref 46–116)
ALT SERPL W P-5'-P-CCNC: 16 U/L (ref 12–78)
ANION GAP SERPL CALCULATED.3IONS-SCNC: 11 MMOL/L (ref 4–13)
AST SERPL W P-5'-P-CCNC: 15 U/L (ref 5–45)
BASOPHILS # BLD AUTO: 0.06 THOUSANDS/ΜL (ref 0–0.1)
BASOPHILS NFR BLD AUTO: 1 % (ref 0–1)
BILIRUB SERPL-MCNC: 0.87 MG/DL (ref 0.2–1)
BUN SERPL-MCNC: 12 MG/DL (ref 5–25)
CALCIUM SERPL-MCNC: 9.2 MG/DL (ref 8.3–10.1)
CHLORIDE SERPL-SCNC: 103 MMOL/L (ref 96–108)
CO2 SERPL-SCNC: 24 MMOL/L (ref 21–32)
CREAT SERPL-MCNC: 0.96 MG/DL (ref 0.6–1.3)
EOSINOPHIL # BLD AUTO: 0.24 THOUSAND/ΜL (ref 0–0.61)
EOSINOPHIL NFR BLD AUTO: 3 % (ref 0–6)
ERYTHROCYTE [DISTWIDTH] IN BLOOD BY AUTOMATED COUNT: 12.4 % (ref 11.6–15.1)
GFR SERPL CREATININE-BSD FRML MDRD: 58 ML/MIN/1.73SQ M
GLUCOSE SERPL-MCNC: 109 MG/DL (ref 65–140)
HCT VFR BLD AUTO: 39.2 % (ref 34.8–46.1)
HGB BLD-MCNC: 13.1 G/DL (ref 11.5–15.4)
IMM GRANULOCYTES # BLD AUTO: 0.03 THOUSAND/UL (ref 0–0.2)
IMM GRANULOCYTES NFR BLD AUTO: 0 % (ref 0–2)
LYMPHOCYTES # BLD AUTO: 1.04 THOUSANDS/ΜL (ref 0.6–4.47)
LYMPHOCYTES NFR BLD AUTO: 12 % (ref 14–44)
MCH RBC QN AUTO: 30.9 PG (ref 26.8–34.3)
MCHC RBC AUTO-ENTMCNC: 33.4 G/DL (ref 31.4–37.4)
MCV RBC AUTO: 93 FL (ref 82–98)
MONOCYTES # BLD AUTO: 0.75 THOUSAND/ΜL (ref 0.17–1.22)
MONOCYTES NFR BLD AUTO: 9 % (ref 4–12)
NEUTROPHILS # BLD AUTO: 6.26 THOUSANDS/ΜL (ref 1.85–7.62)
NEUTS SEG NFR BLD AUTO: 75 % (ref 43–75)
NRBC BLD AUTO-RTO: 0 /100 WBCS
PLATELET # BLD AUTO: 283 THOUSANDS/UL (ref 149–390)
PMV BLD AUTO: 8.6 FL (ref 8.9–12.7)
POTASSIUM SERPL-SCNC: 3.4 MMOL/L (ref 3.5–5.3)
PROT SERPL-MCNC: 7.2 G/DL (ref 6.4–8.4)
RBC # BLD AUTO: 4.24 MILLION/UL (ref 3.81–5.12)
SODIUM SERPL-SCNC: 138 MMOL/L (ref 135–147)
WBC # BLD AUTO: 8.38 THOUSAND/UL (ref 4.31–10.16)

## 2022-07-27 PROCEDURE — 80053 COMPREHEN METABOLIC PANEL: CPT | Performed by: EMERGENCY MEDICINE

## 2022-07-27 PROCEDURE — 99285 EMERGENCY DEPT VISIT HI MDM: CPT

## 2022-07-27 PROCEDURE — 96375 TX/PRO/DX INJ NEW DRUG ADDON: CPT

## 2022-07-27 PROCEDURE — G1004 CDSM NDSC: HCPCS

## 2022-07-27 PROCEDURE — 93005 ELECTROCARDIOGRAM TRACING: CPT

## 2022-07-27 PROCEDURE — 36415 COLL VENOUS BLD VENIPUNCTURE: CPT | Performed by: EMERGENCY MEDICINE

## 2022-07-27 PROCEDURE — 99284 EMERGENCY DEPT VISIT MOD MDM: CPT | Performed by: EMERGENCY MEDICINE

## 2022-07-27 PROCEDURE — 1123F ACP DISCUSS/DSCN MKR DOCD: CPT | Performed by: EMERGENCY MEDICINE

## 2022-07-27 PROCEDURE — 96365 THER/PROPH/DIAG IV INF INIT: CPT

## 2022-07-27 PROCEDURE — 85025 COMPLETE CBC W/AUTO DIFF WBC: CPT | Performed by: EMERGENCY MEDICINE

## 2022-07-27 PROCEDURE — 70450 CT HEAD/BRAIN W/O DYE: CPT

## 2022-07-27 RX ORDER — ONDANSETRON 2 MG/ML
4 INJECTION INTRAMUSCULAR; INTRAVENOUS ONCE
Status: COMPLETED | OUTPATIENT
Start: 2022-07-27 | End: 2022-07-27

## 2022-07-27 RX ORDER — MECLIZINE HYDROCHLORIDE 25 MG/1
25 TABLET ORAL ONCE
Status: COMPLETED | OUTPATIENT
Start: 2022-07-27 | End: 2022-07-27

## 2022-07-27 RX ORDER — MECLIZINE HYDROCHLORIDE 25 MG/1
25 TABLET ORAL EVERY 8 HOURS PRN
Qty: 30 TABLET | Refills: 0 | Status: SHIPPED | OUTPATIENT
Start: 2022-07-27

## 2022-07-27 RX ORDER — POTASSIUM CHLORIDE 20 MEQ/1
20 TABLET, EXTENDED RELEASE ORAL ONCE
Status: COMPLETED | OUTPATIENT
Start: 2022-07-27 | End: 2022-07-27

## 2022-07-27 RX ADMIN — POTASSIUM CHLORIDE 20 MEQ: 1500 TABLET, EXTENDED RELEASE ORAL at 11:01

## 2022-07-27 RX ADMIN — MECLIZINE HYDROCHLORIDE 25 MG: 25 TABLET ORAL at 10:07

## 2022-07-27 RX ADMIN — SODIUM CHLORIDE, POTASSIUM CHLORIDE, SODIUM LACTATE AND CALCIUM CHLORIDE 1000 ML: 600; 310; 30; 20 INJECTION, SOLUTION INTRAVENOUS at 10:12

## 2022-07-27 RX ADMIN — ONDANSETRON 4 MG: 2 INJECTION INTRAMUSCULAR; INTRAVENOUS at 10:12

## 2022-07-27 NOTE — ED NOTES
PT provided d/c instructions  Waiting in room for transportation        Kayli Torres RN  07/27/22 1149

## 2022-07-27 NOTE — ED NOTES
Ambulation trial completed  Pt able to walk independently, reported that dizziness subsided  Provider made aware        Zia Orozco, RN  07/27/22 7332

## 2022-07-27 NOTE — ED PROVIDER NOTES
History  Chief Complaint   Patient presents with    Dizziness     Pt  Woke up this morning feeling dizzy       History provided by:  Medical records, patient and EMS personnel  Dizziness  Quality:  Room spinning  Severity:  Severe  Onset quality:  Sudden  Duration:  2 hours  Timing:  Constant  Progression:  Unchanged  Chronicity:  New  Context comment:  Patient states she woke, came to a sitting position and felt immediate room spinning, associated with nausea and dry heaving  Denies pain  Relieved by:  Lying down, closing eyes and being still  Worsened by:  Eye movement, sitting upright, standing up and turning head  Ineffective treatments:  None tried  Associated symptoms: no chest pain, no diarrhea, no headaches, no nausea, no palpitations, no shortness of breath, no vomiting and no weakness        Prior to Admission Medications   Prescriptions Last Dose Informant Patient Reported?  Taking?   acetaminophen (TYLENOL) 325 mg tablet   No No   Sig: Take 2 tablets (650 mg total) by mouth every 6 (six) hours as needed for mild pain, headaches or fever   amLODIPine (NORVASC) 2 5 mg tablet   No No   Sig: Take 1 tablet (2 5 mg total) by mouth daily   aspirin (ECOTRIN LOW STRENGTH) 81 mg EC tablet   No No   Sig: Take 1 tablet (81 mg total) by mouth daily for 14 days   atenolol (TENORMIN) 50 mg tablet   Yes No   Sig: Take 50 mg by mouth daily   atorvastatin (LIPITOR) 80 mg tablet   No No   Sig: Take 1 tablet (80 mg total) by mouth every evening   clopidogrel (PLAVIX) 75 mg tablet   No No   Sig: Take 1 tablet (75 mg total) by mouth daily   divalproex sodium (DEPAKOTE) 250 mg EC tablet   No No   Sig: Take 1 tablet (250 mg total) by mouth daily at bedtime for 14 days   docusate sodium (COLACE) 100 mg capsule   No No   Sig: Take 1 capsule (100 mg total) by mouth 2 (two) times a day   fluticasone (FLONASE) 50 mcg/act nasal spray   Yes No   Si spray into each nostril daily as needed for rhinitis   levothyroxine 25 mcg tablet Yes No   Sig: Take 25 mcg by mouth daily   magnesium oxide (MAG-OX) 400 mg   No No   Sig: Take 1 tablet (400 mg total) by mouth daily   meclizine (ANTIVERT) 12 5 MG tablet   No No   Sig: Take 1 tablet (12 5 mg total) by mouth every 8 (eight) hours as needed for dizziness   melatonin 3 mg   No No   Sig: Take 1 tablet (3 mg total) by mouth daily at bedtime as needed (insomnia)   ondansetron (ZOFRAN) 4 mg tablet   No No   Sig: Take 1 tablet (4 mg total) by mouth every 12 (twelve) hours as needed for nausea or vomiting      Facility-Administered Medications: None       Past Medical History:   Diagnosis Date    Hypertension     Stroke ([facility] [address] )     TIA (transient ischemic attack)        Past Surgical History:   Procedure Laterality Date    ABLATION SOFT TISSUE         History reviewed  No pertinent family history  I have reviewed and agree with the history as documented  E-Cigarette/Vaping    E-Cigarette Use Never User      E-Cigarette/Vaping Substances     Social History     Tobacco Use    Smoking status: Never Smoker    Smokeless tobacco: Never Used   Vaping Use    Vaping Use: Never used   Substance Use Topics    Alcohol use: Not Currently    Drug use: Not Currently       Review of Systems   Constitutional: Negative for chills, fatigue and fever  HENT: Negative for ear discharge, ear pain, rhinorrhea and sore throat  Eyes: Negative for pain and visual disturbance  Respiratory: Negative for cough and shortness of breath  Cardiovascular: Negative for chest pain and palpitations  Gastrointestinal: Negative for abdominal pain, diarrhea, nausea and vomiting  Endocrine: Negative for polydipsia, polyphagia and polyuria  Genitourinary: Negative for difficulty urinating, dysuria, flank pain and hematuria  Musculoskeletal: Negative for arthralgias and back pain  Skin: Negative for color change and rash  Allergic/Immunologic: Negative for immunocompromised state     Neurological: Positive for dizziness  Negative for seizures, syncope, weakness and headaches  Psychiatric/Behavioral: Negative for confusion and self-injury  The patient is not nervous/anxious  All other systems reviewed and are negative  Physical Exam  Physical Exam  Constitutional:       General: She is not in acute distress  Appearance: Normal appearance  She is ill-appearing  She is not toxic-appearing or diaphoretic  Comments: Wants to keep eyes closed and remain still, anxious   HENT:      Head: Normocephalic and atraumatic  Nose: Nose normal  No congestion or rhinorrhea  Mouth/Throat:      Mouth: Mucous membranes are moist       Pharynx: Oropharynx is clear  No oropharyngeal exudate or posterior oropharyngeal erythema  Eyes:      General:         Right eye: No discharge  Left eye: No discharge  Cardiovascular:      Rate and Rhythm: Normal rate and regular rhythm  Pulses: Normal pulses  Heart sounds: Normal heart sounds  No murmur heard  No gallop  Pulmonary:      Effort: Pulmonary effort is normal  No respiratory distress  Breath sounds: Normal breath sounds  No stridor  No wheezing, rhonchi or rales  Chest:      Chest wall: No tenderness  Abdominal:      General: Bowel sounds are normal  There is no distension  Palpations: Abdomen is soft  There is no mass  Tenderness: There is no abdominal tenderness  There is no right CVA tenderness, left CVA tenderness, guarding or rebound  Hernia: No hernia is present  Musculoskeletal:         General: Normal range of motion  Cervical back: Normal range of motion and neck supple  Skin:     General: Skin is warm and dry  Capillary Refill: Capillary refill takes less than 2 seconds  Neurological:      General: No focal deficit present  Mental Status: She is alert and oriented to person, place, and time  Cranial Nerves: No cranial nerve deficit  Sensory: No sensory deficit        Motor: No weakness  Coordination: Coordination normal       Gait: Gait normal       Deep Tendon Reflexes: Reflexes normal       Comments: Mild horizontal nystagmus, easily fatigued  Reproduction of symptoms with head movement and with eye movement  Psychiatric:         Mood and Affect: Mood normal          Behavior: Behavior normal          Thought Content:  Thought content normal          Judgment: Judgment normal          Vital Signs  ED Triage Vitals [07/27/22 0925]   Temperature Pulse Respirations Blood Pressure SpO2   99 1 °F (37 3 °C) 91 18 (!) 179/81 100 %      Temp Source Heart Rate Source Patient Position - Orthostatic VS BP Location FiO2 (%)   Temporal Monitor Lying Right arm --      Pain Score       No Pain           Vitals:    07/27/22 0925 07/27/22 1100 07/27/22 1115 07/27/22 1130   BP: (!) 179/81 162/79 (!) 179/85 148/84   Pulse: 91 75 85 81   Patient Position - Orthostatic VS: Lying            Visual Acuity  Visual Acuity    Flowsheet Row Most Recent Value   L Pupil Size (mm) 3   R Pupil Size (mm) 3          ED Medications  Medications   lactated ringers bolus 1,000 mL (0 mL Intravenous Stopped 7/27/22 1118)   ondansetron (ZOFRAN) injection 4 mg (4 mg Intravenous Given 7/27/22 1012)   meclizine (ANTIVERT) tablet 25 mg (25 mg Oral Given 7/27/22 1007)   potassium chloride (K-DUR,KLOR-CON) CR tablet 20 mEq (20 mEq Oral Given 7/27/22 1101)       Diagnostic Studies  Results Reviewed     Procedure Component Value Units Date/Time    Comprehensive metabolic panel [[id number]]  (Abnormal) Collected: 07/27/22 1010    Lab Status: Final result Specimen: Blood from Arm, Left Updated: 07/27/22 1034     Sodium 138 mmol/L      Potassium 3 4 mmol/L      Chloride 103 mmol/L      CO2 24 mmol/L      ANION GAP 11 mmol/L      BUN 12 mg/dL      Creatinine 0 96 mg/dL      Glucose 109 mg/dL      Calcium 9 2 mg/dL      AST 15 U/L      ALT 16 U/L      Alkaline Phosphatase 66 U/L      Total Protein 7 2 g/dL      Albumin 4 0 g/dL Total Bilirubin 0 87 mg/dL      eGFR 58 ml/min/1 73sq m     Narrative:      [place] guidelines for Chronic Kidney Disease (CKD):     Stage 1 with normal or high GFR (GFR > 90 mL/min/1 73 square meters)    Stage 2 Mild CKD (GFR = 60-89 mL/min/1 73 square meters)    Stage 3A Moderate CKD (GFR = 45-59 mL/min/1 73 square meters)    Stage 3B Moderate CKD (GFR = 30-44 mL/min/1 73 square meters)    Stage 4 Severe CKD (GFR = 15-29 mL/min/1 73 square meters)    Stage 5 End Stage CKD (GFR <15 mL/min/1 73 square meters)  Note: GFR calculation is accurate only with a steady state creatinine    CBC and differential [[id number]]  (Abnormal) Collected: 07/27/22 1010    Lab Status: Final result Specimen: Blood from Arm, Left Updated: 07/27/22 1017     WBC 8 38 Thousand/uL      RBC 4 24 Million/uL      Hemoglobin 13 1 g/dL      Hematocrit 39 2 %      MCV 93 fL      MCH 30 9 pg      MCHC 33 4 g/dL      RDW 12 4 %      MPV 8 6 fL      Platelets 045 Thousands/uL      nRBC 0 /100 WBCs      Neutrophils Relative 75 %      Immat GRANS % 0 %      Lymphocytes Relative 12 %      Monocytes Relative 9 %      Eosinophils Relative 3 %      Basophils Relative 1 %      Neutrophils Absolute 6 26 Thousands/µL      Immature Grans Absolute 0 03 Thousand/uL      Lymphocytes Absolute 1 04 Thousands/µL      Monocytes Absolute 0 75 Thousand/µL      Eosinophils Absolute 0 24 Thousand/µL      Basophils Absolute 0 06 Thousands/µL                  CT head without contrast   Final Result by Flori Finch MD (07/27 1051)      Chronic ischemic and microangiopathic change without acute intracranial abnormality noted  Workstation performed: [id number]                    Procedures  Procedures         ED Course  ED Course as of 07/27/22 1157   Wed Jul 27, 2022   0940 0940:  Patient appears uncomfortable, vital signs reviewed  Concerns for peripheral vertigo    Nonfocal neurological exam   Plan to complete Etta-Hallpike maneuver and re-evaluate  1010 1010:  Patient has had significant reduction in symptoms, however not fully resolved  Plan to complete basic labs, CT head to rule out central etiology  I will give IV fluids, meclizine, Zofran, re-evaluate  1100 1100:  CT reviewed, labs reviewed  Patient feels much improved  Ambulated without difficulty  Stable for discharge  MDM    Disposition  Final diagnoses:   Vertigo   Hypertension, unspecified type     Time reflects when diagnosis was documented in both MDM as applicable and the Disposition within this note     Time User Action Codes Description Comment    7/27/2022 11:22 AM Derinda Paget Add [R42] Vertigo     7/27/2022 11:22 AM Derinda Paget Add [I10] Hypertension, unspecified type       ED Disposition     ED Disposition   Discharge    Condition   Stable    Date/Time   Wed Jul 27, 2022 11:22 AM    Comment   Ephraim Doyle discharge to home/self care  Follow-up Information     Follow up With Specialties Details Why Contact Danilo Duque DO Internal Medicine Schedule an appointment as soon as possible for a visit   [place] [address]  [address]  [address] [place] [address]            Patient's Medications   Discharge Prescriptions    MECLIZINE (ANTIVERT) 25 MG TABLET    Take 1 tablet (25 mg total) by mouth every 8 (eight) hours as needed for dizziness       Start Date: 7/27/2022 End Date: --       Order Dose: 25 mg       Quantity: 30 tablet    Refills: 0       No discharge procedures on file      PDMP Review     None          ED Provider  Electronically Signed by           Greyson Degroot MD  07/27/22 6577

## 2022-07-28 LAB
ATRIAL RATE: 81 BPM
P AXIS: 68 DEGREES
PR INTERVAL: 134 MS
QRS AXIS: 54 DEGREES
QRSD INTERVAL: 82 MS
QT INTERVAL: 432 MS
QTC INTERVAL: 501 MS
T WAVE AXIS: 80 DEGREES
VENTRICULAR RATE: 81 BPM

## 2022-11-01 ENCOUNTER — OFFICE VISIT (OUTPATIENT)
Dept: URGENT CARE | Facility: CLINIC | Age: 72
End: 2022-11-01

## 2022-11-01 VITALS
HEIGHT: 62 IN | WEIGHT: 125 LBS | OXYGEN SATURATION: 98 % | SYSTOLIC BLOOD PRESSURE: 161 MMHG | TEMPERATURE: 97.7 F | HEART RATE: 72 BPM | DIASTOLIC BLOOD PRESSURE: 82 MMHG | BODY MASS INDEX: 23 KG/M2 | RESPIRATION RATE: 18 BRPM

## 2022-11-01 DIAGNOSIS — J06.9 ACUTE RESPIRATORY DISEASE: Primary | ICD-10-CM

## 2022-11-01 DIAGNOSIS — R05.1 ACUTE COUGH: ICD-10-CM

## 2022-11-01 LAB
SARS-COV-2 AG UPPER RESP QL IA: NEGATIVE
VALID CONTROL: NORMAL

## 2022-11-01 RX ORDER — ASPIRIN AND DIPYRIDAMOLE 25; 200 MG/1; MG/1
CAPSULE, EXTENDED RELEASE ORAL
COMMUNITY

## 2022-11-01 RX ORDER — FOLIC ACID 1 MG/1
TABLET ORAL
COMMUNITY

## 2022-11-01 RX ORDER — GABAPENTIN 100 MG/1
100-200 CAPSULE ORAL
COMMUNITY
Start: 2022-09-12

## 2022-11-01 RX ORDER — CYANOCOBALAMIN 1000 UG/ML
1 INJECTION, SOLUTION INTRAMUSCULAR; SUBCUTANEOUS
COMMUNITY
Start: 2022-09-02

## 2022-11-01 NOTE — PATIENT INSTRUCTIONS
Vitamin D3 2000 IU daily  Vitamin C 1000mg twice per day  Multivitamin daily  Fluids and rest  Over the counter cold medication as needed (EX: coricidin HBP)  Follow up with PCP in 3-5 days  Proceed to  ER if symptoms worsen

## 2022-11-01 NOTE — PROGRESS NOTES
[facility]        NAME: Garrick Zapien is a 67 y o  female  : 1950    MRN: [id number]  DATE: 2022  TIME: 11:16 AM    Assessment and Plan   Acute respiratory disease [J06 9]  1  Acute respiratory disease     2  Acute cough  Poct Covid 19 Rapid Antigen Test         Patient Instructions     Vitamin D3 2000 IU daily  Vitamin C 1000mg twice per day  Multivitamin daily  Fluids and rest  Over the counter cold medication as needed (EX: coricidin HBP)  Follow up with PCP in 3-5 days  Proceed to  ER if symptoms worsen  Chief Complaint     Chief Complaint   Patient presents with   • Cold Like Symptoms     Productive cough with clear mucus production, nasal congestion, and fatigued; symptoms started 3 days ago         History of Present Illness       URI   This is a new problem  Episode onset: 3d  There has been no fever  Associated symptoms include congestion, coughing, ear pain (L ear pressure), headaches and a sore throat (secondary to cough)  Pertinent negatives include no abdominal pain, diarrhea, nausea, rash, rhinorrhea, sinus pain, sneezing, vomiting or wheezing  She has tried NSAIDs for the symptoms  Review of Systems   Review of Systems   Constitutional: Positive for fatigue  Negative for chills and fever  HENT: Positive for congestion, ear pain (L ear pressure), postnasal drip, sinus pressure and sore throat (secondary to cough)  Negative for ear discharge, rhinorrhea, sinus pain, sneezing and trouble swallowing  Respiratory: Positive for cough and shortness of breath (intermittent)  Negative for chest tightness and wheezing  Gastrointestinal: Negative for abdominal pain, diarrhea, nausea and vomiting  Musculoskeletal: Negative for myalgias  Skin: Negative for rash  Neurological: Positive for headaches  Negative for light-headedness           Current Medications       Current Outpatient Medications:   •  acetaminophen (TYLENOL) 325 mg tablet, Take 2 tablets (650 mg total) by mouth every 6 (six) hours as needed for mild pain, headaches or fever, Disp: , Rfl: 0  •  amLODIPine (NORVASC) 2 5 mg tablet, Take 1 tablet (2 5 mg total) by mouth daily, Disp: 30 tablet, Rfl: 0  •  aspirin-dipyridamole (AGGRENOX)  mg per 12 hr capsule, Take by mouth, Disp: , Rfl:   •  atorvastatin (LIPITOR) 80 mg tablet, Take 1 tablet (80 mg total) by mouth every evening, Disp:  , Rfl: 0  •  CANDESARTAN CILEXETIL PO, Take by mouth, Disp: , Rfl:   •  cyanocobalamin 1,000 mcg/mL, Inject 1 mL into a muscle every 30 (thirty) days, Disp: , Rfl:   •  fluticasone (FLONASE) 50 mcg/act nasal spray, 1 spray into each nostril daily as needed for rhinitis, Disp: , Rfl:   •  folic acid (FOLVITE) 1 mg tablet, Take by mouth, Disp: , Rfl:   •  gabapentin (NEURONTIN) 100 mg capsule, Take 100-200 mg by mouth daily at bedtime, Disp: , Rfl:   •  levothyroxine 25 mcg tablet, Take 25 mcg by mouth daily, Disp: , Rfl:   •  meclizine (ANTIVERT) 25 mg tablet, Take 1 tablet (25 mg total) by mouth every 8 (eight) hours as needed for dizziness, Disp: 30 tablet, Rfl: 0  •  melatonin 3 mg, Take 1 tablet (3 mg total) by mouth daily at bedtime as needed (insomnia), Disp:  , Rfl: 0  •  aspirin (ECOTRIN LOW STRENGTH) 81 mg EC tablet, Take 1 tablet (81 mg total) by mouth daily for 14 days, Disp: , Rfl: 0  •  atenolol (TENORMIN) 50 mg tablet, Take 50 mg by mouth daily (Patient not taking: Reported on 11/1/2022), Disp: , Rfl:   •  clopidogrel (PLAVIX) 75 mg tablet, Take 1 tablet (75 mg total) by mouth daily (Patient not taking: Reported on 11/1/2022), Disp:  , Rfl: 0  •  divalproex sodium (DEPAKOTE) 250 mg EC tablet, Take 1 tablet (250 mg total) by mouth daily at bedtime for 14 days, Disp: 14 tablet, Rfl: 0  •  docusate sodium (COLACE) 100 mg capsule, Take 1 capsule (100 mg total) by mouth 2 (two) times a day (Patient not taking: Reported on 11/1/2022), Disp: 10 capsule, Rfl: 0  •  magnesium oxide (MAG-OX) 400 mg, Take 1 tablet (400 mg total) by mouth daily (Patient not taking: Reported on 11/1/2022), Disp: 30 tablet, Rfl: 0  •  ondansetron (ZOFRAN) 4 mg tablet, Take 1 tablet (4 mg total) by mouth every 12 (twelve) hours as needed for nausea or vomiting (Patient not taking: Reported on 11/1/2022), Disp: 10 tablet, Rfl: 0    Current Allergies     Allergies as of 11/01/2022 - Reviewed 11/01/2022   Allergen Reaction Noted   • Penicillins Rash 07/27/2022            The following portions of the patient's history were reviewed and updated as appropriate: allergies, current medications, past family history, past medical history, past social history, past surgical history and problem list      Past Medical History:   Diagnosis Date   • Hypertension    • Stroke ([facility] [address] )    • TIA (transient ischemic attack)        Past Surgical History:   Procedure Laterality Date   • ABLATION SOFT TISSUE         History reviewed  No pertinent family history  Medications have been verified  Objective   /82   Pulse 72   Temp 97 7 °F (36 5 °C)   Resp 18   Ht 5' 2" (1 575 m)   Wt 56 7 kg (125 lb)   SpO2 98%   BMI 22 86 kg/m²   No LMP recorded  Patient is postmenopausal        Physical Exam     Physical Exam  Vitals reviewed  Constitutional:       General: She is not in acute distress  Appearance: She is well-developed  She is not diaphoretic  HENT:      Head: Normocephalic  Right Ear: Tympanic membrane and external ear normal       Left Ear: Tympanic membrane and external ear normal       Nose: Nose normal       Mouth/Throat:      Pharynx: No oropharyngeal exudate or posterior oropharyngeal erythema  Eyes:      Conjunctiva/sclera: Conjunctivae normal    Cardiovascular:      Rate and Rhythm: Normal rate and regular rhythm  Heart sounds: Normal heart sounds  No murmur heard  No friction rub  No gallop  Pulmonary:      Effort: Pulmonary effort is normal  No respiratory distress  Breath sounds: Normal breath sounds   No wheezing or rales  Chest:      Chest wall: No tenderness  Lymphadenopathy:      Cervical: No cervical adenopathy  Skin:     General: Skin is warm  Neurological:      Mental Status: She is alert and oriented to person, place, and time  Psychiatric:         Behavior: Behavior normal          Thought Content:  Thought content normal          Judgment: Judgment normal

## 2023-02-02 ENCOUNTER — OPTICAL OFFICE (OUTPATIENT)
Dept: URBAN - NONMETROPOLITAN AREA CLINIC 4 | Facility: CLINIC | Age: 73
Setting detail: OPHTHALMOLOGY
End: 2023-02-02

## 2023-02-02 DIAGNOSIS — H52.13: ICD-10-CM

## 2023-02-02 PROCEDURE — S0500 DISPOS CONT LENS: HCPCS | Performed by: OPTOMETRIST

## 2023-05-30 ENCOUNTER — DOCTOR'S OFFICE (OUTPATIENT)
Dept: URBAN - NONMETROPOLITAN AREA CLINIC 1 | Facility: CLINIC | Age: 73
Setting detail: OPHTHALMOLOGY
End: 2023-05-30
Payer: MEDICARE

## 2023-05-30 ENCOUNTER — OPTICAL OFFICE (OUTPATIENT)
Dept: URBAN - NONMETROPOLITAN AREA CLINIC 4 | Facility: CLINIC | Age: 73
Setting detail: OPHTHALMOLOGY
End: 2023-05-30

## 2023-05-30 DIAGNOSIS — H40.023: ICD-10-CM

## 2023-05-30 DIAGNOSIS — H43.813: ICD-10-CM

## 2023-05-30 DIAGNOSIS — H52.13: ICD-10-CM

## 2023-05-30 DIAGNOSIS — H04.123: ICD-10-CM

## 2023-05-30 DIAGNOSIS — G43.809: ICD-10-CM

## 2023-05-30 DIAGNOSIS — H25.013: ICD-10-CM

## 2023-05-30 PROCEDURE — 92133 CPTRZD OPH DX IMG PST SGM ON: CPT | Performed by: OPTOMETRIST

## 2023-05-30 PROCEDURE — S0500 DISPOS CONT LENS: HCPCS | Performed by: OPTOMETRIST

## 2023-05-30 PROCEDURE — 92012 INTRM OPH EXAM EST PATIENT: CPT | Performed by: OPTOMETRIST

## 2023-05-30 ASSESSMENT — REFRACTION_CURRENTRX
OD_SPHERE: -1.75
OD_VPRISM_DIRECTION: PROGS
OS_CYLINDER: -0.75
OS_CYLINDER: SPH
OS_ADD: +2.50
OS_SPHERE: -1.00
OS_OVR_VA: 20/
OD_SPHERE: -2.75
OS_SPHERE: -2.50
OD_CYLINDER: -0.75
OS_VPRISM_DIRECTION: PROGS
OD_OVR_VA: 20/
OD_AXIS: 096
OD_OVR_VA: 20/
OS_OVR_VA: 20/
OD_CYLINDER: -0.75
OS_AXIS: 062
OD_ADD: +2.50
OD_AXIS: 095

## 2023-05-30 ASSESSMENT — LID POSITION - DERMATOCHALASIS
OD_DERMATOCHALASIS: RUL 1+
OS_DERMATOCHALASIS: LUL 1+

## 2023-05-30 ASSESSMENT — REFRACTION_AUTOREFRACTION
OD_SPHERE: -0.25
OD_CYLINDER: -1.75
OS_CYLINDER: -1.00
OS_SPHERE: -0.75
OS_AXIS: 067
OD_AXIS: 087

## 2023-05-30 ASSESSMENT — REFRACTION_MANIFEST
OD_VA2: 20/30+2
OS_VA1: 20/25-2
OD_VA1: 20/30+2
OS_ADD: +2.50
OS_CYLINDER: -1.00
OD_AXIS: 090
OS_VA2: 20/25-2
OS_SPHERE: -0.75
OD_CYLINDER: -1.75
OS_AXIS: 070
OU_VA: 20/25
OD_ADD: +2.50
OD_SPHERE: -0.50

## 2023-05-30 ASSESSMENT — SPHEQUIV_DERIVED
OD_SPHEQUIV: -1.125
OD_SPHEQUIV: -1.375
OS_SPHEQUIV: -1.25
OS_SPHEQUIV: -1.25

## 2023-05-30 ASSESSMENT — VISUAL ACUITY
OD_BCVA: 20/25-1
OS_BCVA: 20/40+2

## 2023-05-30 ASSESSMENT — SUPERFICIAL PUNCTATE KERATITIS (SPK)
OS_SPK: 2+ 3+
OD_SPK: T 1+

## 2023-05-30 ASSESSMENT — LID POSITION - COMMENTS: OS_COMMENTS: +VE EVERTED PUNCTUM LUL

## 2023-06-27 ENCOUNTER — OPTICAL OFFICE (OUTPATIENT)
Dept: URBAN - NONMETROPOLITAN AREA CLINIC 4 | Facility: CLINIC | Age: 73
Setting detail: OPHTHALMOLOGY
End: 2023-06-27
Payer: COMMERCIAL

## 2023-06-27 ENCOUNTER — DOCTOR'S OFFICE (OUTPATIENT)
Dept: URBAN - NONMETROPOLITAN AREA CLINIC 1 | Facility: CLINIC | Age: 73
Setting detail: OPHTHALMOLOGY
End: 2023-06-27
Payer: MEDICARE

## 2023-06-27 DIAGNOSIS — H25.013: ICD-10-CM

## 2023-06-27 DIAGNOSIS — H04.121: ICD-10-CM

## 2023-06-27 DIAGNOSIS — H02.054: ICD-10-CM

## 2023-06-27 DIAGNOSIS — H52.13: ICD-10-CM

## 2023-06-27 DIAGNOSIS — H04.123: ICD-10-CM

## 2023-06-27 DIAGNOSIS — H04.122: ICD-10-CM

## 2023-06-27 PROCEDURE — V2203 LENS SPHCYL BIFOCAL 4.00D/.1: HCPCS | Performed by: OPTOMETRIST

## 2023-06-27 PROCEDURE — V2781 PROGRESSIVE LENS PER LENS: HCPCS | Performed by: OPTOMETRIST

## 2023-06-27 PROCEDURE — 67820 REVISE EYELASHES: CPT | Performed by: OPTOMETRIST

## 2023-06-27 PROCEDURE — 83861 MICROFLUID ANALY TEARS: CPT | Performed by: OPTOMETRIST

## 2023-06-27 PROCEDURE — V2025 EYEGLASSES DELUX FRAMES: HCPCS | Performed by: OPTOMETRIST

## 2023-06-27 PROCEDURE — V2020 VISION SVCS FRAMES PURCHASES: HCPCS | Performed by: OPTOMETRIST

## 2023-06-27 PROCEDURE — V2750 ANTI-REFLECTIVE COATING: HCPCS | Performed by: OPTOMETRIST

## 2023-06-27 PROCEDURE — V2784 LENS POLYCARB OR EQUAL: HCPCS | Performed by: OPTOMETRIST

## 2023-06-27 PROCEDURE — 92012 INTRM OPH EXAM EST PATIENT: CPT | Performed by: OPTOMETRIST

## 2023-06-27 ASSESSMENT — LID POSITION - COMMENTS: OS_COMMENTS: +VE EVERTED PUNCTUM LUL

## 2023-06-27 ASSESSMENT — CONFRONTATIONAL VISUAL FIELD TEST (CVF)
OS_FINDINGS: FULL
OD_FINDINGS: FULL

## 2023-06-27 ASSESSMENT — LID POSITION - DERMATOCHALASIS
OD_DERMATOCHALASIS: RUL 1+
OS_DERMATOCHALASIS: LUL 1+

## 2023-06-27 ASSESSMENT — LID EXAM ASSESSMENTS: OS_TRICHIASIS: LUL 1+ 2+

## 2023-06-27 ASSESSMENT — TONOMETRY
OD_IOP_MMHG: 16
OS_IOP_MMHG: 16

## 2023-06-27 ASSESSMENT — SUPERFICIAL PUNCTATE KERATITIS (SPK)
OD_SPK: T 1+
OS_SPK: 2+ 3+

## 2023-06-28 ASSESSMENT — SPHEQUIV_DERIVED
OS_SPHEQUIV: -1.25
OD_SPHEQUIV: -1.375
OS_SPHEQUIV: -1.25
OD_SPHEQUIV: -1.75

## 2023-06-28 ASSESSMENT — REFRACTION_CURRENTRX
OS_SPHERE: -2.50
OD_CYLINDER: -0.75
OS_CYLINDER: -0.75
OS_CYLINDER: SPH
OS_SPHERE: -1.00
OD_OVR_VA: 20/
OS_VPRISM_DIRECTION: PROGS
OD_ADD: +2.50
OD_AXIS: 096
OS_OVR_VA: 20/
OD_CYLINDER: -0.75
OD_SPHERE: -1.75
OS_ADD: +2.50
OD_AXIS: 094
OD_OVR_VA: 20/
OD_VPRISM_DIRECTION: PROGS
OS_AXIS: 060
OD_SPHERE: -2.75
OS_OVR_VA: 20/

## 2023-06-28 ASSESSMENT — REFRACTION_AUTOREFRACTION
OD_AXIS: 077
OD_SPHERE: -1.25
OS_CYLINDER: -1.00
OS_AXIS: 088
OS_SPHERE: -0.75
OD_CYLINDER: -1.00

## 2023-06-28 ASSESSMENT — REFRACTION_MANIFEST
OU_VA: 20/25
OS_VA2: 20/25-2
OD_CYLINDER: -1.75
OD_ADD: +2.50
OS_VA1: 20/25-2
OS_AXIS: 070
OD_SPHERE: -0.50
OD_VA1: 20/30+2
OS_CYLINDER: -1.00
OD_VA2: 20/30+2
OD_AXIS: 090
OS_ADD: +2.50
OS_SPHERE: -0.75

## 2023-06-28 ASSESSMENT — VISUAL ACUITY
OD_BCVA: 20/25-1
OS_BCVA: 20/30-1

## 2023-11-27 ENCOUNTER — OPTICAL OFFICE (OUTPATIENT)
Dept: URBAN - NONMETROPOLITAN AREA CLINIC 4 | Facility: CLINIC | Age: 73
Setting detail: OPHTHALMOLOGY
End: 2023-11-27

## 2023-11-27 DIAGNOSIS — H52.13: ICD-10-CM

## 2023-11-27 PROCEDURE — S0500 DISPOS CONT LENS: HCPCS | Mod: RT | Performed by: OPTOMETRIST

## 2023-12-05 ENCOUNTER — HOSPITAL ENCOUNTER (EMERGENCY)
Facility: HOSPITAL | Age: 73
Discharge: HOME | End: 2023-12-05
Attending: EMERGENCY MEDICINE
Payer: COMMERCIAL

## 2023-12-05 ENCOUNTER — APPOINTMENT (EMERGENCY)
Dept: CT IMAGING | Facility: HOSPITAL | Age: 73
End: 2023-12-05
Payer: COMMERCIAL

## 2023-12-05 VITALS
BODY MASS INDEX: 22.15 KG/M2 | HEIGHT: 62 IN | WEIGHT: 120.37 LBS | SYSTOLIC BLOOD PRESSURE: 138 MMHG | HEART RATE: 92 BPM | DIASTOLIC BLOOD PRESSURE: 80 MMHG | RESPIRATION RATE: 17 BRPM | TEMPERATURE: 98.1 F | OXYGEN SATURATION: 97 %

## 2023-12-05 DIAGNOSIS — K52.9 ENTEROCOLITIS: ICD-10-CM

## 2023-12-05 DIAGNOSIS — K52.9 GASTROENTERITIS: Primary | ICD-10-CM

## 2023-12-05 LAB
ALBUMIN SERPL BCP-MCNC: 4.5 G/DL (ref 3.5–5)
ALP SERPL-CCNC: 58 U/L (ref 34–104)
ALT SERPL W P-5'-P-CCNC: 14 U/L (ref 7–52)
ANION GAP SERPL CALCULATED.3IONS-SCNC: 10 MMOL/L
APTT PPP: 28 SECONDS (ref 23–37)
AST SERPL W P-5'-P-CCNC: 17 U/L (ref 13–39)
BASOPHILS # BLD AUTO: 0.04 THOUSANDS/ÂΜL (ref 0–0.1)
BASOPHILS NFR BLD AUTO: 0 % (ref 0–1)
BILIRUB SERPL-MCNC: 0.97 MG/DL (ref 0.2–1)
BUN SERPL-MCNC: 20 MG/DL (ref 5–25)
CALCIUM SERPL-MCNC: 9.7 MG/DL (ref 8.4–10.2)
CHLORIDE SERPL-SCNC: 106 MMOL/L (ref 96–108)
CO2 SERPL-SCNC: 24 MMOL/L (ref 21–32)
CREAT SERPL-MCNC: 0.94 MG/DL (ref 0.6–1.3)
EOSINOPHIL # BLD AUTO: 0.14 THOUSAND/ÂΜL (ref 0–0.61)
EOSINOPHIL NFR BLD AUTO: 1 % (ref 0–6)
ERYTHROCYTE [DISTWIDTH] IN BLOOD BY AUTOMATED COUNT: 13 % (ref 11.6–15.1)
GFR SERPL CREATININE-BSD FRML MDRD: 59 ML/MIN/1.73SQ M
GLUCOSE SERPL-MCNC: 153 MG/DL (ref 65–140)
HCT VFR BLD AUTO: 41.8 % (ref 34.8–46.1)
HGB BLD-MCNC: 13.3 G/DL (ref 11.5–15.4)
IMM GRANULOCYTES # BLD AUTO: 0.03 THOUSAND/UL (ref 0–0.2)
IMM GRANULOCYTES NFR BLD AUTO: 0 % (ref 0–2)
INR PPP: 0.98 (ref 0.84–1.19)
LACTATE SERPL-SCNC: 1.6 MMOL/L (ref 0.5–2)
LIPASE SERPL-CCNC: 36 U/L (ref 11–82)
LYMPHOCYTES # BLD AUTO: 0.92 THOUSANDS/ÂΜL (ref 0.6–4.47)
LYMPHOCYTES NFR BLD AUTO: 6 % (ref 14–44)
MCH RBC QN AUTO: 30.4 PG (ref 26.8–34.3)
MCHC RBC AUTO-ENTMCNC: 31.8 G/DL (ref 31.4–37.4)
MCV RBC AUTO: 96 FL (ref 82–98)
MONOCYTES # BLD AUTO: 1.51 THOUSAND/ÂΜL (ref 0.17–1.22)
MONOCYTES NFR BLD AUTO: 10 % (ref 4–12)
NEUTROPHILS # BLD AUTO: 13.27 THOUSANDS/ÂΜL (ref 1.85–7.62)
NEUTS SEG NFR BLD AUTO: 83 % (ref 43–75)
NRBC BLD AUTO-RTO: 0 /100 WBCS
PLATELET # BLD AUTO: 312 THOUSANDS/UL (ref 149–390)
PMV BLD AUTO: 9.2 FL (ref 8.9–12.7)
POTASSIUM SERPL-SCNC: 4.1 MMOL/L (ref 3.5–5.3)
PROT SERPL-MCNC: 7.1 G/DL (ref 6.4–8.4)
PROTHROMBIN TIME: 13.3 SECONDS (ref 11.6–14.5)
RBC # BLD AUTO: 4.37 MILLION/UL (ref 3.81–5.12)
SODIUM SERPL-SCNC: 140 MMOL/L (ref 135–147)
WBC # BLD AUTO: 15.91 THOUSAND/UL (ref 4.31–10.16)

## 2023-12-05 PROCEDURE — 85610 PROTHROMBIN TIME: CPT | Performed by: EMERGENCY MEDICINE

## 2023-12-05 PROCEDURE — 96361 HYDRATE IV INFUSION ADD-ON: CPT

## 2023-12-05 PROCEDURE — 85025 COMPLETE CBC W/AUTO DIFF WBC: CPT | Performed by: EMERGENCY MEDICINE

## 2023-12-05 PROCEDURE — G1004 CDSM NDSC: HCPCS

## 2023-12-05 PROCEDURE — 99284 EMERGENCY DEPT VISIT MOD MDM: CPT | Performed by: EMERGENCY MEDICINE

## 2023-12-05 PROCEDURE — 96375 TX/PRO/DX INJ NEW DRUG ADDON: CPT

## 2023-12-05 PROCEDURE — 96376 TX/PRO/DX INJ SAME DRUG ADON: CPT

## 2023-12-05 PROCEDURE — 85730 THROMBOPLASTIN TIME PARTIAL: CPT | Performed by: EMERGENCY MEDICINE

## 2023-12-05 PROCEDURE — 83605 ASSAY OF LACTIC ACID: CPT | Performed by: EMERGENCY MEDICINE

## 2023-12-05 PROCEDURE — 74177 CT ABD & PELVIS W/CONTRAST: CPT

## 2023-12-05 PROCEDURE — 36415 COLL VENOUS BLD VENIPUNCTURE: CPT | Performed by: EMERGENCY MEDICINE

## 2023-12-05 PROCEDURE — 99284 EMERGENCY DEPT VISIT MOD MDM: CPT

## 2023-12-05 PROCEDURE — 96374 THER/PROPH/DIAG INJ IV PUSH: CPT

## 2023-12-05 PROCEDURE — 80053 COMPREHEN METABOLIC PANEL: CPT | Performed by: EMERGENCY MEDICINE

## 2023-12-05 PROCEDURE — 83690 ASSAY OF LIPASE: CPT | Performed by: EMERGENCY MEDICINE

## 2023-12-05 RX ORDER — ONDANSETRON 2 MG/ML
4 INJECTION INTRAMUSCULAR; INTRAVENOUS ONCE
Status: COMPLETED | OUTPATIENT
Start: 2023-12-05 | End: 2023-12-05

## 2023-12-05 RX ORDER — KETOROLAC TROMETHAMINE 30 MG/ML
15 INJECTION, SOLUTION INTRAMUSCULAR; INTRAVENOUS ONCE
Status: COMPLETED | OUTPATIENT
Start: 2023-12-05 | End: 2023-12-05

## 2023-12-05 RX ORDER — ONDANSETRON 4 MG/1
4 TABLET, ORALLY DISINTEGRATING ORAL EVERY 6 HOURS PRN
Qty: 12 TABLET | Refills: 0 | Status: SHIPPED | OUTPATIENT
Start: 2023-12-05

## 2023-12-05 RX ADMIN — IOHEXOL 100 ML: 350 INJECTION, SOLUTION INTRAVENOUS at 02:53

## 2023-12-05 RX ADMIN — ONDANSETRON 4 MG: 2 INJECTION INTRAMUSCULAR; INTRAVENOUS at 01:50

## 2023-12-05 RX ADMIN — ONDANSETRON 4 MG: 2 INJECTION INTRAMUSCULAR; INTRAVENOUS at 03:19

## 2023-12-05 RX ADMIN — KETOROLAC TROMETHAMINE 15 MG: 30 INJECTION, SOLUTION INTRAMUSCULAR; INTRAVENOUS at 02:29

## 2023-12-05 RX ADMIN — MORPHINE SULFATE 2 MG: 2 INJECTION, SOLUTION INTRAMUSCULAR; INTRAVENOUS at 01:50

## 2023-12-05 RX ADMIN — SODIUM CHLORIDE 1000 ML: 0.9 INJECTION, SOLUTION INTRAVENOUS at 01:49

## 2023-12-05 NOTE — ED PROVIDER NOTES
History  Chief Complaint   Patient presents with    Vomiting     C/o n/v/d since 1500 yesterday. Patient is a 78-year-old female history of hypertension stroke and diverticulitis presents emergency department complaining of lower abdominal pain nausea vomiting and diarrhea started yesterday still present worsening. No prior abdominal surgery no fevers or chills. History provided by:  Patient  Vomiting  Severity:  Moderate  Duration:  1 day  Timing:  Constant  Quality:  Stomach contents  Progression:  Worsening  Chronicity:  New  Associated symptoms: abdominal pain and diarrhea    Associated symptoms: no arthralgias, no chills, no cough, no fever, no headaches, no myalgias and no sore throat        Prior to Admission Medications   Prescriptions Last Dose Informant Patient Reported? Taking?    CANDESARTAN CILEXETIL PO   Yes No   Sig: Take by mouth   acetaminophen (TYLENOL) 325 mg tablet   No No   Sig: Take 2 tablets (650 mg total) by mouth every 6 (six) hours as needed for mild pain, headaches or fever   amLODIPine (NORVASC) 2.5 mg tablet   No No   Sig: Take 1 tablet (2.5 mg total) by mouth daily   Patient taking differently: Take 10 mg by mouth daily   aspirin-dipyridamole (AGGRENOX)  mg per 12 hr capsule   Yes No   Sig: Take by mouth   atenolol (TENORMIN) 50 mg tablet   Yes No   Sig: Take 50 mg by mouth daily   Patient not taking: Reported on 11/1/2022   atorvastatin (LIPITOR) 80 mg tablet   No No   Sig: Take 1 tablet (80 mg total) by mouth every evening   Patient taking differently: Take 20 mg by mouth every evening   clopidogrel (PLAVIX) 75 mg tablet   No No   Sig: Take 1 tablet (75 mg total) by mouth daily   Patient not taking: Reported on 11/1/2022   cyanocobalamin 1,000 mcg/mL   Yes No   Sig: Inject 1 mL into a muscle every 30 (thirty) days   docusate sodium (COLACE) 100 mg capsule   No No   Sig: Take 1 capsule (100 mg total) by mouth 2 (two) times a day   Patient not taking: Reported on 2022   fluticasone (FLONASE) 50 mcg/act nasal spray   Yes No   Si spray into each nostril daily as needed for rhinitis   folic acid (FOLVITE) 1 mg tablet Not Taking  Yes No   Sig: Take by mouth   Patient not taking: Reported on 2023   gabapentin (NEURONTIN) 100 mg capsule   Yes No   Sig: Take 100-200 mg by mouth daily at bedtime   levothyroxine 25 mcg tablet   Yes No   Sig: Take 75 mcg by mouth daily   magnesium oxide (MAG-OX) 400 mg   No No   Sig: Take 1 tablet (400 mg total) by mouth daily   Patient not taking: Reported on 2022   meclizine (ANTIVERT) 25 mg tablet   No No   Sig: Take 1 tablet (25 mg total) by mouth every 8 (eight) hours as needed for dizziness   melatonin 3 mg   No No   Sig: Take 1 tablet (3 mg total) by mouth daily at bedtime as needed (insomnia)   ondansetron (ZOFRAN) 4 mg tablet   No No   Sig: Take 1 tablet (4 mg total) by mouth every 12 (twelve) hours as needed for nausea or vomiting   Patient not taking: Reported on 2022      Facility-Administered Medications: None       Past Medical History:   Diagnosis Date    Hypertension     Stroke ([address])     TIA (transient ischemic attack)        Past Surgical History:   Procedure Laterality Date    ABLATION SOFT TISSUE         History reviewed. No pertinent family history. I have reviewed and agree with the history as documented. E-Cigarette/Vaping    E-Cigarette Use Never User      E-Cigarette/Vaping Substances     Social History     Tobacco Use    Smoking status: Never    Smokeless tobacco: Never   Vaping Use    Vaping Use: Never used   Substance Use Topics    Alcohol use: Not Currently    Drug use: Not Currently       Review of Systems   Constitutional:  Negative for activity change, appetite change, chills, fatigue and fever. HENT:  Negative for congestion, ear pain, rhinorrhea and sore throat. Eyes:  Negative for discharge, redness and visual disturbance.    Respiratory:  Negative for cough, chest tightness, shortness of breath and wheezing. Cardiovascular:  Negative for chest pain and palpitations. Gastrointestinal:  Positive for abdominal pain, diarrhea, nausea and vomiting. Negative for constipation. Endocrine: Negative for polydipsia and polyuria. Genitourinary:  Negative for difficulty urinating, dysuria, frequency, hematuria and urgency. Musculoskeletal:  Negative for arthralgias and myalgias. Skin:  Negative for color change, pallor and rash. Neurological:  Negative for dizziness, weakness, light-headedness, numbness and headaches. Hematological:  Negative for adenopathy. Does not bruise/bleed easily. All other systems reviewed and are negative. Physical Exam  Physical Exam  Vitals and nursing note reviewed. Constitutional:       Appearance: She is well-developed. HENT:      Head: Normocephalic and atraumatic. Right Ear: External ear normal.      Left Ear: External ear normal.      Nose: Nose normal.   Eyes:      Conjunctiva/sclera: Conjunctivae normal.      Pupils: Pupils are equal, round, and reactive to light. Cardiovascular:      Rate and Rhythm: Normal rate and regular rhythm. Heart sounds: Normal heart sounds. Pulmonary:      Effort: Pulmonary effort is normal. No respiratory distress. Breath sounds: Normal breath sounds. No wheezing or rales. Chest:      Chest wall: No tenderness. Abdominal:      General: Bowel sounds are normal. There is no distension. Palpations: Abdomen is soft. Tenderness: There is abdominal tenderness in the right lower quadrant, periumbilical area, suprapubic area and left lower quadrant. There is no guarding or rebound. Musculoskeletal:         General: Normal range of motion. Cervical back: Normal range of motion and neck supple. Skin:     General: Skin is warm and dry. Neurological:      Mental Status: She is alert and oriented to person, place, and time. Cranial Nerves: No cranial nerve deficit.       Sensory: No sensory deficit. Vital Signs  ED Triage Vitals   Temperature Pulse Respirations Blood Pressure SpO2   12/05/23 0122 12/05/23 0122 12/05/23 0122 12/05/23 0122 12/05/23 0122   98.1 °F (36.7 °C) 100 16 130/81 97 %      Temp Source Heart Rate Source Patient Position - Orthostatic VS BP Location FiO2 (%)   12/05/23 0122 12/05/23 0122 12/05/23 0122 12/05/23 0122 --   Temporal Monitor Lying Right arm       Pain Score       12/05/23 0150       7           Vitals:    12/05/23 0300 12/05/23 0315 12/05/23 0330 12/05/23 0400   BP: 133/69 147/76 129/70 130/72   Pulse: 87 88 85 88   Patient Position - Orthostatic VS: Lying Lying Lying Lying         Visual Acuity      ED Medications  Medications   sodium chloride 0.9 % bolus 1,000 mL (0 mL Intravenous Stopped 12/5/23 0249)   ondansetron (ZOFRAN) injection 4 mg (4 mg Intravenous Given 12/5/23 0150)   morphine injection 2 mg (2 mg Intravenous Given 12/5/23 0150)   ketorolac (TORADOL) injection 15 mg (15 mg Intravenous Given 12/5/23 0229)   iohexol (OMNIPAQUE) 350 MG/ML injection (MULTI-DOSE) 100 mL (100 mL Intravenous Given 12/5/23 0253)   ondansetron (ZOFRAN) injection 4 mg (4 mg Intravenous Given 12/5/23 0319)       Diagnostic Studies  Results Reviewed       Procedure Component Value Units Date/Time    Lactic acid, plasma (w/reflex if result > 2.0) [[id number]]  (Normal) Collected: 12/05/23 0147    Lab Status: Final result Specimen: Blood from Arm, Right Updated: 12/05/23 0231     LACTIC ACID 1.6 mmol/L     Narrative:      Result may be elevated if tourniquet was used during collection.     Comprehensive metabolic panel [[id number]]  (Abnormal) Collected: 12/05/23 0147    Lab Status: Final result Specimen: Blood from Arm, Right Updated: 12/05/23 0230     Sodium 140 mmol/L      Potassium 4.1 mmol/L      Chloride 106 mmol/L      CO2 24 mmol/L      ANION GAP 10 mmol/L      BUN 20 mg/dL      Creatinine 0.94 mg/dL      Glucose 153 mg/dL      Calcium 9.7 mg/dL      AST 17 U/L ALT 14 U/L      Alkaline Phosphatase 58 U/L      Total Protein 7.1 g/dL      Albumin 4.5 g/dL      Total Bilirubin 0.97 mg/dL      eGFR 59 ml/min/1.73sq m     Narrative:      [facility] guidelines for Chronic Kidney Disease (CKD):     Stage 1 with normal or high GFR (GFR > 90 mL/min/1.73 square meters)    Stage 2 Mild CKD (GFR = 60-89 mL/min/1.73 square meters)    Stage 3A Moderate CKD (GFR = 45-59 mL/min/1.73 square meters)    Stage 3B Moderate CKD (GFR = 30-44 mL/min/1.73 square meters)    Stage 4 Severe CKD (GFR = 15-29 mL/min/1.73 square meters)    Stage 5 End Stage CKD (GFR <15 mL/min/1.73 square meters)  Note: GFR calculation is accurate only with a steady state creatinine    Lipase [[id number]]  (Normal) Collected: 12/05/23 0147    Lab Status: Final result Specimen: Blood from Arm, Right Updated: 12/05/23 0230     Lipase 36 u/L     Protime-INR [[id number]]  (Normal) Collected: 12/05/23 0147    Lab Status: Final result Specimen: Blood from Arm, Right Updated: 12/05/23 0226     Protime 13.3 seconds      INR 0.98    APTT [[id number]]  (Normal) Collected: 12/05/23 0147    Lab Status: Final result Specimen: Blood from Arm, Right Updated: 12/05/23 0226     PTT 28 seconds     CBC and differential [[id number]]  (Abnormal) Collected: 12/05/23 0147    Lab Status: Final result Specimen: Blood from Arm, Right Updated: 12/05/23 0209     WBC 15.91 Thousand/uL      RBC 4.37 Million/uL      Hemoglobin 13.3 g/dL      Hematocrit 41.8 %      MCV 96 fL      MCH 30.4 pg      MCHC 31.8 g/dL      RDW 13.0 %      MPV 9.2 fL      Platelets 886 Thousands/uL      nRBC 0 /100 WBCs      Neutrophils Relative 83 %      Immat GRANS % 0 %      Lymphocytes Relative 6 %      Monocytes Relative 10 %      Eosinophils Relative 1 %      Basophils Relative 0 %      Neutrophils Absolute 13.27 Thousands/µL      Immature Grans Absolute 0.03 Thousand/uL      Lymphocytes Absolute 0.92 Thousands/µL      Monocytes Absolute 1.51 Thousand/µL      Eosinophils Absolute 0.14 Thousand/µL      Basophils Absolute 0.04 Thousands/µL     UA w Reflex to Microscopic w Reflex to Culture [[id number]]     Lab Status: No result Specimen: Urine                    CT abdomen pelvis with contrast   Final Result by Maegan Watts MD (34/26 [id number])      1. Hyperenhancement of the small bowel and mild diffuse thickening of the colon may be due to enterocolitis. Colonic diverticulosis without focal inflammatory change to suggest diverticulitis. 2.  Small hiatal hernia. Fluid within the distal esophagus may be due to gastroesophageal reflux. Mild thickening of the distal stomach may be due to gastritis. 3.  Small amount of ascites. Workstation performed: [id number]                    Procedures  Procedures         ED Course                               SBIRT 22yo+      Flowsheet Row Most Recent Value   Initial Alcohol Screen: US AUDIT-C     1. How often do you have a drink containing alcohol? 0 Filed at: 12/05/2023 0123   2. How many drinks containing alcohol do you have on a typical day you are drinking? 0 Filed at: 12/05/2023 0123   3b. FEMALE Any Age, or MALE 65+: How often do you have 4 or more drinks on one occassion? 0 Filed at: 12/05/2023 0123   Audit-C Score 0 Filed at: 12/05/2023 0214   HILDA: How many times in the past year have you. .. Used an illegal drug or used a prescription medication for non-medical reasons? Never Filed at: 12/05/2023 1686                      Medical Decision Making  Differential diagnosis included but not limited to diverticulitis bowel obstruction colitis. Patient remained clinically and hemodynamically stable in the emergency department she is afebrile nontoxic well-appearing workup in the ED reveals evidence of enterocolitis and gastritis.   For now we will hold on antibiotics and advised rest plenty fluid supportive care antiemetics as needed and prompt follow-up with primary physician for further evaluation and treatment and obtain test results. return precautions and anticipatory guidance discussed. Problems Addressed:  Enterocolitis: acute illness or injury  Gastroenteritis: acute illness or injury    Amount and/or Complexity of Data Reviewed  Labs: ordered. Decision-making details documented in ED Course. Radiology: ordered. Decision-making details documented in ED Course. Risk  Prescription drug management. Disposition  Final diagnoses:   Gastroenteritis   Enterocolitis     Time reflects when diagnosis was documented in both MDM as applicable and the Disposition within this note       Time User Action Codes Description Comment    12/5/2023  4:26 AM Dulcy Prayer Add [K52.9] Gastroenteritis     12/5/2023  4:26 AM Dulcy Prayer Add [K52.9] Enterocolitis           ED Disposition       ED Disposition   Discharge    Condition   Stable    Date/Time   Tue Dec 5, 2023 Judyanscarine discharge to home/self care. Follow-up Information       Follow up With Specialties Details Why Contact Info    Regan Cast DO Internal Medicine Schedule an appointment as soon as possible for a visit in 3 days  [place] [address] [address] [facility]  [address] [address]  [phone]              Patient's Medications   Discharge Prescriptions    ONDANSETRON (ZOFRAN-ODT) 4 MG DISINTEGRATING TABLET    Take 1 tablet (4 mg total) by mouth every 6 (six) hours as needed for nausea or vomiting       Start Date: 12/5/2023 End Date: --       Order Dose: 4 mg       Quantity: 12 tablet    Refills: 0       No discharge procedures on file.     PDMP Review       None            ED Provider  Electronically Signed by             Reji Sun DO  12/05/23 5124

## 2023-12-09 ENCOUNTER — HOSPITAL ENCOUNTER (OUTPATIENT)
Dept: RADIOLOGY | Facility: CLINIC | Age: 73
Discharge: HOME | End: 2023-12-09
Payer: COMMERCIAL

## 2023-12-09 ENCOUNTER — OFFICE VISIT (OUTPATIENT)
Dept: URGENT CARE | Facility: CLINIC | Age: 73
End: 2023-12-09
Payer: COMMERCIAL

## 2023-12-09 DIAGNOSIS — S52.232A CLOSED DISPLACED OBLIQUE FRACTURE OF SHAFT OF LEFT ULNA, INITIAL ENCOUNTER: ICD-10-CM

## 2023-12-09 DIAGNOSIS — S69.92XA INJURY OF LEFT WRIST, INITIAL ENCOUNTER: Primary | ICD-10-CM

## 2023-12-09 DIAGNOSIS — S69.92XA INJURY OF LEFT WRIST, INITIAL ENCOUNTER: ICD-10-CM

## 2023-12-09 PROCEDURE — 29105 APPLICATION LONG ARM SPLINT: CPT

## 2023-12-09 PROCEDURE — 73110 X-RAY EXAM OF WRIST: CPT

## 2023-12-09 PROCEDURE — 99213 OFFICE O/P EST LOW 20 MIN: CPT

## 2023-12-09 PROCEDURE — S9083 URGENT CARE CENTER GLOBAL: HCPCS

## 2023-12-09 RX ORDER — LEVOTHYROXINE SODIUM 0.05 MG/1
50 TABLET ORAL DAILY
COMMUNITY
Start: 2023-10-02

## 2023-12-09 RX ORDER — ATORVASTATIN CALCIUM 20 MG/1
20 TABLET, FILM COATED ORAL DAILY
COMMUNITY

## 2023-12-09 NOTE — PATIENT INSTRUCTIONS
Keep the splint in place until seen by ortho. Continue to ice the area 20 minutes on and 20 minutes off  Take over the counter tylenol as needed for pain. Follow-up with the orthopedic.

## 2023-12-09 NOTE — PROGRESS NOTES
[facility]        NAME: Shante Biggs is a 75 y.o. female  : 1950    MRN: [id number]  DATE: 2023  TIME: 10:57 AM    Assessment and Plan   Injury of left wrist, initial encounter [S69.92XA]  1. Injury of left wrist, initial encounter  XR wrist 3+ vw left    Ambulatory Referral to Orthopedic Surgery      2. Closed displaced oblique fracture of shaft of left ulna, initial encounter  Ambulatory Referral to Orthopedic Surgery            Patient Instructions     Keep the splint in place until seen by ortho. Continue to ice the area 20 minutes on and 20 minutes off  Take over the counter tylenol as needed for pain. Follow-up with the orthopedic. Follow up with PCP in 3-5 days. Proceed to  ER if symptoms worsen. Chief Complaint     Chief Complaint   Patient presents with    Wrist Pain     Pt states she slipped and fell on her left wrist causing pain, Happened this morning. History of Present Illness       Patient is a [phone] presenting with left wrist pain after a fall. She states she slipped on the last step while carrying a basket of laundry downstairs. She denies any head injury. She is not taking any blood thinners. Review of Systems   Review of Systems   Musculoskeletal:  Positive for arthralgias and joint swelling. All other systems reviewed and are negative.         Current Medications       Current Outpatient Medications:     acetaminophen (TYLENOL) 325 mg tablet, Take 2 tablets (650 mg total) by mouth every 6 (six) hours as needed for mild pain, headaches or fever, Disp: , Rfl: 0    amLODIPine (NORVASC) 2.5 mg tablet, Take 1 tablet (2.5 mg total) by mouth daily (Patient taking differently: Take 10 mg by mouth daily), Disp: 30 tablet, Rfl: 0    aspirin-dipyridamole (AGGRENOX)  mg per 12 hr capsule, Take by mouth, Disp: , Rfl:     atorvastatin (LIPITOR) 20 mg tablet, Take 20 mg by mouth daily, Disp: , Rfl:     atorvastatin (LIPITOR) 80 mg tablet, Take 1 tablet (80 mg total) by mouth every evening (Patient taking differently: Take 20 mg by mouth every evening), Disp:  , Rfl: 0    Calcium 200 MG TABS, 600 mg, Disp: , Rfl:     CANDESARTAN CILEXETIL PO, Take by mouth, Disp: , Rfl:     cyanocobalamin 1,000 mcg/mL, Inject 1 mL into a muscle every 30 (thirty) days, Disp: , Rfl:     docusate sodium (COLACE) 100 mg capsule, Take 1 capsule (100 mg total) by mouth 2 (two) times a day, Disp: 10 capsule, Rfl: 0    fluticasone (FLONASE) 50 mcg/act nasal spray, 1 spray into each nostril daily as needed for rhinitis, Disp: , Rfl:     folic acid (FOLVITE) 1 mg tablet, Take by mouth, Disp: , Rfl:     gabapentin (NEURONTIN) 100 mg capsule, Take 100-200 mg by mouth daily at bedtime, Disp: , Rfl:     levothyroxine 25 mcg tablet, Take 75 mcg by mouth daily, Disp: , Rfl:     levothyroxine 50 mcg tablet, Take 50 mcg by mouth daily, Disp: , Rfl:     magnesium oxide (MAG-OX) 400 mg, Take 1 tablet (400 mg total) by mouth daily, Disp: 30 tablet, Rfl: 0    atenolol (TENORMIN) 50 mg tablet, Take 50 mg by mouth daily, Disp: , Rfl:     clopidogrel (PLAVIX) 75 mg tablet, Take 1 tablet (75 mg total) by mouth daily, Disp:  , Rfl: 0    meclizine (ANTIVERT) 25 mg tablet, Take 1 tablet (25 mg total) by mouth every 8 (eight) hours as needed for dizziness, Disp: 30 tablet, Rfl: 0    melatonin 3 mg, Take 1 tablet (3 mg total) by mouth daily at bedtime as needed (insomnia), Disp:  , Rfl: 0    ondansetron (ZOFRAN) 4 mg tablet, Take 1 tablet (4 mg total) by mouth every 12 (twelve) hours as needed for nausea or vomiting, Disp: 10 tablet, Rfl: 0    ondansetron (ZOFRAN-ODT) 4 mg disintegrating tablet, Take 1 tablet (4 mg total) by mouth every 6 (six) hours as needed for nausea or vomiting, Disp: 12 tablet, Rfl: 0    Current Allergies     Allergies as of 12/09/2023 - Reviewed 12/09/2023   Allergen Reaction Noted    Penicillins Rash 07/27/2022            The following portions of the patient's history were reviewed and updated as appropriate: allergies, current medications, past family history, past medical history, past social history, past surgical history and problem list.     Past Medical History:   Diagnosis Date    Hypertension     Raynaud disease     Stroke ([address])     TIA (transient ischemic attack)        Past Surgical History:   Procedure Laterality Date    ABLATION SOFT TISSUE         Family History   Problem Relation Age of Onset    Other Mother         encephalitis    Other Father         trauma         Medications have been verified. Objective   There were no vitals taken for this visit. Physical Exam     Physical Exam  Vitals and nursing note reviewed. Constitutional:       General: She is not in acute distress. Appearance: Normal appearance. She is normal weight. She is not ill-appearing or toxic-appearing. HENT:      Mouth/Throat:      Pharynx: Oropharynx is clear. Cardiovascular:      Rate and Rhythm: Normal rate. Pulses: Normal pulses. Pulmonary:      Effort: Pulmonary effort is normal.   Musculoskeletal:      Left wrist: Swelling, deformity and tenderness present. Decreased range of motion. Normal pulse. Neurological:      Mental Status: She is alert.

## 2023-12-11 ENCOUNTER — OFFICE VISIT (OUTPATIENT)
Dept: OBGYN CLINIC | Facility: CLINIC | Age: 73
End: 2023-12-11
Payer: COMMERCIAL

## 2023-12-11 VITALS
HEIGHT: 62 IN | TEMPERATURE: 97.4 F | BODY MASS INDEX: 22.6 KG/M2 | SYSTOLIC BLOOD PRESSURE: 108 MMHG | WEIGHT: 122.8 LBS | DIASTOLIC BLOOD PRESSURE: 76 MMHG | HEART RATE: 70 BPM

## 2023-12-11 DIAGNOSIS — S52.255A CLOSED NONDISPLACED COMMINUTED FRACTURE OF SHAFT OF LEFT ULNA, INITIAL ENCOUNTER: ICD-10-CM

## 2023-12-11 DIAGNOSIS — M79.632 PAIN IN LEFT FOREARM: Primary | ICD-10-CM

## 2023-12-11 DIAGNOSIS — Z86.73 HISTORY OF CVA (CEREBROVASCULAR ACCIDENT): ICD-10-CM

## 2023-12-11 PROCEDURE — 25530 CLTX ULNAR SHFT FX W/O MNPJ: CPT | Performed by: ORTHOPAEDIC SURGERY

## 2023-12-11 PROCEDURE — 99204 OFFICE O/P NEW MOD 45 MIN: CPT | Performed by: ORTHOPAEDIC SURGERY

## 2023-12-11 NOTE — PATIENT INSTRUCTIONS
May take up to 1000 mg of Tylenol 3 times daily. You may also use up to 600 mg of ibuprofen 3 times daily but this should be taken with food. These may be alternated or, if you have the combination ibuprofen/Tylenol product, this may be used alternatively. Keep the cast clean, dry and intact. Elevate arm above shoulder height when showering with a plastic bag on to keep the cast dry. Contact the office if questions or concerns arise.

## 2023-12-11 NOTE — PROGRESS NOTES
ASSESSMENT/PLAN:    Diagnoses and all orders for this visit:    Pain in left forearm  -     Ambulatory Referral to Orthopedic Surgery    Closed nondisplaced comminuted fracture of shaft of left ulna, initial encounter  -     Ambulatory Referral to Orthopedic Surgery    History of CVA (cerebrovascular accident)        Plan: Treatment was discussed. At this time, she primarily uses her left hand due to her history of CVA and, therefore prefers a short arm cast which will allow her elbow range of motion for her eating/drinking and daily care. Cautions have been reviewed, instructions provided and questions answered. I will see her in 1 week with AP and lateral x-rays of her left forearm obtained at follow-up. The office should be contacted if questions or concerns arise. Her son was present with her today. Return in about 1 week (around 12/18/2023). _____________________________________________________  CHIEF COMPLAINT:  Chief Complaint   Patient presents with    Left Wrist - Fracture         SUBJECTIVE:  Andrew Mcfadden is a 75y.o. year old primarily left-handed female who presents for evaluation of her left forearm. She slipped on stairs going to the laundry room on 12/9/2023. She was seen at the [facility] in [facility], x-rays obtained and she was placed into a volar splint. She now presents for orthopedic evaluation and treatment. She is here with her son today. She does primarily use her left hand status post CVA causing some right hemiparesis. She denies any additional injuries. She denies paresthesias in the left upper extremity and complains of pain along the ulnar aspect of the left forearm.     PAST MEDICAL HISTORY:  Past Medical History:   Diagnosis Date    Hypertension     Raynaud disease     Stroke ([address])     TIA (transient ischemic attack)        PAST SURGICAL HISTORY:  Past Surgical History:   Procedure Laterality Date    ABLATION SOFT TISSUE         FAMILY HISTORY:  Family History Problem Relation Age of Onset    Other Mother         encephalitis    Other Father         trauma       SOCIAL HISTORY:  Social History     Tobacco Use    Smoking status: Never    Smokeless tobacco: Never   Vaping Use    Vaping Use: Never used   Substance Use Topics    Alcohol use: Not Currently    Drug use: Not Currently       MEDICATIONS:    Current Outpatient Medications:     acetaminophen (TYLENOL) 325 mg tablet, Take 2 tablets (650 mg total) by mouth every 6 (six) hours as needed for mild pain, headaches or fever, Disp: , Rfl: 0    aspirin-dipyridamole (AGGRENOX)  mg per 12 hr capsule, Take by mouth, Disp: , Rfl:     atenolol (TENORMIN) 50 mg tablet, Take 50 mg by mouth daily, Disp: , Rfl:     atorvastatin (LIPITOR) 20 mg tablet, Take 20 mg by mouth daily, Disp: , Rfl:     Calcium 200 MG TABS, 600 mg, Disp: , Rfl:     CANDESARTAN CILEXETIL PO, Take by mouth, Disp: , Rfl:     clopidogrel (PLAVIX) 75 mg tablet, Take 1 tablet (75 mg total) by mouth daily, Disp:  , Rfl: 0    cyanocobalamin 1,000 mcg/mL, Inject 1 mL into a muscle every 30 (thirty) days, Disp: , Rfl:     fluticasone (FLONASE) 50 mcg/act nasal spray, 1 spray into each nostril daily as needed for rhinitis, Disp: , Rfl:     folic acid (FOLVITE) 1 mg tablet, Take by mouth, Disp: , Rfl:     gabapentin (NEURONTIN) 100 mg capsule, Take 100-200 mg by mouth daily at bedtime, Disp: , Rfl:     levothyroxine 25 mcg tablet, Take 75 mcg by mouth daily, Disp: , Rfl:     levothyroxine 50 mcg tablet, Take 50 mcg by mouth daily, Disp: , Rfl:     magnesium oxide (MAG-OX) 400 mg, Take 1 tablet (400 mg total) by mouth daily, Disp: 30 tablet, Rfl: 0    meclizine (ANTIVERT) 25 mg tablet, Take 1 tablet (25 mg total) by mouth every 8 (eight) hours as needed for dizziness, Disp: 30 tablet, Rfl: 0    ondansetron (ZOFRAN) 4 mg tablet, Take 1 tablet (4 mg total) by mouth every 12 (twelve) hours as needed for nausea or vomiting, Disp: 10 tablet, Rfl: 0    ondansetron (ZOFRAN-ODT) 4 mg disintegrating tablet, Take 1 tablet (4 mg total) by mouth every 6 (six) hours as needed for nausea or vomiting, Disp: 12 tablet, Rfl: 0    amLODIPine (NORVASC) 2.5 mg tablet, Take 1 tablet (2.5 mg total) by mouth daily (Patient not taking: Reported on 12/11/2023), Disp: 30 tablet, Rfl: 0    atorvastatin (LIPITOR) 80 mg tablet, Take 1 tablet (80 mg total) by mouth every evening (Patient not taking: Reported on 12/11/2023), Disp:  , Rfl: 0    docusate sodium (COLACE) 100 mg capsule, Take 1 capsule (100 mg total) by mouth 2 (two) times a day (Patient not taking: Reported on 12/11/2023), Disp: 10 capsule, Rfl: 0    melatonin 3 mg, Take 1 tablet (3 mg total) by mouth daily at bedtime as needed (insomnia) (Patient not taking: Reported on 12/11/2023), Disp:  , Rfl: 0    ALLERGIES:  Allergies   Allergen Reactions    Penicillins Rash       Review of systems:   Constitutional: Negative for fatigue, fever or loss of apetite. HENT: Negative. Respiratory: Negative for shortness of breath, dyspnea. Cardiovascular: Negative for chest pain/tightness. Gastrointestinal: Negative for abdominal pain, N/V. Endocrine: Negative for cold/heat intolerance, unexplained weight loss/gain. Genitourinary: Negative for flank pain, dysuria, hematuria. Musculoskeletal: Positive as in the HPI  Skin: Negative for rash. Neurological: Negative  Psychiatric/Behavioral: Negative for agitation. _____________________________________________________  PHYSICAL EXAMINATION:    Blood pressure 108/76, pulse 70, temperature (!) 97.4 °F (36.3 °C), temperature source Temporal, height 5' 2" (1.575 m), weight 55.7 kg (122 lb 12.8 oz).     General: well developed and well nourished, alert, oriented times 3, and appears comfortable  Psychiatric: Normal  HEENT: Benign  Cardiovascular: Regular    Pulmonary: No wheezing or stridor  Abdomen: Soft, Nontender  Skin: No masses, erythema, lacerations, fluctation, ulcerations  Neurovascular: Motor and sensory exams are grossly intact except as limited by her injury. Pulses are palpable. MUSCULOSKELETAL EXAMINATION:    The left forearm is immobilized in a volar splint. She has good range of motion of the shoulder and elbow prior to removal of the splint without complaints. The right upper extremity exam and both lower extremity examinations are benign. The splint was removed. There is mild swelling and resolving ecchymosis in the left forearm with no gross deformity. She does complain of tenderness to palpation of the ulna and the distal third as well as over the distal radius. She denies tenderness along the radial or ulnar shaft and at the proximal radius and ulna. Forearm rotation and wrist range of motion were not assessed due to the patient's injury. She has somewhat limited finger flexion and extension having been immobilized in a splint that extended out onto the fingers. However she has no tenderness throughout the hand.      _____________________________________________________  STUDIES REVIEWED:  X-rays of the wrist demonstrated a comminuted distal ulnar shaft fracture in good alignment. The [facility] note was reviewed. The x-ray report was reviewed. PROCEDURES:  Fracture / Dislocation Treatment    Date/Time: 12/11/2023 8:30 AM    Performed by: Reagan Hale  Authorized by: Reagan Hale    Patient Location:  [facility] Protocol:  Consent: Verbal consent obtained. Written consent not obtained. Risks and benefits: risks, benefits and alternatives were discussed  Consent given by: patient  Patient understanding: patient states understanding of the procedure being performed  Test results: test results available and properly labeled  Radiology Images displayed and confirmed.  If images not available, report reviewed: imaging studies available    Injury location:  Forearm  Location details:  Left forearm  Injury type:  Fracture  Fracture type: ulnar shaft    Neurovascular status: Neurovascularly intact    Local anesthesia used?: No    General anesthesia used?: No    Manipulation performed?: No    Cast type:  Short arm  Neurovascular status: Neurovascularly intact    Patient tolerance:  Patient tolerated the procedure well with no immediate complications        Priscilla Domínguez

## 2023-12-12 ENCOUNTER — TELEPHONE (OUTPATIENT)
Dept: OBGYN CLINIC | Facility: HOSPITAL | Age: 73
End: 2023-12-12

## 2023-12-12 NOTE — TELEPHONE ENCOUNTER
Caller: patient    Doctor: Mansoor    Reason for call: Patient is calling because she stated since her cast was put on she is having a lot of pain, her whole arm is sore, and she is having swelling into her hand. Asking if this is normal?    Call back#: [phone]

## 2023-12-12 NOTE — TELEPHONE ENCOUNTER
Called and spoke w/pt and relayed Dr Max's msg.  Pt has a Left wrist fx and was place in short arm cast yesterday.  She states her fingers are swollen.  She has not been elevating and has just started icing.  Nailbeds dark pink.  She was trying to use arm.  Advised pt to elevate arm to heart level either on pillows or in sling to try to get swelling down.  Also advised to ice area.  Pt states she just started that.  She is taking ibuprofen for pain and it is not helping.  Advised she may also take ES Tylenol 500mg 2 tabs every 8 hours as needed if not contraindicated and not more than 3000mg in 24hours.  Offered to make pt an appt w/Dr Max tomorrow and pt states that she will try recommendations/advice and callback if she feels she needs an appt.    Thank you.

## 2023-12-18 ENCOUNTER — HOSPITAL ENCOUNTER (OUTPATIENT)
Dept: RADIOLOGY | Facility: CLINIC | Age: 73
Discharge: HOME | End: 2023-12-18
Payer: COMMERCIAL

## 2023-12-18 ENCOUNTER — OFFICE VISIT (OUTPATIENT)
Dept: OBGYN CLINIC | Facility: CLINIC | Age: 73
End: 2023-12-18

## 2023-12-18 VITALS
TEMPERATURE: 97.8 F | WEIGHT: 122 LBS | BODY MASS INDEX: 22.45 KG/M2 | SYSTOLIC BLOOD PRESSURE: 120 MMHG | HEART RATE: 78 BPM | DIASTOLIC BLOOD PRESSURE: 70 MMHG | HEIGHT: 62 IN

## 2023-12-18 DIAGNOSIS — S52.255D CLOSED NONDISPLACED COMMINUTED FRACTURE OF SHAFT OF LEFT ULNA WITH ROUTINE HEALING, SUBSEQUENT ENCOUNTER: Primary | ICD-10-CM

## 2023-12-18 DIAGNOSIS — S52.255D CLOSED NONDISPLACED COMMINUTED FRACTURE OF SHAFT OF LEFT ULNA WITH ROUTINE HEALING, SUBSEQUENT ENCOUNTER: ICD-10-CM

## 2023-12-18 PROCEDURE — 99024 POSTOP FOLLOW-UP VISIT: CPT | Performed by: ORTHOPAEDIC SURGERY

## 2023-12-18 PROCEDURE — 73090 X-RAY EXAM OF FOREARM: CPT

## 2023-12-18 NOTE — PATIENT INSTRUCTIONS
Continue cast care instructions.  No lifting more than 1 pound.  Sling for comfort only.  She should work on shoulder range of motion and gentle elbow flexion extension.  She continue working her fingers gently.  Patient will be seen back in the orthopedic office on Wednesday, 12/27/2023 for repeat x-rays through the cast.  Which is reasonable will require close monitoring with x-ray.

## 2023-12-18 NOTE — PROGRESS NOTES
"Patient Name:  Italia Conway  MRN:  [id number]    Assessment     1. Closed nondisplaced comminuted fracture of shaft of left ulna with routine healing, subsequent encounter  XR forearm 2 vw left        Plan     Continue cast care instructions.  No lifting more than 1 pound.  Sling for comfort only.  She should work on shoulder range of motion and gentle elbow flexion extension.  She continue working her fingers gently.  Patient will be seen back in the orthopedic office on Wednesday, 12/27/2023 for repeat x-rays through the cast.  Which is reasonable will require close monitoring with x-ray.    Return in about 9 days (around 12/27/2023) for Recheck x-ray through cast.    Subjective   Italia Conway returns for follow-up of left distal ulnar shaft fracture, date of injury is 12/9/2023.  The patient is 9 day(s) post injury and returns for routine follow-up and x-ray through cast. Patient complains of some mild discomfort and ecchymosis about her elbow.  As her cast is a little looser.  Denies gross numbness or tingling.    Objective     /70   Pulse 78   Temp 97.8 °F (36.6 °C)   Ht 5' 2\" (1.575 m)   Wt 55.3 kg (122 lb)   BMI 22.31 kg/m²     Left arm in a well-padded properly fitting short arm cast.  Cast is not too tight and 1 finger is able to be inserted proximately.  She is able to oppose her thumb to all fingers.  She has active elbow flexion and extension which are near full in its motion.  Some slight pronation and supination approximately 5 to 10 degrees either direction.  Capillary refill is brisk.  Light touch sensation is intact.    Data Review     I have personally reviewed pertinent films in PACS documenting no change in comparison views of the lateral x-ray from last visit.  On the AP view there is approximately a 3 degree ulnar shift today compared to last time.  The DRUJ is intact.  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  Alignment is acceptable.      Benoit Duckworth PA-C     "

## 2023-12-27 ENCOUNTER — OFFICE VISIT (OUTPATIENT)
Dept: OBGYN CLINIC | Facility: CLINIC | Age: 73
End: 2023-12-27

## 2023-12-27 ENCOUNTER — HOSPITAL ENCOUNTER (OUTPATIENT)
Dept: RADIOLOGY | Facility: CLINIC | Age: 73
Discharge: HOME | End: 2023-12-27
Payer: COMMERCIAL

## 2023-12-27 ENCOUNTER — TELEPHONE (OUTPATIENT)
Age: 73
End: 2023-12-27

## 2023-12-27 VITALS
WEIGHT: 122 LBS | HEIGHT: 62 IN | SYSTOLIC BLOOD PRESSURE: 120 MMHG | TEMPERATURE: 97.6 F | DIASTOLIC BLOOD PRESSURE: 66 MMHG | BODY MASS INDEX: 22.45 KG/M2 | HEART RATE: 78 BPM

## 2023-12-27 DIAGNOSIS — S52.255D CLOSED NONDISPLACED COMMINUTED FRACTURE OF SHAFT OF LEFT ULNA WITH ROUTINE HEALING, SUBSEQUENT ENCOUNTER: ICD-10-CM

## 2023-12-27 DIAGNOSIS — S52.255D CLOSED NONDISPLACED COMMINUTED FRACTURE OF SHAFT OF LEFT ULNA WITH ROUTINE HEALING, SUBSEQUENT ENCOUNTER: Primary | ICD-10-CM

## 2023-12-27 PROCEDURE — 99024 POSTOP FOLLOW-UP VISIT: CPT | Performed by: ORTHOPAEDIC SURGERY

## 2023-12-27 PROCEDURE — 73090 X-RAY EXAM OF FOREARM: CPT

## 2023-12-27 NOTE — TELEPHONE ENCOUNTER
Caller: Patient    Doctor: Mansoor    Reason for call: Patient wanted to let Dr Max know she would like to proceed w/surgery...asap. Confirmed patients appt 12/29    Call back#: [phone]   2/2 to height of the stretcher

## 2023-12-27 NOTE — PROGRESS NOTES
ASSESSMENT/PLAN:    Diagnoses and all orders for this visit:    Closed nondisplaced comminuted fracture of shaft of left ulna with routine healing, subsequent encounter  -     XR forearm 2 vw left; Future        X-rays of the patient's left forearm show the fracture has displaced since the previous x-ray.  There is some callus formation.  The risks and benefits of surgical intervention versus continued conservative treatment were discussed with the patient.  There are risks of malunion, nonunion, decreased range of motion and/or prolonged pain.  The advantages and disadvantages of surgical intervention were discussed with the patient at length.  At this point, she would like to proceed with conservative treatment but would like to discuss this with her sons.  Will continue with the cast and she will follow-up with our office in 1 week with new x-rays of her left forearm.  The patient is acceptable to this plan.  She was informed that if she is to discuss further treatment with her sons and they feel that she should proceed with surgery, she is to contact the office as soon as this decision is made.  If she makes this decision in the next 24 hours, I can see her on 12/29/2023 and we can plan to proceed with surgery next week.    Return in about 1 week (around 1/3/2024).      _____________________________________________________  CHIEF COMPLAINT:  Chief Complaint   Patient presents with    Follow-up         SUBJECTIVE:  Italia Conway is a 73 y.o. female who presents to our office for follow-up visit.  The patient is status post left distal ulnar shaft fracture from 12/9/2023.  She states she has occasional pain near the fracture site.  She states that the swelling has improved.  She denies any fever or chills.      The following portions of the patient's history were reviewed and updated as appropriate: allergies, current medications, past family history, past medical history, past social history, past surgical  history and problem list.    PAST MEDICAL HISTORY:  Past Medical History:   Diagnosis Date    Hypertension     Raynaud disease     Stroke (HCC)     TIA (transient ischemic attack)        PAST SURGICAL HISTORY:  Past Surgical History:   Procedure Laterality Date    ABLATION SOFT TISSUE         FAMILY HISTORY:  Family History   Problem Relation Age of Onset    Other Mother         encephalitis    Other Father         trauma       SOCIAL HISTORY:  Social History     Tobacco Use    Smoking status: Never    Smokeless tobacco: Never   Vaping Use    Vaping status: Never Used   Substance Use Topics    Alcohol use: Not Currently    Drug use: Not Currently       MEDICATIONS:    Current Outpatient Medications:     acetaminophen (TYLENOL) 325 mg tablet, Take 2 tablets (650 mg total) by mouth every 6 (six) hours as needed for mild pain, headaches or fever, Disp: , Rfl: 0    aspirin-dipyridamole (AGGRENOX)  mg per 12 hr capsule, Take by mouth, Disp: , Rfl:     atenolol (TENORMIN) 50 mg tablet, Take 50 mg by mouth daily, Disp: , Rfl:     atorvastatin (LIPITOR) 20 mg tablet, Take 20 mg by mouth daily, Disp: , Rfl:     Calcium 200 MG TABS, 600 mg, Disp: , Rfl:     CANDESARTAN CILEXETIL PO, Take by mouth, Disp: , Rfl:     clopidogrel (PLAVIX) 75 mg tablet, Take 1 tablet (75 mg total) by mouth daily, Disp:  , Rfl: 0    cyanocobalamin 1,000 mcg/mL, Inject 1 mL into a muscle every 30 (thirty) days, Disp: , Rfl:     fluticasone (FLONASE) 50 mcg/act nasal spray, 1 spray into each nostril daily as needed for rhinitis, Disp: , Rfl:     folic acid (FOLVITE) 1 mg tablet, Take by mouth, Disp: , Rfl:     gabapentin (NEURONTIN) 100 mg capsule, Take 100-200 mg by mouth daily at bedtime, Disp: , Rfl:     levothyroxine 25 mcg tablet, Take 75 mcg by mouth daily, Disp: , Rfl:     levothyroxine 50 mcg tablet, Take 50 mcg by mouth daily, Disp: , Rfl:     magnesium oxide (MAG-OX) 400 mg, Take 1 tablet (400 mg total) by mouth daily, Disp: 30  tablet, Rfl: 0    meclizine (ANTIVERT) 25 mg tablet, Take 1 tablet (25 mg total) by mouth every 8 (eight) hours as needed for dizziness, Disp: 30 tablet, Rfl: 0    ondansetron (ZOFRAN) 4 mg tablet, Take 1 tablet (4 mg total) by mouth every 12 (twelve) hours as needed for nausea or vomiting, Disp: 10 tablet, Rfl: 0    ondansetron (ZOFRAN-ODT) 4 mg disintegrating tablet, Take 1 tablet (4 mg total) by mouth every 6 (six) hours as needed for nausea or vomiting, Disp: 12 tablet, Rfl: 0    amLODIPine (NORVASC) 2.5 mg tablet, Take 1 tablet (2.5 mg total) by mouth daily (Patient not taking: Reported on 12/11/2023), Disp: 30 tablet, Rfl: 0    atorvastatin (LIPITOR) 80 mg tablet, Take 1 tablet (80 mg total) by mouth every evening (Patient not taking: Reported on 12/11/2023), Disp:  , Rfl: 0    docusate sodium (COLACE) 100 mg capsule, Take 1 capsule (100 mg total) by mouth 2 (two) times a day (Patient not taking: Reported on 12/11/2023), Disp: 10 capsule, Rfl: 0    melatonin 3 mg, Take 1 tablet (3 mg total) by mouth daily at bedtime as needed (insomnia) (Patient not taking: Reported on 12/11/2023), Disp:  , Rfl: 0    ALLERGIES:  Allergies   Allergen Reactions    Penicillins Rash       ROS:  Review of Systems     Constitutional: Negative for fatigue, fever or loss of appetite.   HENT: Negative.    Respiratory: Negative for shortness of breath, dyspnea.    Cardiovascular: Negative for chest pain/tightness.   Gastrointestinal: Negative for abdominal pain, N/V.   Endocrine: Negative for cold/heat intolerance, unexplained weight loss/gain.   Genitourinary: Negative for flank pain, dysuria, hematuria.   Musculoskeletal: Positive for arthralgia   Skin: Negative for rash.    Neurological: Negative for numbness or tingling  Psychiatric/Behavioral: Negative for agitation.  _____________________________________________________  PHYSICAL EXAMINATION:    Blood pressure 120/66, pulse 78, temperature 97.6 °F (36.4 °C), temperature source  "Temporal, height 5' 2\" (1.575 m), weight 55.3 kg (122 lb).         MUSCULOSKELETAL EXAMINATION:   Physical Exam  Ortho Exam    Left upper extremity is neurovascularly intact  Fingers are pink and mobile  Compartments are soft  Cast in place  Sensation intact  Brisk cap refill  No significant edema present  No warmth or erythema  No tenderness to palpation along elbow    Objective:  BP Readings from Last 1 Encounters:   12/27/23 120/66      Wt Readings from Last 1 Encounters:   12/27/23 55.3 kg (122 lb)        BMI:   Estimated body mass index is 22.31 kg/m² as calculated from the following:    Height as of this encounter: 5' 2\" (1.575 m).    Weight as of this encounter: 55.3 kg (122 lb).    X-rays of the forearm demonstrate the fracture to be in good alignment on the lateral image.  On the AP image, there has been slight ulnar translation of the distal fragment, more so than was recently noted on the previous x-rays.        Scribe Attestation      I,:  Ronal Vásquez PA-C am acting as a scribe while in the presence of the attending physician.:       I,:  Jim Max personally performed the services described in this documentation    as scribed in my presence.:            "

## 2023-12-29 ENCOUNTER — OFFICE VISIT (OUTPATIENT)
Dept: OBGYN CLINIC | Facility: CLINIC | Age: 73
End: 2023-12-29

## 2023-12-29 VITALS
HEIGHT: 62 IN | DIASTOLIC BLOOD PRESSURE: 74 MMHG | SYSTOLIC BLOOD PRESSURE: 120 MMHG | TEMPERATURE: 97.6 F | BODY MASS INDEX: 22.45 KG/M2 | WEIGHT: 122 LBS | HEART RATE: 74 BPM

## 2023-12-29 DIAGNOSIS — S52.255D CLOSED NONDISPLACED COMMINUTED FRACTURE OF SHAFT OF LEFT ULNA WITH ROUTINE HEALING, SUBSEQUENT ENCOUNTER: Primary | ICD-10-CM

## 2023-12-29 PROCEDURE — 99024 POSTOP FOLLOW-UP VISIT: CPT | Performed by: ORTHOPAEDIC SURGERY

## 2023-12-29 RX ORDER — ACETAMINOPHEN 325 MG/1
975 TABLET ORAL ONCE
Status: CANCELLED | OUTPATIENT
Start: 2024-01-04

## 2023-12-29 RX ORDER — CEFAZOLIN SODIUM 1 G/50ML
1000 SOLUTION INTRAVENOUS ONCE
Status: CANCELLED | OUTPATIENT
Start: 2024-01-04

## 2023-12-29 RX ORDER — CHLORHEXIDINE GLUCONATE ORAL RINSE 1.2 MG/ML
15 SOLUTION DENTAL ONCE
Status: CANCELLED | OUTPATIENT
Start: 2023-12-29 | End: 2023-12-29

## 2023-12-29 NOTE — PROGRESS NOTES
ASSESSMENT/PLAN:    Diagnoses and all orders for this visit:    Closed nondisplaced comminuted fracture of shaft of left ulna with routine healing, subsequent encounter        Plan: The risks, benefits, options and alternatives of treatment were discussed, including but not limited to:  Infection, blood loss, DVT/PE, poor wound healing, anesthetic risks, loss of motion, nerve/blood vessel damage, poor fracture healing and fixation failure.  After a thorough discussion of the treatment options with the patient and her son, present with her today, they have elected to proceed with surgical fixation.  This will be scheduled for 1/4/2024 and she will be seen 1 week postoperatively in the office.  I have suggested that she discontinue the Aggrenox and her son will contact her primary care physician.  However, she has been permitted to discontinue this in the past for epidural injections.    Return for 1 week postop.      _____________________________________________________  CHIEF COMPLAINT:  Chief Complaint   Patient presents with    Follow-up         SUBJECTIVE:  Italia Conway is a 73 y.o. year old female who presents for follow up of her distal left ulna fracture.  She was seen a couple days ago and noted to have some displacement of the fracture in comparison to previous images.  She was able to go home, discussed the surgical versus nonsurgical options and has elected to proceed with surgery.  She presents now to schedule the operative procedure.  She continues to complain of pain over the ulnar aspect of the distal forearm.  She does complain of some feeling as if something is popping or moving.  She denies paresthesias.      PAST MEDICAL HISTORY:  Past Medical History:   Diagnosis Date    Hypertension     Raynaud disease     Stroke (HCC)     TIA (transient ischemic attack)        PAST SURGICAL HISTORY:  Past Surgical History:   Procedure Laterality Date    ABLATION SOFT TISSUE         FAMILY HISTORY:  Family  History   Problem Relation Age of Onset    Other Mother         encephalitis    Other Father         trauma       SOCIAL HISTORY:  Social History     Tobacco Use    Smoking status: Never    Smokeless tobacco: Never   Vaping Use    Vaping status: Never Used   Substance Use Topics    Alcohol use: Not Currently    Drug use: Not Currently       MEDICATIONS:    Current Outpatient Medications:     acetaminophen (TYLENOL) 325 mg tablet, Take 2 tablets (650 mg total) by mouth every 6 (six) hours as needed for mild pain, headaches or fever, Disp: , Rfl: 0    aspirin-dipyridamole (AGGRENOX)  mg per 12 hr capsule, Take by mouth, Disp: , Rfl:     atenolol (TENORMIN) 50 mg tablet, Take 50 mg by mouth daily, Disp: , Rfl:     atorvastatin (LIPITOR) 20 mg tablet, Take 20 mg by mouth daily, Disp: , Rfl:     Calcium 200 MG TABS, 600 mg, Disp: , Rfl:     CANDESARTAN CILEXETIL PO, Take by mouth, Disp: , Rfl:     clopidogrel (PLAVIX) 75 mg tablet, Take 1 tablet (75 mg total) by mouth daily, Disp:  , Rfl: 0    cyanocobalamin 1,000 mcg/mL, Inject 1 mL into a muscle every 30 (thirty) days, Disp: , Rfl:     fluticasone (FLONASE) 50 mcg/act nasal spray, 1 spray into each nostril daily as needed for rhinitis, Disp: , Rfl:     folic acid (FOLVITE) 1 mg tablet, Take by mouth, Disp: , Rfl:     gabapentin (NEURONTIN) 100 mg capsule, Take 100-200 mg by mouth daily at bedtime, Disp: , Rfl:     levothyroxine 25 mcg tablet, Take 75 mcg by mouth daily, Disp: , Rfl:     levothyroxine 50 mcg tablet, Take 50 mcg by mouth daily, Disp: , Rfl:     meclizine (ANTIVERT) 25 mg tablet, Take 1 tablet (25 mg total) by mouth every 8 (eight) hours as needed for dizziness, Disp: 30 tablet, Rfl: 0    ondansetron (ZOFRAN) 4 mg tablet, Take 1 tablet (4 mg total) by mouth every 12 (twelve) hours as needed for nausea or vomiting, Disp: 10 tablet, Rfl: 0    amLODIPine (NORVASC) 2.5 mg tablet, Take 1 tablet (2.5 mg total) by mouth daily, Disp: 30 tablet, Rfl: 0     atorvastatin (LIPITOR) 80 mg tablet, Take 1 tablet (80 mg total) by mouth every evening, Disp:  , Rfl: 0    docusate sodium (COLACE) 100 mg capsule, Take 1 capsule (100 mg total) by mouth 2 (two) times a day, Disp: 10 capsule, Rfl: 0    magnesium oxide (MAG-OX) 400 mg, Take 1 tablet (400 mg total) by mouth daily (Patient not taking: Reported on 12/29/2023), Disp: 30 tablet, Rfl: 0    melatonin 3 mg, Take 1 tablet (3 mg total) by mouth daily at bedtime as needed (insomnia), Disp:  , Rfl: 0    ondansetron (ZOFRAN-ODT) 4 mg disintegrating tablet, Take 1 tablet (4 mg total) by mouth every 6 (six) hours as needed for nausea or vomiting, Disp: 12 tablet, Rfl: 0    ALLERGIES:  Allergies   Allergen Reactions    Penicillins Rash       REVIEW OF SYSTEMS:  Pertinent items are noted in HPI.  A comprehensive review of systems was negative.      _____________________________________________________  PHYSICAL EXAMINATION:  General: well developed and well nourished, alert, oriented times 3, and appears comfortable  Psychiatric: Normal  HEENT:  Normocephalic, atraumatic  Cardiovascular:  Regular  Pulmonary: No wheezing or stridor  Skin: No masses, erthema, lacerations, fluctation, ulcerations  Abdomen: Soft, nontender  Neurovascular: Motor and sensory exams are intact and pulses palpable with limitations of her exam secondary to her injury and casting    MUSCULOSKELETAL EXAMINATION:    Exam demonstrates the short arm cast in place.  The fingers and thumb demonstrate good color and capillary refill and no swelling.  She has good flexion and extension and is able to actively abduct and adduct the fingers although she does complain of minor pain along the ulnar forearm during range of motion.  The remainder of the upper extremity examination bilaterally is benign.    _____________________________________________________  STUDIES REVIEWED:  Previous images were reviewed again and discussed with the patient.          Jim Max

## 2024-01-03 ENCOUNTER — ANESTHESIA EVENT (OUTPATIENT)
Dept: PERIOP | Facility: HOSPITAL | Age: 74
End: 2024-01-03
Payer: COMMERCIAL

## 2024-01-03 NOTE — PRE-PROCEDURE INSTRUCTIONS
Pre-Surgery Instructions:   Medication Instructions    acetaminophen (TYLENOL) 325 mg tablet Uses PRN- OK to take day of surgery    amLODIPine (NORVASC) 2.5 mg tablet Take day of surgery.    aspirin-dipyridamole (AGGRENOX)  mg per 12 hr capsule Pt was instructed by Dr Max to hold starting 12/29/23 which was done    atorvastatin (LIPITOR) 80 mg tablet Take night before surgery    Calcium 200 MG TABS Stop taking 7 days prior to surgery.    CANDESARTAN CILEXETIL PO Take night before surgery    cyanocobalamin 1,000 mcg/mL N/A- Not due around time of sx    fluticasone (FLONASE) 50 mcg/act nasal spray Uses PRN- OK to take day of surgery    folic acid (FOLVITE) 1 mg tablet Stop taking 7 days prior to surgery.    gabapentin (NEURONTIN) 100 mg capsule Take night before surgery    levothyroxine 25 mcg tablet Take day of surgery.    levothyroxine 50 mcg tablet Take day of surgery.    meclizine (ANTIVERT) 25 mg tablet Uses PRN- OK to take day of surgery    ondansetron (ZOFRAN) 4 mg tablet Uses PRN- OK to take day of surgery    Medication instructions for day surgery reviewed. Please use only a sip of water to take your instructed medications. Avoid all over the counter vitamins, supplements and NSAIDS for one week prior to surgery per anesthesia guidelines. Tylenol is ok to take as needed.     You will receive a call one business day prior to surgery with an arrival time and hospital directions. If your surgery is scheduled on a Monday, the hospital will be calling you on the Friday prior to your surgery. If you have not heard from anyone by 8pm, please call the hospital supervisor through the hospital  at [phone]. ([place] [phone]).    Do not eat or drink anything after midnight the night before your surgery, including candy, mints, lifesavers, or chewing gum. Do not drink alcohol 24hrs before your surgery. Try not to smoke at least 24hrs before your surgery.       Follow the pre surgery showering  instructions as listed in the “My Surgical Experience Booklet” or otherwise provided by your surgeon's office. Do not use a blade to shave the surgical area 1 week before surgery. It is okay to use a clean electric clippers up to 24 hours before surgery. Do not apply any lotions, creams, including makeup, cologne, deodorant, or perfumes after showering on the day of your surgery. Do not use dry shampoo, hair spray, hair gel, or any type of hair products.     No contact lenses, eye make-up, or artificial eyelashes. Remove nail polish, including gel polish, and any artificial, gel, or acrylic nails if possible. Remove all jewelry including rings and body piercing jewelry.     Wear causal clothing that is easy to take on and off. Consider your type of surgery.    Keep any valuables, jewelry, piercings at home. Please bring any specially ordered equipment (sling, braces) if indicated.    Arrange for a responsible person to drive you to and from the hospital on the day of your surgery. Visitor Guidelines discussed.     Call the surgeon's office with any new illnesses, exposures, or additional questions prior to surgery.    Please reference your “My Surgical Experience Booklet” for additional information to prepare for your upcoming surgery.

## 2024-01-03 NOTE — ANESTHESIA PREPROCEDURE EVALUATION
Procedure:  Open reduction internal fixation of distal ulna (Left: Wrist)    Relevant Problems   CARDIO   (+) Hypertension, essential   (+) Hypertensive urgency   (+) Intractable chronic migraine without aura and with status migrainosus      ENDO   (+) Hypothyroidism (acquired)      NEURO/PSYCH   (+) Intractable chronic migraine without aura and with status migrainosus   (+) TIA (transient ischemic attack)      Normal sinus rhythm  Nonspecific ST abnormality  Abnormal ECG  When compared with ECG of 09-MAR-2021 18:14,  No significant change was found    2020  NL ECHO    Physical Exam    Airway    Mallampati score: II  TM Distance: >3 FB  Neck ROM: full     Dental   No notable dental hx     Cardiovascular  Rhythm: regular, Rate: normal    Pulmonary   Breath sounds clear to auscultation    Other Findings  post-pubertal.      Anesthesia Plan  ASA Score- 3     Anesthesia Type- general with ASA Monitors.         Additional Monitors:     Airway Plan: LMA.    Comment: Supraclavivular block.       Plan Factors-Exercise tolerance (METS): >4 METS.    Chart reviewed. EKG reviewed. Imaging results reviewed. Existing labs reviewed. Patient summary reviewed.    Patient is not a current smoker. Patient not instructed to abstain from smoking on day of procedure. Patient did not smoke on day of surgery.    Obstructive sleep apnea risk education given perioperatively.        Induction-     Postoperative Plan-     Informed Consent- Anesthetic plan and risks discussed with patient.  I personally reviewed this patient with the CRNA. Discussed and agreed on the Anesthesia Plan with the CRNA..

## 2024-01-04 ENCOUNTER — HOSPITAL ENCOUNTER (OUTPATIENT)
Facility: HOSPITAL | Age: 74
Setting detail: OUTPATIENT SURGERY
Discharge: HOME | End: 2024-01-04
Attending: ORTHOPAEDIC SURGERY | Admitting: ORTHOPAEDIC SURGERY
Payer: COMMERCIAL

## 2024-01-04 ENCOUNTER — APPOINTMENT (OUTPATIENT)
Dept: RADIOLOGY | Facility: HOSPITAL | Age: 74
End: 2024-01-04
Payer: COMMERCIAL

## 2024-01-04 ENCOUNTER — ANESTHESIA (OUTPATIENT)
Dept: PERIOP | Facility: HOSPITAL | Age: 74
End: 2024-01-04
Payer: COMMERCIAL

## 2024-01-04 VITALS
TEMPERATURE: 98.1 F | OXYGEN SATURATION: 99 % | WEIGHT: 122 LBS | HEIGHT: 62 IN | RESPIRATION RATE: 22 BRPM | DIASTOLIC BLOOD PRESSURE: 80 MMHG | HEART RATE: 74 BPM | BODY MASS INDEX: 22.45 KG/M2 | SYSTOLIC BLOOD PRESSURE: 145 MMHG

## 2024-01-04 DIAGNOSIS — I63.9 ACUTE CVA (CEREBROVASCULAR ACCIDENT) (HCC): ICD-10-CM

## 2024-01-04 DIAGNOSIS — S52.255D CLOSED NONDISPLACED COMMINUTED FRACTURE OF SHAFT OF LEFT ULNA WITH ROUTINE HEALING, SUBSEQUENT ENCOUNTER: Primary | ICD-10-CM

## 2024-01-04 PROCEDURE — C1713 ANCHOR/SCREW BN/BN,TIS/BN: HCPCS | Performed by: ORTHOPAEDIC SURGERY

## 2024-01-04 PROCEDURE — 25545 OPTX ULNAR SHFT FX INT FIXJ: CPT | Performed by: PHYSICIAN ASSISTANT

## 2024-01-04 PROCEDURE — 73110 X-RAY EXAM OF WRIST: CPT

## 2024-01-04 PROCEDURE — 25545 OPTX ULNAR SHFT FX INT FIXJ: CPT | Performed by: ORTHOPAEDIC SURGERY

## 2024-01-04 DEVICE — 2.0MM CORTEX SCREW SLF-TPNG WITH STARDRIVE RECESS 14MM: Type: IMPLANTABLE DEVICE | Site: WRIST | Status: FUNCTIONAL

## 2024-01-04 DEVICE — 2.0MM CORTEX SCREW SLF-TPNG WITH STARDRIVE RECESS 12MM: Type: IMPLANTABLE DEVICE | Site: WRIST | Status: FUNCTIONAL

## 2024-01-04 DEVICE — 2.0MM CORTEX SCREW SLF-TPNG WITH STARDRIVE RECESS 11MM: Type: IMPLANTABLE DEVICE | Site: WRIST | Status: FUNCTIONAL

## 2024-01-04 DEVICE — 2.0MM VA-LCKNG SCREW SLF-TPNG WITH T6 STARDRIVE RECESS 16MM: Type: IMPLANTABLE DEVICE | Site: WRIST | Status: FUNCTIONAL

## 2024-01-04 DEVICE — 2.0MM VA-LCKNG SCREW SLF-TPNG WITH T6 STARDRIVE RECESS 12MM: Type: IMPLANTABLE DEVICE | Site: WRIST | Status: FUNCTIONAL

## 2024-01-04 DEVICE — 2.0MM VA-LCKNG SCREW SLF-TPNG WITH T6 STARDRIVE RECESS 13MM: Type: IMPLANTABLE DEVICE | Site: WRIST | Status: FUNCTIONAL

## 2024-01-04 RX ORDER — MAGNESIUM HYDROXIDE 1200 MG/15ML
LIQUID ORAL AS NEEDED
Status: DISCONTINUED | OUTPATIENT
Start: 2024-01-04 | End: 2024-01-04 | Stop reason: HOSPADM

## 2024-01-04 RX ORDER — CHLORHEXIDINE GLUCONATE ORAL RINSE 1.2 MG/ML
15 SOLUTION DENTAL ONCE
Status: COMPLETED | OUTPATIENT
Start: 2024-01-04 | End: 2024-01-04

## 2024-01-04 RX ORDER — FENTANYL CITRATE/PF 50 MCG/ML
25 SYRINGE (ML) INJECTION
Status: DISCONTINUED | OUTPATIENT
Start: 2024-01-04 | End: 2024-01-04 | Stop reason: HOSPADM

## 2024-01-04 RX ORDER — DEXAMETHASONE SODIUM PHOSPHATE 10 MG/ML
INJECTION, SOLUTION INTRAMUSCULAR; INTRAVENOUS AS NEEDED
Status: DISCONTINUED | OUTPATIENT
Start: 2024-01-04 | End: 2024-01-04

## 2024-01-04 RX ORDER — PROPOFOL 10 MG/ML
INJECTION, EMULSION INTRAVENOUS AS NEEDED
Status: DISCONTINUED | OUTPATIENT
Start: 2024-01-04 | End: 2024-01-04

## 2024-01-04 RX ORDER — TRAMADOL HYDROCHLORIDE 50 MG/1
50 TABLET ORAL EVERY 6 HOURS PRN
Qty: 20 TABLET | Refills: 0 | Status: SHIPPED | OUTPATIENT
Start: 2024-01-04 | End: 2024-01-09

## 2024-01-04 RX ORDER — SODIUM CHLORIDE, SODIUM LACTATE, POTASSIUM CHLORIDE, CALCIUM CHLORIDE 600; 310; 30; 20 MG/100ML; MG/100ML; MG/100ML; MG/100ML
INJECTION, SOLUTION INTRAVENOUS CONTINUOUS PRN
Status: DISCONTINUED | OUTPATIENT
Start: 2024-01-04 | End: 2024-01-04

## 2024-01-04 RX ORDER — ONDANSETRON 2 MG/ML
4 INJECTION INTRAMUSCULAR; INTRAVENOUS ONCE AS NEEDED
Status: COMPLETED | OUTPATIENT
Start: 2024-01-04 | End: 2024-01-04

## 2024-01-04 RX ORDER — ACETAMINOPHEN 325 MG/1
975 TABLET ORAL ONCE
Status: COMPLETED | OUTPATIENT
Start: 2024-01-04 | End: 2024-01-04

## 2024-01-04 RX ORDER — SODIUM CHLORIDE, SODIUM LACTATE, POTASSIUM CHLORIDE, CALCIUM CHLORIDE 600; 310; 30; 20 MG/100ML; MG/100ML; MG/100ML; MG/100ML
75 INJECTION, SOLUTION INTRAVENOUS CONTINUOUS
Status: DISCONTINUED | OUTPATIENT
Start: 2024-01-04 | End: 2024-01-04 | Stop reason: HOSPADM

## 2024-01-04 RX ORDER — ROPIVACAINE HYDROCHLORIDE 5 MG/ML
INJECTION, SOLUTION EPIDURAL; INFILTRATION; PERINEURAL
Status: DISCONTINUED | OUTPATIENT
Start: 2024-01-04 | End: 2024-01-04

## 2024-01-04 RX ORDER — MIDAZOLAM HYDROCHLORIDE 2 MG/2ML
INJECTION, SOLUTION INTRAMUSCULAR; INTRAVENOUS AS NEEDED
Status: DISCONTINUED | OUTPATIENT
Start: 2024-01-04 | End: 2024-01-04

## 2024-01-04 RX ORDER — ONDANSETRON 2 MG/ML
INJECTION INTRAMUSCULAR; INTRAVENOUS AS NEEDED
Status: DISCONTINUED | OUTPATIENT
Start: 2024-01-04 | End: 2024-01-04

## 2024-01-04 RX ORDER — PROPOFOL 10 MG/ML
INJECTION, EMULSION INTRAVENOUS CONTINUOUS PRN
Status: DISCONTINUED | OUTPATIENT
Start: 2024-01-04 | End: 2024-01-04

## 2024-01-04 RX ORDER — CEFAZOLIN SODIUM 1 G/50ML
1000 SOLUTION INTRAVENOUS ONCE
Status: COMPLETED | OUTPATIENT
Start: 2024-01-04 | End: 2024-01-04

## 2024-01-04 RX ORDER — DROPERIDOL 2.5 MG/ML
0.62 INJECTION, SOLUTION INTRAMUSCULAR; INTRAVENOUS ONCE
Status: COMPLETED | OUTPATIENT
Start: 2024-01-04 | End: 2024-01-04

## 2024-01-04 RX ADMIN — MIDAZOLAM 1 MG: 1 INJECTION INTRAMUSCULAR; INTRAVENOUS at 07:46

## 2024-01-04 RX ADMIN — DROPERIDOL 0.62 MG: 2.5 INJECTION, SOLUTION INTRAMUSCULAR; INTRAVENOUS at 10:42

## 2024-01-04 RX ADMIN — CEFAZOLIN SODIUM 1000 MG: 1 SOLUTION INTRAVENOUS at 08:38

## 2024-01-04 RX ADMIN — PROPOFOL 120 MG: 10 INJECTION, EMULSION INTRAVENOUS at 08:35

## 2024-01-04 RX ADMIN — ACETAMINOPHEN 975 MG: 325 TABLET, FILM COATED ORAL at 07:21

## 2024-01-04 RX ADMIN — DEXAMETHASONE SODIUM PHOSPHATE 10 MG: 10 INJECTION, SOLUTION INTRAMUSCULAR; INTRAVENOUS at 08:38

## 2024-01-04 RX ADMIN — CHLORHEXIDINE GLUCONATE 15 ML: 1.2 RINSE ORAL at 07:21

## 2024-01-04 RX ADMIN — ROPIVACAINE HYDROCHLORIDE 30 ML: 5 INJECTION, SOLUTION EPIDURAL; INFILTRATION; PERINEURAL at 08:00

## 2024-01-04 RX ADMIN — SODIUM CHLORIDE, SODIUM LACTATE, POTASSIUM CHLORIDE, AND CALCIUM CHLORIDE: .6; .31; .03; .02 INJECTION, SOLUTION INTRAVENOUS at 08:31

## 2024-01-04 RX ADMIN — MIDAZOLAM 1 MG: 1 INJECTION INTRAMUSCULAR; INTRAVENOUS at 09:22

## 2024-01-04 RX ADMIN — PROPOFOL 100 MCG/KG/MIN: 10 INJECTION, EMULSION INTRAVENOUS at 08:37

## 2024-01-04 RX ADMIN — ONDANSETRON 4 MG: 2 INJECTION INTRAMUSCULAR; INTRAVENOUS at 10:13

## 2024-01-04 RX ADMIN — ONDANSETRON 4 MG: 2 INJECTION INTRAMUSCULAR; INTRAVENOUS at 08:38

## 2024-01-04 RX ADMIN — PROPOFOL 20 MG: 10 INJECTION, EMULSION INTRAVENOUS at 09:22

## 2024-01-04 NOTE — INTERVAL H&P NOTE
H&P reviewed. After examining the patient I find no changes in the patients condition since the H&P had been written.    Vitals:    01/04/24 0714   BP: 157/72   Pulse: 78   Resp: 18   Temp: 98.6 °F (37 °C)   SpO2: 100%

## 2024-01-04 NOTE — DISCHARGE INSTR - AVS FIRST PAGE
Discharge Instructions - Orthopedics  Italia Conway 73 y.o. female MRN: [id number]  Unit/Bed#: APU 11    Weight Bearing Status:                                           NWB left hand    DVT prophylaxis:  Ambulation     Pain:  Continue analgesics as directed. For pain control take Tylenol 1,000 mg every 8 hours around the clock.  Supplement with the narcotic pain medication, only if needed every 4-6 hours.  Ice bag to the surgical area for 20-30 minutes 4-6 times per day with elevation for swelling and pain control.  A prescription was sent to your pharmacy.     Dressing Instructions:   Keep splint clean, dry and intact until follow up appointment. Do not remove.   Do not shower until seen in clinic. Sponge bathe to avoid getting splint wet.     Appt Instructions:   If you do not have your appointment, please call the clinic at [phone] to f/u with Dr. Max in 1 week.  Otherwise followup as scheduled below:    Contact the office sooner if you experience any increased numbness/tingling in the extremities.     
PAST MEDICAL HISTORY:  HTN (hypertension)     Migraine

## 2024-01-04 NOTE — ANESTHESIA POSTPROCEDURE EVALUATION
Post-Op Assessment Note    CV Status:  Stable    Pain management: satisfactory to patient       Mental Status:  Sleepy   Hydration Status:  Stable   PONV Controlled:  None   Airway Patency:  Patent     Post Op Vitals Reviewed: Yes      Staff: CRNA               /60 (01/04/24 1000)    Temp (!) 97.4 °F (36.3 °C) (01/04/24 1000)    Pulse 69 (01/04/24 1000)   Resp 16 (01/04/24 1000)    SpO2 100 % (01/04/24 1000)

## 2024-01-04 NOTE — ANESTHESIA PROCEDURE NOTES
Peripheral Block    Patient location during procedure: holding area  Start time: 1/4/2024 8:00 AM  Reason for block: at surgeon's request and post-op pain management  Staffing  Performed by: Oscar Kirby MD  Authorized by: Oscar Kirby MD    Preanesthetic Checklist  Completed: patient identified, IV checked, site marked, risks and benefits discussed, surgical consent, monitors and equipment checked, pre-op evaluation and timeout performed  Peripheral Block  Patient position: supine  Prep: ChloraPrep  Patient monitoring: continuous pulse oximetry, frequent blood pressure checks and heart rate  Block type: Supraclavicular  Laterality: left  Injection technique: single-shot  Procedures: ultrasound guided, Ultrasound guidance required for the procedure to increase accuracy and safety of medication placement and decrease risk of complications.  Ultrasound permanent image savedropivacaine (NAROPIN) 0.5 % injection 20 mL - Perineural   30 mL - 1/4/2024 8:00:00 AM  Needle  Needle type: Stimuplex   Needle gauge: 20 G  Needle length: 3.5 in  Needle localization: ultrasound guidance  Assessment  Injection assessment: frequent aspiration, injected with ease, negative aspiration, no paresthesia on injection, incremental injection, needle tip visualized at all times, negative for heart rate change and no symptoms of intraneural/intravenous injection  Paresthesia pain: none  Post-procedure:  site cleaned

## 2024-01-04 NOTE — OP NOTE
OPERATIVE REPORT  PATIENT NAME: Italia Conway    :  1950  MRN: [id number]  Pt Location:  OR ROOM 02    SURGERY DATE: 2024    Surgeons and Role:     * Jim Jangio - Primary     * Benoit Duckworth PA-C    Preop Diagnosis:  Closed nondisplaced comminuted fracture of shaft of left ulna with routine healing, subsequent encounter [S52.462D]    Post-Op Diagnosis Codes:     * Closed nondisplaced comminuted fracture of shaft of left ulna with routine healing, subsequent encounter [S52.815D]    Procedure(s):  Left - Open reduction internal fixation of distal ulna    Fluids: 500 cc    Estimated Blood Loss:   Minimal    Tourniquet time: 58 minutes at 250 mmHg    Anesthesia Type:   General with supraclavicular block    Operative Indications:  Closed nondisplaced comminuted fracture of shaft of left ulna with routine healing, subsequent encounter [S52.120K]  Italia is a 73-year-old female with a fracture of the distal ulnar shaft.  The fracture began demonstrating displacement and angulation with follow-up in a short arm cast.  The risk, benefits, options and alternatives of treatment were discussed and she elected to proceed with surgical fixation.    Operative Findings:  At the time of the procedure, the fracture was noted to have a distal piece, a butterfly fragment at the central portion and a proximal piece.  The fracture was reduced and stably fixated utilizing a T plate with fluoroscopic images in PA, AP, oblique and lateral images demonstrating the fracture be well aligned and stably fixated.  At the conclusion of the procedure, the forearm could be supinated 90 degrees and pronated about 60 degrees with good range of motion of the wrist noted with 45 degrees of volar flexion and 60 degrees of dorsiflexion.    Complications:   None    Procedure and Technique:  Italia was administered a supraclavicular block in the holding area.  She was then taken to the operating room and a general anesthetic  administered.  A well-padded tourniquet was applied to the proximal left arm.  The left upper extremity was then prepped and draped in standard fashion.  A timeout was performed.  Preoperative antibiotics have been administered.  The limb was elevated, exsanguinated with an Esmarch bandage and the tourniquet then inflated to 250 mmHg.    Utilizing fluoroscopic guidance an incision was made over the distal ulna at the subcutaneous border.  Sharp dissection was carried down through the skin and subcutaneous tissues.  The ulna was then exposed subperiosteally and fracture callus removed as needed.  The fracture site was debrided and the fracture mobilized.  K wires were utilized to assist with mobilization of the fracture fragments and then the fracture reduced and a T plate applied over the distal ulna with fluoroscopic imaging assisting with proper placement.  Once the plate was applied and the fracture was in good alignment, a cortical screw was placed proximally and a locking screw distally.  Fluoroscopic images in the AP, lateral and oblique planes demonstrated the fracture to be in good alignment.  Additional locking screws were placed distally and cortical screws placed proximally.  Fluoroscopic images in the AP, PA, lateral and oblique views demonstrated the fracture to be in good alignment with stable fixation.  Form was placed to range of motion and could be supinated to 90 degrees and pronated about 60 degrees.  She had 60 degrees of dorsiflexion about 45 degrees of volar flexion noted.  The wound was irrigated with saline solution.    The muscle fascia was repaired with 2-0 Vicryl.  The skin was secured with 3-0 nylon.  Dressings were then applied consisting of bacitracin, Adaptic, gauze and Webril.  The tourniquet was deflated after total tourniquet time of 58 minutes and a long-arm splint was applied with the forearm in neutral rotation.  The patient was awakened from the general anesthetic and  transferred to the recovery room in stable and satisfactory postoperative condition.   I was present for the entire procedure., A qualified resident physician was not available., and A physician assistant was required during the procedure for retraction, tissue handling, dissection and suturing.    Patient Disposition:  APU        SIGNATURE: Jim Max  DATE: January 4, 2024  TIME: 10:01 AM

## 2024-01-11 ENCOUNTER — OFFICE VISIT (OUTPATIENT)
Dept: OBGYN CLINIC | Facility: CLINIC | Age: 74
End: 2024-01-11

## 2024-01-11 VITALS
WEIGHT: 122 LBS | BODY MASS INDEX: 22.45 KG/M2 | DIASTOLIC BLOOD PRESSURE: 76 MMHG | HEART RATE: 75 BPM | TEMPERATURE: 98.2 F | HEIGHT: 62 IN | SYSTOLIC BLOOD PRESSURE: 142 MMHG

## 2024-01-11 DIAGNOSIS — S52.255D CLOSED NONDISPLACED COMMINUTED FRACTURE OF SHAFT OF LEFT ULNA WITH ROUTINE HEALING, SUBSEQUENT ENCOUNTER: Primary | ICD-10-CM

## 2024-01-11 PROCEDURE — 99024 POSTOP FOLLOW-UP VISIT: CPT | Performed by: ORTHOPAEDIC SURGERY

## 2024-01-11 NOTE — PROGRESS NOTES
"Patient Name:  Italia Conway  MRN:  [id number]    Assessment     1. Closed nondisplaced comminuted fracture of shaft of left ulna with routine healing, subsequent encounter  Cock Up Wrist Splint          Plan     I would recommend follow-up in 1 week.  She was placed into an Exos cast which can be removed for showering or bathing if she chooses although she is quite hesitant to do so.  If she chooses not to, this may remain in place.  X-rays will be obtained in 1 week including AP and lateral views of the left forearm.  Her stitches will be removed.  She is to contact the office if questions or concerns arise in the interim.    Return in about 1 week (around 1/18/2024).      Subjective   Italia Conway returns for follow-up of her left wrist.  The patient is 1 week(s) post ORIF distal left ulna and returns for routine follow-up. Patient denies significant pain.  She denies paresthesias.      Objective     /76   Pulse 75   Temp 98.2 °F (36.8 °C)   Ht 5' 2\" (1.575 m)   Wt 55.3 kg (122 lb)   BMI 22.31 kg/m²     The exam demonstrated the postop dressings in place.  These were removed without difficulty.  The incision is benign with stitches in place.  There is resolving ecchymosis.  There is minimal swelling.  She does complain of some generalized tenderness at the incisional site.  She has decreased range of motion consistent with her early postoperative course.  Pulses are palpable and good color and capillary refill is noted.  Sensation is intact.      Data Review     The intraoperative fluoroscopic images were reviewed with the patient and her son who was present with her today.    Jim Max"

## 2024-01-19 ENCOUNTER — HOSPITAL ENCOUNTER (OUTPATIENT)
Dept: RADIOLOGY | Facility: CLINIC | Age: 74
End: 2024-01-19
Payer: COMMERCIAL

## 2024-01-19 ENCOUNTER — OFFICE VISIT (OUTPATIENT)
Dept: OBGYN CLINIC | Facility: CLINIC | Age: 74
End: 2024-01-19

## 2024-01-19 VITALS
BODY MASS INDEX: 22.46 KG/M2 | WEIGHT: 122.8 LBS | OXYGEN SATURATION: 97 % | SYSTOLIC BLOOD PRESSURE: 150 MMHG | HEART RATE: 79 BPM | TEMPERATURE: 98.5 F | DIASTOLIC BLOOD PRESSURE: 68 MMHG

## 2024-01-19 DIAGNOSIS — S52.255A CLOSED NONDISPLACED COMMINUTED FRACTURE OF SHAFT OF LEFT ULNA, INITIAL ENCOUNTER: ICD-10-CM

## 2024-01-19 DIAGNOSIS — S52.255D CLOSED NONDISPLACED COMMINUTED FRACTURE OF SHAFT OF LEFT ULNA WITH ROUTINE HEALING, SUBSEQUENT ENCOUNTER: Primary | ICD-10-CM

## 2024-01-19 PROCEDURE — 73090 X-RAY EXAM OF FOREARM: CPT

## 2024-01-19 PROCEDURE — 99024 POSTOP FOLLOW-UP VISIT: CPT | Performed by: ORTHOPAEDIC SURGERY

## 2024-01-19 RX ORDER — SYRINGE W-NEEDLE,DISPOSAB,3 ML 25GX5/8"
SYRINGE, EMPTY DISPOSABLE MISCELLANEOUS
COMMUNITY
Start: 2024-01-11

## 2024-01-19 NOTE — PATIENT INSTRUCTIONS
She will be maintained in the Exos brace.  This is only to be removed for bathing.  Otherwise she is to be in the brace at all times even to sleep.  No lifting more than 1 pound with the left arm.  She will be seen back in orthopedic office with repeat x-rays in 3 weeks.  Steri- strips remain in place for another 7 days.  No soaking in water.

## 2024-01-19 NOTE — PROGRESS NOTES
Patient Name:  Italia Conway  MRN:  [id number]    Assessment     1. Closed nondisplaced comminuted fracture of shaft of left ulna with routine healing, subsequent encounter  XR forearm 2 vw left      2. Closed nondisplaced comminuted fracture of shaft of left ulna, initial encounter          Plan     She will be maintained in the Exos brace.  This is only to be removed for bathing.  Otherwise she is to be in the brace at all times even to sleep.  No lifting more than 1 pound with the left arm.  She will be seen back in orthopedic office with repeat x-rays in 3 weeks.  Steri- strips remain in place for another 7 days.  No soaking in water.    Return in about 3 weeks (around 2/9/2024) for Recheck x-ray left wrist.    Subjective   Italia Conway returns for follow-up of left distal ulna ORIF, DOS 1/4/24.  The patient is 2 week(s) post op and returns for routine follow-up and x-ray. Patient complains of discomfort at the fracture site and wrist area.  Denies any numbness or tingling.  Denies elbow pain.    Objective     /68 (BP Location: Right arm, Patient Position: Sitting)   Pulse 79   Temp 98.5 °F (36.9 °C)   Wt 55.7 kg (122 lb 12.8 oz)   SpO2 97%   BMI 22.46 kg/m²     Left wrist is Exos in place.  This was removed for x-ray.  Sutures were in place with good healing noted without signs of infection.  All sutures were removed and Steri-Strips were applied over benzoin.  No signs of infection.  Sensation is intact as well as gross motion to all digits of the hand.  Pain-free flexion extension of the elbow.    Data Review     I have personally reviewed pertinent films in PACS documenting ORIF distal ulna with adequate position and alignment of the hardware and fracture with little fragments.    Benoit Duckworth PA-C

## 2024-02-09 ENCOUNTER — OFFICE VISIT (OUTPATIENT)
Dept: OBGYN CLINIC | Facility: CLINIC | Age: 74
End: 2024-02-09

## 2024-02-09 ENCOUNTER — HOSPITAL ENCOUNTER (OUTPATIENT)
Dept: RADIOLOGY | Facility: CLINIC | Age: 74
End: 2024-02-09
Payer: COMMERCIAL

## 2024-02-09 VITALS
BODY MASS INDEX: 22.45 KG/M2 | DIASTOLIC BLOOD PRESSURE: 60 MMHG | WEIGHT: 122 LBS | HEIGHT: 62 IN | SYSTOLIC BLOOD PRESSURE: 118 MMHG | TEMPERATURE: 97.6 F | HEART RATE: 87 BPM

## 2024-02-09 DIAGNOSIS — S52.255D CLOSED NONDISPLACED COMMINUTED FRACTURE OF SHAFT OF LEFT ULNA WITH ROUTINE HEALING, SUBSEQUENT ENCOUNTER: Primary | ICD-10-CM

## 2024-02-09 DIAGNOSIS — S52.255D CLOSED NONDISPLACED COMMINUTED FRACTURE OF SHAFT OF LEFT ULNA WITH ROUTINE HEALING, SUBSEQUENT ENCOUNTER: ICD-10-CM

## 2024-02-09 PROCEDURE — 99024 POSTOP FOLLOW-UP VISIT: CPT | Performed by: ORTHOPAEDIC SURGERY

## 2024-02-09 PROCEDURE — 73100 X-RAY EXAM OF WRIST: CPT

## 2024-02-09 NOTE — PROGRESS NOTES
"Patient Name:  Italia Conway  MRN:  [id number]    Assessment     1. Closed nondisplaced comminuted fracture of shaft of left ulna with routine healing, subsequent encounter  XR wrist 2 vw left    Cock Up Wrist Splint    Ambulatory Referral to PT/OT Hand Therapy          Plan     I would recommend follow-up in approximately 4 weeks.  She is to begin a course of physical therapy and a prescription was placed in her chart.  She found the Exos cast somewhat uncomfortable and she was provided with a cock-up wrist brace today.  This may be removed when she is at home and during periods of inactivity, for cleansing and sleeping.  If she is out of the house and more active, the brace should be worn.  X-rays of the wrist will be obtained at follow-up including AP and lateral views.  She or her son, present with her today, were instructed to contact me if any questions or concerns arise.    Return in about 4 weeks (around 3/8/2024).      Subjective   Italia Conway returns for follow-up of her distal left ulnar fracture.  The patient is 5 week(s) post ORIF and returns for routine follow-up. Patient complains of discomfort from the Exos cast at the first webspace.  She notes minimal ulnar-sided pain.  She denies paresthesias.      Objective     /60   Pulse 87   Temp 97.6 °F (36.4 °C) (Temporal)   Ht 5' 2\" (1.575 m)   Wt 55.3 kg (122 lb)   BMI 22.31 kg/m²     The exam demonstrated the Exos in place upon arrival.  This was removed without difficulty.  Steri-Strips were removed after evaluation.  The incision is healed, benign and there is no significant swelling noted.  She has no more than about 10 degrees of supination and 30 degrees of pronation.  She complains of pain during forearm rotation.  She has minimal dorsiflexion and volar flexion of the wrist, once again complaining of pain.  Good color and capillary refill is noted throughout the hand and sensation is grossly intact.  She has good range of motion " of the fingers and thumb.      Data Review     I have personally reviewed pertinent films in PACS demonstrating good progression of healing with stable fixation in good alignment.    Jim Max

## 2024-02-12 ENCOUNTER — TELEPHONE (OUTPATIENT)
Age: 74
End: 2024-02-12

## 2024-02-12 NOTE — TELEPHONE ENCOUNTER
Patient called back to let Dr. aMx know that if he really wants her to see a PT specialized in hand therapy that she did find one through [facility] but they would need to script - their phone # is [phone]

## 2024-02-12 NOTE — TELEPHONE ENCOUNTER
Caller: Patient    Doctor: Mansoor    Reason for call: Patient stated she called [facility] for her hand. They stated they do not have a hand specialist there, but that they could do therapy on her hand and requested an updated script for general PT.    Please advise patient once script has been updated to that she can call to schedule.    Call back#: [phone]

## 2024-02-15 ENCOUNTER — EVALUATION (OUTPATIENT)
Dept: PHYSICAL THERAPY | Facility: CLINIC | Age: 74
End: 2024-02-15
Payer: COMMERCIAL

## 2024-02-15 DIAGNOSIS — S52.255D CLOSED NONDISPLACED COMMINUTED FRACTURE OF SHAFT OF LEFT ULNA WITH ROUTINE HEALING, SUBSEQUENT ENCOUNTER: ICD-10-CM

## 2024-02-15 PROCEDURE — 97161 PT EVAL LOW COMPLEX 20 MIN: CPT

## 2024-02-15 PROCEDURE — 97110 THERAPEUTIC EXERCISES: CPT

## 2024-02-15 PROCEDURE — 97535 SELF CARE MNGMENT TRAINING: CPT

## 2024-02-15 PROCEDURE — 97140 MANUAL THERAPY 1/> REGIONS: CPT

## 2024-02-15 NOTE — PROGRESS NOTES
PT Evaluation     Today's date: 2/15/2024  Patient name: Italia Conway  : 1950  MRN: [id number]  Referring provider: Jim Max DO  Dx:   Encounter Diagnosis     ICD-10-CM    1. Closed nondisplaced comminuted fracture of shaft of left ulna with routine healing, subsequent encounter  S52.255D Ambulatory Referral to PT/OT Hand Therapy          Start Time: 1110  Stop Time: 1210  Total time in clinic (min): 60 minutes    Assessment  Assessment details: Italia Conway presents to therapy following open reduction and internal fixation of left ulna s/p fall in early December. She presents with pain at rest that is worsened with active range of motion in all directions. Wrist flexion and radial deviation being the most painful. All wrist motions limited compared to right side, especially wrist flexion, pronation and supination. Objective findings detailed below. She does have wrist splint which she is to wear most of the day at this time. Educated patient extensively on healing process/timeline and importance of ranging/mobility exercises to aide in maximizing returns towards baseline.  At this time, would benefit from skilled therapy services in order to maximize ROM and strength for improved function with ADLs.  Impairments: abnormal or restricted ROM  Barriers to therapy: History of CVA  Understanding of Dx/Px/POC: fair   Prognosis: fair    Goals  STG: 3 weeks  -patient will improve pronation to >40 degrees for improved ability to  objects  -patient will be able to improve L wrist flexion to >50 degrees for improve ability to  objects  -patient will improve L  strength to >25lbs with <2/10 pain to allow introduction of gripping activities       LT weeks  -patient will improve L wrist AROM in all directions within 5 degrees of R wrist ranges   -patient will improve L wrist strength to >4/5 in all wrist directions  -patient will improve L  strength to > 57 to match R   strength for improved function in gripping ADLs  -patient will improve FOTO score to 65 to demonstrate an improvement in function    Plan  Patient would benefit from: skilled physical therapy  Referral necessary: No  Planned modality interventions: cryotherapy and thermotherapy: hydrocollator packs  Planned therapy interventions: ADL training, activity modification, IASTM, joint mobilization, therapeutic activities, therapeutic exercise, stretching, strengthening, functional ROM exercises, balance, nerve gliding and manual therapy  Frequency: 2x week  Duration in weeks: 6  Plan of Care beginning date: 2/15/2024  Plan of Care expiration date: 3/28/2024  Treatment plan discussed with: patient        Subjective Evaluation    History of Present Illness  Mechanism of injury: Italia Conway states that she had a fall down the stairs which resulted in a nondisplaced fracture of the left ulna. She reports primarily using L hand for activities as she had a CVA affect her right arm and leg around 3 years ago          Not a recurrent problem   Quality of life: fair    Patient Goals  Patient goals for therapy: decreased pain, decreased edema, increased strength, independence with ADLs/IADLs and improved balance    Pain  Current pain ratin  At best pain ratin  At worst pain ratin  Quality: sharp, discomfort and grinding  Relieving factors: ice, change in position and rest  Aggravating factors: overhead activity and lifting  Progression: no change    Social Support  Steps to enter house: yes  Lives in: multiple-level home  Lives with: alone    Employment status: not working  Hand dominance: right      Diagnostic Tests  X-ray: abnormal        Objective     Tenderness     Left Wrist/Hand   Tenderness in the first dorsal compartment, second dorsal compartment, CMC joint, distal radioulnar joint, scaphoid and radial styloid process.     Active Range of Motion     Left Elbow   Normal active range of motion    Right Elbow    Normal active range of motion    Left Wrist   Wrist flexion: 35 degrees with pain  Wrist extension: 45 degrees   Radial deviation: 12 degrees with pain  Ulnar deviation: 10 degrees with pain      Right Wrist   Wrist flexion: 80 degrees   Wrist extension: 75 degrees   Radial deviation: 30 degrees   Ulnar deviation: 40 degrees     Additional Active Range of Motion Details  Pronation:20    Supination: 35    Strength/Myotome Testing     Left Elbow   Flexion: 4-  Extension: 4    Right Elbow   Flexion: WFL  Extension: WFL    Left Wrist/Hand   Wrist extension: 3-  Wrist flexion: 2+  Radial deviation: 3-  Ulnar deviation: 3-     (2nd hand position)     Trial 1: 16    Trial 2: 18    Trial 3: 16    Average: 16.67    Right Wrist/Hand   Wrist extension: 4-  Wrist flexion: 4-  Radial deviation: 4-  Ulnar deviation: 4-     (2nd hand position)     Trial 1: 58    Trial 2: 55    Trial 3: 60    Average: 57.67    Additional Strength Details  Limited by pain    Swelling     Left Wrist/Hand   Circumference wrist: 16.5 cm    Right Wrist/Hand   Circumference wrist: 15.75 cm           Precautions: PMH of stroke 2021(R UE + LE affected)     Daily Treatment Diary:      Initial Evaluation Date: 02/15/24  Compliance 2/15                     Visit Number 1                    Re-Eval  IE                 MC   Foto Captured Y                           2/15                     Manual                      Strecthing/PROM of wrist in all direcitons 10'                                                                 Ther-Ex                      AROM wrist All dir 20x ea                     AAROM wrist   All dir 20x ea                     Wrist flx/ext stretch ->                     Thumb opposition 15x                     Thumb abd 10x                     Finger web ->                                                                                                             Neuro Re-Ed                                                                                                 Ther-Act                                                               Modalities

## 2024-02-16 ENCOUNTER — DOCTOR'S OFFICE (OUTPATIENT)
Dept: URBAN - NONMETROPOLITAN AREA CLINIC 1 | Facility: CLINIC | Age: 74
Setting detail: OPHTHALMOLOGY
End: 2024-02-16
Payer: MEDICARE

## 2024-02-16 DIAGNOSIS — H02.831: ICD-10-CM

## 2024-02-16 DIAGNOSIS — H25.013: ICD-10-CM

## 2024-02-16 DIAGNOSIS — H02.054: ICD-10-CM

## 2024-02-16 DIAGNOSIS — H04.123: ICD-10-CM

## 2024-02-16 DIAGNOSIS — H02.834: ICD-10-CM

## 2024-02-16 PROCEDURE — 92012 INTRM OPH EXAM EST PATIENT: CPT | Mod: 25 | Performed by: OPTOMETRIST

## 2024-02-16 PROCEDURE — 67820 REVISE EYELASHES: CPT | Mod: LT | Performed by: OPTOMETRIST

## 2024-02-16 ASSESSMENT — CONFRONTATIONAL VISUAL FIELD TEST (CVF)
OD_FINDINGS: FULL
OS_FINDINGS: FULL

## 2024-02-16 ASSESSMENT — LID POSITION - COMMENTS: OS_COMMENTS: +VE EVERTED PUNCTUM LUL

## 2024-02-16 ASSESSMENT — LID EXAM ASSESSMENTS: OS_TRICHIASIS: LUL 1+ 2+

## 2024-02-16 ASSESSMENT — LID POSITION - DERMATOCHALASIS
OD_DERMATOCHALASIS: RUL 1+
OS_DERMATOCHALASIS: LUL 1+

## 2024-02-20 ENCOUNTER — OFFICE VISIT (OUTPATIENT)
Dept: PHYSICAL THERAPY | Facility: CLINIC | Age: 74
End: 2024-02-20
Payer: COMMERCIAL

## 2024-02-20 DIAGNOSIS — S52.255D CLOSED NONDISPLACED COMMINUTED FRACTURE OF SHAFT OF LEFT ULNA WITH ROUTINE HEALING, SUBSEQUENT ENCOUNTER: Primary | ICD-10-CM

## 2024-02-20 PROCEDURE — 97140 MANUAL THERAPY 1/> REGIONS: CPT

## 2024-02-20 PROCEDURE — 97110 THERAPEUTIC EXERCISES: CPT

## 2024-02-20 NOTE — PROGRESS NOTES
"Daily Note     Today's date: 2024  Patient name: Italia Conway  : 1950  MRN: [id number]  Referring provider: Jim Max DO  Dx:   Encounter Diagnosis     ICD-10-CM    1. Closed nondisplaced comminuted fracture of shaft of left ulna with routine healing, subsequent encounter  S52.255D           Start Time: 1258  Stop Time: 1345  Total time in clinic (min): 47 minutes    Subjective: patient reports that her wrist is still causing her pain at rest. Also, notes that she was unable to complete exercises at home d/t limited time over the weekend.      Objective: See treatment diary below      Assessment: Tolerated treatment well. Patient demonstrated fatigue post treatment as well as soreness. She does require facilitative cueing for appropriate form and to prevent compensatory movements from elbows. Introduced further stretching and ranging interventions as well as closed chain activities with minimal weight bearing. Would continue to benefit from skilled therapy services in order to maximize function.      Plan: Continue per plan of care.      Precautions: PMH of stroke (R UE + LE affected)     Daily Treatment Diary:      Initial Evaluation Date: 02/15/24  Compliance 2/15  2/20                   Visit Number 1                    Re-Eval  IE                 MC   Foto Captured Y                           2/15  2/20                   Manual                      Strecthing/PROM of wrist in all direcitons 10'  8'                                                               Ther-Ex                      AROM wrist All dir 20x ea  all direction 20x                   Wrist PREs  1# 10x flx/ext/pro/sup           AAROM wrist   All dir 20x ea                     Wrist flx/ext stretch ->  4x30\" ea                   Thumb opposition 15x  20x                   Thumb abd 10x  20x                   Finger web ->  red  Squeze + pull + twist 15 x                   Digi    1.5 15x3\"  15x individual " "fingers                                         Ball on wall wrist rolling   15x ea up/down/CW/CCW                                Elbow flexed wrist/ext stretch  4x30\" ea                                                           Neuro Re-Ed                                                                                                Ther-Act                                                               Modalities                                                                                    "

## 2024-02-21 ENCOUNTER — OFFICE VISIT (OUTPATIENT)
Dept: PHYSICAL THERAPY | Facility: CLINIC | Age: 74
End: 2024-02-21
Payer: COMMERCIAL

## 2024-02-21 DIAGNOSIS — S52.255D CLOSED NONDISPLACED COMMINUTED FRACTURE OF SHAFT OF LEFT ULNA WITH ROUTINE HEALING, SUBSEQUENT ENCOUNTER: Primary | ICD-10-CM

## 2024-02-21 PROCEDURE — 97140 MANUAL THERAPY 1/> REGIONS: CPT

## 2024-02-21 PROCEDURE — 97110 THERAPEUTIC EXERCISES: CPT

## 2024-02-21 NOTE — PROGRESS NOTES
"Daily Note     Today's date: 2024  Patient name: Italia Conway  : 1950  MRN: [id number]  Referring provider: Jim Max DO  Dx:   Encounter Diagnosis     ICD-10-CM    1. Closed nondisplaced comminuted fracture of shaft of left ulna with routine healing, subsequent encounter  S52.255D           Start Time: 1022  Stop Time: 1110  Total time in clinic (min): 48 minutes    Subjective: Patient reports that her wrist was feeling slightly better following yesterday's session. She expressed that she understands the importance of range of motion exercises.      Objective: See treatment diary below      Assessment: Tolerated treatment well. Patient demonstrated fatigue post treatment, exhibited good technique with therapeutic exercises, and would benefit from continued PT Educated on how to utilize ball or water bottle to assist with AAROM rolling through all wrist range of motions. Continues to demonstrate milde swelling throughout left forearm and wrist @ 6.5\" compared to 6\" on right      Plan: Continue per plan of care.      Precautions: PMH of stroke (R UE + LE affected)     Daily Treatment Diary:      Initial Evaluation Date: 02/15/24  Compliance 2/15  2/20  2/21                 Visit Number 1 2 3                  Re-Eval  IE                 MC   Foto Captured Y                           2/15  2/20  2/21                 Manual                      Strecthing/PROM of wrist in all direcitons 10'  8  8'                                                             Ther-Ex                      AROM wrist All dir 20x ea  all direction 20x  all directions 20x                 Wrist PREs  1# 10x flx/ext/pro/sup 1# 10x flx/ext  Pro/sup          AAROM wrist   All dir 20x ea                     Wrist flx/ext stretch ->  4x30\" ea  4x30\" ea                 Thumb opposition 15x  20x  20x                 Thumb abd 10x  20x  20x                 Finger web ->  red  Squeze + pull + twist 15 x  red squeeze + pull _ " "twist  15x                 Digi    1.5 15x3\"  15x individual fingers  1.5  15x3\"  15x individual fingers                                       Ball on wall wrist rolling   15x ea up/down/CW/CCW  15x ea                                 Elbow flexed wrist/ext stretch  4x30\" ea 4x30\"                                                          Neuro Re-Ed                                                                                                Ther-Act                                                               Modalities                                                                                      "

## 2024-02-26 ENCOUNTER — OFFICE VISIT (OUTPATIENT)
Dept: PHYSICAL THERAPY | Facility: CLINIC | Age: 74
End: 2024-02-26
Payer: COMMERCIAL

## 2024-02-26 DIAGNOSIS — S52.255D CLOSED NONDISPLACED COMMINUTED FRACTURE OF SHAFT OF LEFT ULNA WITH ROUTINE HEALING, SUBSEQUENT ENCOUNTER: Primary | ICD-10-CM

## 2024-02-26 PROCEDURE — 97140 MANUAL THERAPY 1/> REGIONS: CPT

## 2024-02-26 PROCEDURE — 97110 THERAPEUTIC EXERCISES: CPT

## 2024-02-26 NOTE — PROGRESS NOTES
"Daily Note     Today's date: 2024  Patient name: Italia Conway  : 1950  MRN: [id number]  Referring provider: Jim Max DO  Dx:   Encounter Diagnosis     ICD-10-CM    1. Closed nondisplaced comminuted fracture of shaft of left ulna with routine healing, subsequent encounter  S52.255D           Start Time: 1025  Stop Time: 1115  Total time in clinic (min): 50 minutes    Subjective: reports that her wrist is causing her pain today though was tolerable over the weekend. Feels that her pain is improving slowly.      Objective: See treatment diary below      Assessment: Tolerated treatment well. Patient requires reminder cueing for correct form throughout stretching and range of motion exercises. Continues to have some pain with both wrist flexion and ulnar deviations with over pressure. Range continues to be limited in in all directions. At this time, would continue to benefit from skilled therapy services in order to maximize function.      Plan: Continue per plan of care.      Precautions: PMH of stroke (R UE + LE affected)     Daily Treatment Diary:      Initial Evaluation Date: 02/15/24  Compliance 2/15  2/20  2/21  2/26               Visit Number 1 2 3   4               Re-Eval  IE                 MC   Foto Captured Y                           2/15  2/20  2/21  2/26               Manual                      Strecthing/PROM of wrist in all direcitons 10'  8  8'  8'                                                           Ther-Ex                      AROM wrist All dir 20x ea  all direction 20x  all directions 20x  all directions 20x               Wrist PREs  1# 10x flx/ext/pro/sup 1# 10x flx/ext  Pro/sup 1# 10x flx/ext/pro/sup         AAROM wrist   All dir 20x ea                     Wrist flx/ext stretch ->  4x30\" ea  4x30\" ea  4x30\"               Thumb opposition 15x  20x  20x  20x               Thumb abd 10x  20x  20x  20x               Finger web ->  red  Squeze + pull + twist 15 x  " "red squeeze + pull _ twist  15x  red squeeze + pull _ twist  15x               Digi    1.5 15x3\"  15x individual fingers  1.5  15x3\"  15x individual fingers  1.5  15x3\"  15x individual fingers                                     Ball on wall wrist rolling   15x ea up/down/CW/CCW  15x ea     15x ea                            Elbow flexed wrist/ext stretch  4x30\" ea 4x30\" 4x30\"                                                         Neuro Re-Ed                                                                                                Ther-Act                                                               Modalities                                                                                        "

## 2024-02-29 ENCOUNTER — OFFICE VISIT (OUTPATIENT)
Dept: PHYSICAL THERAPY | Facility: CLINIC | Age: 74
End: 2024-02-29
Payer: COMMERCIAL

## 2024-02-29 DIAGNOSIS — S52.255D CLOSED NONDISPLACED COMMINUTED FRACTURE OF SHAFT OF LEFT ULNA WITH ROUTINE HEALING, SUBSEQUENT ENCOUNTER: Primary | ICD-10-CM

## 2024-02-29 PROCEDURE — 97110 THERAPEUTIC EXERCISES: CPT

## 2024-02-29 PROCEDURE — 97140 MANUAL THERAPY 1/> REGIONS: CPT

## 2024-02-29 NOTE — PROGRESS NOTES
"Daily Note     Today's date: 2024  Patient name: Italia Conway  : 1950  MRN: [id number]  Referring provider: Jim Max DO  Dx:   Encounter Diagnosis     ICD-10-CM    1. Closed nondisplaced comminuted fracture of shaft of left ulna with routine healing, subsequent encounter  S52.255D           Start Time: 1020  Stop Time: 1110  Total time in clinic (min): 50 minutes    Subjective: patient reports that her followup appointment with ortho has been rescheduled to 2 weeks from tomorrow. Otherwise no c/o regarding pain at rest.      Objective: See treatment diary below      Assessment: Tolerated treatment well. Patient demonstrated fatigue post treatment though does require frequent cueing for correct form of exercises. Wrist continues to be limited in range of motion with supination/pronation>wrist flexion>wrist extension. Progressed range of motion exercises and will continue to progress strengthening to tolerance. At this time, would continue to benefit from skilled therapy services in order to maximize function with L hand to complete ADL's      Plan: Continue per plan of care.      Precautions: PMH of stroke (R UE + LE affected)     Daily Treatment Diary:      Initial Evaluation Date: 02/15/24  Compliance 2/15  2/20  2/21  2/26               Visit Number 1 2 3   4               Re-Eval  IE                 MC   Foto Captured Y                           2/15  2/20  2/21  2/26               Manual                      Strecthing/PROM of wrist in all direcitons 10'  8  8'  8'                                                           Ther-Ex                      AROM wrist All dir 20x ea  all direction 20x  all directions 20x  all directions 20x               Wrist PREs  1# 10x flx/ext/pro/sup 1# 10x flx/ext  Pro/sup 1# 10x flx/ext/pro/sup         AAROM wrist   All dir 20x ea                     Wrist flx/ext stretch ->  4x30\" ea  4x30\" ea  4x30\"               Thumb opposition 15x  20x  20x  " "20x               Thumb abd 10x  20x  20x  20x               Finger web ->  red  Squeze + pull + twist 15 x  red squeeze + pull _ twist  15x  red squeeze + pull _ twist  15x               Digi    1.5 15x3\"  15x individual fingers  1.5  15x3\"  15x individual fingers  1.5  15x3\"  15x individual fingers                                     Ball on wall wrist rolling   15x ea up/down/CW/CCW  15x ea     15x ea                            Elbow flexed wrist/ext stretch  4x30\" ea 4x30\" 4x30\"         flexibar    Red twist(flx/ext)  Bend  20x ea                                            Neuro Re-Ed                                                                                                Ther-Act                                                               Modalities                                                                                          "

## 2024-03-04 ENCOUNTER — OFFICE VISIT (OUTPATIENT)
Dept: PHYSICAL THERAPY | Facility: CLINIC | Age: 74
End: 2024-03-04
Payer: COMMERCIAL

## 2024-03-04 DIAGNOSIS — S52.255D CLOSED NONDISPLACED COMMINUTED FRACTURE OF SHAFT OF LEFT ULNA WITH ROUTINE HEALING, SUBSEQUENT ENCOUNTER: Primary | ICD-10-CM

## 2024-03-04 PROCEDURE — 97140 MANUAL THERAPY 1/> REGIONS: CPT

## 2024-03-04 PROCEDURE — 97110 THERAPEUTIC EXERCISES: CPT

## 2024-03-04 NOTE — PROGRESS NOTES
"Daily Note     Today's date: 3/4/2024  Patient name: Italia Conway  : 1950  MRN: [id number]  Referring provider: Jim Max DO  Dx:   Encounter Diagnosis     ICD-10-CM    1. Closed nondisplaced comminuted fracture of shaft of left ulna with routine healing, subsequent encounter  S52.255D           Start Time: 1125  Stop Time: 1210  Total time in clinic (min): 45 minutes    Subjective: patient reports that she noted increased pain and soreness along dorsal aspect of L forearm/wrist this past weekend. She states the pain is still tolerable but was noticeable.      Objective: See treatment diary below      Assessment: Tolerated treatment well. Patient demonstrated fatigue post treatment and does require frequent facilitative cueing for proper technique especially to minimizae compensatory elbow movement. Wrist flexion>extension continues to be moderately limited in range of motion. Progressed program to include more stretches, and range of motion. At this time, would continue to benefit from skilled therapy services in order to maximize function       Plan: Continue per plan of care.      Precautions: PMH of stroke (R UE + LE affected)     Daily Treatment Diary:      Initial Evaluation Date: 02/15/24  Compliance 2/15  2/20  2/21  2/26  3/             Visit Number 1 2 3   4  5             Re-Eval  IE                 MC   Foto Captured Y                           2/15  2/20  2/21  2/26  3/             Manual                      Strecthing/PROM of wrist in all direcitons 10'  8  8'  8'  8'                                                         Ther-Ex                      AROM wrist All dir 20x ea  all direction 20x  all directions 20x  all directions 20x 20x ea dir             Wrist PREs  1# 10x flx/ext/pro/sup 1# 10x flx/ext  Pro/sup 1# 10x flx/ext/pro/sup 1# 15x flx/ext/pro/sup        AAROM wrist   All dir 20x ea                     Wrist flx/ext stretch ->  4x30\" ea  4x30\" ea  4x30\"  4x30\"    " "         Thumb opposition 15x  20x  20x  20x  20x             Thumb abd 10x  20x  20x  20x  20x             Finger web ->  red  Squeze + pull + twist 15 x  red squeeze + pull _ twist  15x  red squeeze + pull _ twist  15x  red squeeze + pull _ twist  15x             Digi    1.5 15x3\"  15x individual fingers  1.5  15x3\"  15x individual fingers  1.5  15x3\"  15x individual fingers  2lb  30x  10x individual fingers                                   Ball on wall wrist rolling   15x ea up/down/CW/CCW  15x ea     15x ea  15x ea                          Elbow flexed wrist/ext stretch  4x30\" ea 4x30\" 4x30\" 4x30\"        flexibar    Red twist(flx/ext)  Bend  20x ea Red twist(flx/ebxt)  Bend  20x ea                                           Neuro Re-Ed                                                                                                Ther-Act                                                               Modalities                                                                                            "

## 2024-03-07 ENCOUNTER — OFFICE VISIT (OUTPATIENT)
Dept: PHYSICAL THERAPY | Facility: CLINIC | Age: 74
End: 2024-03-07
Payer: COMMERCIAL

## 2024-03-07 DIAGNOSIS — S52.255D CLOSED NONDISPLACED COMMINUTED FRACTURE OF SHAFT OF LEFT ULNA WITH ROUTINE HEALING, SUBSEQUENT ENCOUNTER: Primary | ICD-10-CM

## 2024-03-07 PROCEDURE — 97140 MANUAL THERAPY 1/> REGIONS: CPT

## 2024-03-07 PROCEDURE — 97110 THERAPEUTIC EXERCISES: CPT

## 2024-03-11 ENCOUNTER — OFFICE VISIT (OUTPATIENT)
Dept: PHYSICAL THERAPY | Facility: CLINIC | Age: 74
End: 2024-03-11
Payer: COMMERCIAL

## 2024-03-11 DIAGNOSIS — S52.255D CLOSED NONDISPLACED COMMINUTED FRACTURE OF SHAFT OF LEFT ULNA WITH ROUTINE HEALING, SUBSEQUENT ENCOUNTER: Primary | ICD-10-CM

## 2024-03-11 PROCEDURE — 97110 THERAPEUTIC EXERCISES: CPT

## 2024-03-11 PROCEDURE — 97140 MANUAL THERAPY 1/> REGIONS: CPT

## 2024-03-11 NOTE — PROGRESS NOTES
"Daily Note     Today's date: 3/11/2024  Patient name: Italia Conway  : 1950  MRN: [id number]  Referring provider: Jim Max DO  Dx:   Encounter Diagnosis     ICD-10-CM    1. Closed nondisplaced comminuted fracture of shaft of left ulna with routine healing, subsequent encounter  S52.255D                      Subjective: Patient reports 3/10 wrist soreness at beginning of session.      Objective: See treatment diary below      Assessment: Tolerated treatment well without complaint. Patient would benefit from continued PT to increase L wrist strength and mobility for improved function in ADLs.      Plan: Continue per plan of care.      Precautions: PMH of stroke (R UE + LE affected)     Daily Treatment Diary:      Initial Evaluation Date: 02/15/24  Compliance 2/15  2/20  2/21  2/26  3/4  3/7  3/11         Visit Number 1 2 3   4  5  6  7         Re-Eval  IE                 MC   Foto Captured Y           Y                2/15  2/20  2/21  2/26  3/4  3/7  3/11         Manual                      Strecthing/PROM of wrist in all direcitons 10'  8  8'  8'  8'  8'  8'                                                     Ther-Ex                      UBE      2.5'/2.5' 3'/3'                   AROM wrist All dir 20x ea  all direction 20x  all directions 20x  all directions 20x 20x ea dir  20x ea -         Wrist PREs  1# 10x flx/ext/pro/sup 1# 10x flx/ext  Pro/sup 1# 10x flx/ext/pro/sup 1# 15x flx/ext/pro/sup 2# 15x flx.ext/pro/sup 2# 15x flx.ext/pro/sup      AAROM wrist   All dir 20x ea                     Wrist flx/ext stretch ->  4x30\" ea  4x30\" ea  4x30\"  4x30\"  4x30\"   4x30\"         Thumb opposition 15x  20x  20x  20x  20x  see finger web 15x         Thumb abd 10x  20x  20x  20x  20x  see finger web 15x         Finger web ->  red  Squeze + pull + twist 15 x  red squeeze + pull _ twist  15x  red squeeze + pull _ twist  15x  red squeeze + pull _ twist  15x  red  Squeeze +pull + twist 15x    Thumb " "opposition 15x    Thumb abd 15x  red  Squeeze +pull + twist 15x         Digi    1.5 15x3\"  15x individual fingers  1.5  15x3\"  15x individual fingers  1.5  15x3\"  15x individual fingers  2lb  30x  10x individual fingers  3lb   30x  10x individual fingers 3lb   30x  10x individual fingers                               Ball on wall wrist rolling   15x ea up/down/CW/CCW  15x ea     15x ea  15x ea  15x ea 15x ea                      Elbow flexed wrist/ext stretch  4x30\" ea 4x30\" 4x30\" 4x30\"        flexibar    Red twist(flx/ext)  Bend  20x ea Red twist(flx/ebxt)  Bend  20x ea Red   Twist (flx/ext)  Bend 20x ea Red   Twist (flx/ext)  Bend 20x ea                                         Neuro Re-Ed                                                                                                Ther-Act                                                               Modalities                                                                                                "

## 2024-03-12 RX ORDER — TRAMADOL HYDROCHLORIDE 50 MG/1
50 TABLET ORAL EVERY 6 HOURS PRN
COMMUNITY
Start: 2024-03-06

## 2024-03-14 ENCOUNTER — OFFICE VISIT (OUTPATIENT)
Dept: PHYSICAL THERAPY | Facility: CLINIC | Age: 74
End: 2024-03-14
Payer: COMMERCIAL

## 2024-03-14 DIAGNOSIS — S52.255D CLOSED NONDISPLACED COMMINUTED FRACTURE OF SHAFT OF LEFT ULNA WITH ROUTINE HEALING, SUBSEQUENT ENCOUNTER: Primary | ICD-10-CM

## 2024-03-14 PROCEDURE — 97110 THERAPEUTIC EXERCISES: CPT

## 2024-03-14 PROCEDURE — 97140 MANUAL THERAPY 1/> REGIONS: CPT

## 2024-03-14 NOTE — PROGRESS NOTES
"Daily Note     Today's date: 3/14/2024  Patient name: Italia Conway  : 1950  MRN: [id number]  Referring provider: Jim Max DO  Dx:   Encounter Diagnosis     ICD-10-CM    1. Closed nondisplaced comminuted fracture of shaft of left ulna with routine healing, subsequent encounter  S52.255D           Start Time: 1025  Stop Time: 1110  Total time in clinic (min): 45 minutes    Subjective: patient notes that pain is continuing to improve though does have lingering pain along dorsal aspect of her wrist.       Objective: See treatment diary below      Assessment: Tolerated treatment well. Patient presents with visibly improving edema as definition of her wrist is increasingly noticeable. Range of motion and strength continue to improve.Patient is slowly progressing towards STG's. At this time, would continue to benefit from skilled therapy services in order to maximize function.      Plan: Continue per plan of care.      Precautions: PMH of stroke (R UE + LE affected)     Daily Treatment Diary:      Initial Evaluation Date: 02/15/24  Compliance 2/15  2/20  2/21  2/26  3/4  3/7  3/11  3/14       Visit Number 1 2 3   4  5  6  7  8       Re-Eval  IE                 MC   Foto Captured Y           Y                2/15  2/20  2/21  2/26  3/  3/7  3/11  3/14       Manual                      Strecthing/PROM of wrist in all direcitons 10'  8  8'  8'  8'  8'  8'  8'                                                   Ther-Ex                      UBE      2.5'/2.5' 3'/3' 3'/3'                  AROM wrist All dir 20x ea  all direction 20x  all directions 20x  all directions 20x 20x ea dir  20x ea -  20x       Wrist PREs  1# 10x flx/ext/pro/sup 1# 10x flx/ext  Pro/sup 1# 10x flx/ext/pro/sup 1# 15x flx/ext/pro/sup 2# 15x flx.ext/pro/sup 2# 15x flx.ext/pro/sup 3# 2x15 flx.ext/pro/sup     AAROM wrist   All dir 20x ea                     Wrist flx/ext stretch ->  4x30\" ea  4x30\" ea  4x30\"  4x30\"  4x30\"   4x30\"  " Pt cleared for discharge. Parent or responsible adult verbalize understanding of discharge instructions. Pt left in care of adult, alert, and appropriate.      "4x30\"       Thumb opposition 15x  20x  20x  20x  20x  see finger web 15x  15x       Thumb abd 10x  20x  20x  20x  20x  see finger web 15x  15x       Finger web ->  red  Squeze + pull + twist 15 x  red squeeze + pull _ twist  15x  red squeeze + pull _ twist  15x  red squeeze + pull _ twist  15x  red  Squeeze +pull + twist 15x    Thumb opposition 15x    Thumb abd 15x  red  Squeeze +pull + twist 15x blue  Squeeze +pull + twist 15x    Thumb opposition 15x    Thumb abd 15x       Digi    1.5 15x3\"  15x individual fingers  1.5  15x3\"  15x individual fingers  1.5  15x3\"  15x individual fingers  2lb  30x  10x individual fingers  3lb   30x  10x individual fingers 3lb   30x  10x individual fingers  5 lb 30x  3lb 10x ea finger                             Ball on wall wrist rolling   15x ea up/down/CW/CCW  15x ea     15x ea  15x ea  15x ea 15x ea  15x ea                    Elbow flexed wrist/ext stretch  4x30\" ea 4x30\" 4x30\" 4x30\"        flexibar    Red twist(flx/ext)  Bend  20x ea Red twist(flx/ebxt)  Bend  20x ea Red   Twist (flx/ext)  Bend 20x ea Red   Twist (flx/ext)  Bend 20x ea Red   Twist (flx/ext)  Bend 20x ea                                        Neuro Re-Ed                                                                                                Ther-Act                                                               Modalities                                                                                                  "

## 2024-03-15 ENCOUNTER — OFFICE VISIT (OUTPATIENT)
Dept: OBGYN CLINIC | Facility: CLINIC | Age: 74
End: 2024-03-15
Payer: COMMERCIAL

## 2024-03-15 ENCOUNTER — HOSPITAL ENCOUNTER (OUTPATIENT)
Dept: RADIOLOGY | Facility: CLINIC | Age: 74
End: 2024-03-15
Payer: COMMERCIAL

## 2024-03-15 VITALS
SYSTOLIC BLOOD PRESSURE: 110 MMHG | HEART RATE: 68 BPM | WEIGHT: 122 LBS | DIASTOLIC BLOOD PRESSURE: 70 MMHG | BODY MASS INDEX: 22.45 KG/M2 | TEMPERATURE: 97.7 F | HEIGHT: 62 IN

## 2024-03-15 DIAGNOSIS — M81.0 AGE-RELATED OSTEOPOROSIS WITHOUT CURRENT PATHOLOGICAL FRACTURE: ICD-10-CM

## 2024-03-15 DIAGNOSIS — S52.255D CLOSED NONDISPLACED COMMINUTED FRACTURE OF SHAFT OF LEFT ULNA WITH ROUTINE HEALING, SUBSEQUENT ENCOUNTER: Primary | ICD-10-CM

## 2024-03-15 DIAGNOSIS — S52.255D CLOSED NONDISPLACED COMMINUTED FRACTURE OF SHAFT OF LEFT ULNA WITH ROUTINE HEALING, SUBSEQUENT ENCOUNTER: ICD-10-CM

## 2024-03-15 PROCEDURE — 73100 X-RAY EXAM OF WRIST: CPT

## 2024-03-15 PROCEDURE — G2211 COMPLEX E/M VISIT ADD ON: HCPCS | Performed by: ORTHOPAEDIC SURGERY

## 2024-03-15 PROCEDURE — 99024 POSTOP FOLLOW-UP VISIT: CPT | Performed by: ORTHOPAEDIC SURGERY

## 2024-03-15 NOTE — PROGRESS NOTES
"Patient Name:  Italia Conway  MRN:  [id number]    Assessment     1. Closed nondisplaced comminuted fracture of shaft of left ulna with routine healing, subsequent encounter  XR wrist 2 vw left    Ambulatory Referral to Physical Therapy      2. Age-related osteoporosis without current pathological fracture            Plan     Would recommend follow-up in 4 to 6 weeks.  Italia should continue physical therapy.  She does not want to pursue a bone density at this time as she has just recently seen a rheumatologist and is already scheduled for a bone density study but needs to wait until May 2024 as her last study was done in May 2022.  The rheumatologist plans to initiate more formal treatment of osteoporosis after the bone density study has been obtained.  In the interim, she is to continue with calcium and vitamin D supplementation as already prescribed.    She did have some concerns regarding pain at the base of her left thumb.  This has been a chronic issue but was somewhat exacerbated by immobilization postoperatively.  I will be happy to see her for this to initiate more formal treatment if symptoms do not seem to respond.    Return for 4 to 6 weeks.      Subjective   Italia Conway returns for follow-up of her left distal ulnar shaft fracture.  The patient is nearly 3 month(s) post op and returns for routine follow-up. Patient complains of some persistent pain.  Her pain is mostly localized to the radial aspect of the wrist and the CMC joint of the thumb.  She has only minimal pain at the ulnar aspect of the wrist.  She continues to attend physical therapy.  She denies paresthesias.      Objective     /70   Pulse 68   Temp 97.7 °F (36.5 °C) (Temporal)   Ht 5' 2\" (1.575 m)   Wt 55.3 kg (122 lb)   BMI 22.31 kg/m²     The exam today demonstrates the incision to be well-healed, nontender and without any residual swelling.  She has about 70 degrees of forearm supination compared to 90 degrees on the " contralateral right, 90 degrees of pronation bilaterally, 60 degrees of dorsiflexion of the wrist bilaterally and about 30 degrees of volar flexion of the left wrist compared to 45 degrees on the right.  Sensation is intact.  Good color and capillary refill is noted.      Data Review     I have personally reviewed pertinent films in PACS demonstrating the fracture to be healing with good alignment and stable fixation.  On the images there is evidence of CMC degenerative change.    Jim Max

## 2024-03-18 ENCOUNTER — OFFICE VISIT (OUTPATIENT)
Dept: PHYSICAL THERAPY | Facility: CLINIC | Age: 74
End: 2024-03-18
Payer: COMMERCIAL

## 2024-03-18 DIAGNOSIS — S52.255D CLOSED NONDISPLACED COMMINUTED FRACTURE OF SHAFT OF LEFT ULNA WITH ROUTINE HEALING, SUBSEQUENT ENCOUNTER: Primary | ICD-10-CM

## 2024-03-18 PROCEDURE — 97140 MANUAL THERAPY 1/> REGIONS: CPT

## 2024-03-18 PROCEDURE — 97110 THERAPEUTIC EXERCISES: CPT

## 2024-03-18 NOTE — PROGRESS NOTES
Daily Note     Today's date: 3/18/2024  Patient name: Italia Conway  : 1950  MRN: [id number]  Referring provider: Jim Max DO  Dx:   Encounter Diagnosis     ICD-10-CM    1. Closed nondisplaced comminuted fracture of shaft of left ulna with routine healing, subsequent encounter  S52.255D           Start Time: 1105  Stop Time: 1155  Total time in clinic (min): 50 minutes    Subjective: states she had follow up with orthopedics who is happy with patient's current progress and recommended continued therapy.       Objective: See treatment diary below      Assessment: Tolerated treatment well. Patient demonstrated fatigue post treatment, exhibited good technique with therapeutic exercises, and would benefit from continued PT. Pt continues to improve in strength with program progressed as tolerated. Able to utilize 3# for wrist PREs, though limited set before soreness began to accumulate. Continues to be lacking flexion. Reports pain levels overall decreasing as she completes daily ADLs. At this time, would continue to benefit from skilled therapy services in order to maximize function.      Plan: Continue per plan of care.      Precautions: PMH of stroke (R UE + LE affected)     Daily Treatment Diary:      Initial Evaluation Date: 02/15/24  Compliance 2/15  2/20  2/21  2/26  3/4  3/7  3/11  3/14  3/18     Visit Number 1 2 3   4  5  6  7  8  9     Re-Eval  IE                    Foto Captured Y           Y                2/15  2/20  2/21  2/26  3/4  3/7  3/11  3/14  3/18     Manual                      Strecthing/PROM of wrist in all direcitons 10'  8  8'  8'  8'  8'  8'  8'  8'                                                 Ther-Ex                      UBE      2.5'/2.5' 3'/3' 3'/3' 3'/3'                 AROM wrist All dir 20x ea  all direction 20x  all directions 20x  all directions 20x 20x ea dir  20x ea -  20x  20x     Wrist PREs  1# 10x flx/ext/pro/sup 1# 10x flx/ext  Pro/sup 1# 10x  "flx/ext/pro/sup 1# 15x flx/ext/pro/sup 2# 15x flx.ext/pro/sup 2# 15x flx.ext/pro/sup 3# 2x15 flx.ext/pro/sup 3#   2x15 flx/ext/pro/sup    AAROM wrist   All dir 20x ea                     Wrist flx/ext stretch ->  4x30\" ea  4x30\" ea  4x30\"  4x30\"  4x30\"   4x30\"  4x30\"  4x30\"     Thumb opposition 15x  20x  20x  20x  20x  see finger web 15x  15x  15x     Thumb abd 10x  20x  20x  20x  20x  see finger web 15x  15x  15x     Finger web ->  red  Squeze + pull + twist 15 x  red squeeze + pull _ twist  15x  red squeeze + pull _ twist  15x  red squeeze + pull _ twist  15x  red  Squeeze +pull + twist 15x    Thumb opposition 15x    Thumb abd 15x  red  Squeeze +pull + twist 15x blue  Squeeze +pull + twist 15x    Thumb opposition 15x    Thumb abd 15x  blue  Squeeze +pull + twist 15x    Thumb opposition 15x    Thumb abd 15x     Digi    1.5 15x3\"  15x individual fingers  1.5  15x3\"  15x individual fingers  1.5  15x3\"  15x individual fingers  2lb  30x  10x individual fingers  3lb   30x  10x individual fingers 3lb   30x  10x individual fingers  5 lb 30x  3lb 10x ea finger   5 lb 30x  3lb 10x ea finge                           Ball on wall wrist rolling   15x ea up/down/CW/CCW  15x ea     15x ea  15x ea  15x ea 15x ea  15x ea                    Elbow flexed wrist/ext stretch  4x30\" ea 4x30\" 4x30\" 4x30\"        flexibar    Red twist(flx/ext)  Bend  20x ea Red twist(flx/ebxt)  Bend  20x ea Red   Twist (flx/ext)  Bend 20x ea Red   Twist (flx/ext)  Bend 20x ea Red   Twist (flx/ext)  Bend 20x ea Red   Twist (flx/ext)  Bend 20x ea                                       Neuro Re-Ed                                                                                                Ther-Act                                                               Modalities                                                                                                    "

## 2024-03-21 ENCOUNTER — EVALUATION (OUTPATIENT)
Dept: PHYSICAL THERAPY | Facility: CLINIC | Age: 74
End: 2024-03-21
Payer: COMMERCIAL

## 2024-03-21 DIAGNOSIS — S52.255D CLOSED NONDISPLACED COMMINUTED FRACTURE OF SHAFT OF LEFT ULNA WITH ROUTINE HEALING, SUBSEQUENT ENCOUNTER: Primary | ICD-10-CM

## 2024-03-21 PROCEDURE — 97164 PT RE-EVAL EST PLAN CARE: CPT

## 2024-03-21 PROCEDURE — 97140 MANUAL THERAPY 1/> REGIONS: CPT

## 2024-03-21 PROCEDURE — 97110 THERAPEUTIC EXERCISES: CPT

## 2024-03-21 NOTE — PROGRESS NOTES
PT Re-Evaluation     Today's date: 3/25/2024  Patient name: Italia Conway  : 1950  MRN: [id number]  Referring provider: Jim Max DO  Dx:   Encounter Diagnosis     ICD-10-CM    1. Closed nondisplaced comminuted fracture of shaft of left ulna with routine healing, subsequent encounter  S52.255D           Start Time: 1035  Stop Time: 1130  Total time in clinic (min): 55 minutes    Assessment  Assessment details: Italia Conway presents to therapy following open reduction and internal fixation of left ulna s/p fall in early December. She presents with pain at rest that is worsened with active range of motion in all directions. Wrist flexion and radial deviation being the most painful. All wrist motions limited compared to right side, especially wrist flexion, pronation and supination. Objective findings detailed below. She does have wrist splint which she is to wear most of the day at this time. Educated patient extensively on healing process/timeline and importance of ranging/mobility exercises to aide in maximizing returns towards baseline.  At this time, would benefit from skilled therapy services in order to maximize ROM and strength for improved function with ADLs.      Update 3/21:  Italia has been completing therapy 1-2x/week and is progressing towards established goals. However, patient continues to demonstrate diminished range of motion and strength in her left wrist compared to her right.  string continues to improve is weaker than her right, which was her previously weaker arm. Continues to have pain though tolerable unless used repeatedly. At this time, would continue to benefit from skilled therapy services in order to maximize function.    Impairments: abnormal or restricted ROM  Barriers to therapy: History of CVA  Understanding of Dx/Px/POC: fair   Prognosis: fair    Goals  STG: 3 weeks  -patient will improve pronation to >40 degrees for improved ability to   objects-met  -patient will be able to improve L wrist flexion to >50 degrees for improve ability to  objects-ongoing  -patient will improve L  strength to >25lbs with <2/10 pain to allow introduction of gripping activities-met     -patient will be able to improve L thumb abduction/adduction to 5/5 for improved gripping    LT weeks  -patient will improve L wrist AROM in all directions within 5 degrees of R wrist ranges -ongoing  -patient will improve L wrist strength to >4/5 in all wrist directions-met  -patient will improve L  strength to > 57 to match R  strength for improved function in gripping ADLs-ongoing  -patient will improve FOTO score to 65 to demonstrate an improvement in function-ongoing      Plan  Patient would benefit from: skilled physical therapy  Referral necessary: No  Planned modality interventions: cryotherapy and thermotherapy: hydrocollator packs  Planned therapy interventions: ADL training, activity modification, IASTM, joint mobilization, therapeutic activities, therapeutic exercise, stretching, strengthening, functional ROM exercises, balance, nerve gliding and manual therapy  Frequency: 2x week  Duration in weeks: 6  Plan of Care beginning date: 3/21/2024  Plan of Care expiration date: 2024  Treatment plan discussed with: patient        Subjective Evaluation    History of Present Illness  Mechanism of injury: Italia Conway states that she had a fall down the stairs which resulted in a nondisplaced fracture of the left ulna. She reports primarily using L hand for activities as she had a CVA affect her right arm and leg around 3 years ago      Update: 3/21  Italia Conway states that pain continues to be present along wrist though is much less than originally. Notes it does become sore with frequent use. Notices improved range and gripping strengtha s she is able to use it with more daily activities, though continues to have difficulty picking up heavier  objects such as when cooking.          Not a recurrent problem   Quality of life: fair    Patient Goals  Patient goals for therapy: decreased pain, decreased edema, increased strength, independence with ADLs/IADLs and improved balance    Pain  Current pain ratin  At best pain ratin  At worst pain ratin  Quality: sharp, discomfort and grinding  Relieving factors: ice, change in position and rest  Aggravating factors: overhead activity and lifting  Progression: no change    Social Support  Steps to enter house: yes  Lives in: multiple-level home  Lives with: alone    Employment status: not working  Hand dominance: right      Diagnostic Tests  X-ray: abnormal        Objective     Tenderness     Left Wrist/Hand   No tenderness in the first dorsal compartment, second dorsal compartment, CMC joint, distal radioulnar joint, scaphoid and radial styloid process.     Active Range of Motion     Left Elbow   Normal active range of motion    Right Elbow   Normal active range of motion    Left Wrist   Wrist flexion: 60 degrees   Wrist extension: 50 degrees   Radial deviation: 15 degrees with pain  Ulnar deviation: 20 degrees with pain      Right Wrist   Wrist flexion: 80 degrees   Wrist extension: 75 degrees   Radial deviation: 30 degrees   Ulnar deviation: 40 degrees     Additional Active Range of Motion Details  Supination  L:70  R: 90    pronation:   L: 90  R:90    Strength/Myotome Testing     Left Elbow   Flexion: 4-  Extension: 4    Right Elbow   Flexion: WFL  Extension: WFL    Left Wrist/Hand   Wrist extension: 4  Wrist flexion: 4  Radial deviation: 4  Ulnar deviation: 4     (2nd hand position)     Trial 1: 42    Trial 2: 45    Trial 3: 43    Average: 43.33    Right Wrist/Hand   Wrist extension: 4+  Wrist flexion: 4+  Radial deviation: 4+  Ulnar deviation: 4+     (2nd hand position)     Trial 1: 65    Trial 2: 63    Trial 3: 65    Average: 64.33    Additional Strength Details  Limited by pain    Swelling  "    Left Wrist/Hand   Circumference wrist: 15.75 cm    Right Wrist/Hand   Circumference wrist: 15.75 cm           Precautions: PMH of stroke 2021(R UE + LE affected)     Daily Treatment Diary:    Initial Evaluation Date: 02/15/24; 3/21/24  Compliance 3/21             Visit Number 10             Re-Eval  Y          MC   Foto Captured  Y                   3/21             Manual              Strecthing/PROM of wrist in all direcitons  8'                                           Ther-Ex               UBE 3'/3'                         AROM wrist  20x             Wrist PREs 3#   2x15 flx/ext/pro/sup            AAROM wrist                 Wrist flx/ext stretch  4x30\"             Thumb opposition  15x             Thumb abd  15x             Finger web  blue  Squeeze +pull + twist 15x    Thumb opposition 15x    Thumb abd 15x             Digi    5 lb 30x  3lb 10x ea finge                            Ball on wall wrist rolling  15x ea                          Elbow flexed wrist/ext stretch             flexibar red  Twist (flx/ext)  Bend 20x ea                                        Neuro Re-Ed                                                                                  Ther-Act                                                        Modalities                                                                                     "

## 2024-03-25 ENCOUNTER — OFFICE VISIT (OUTPATIENT)
Dept: PHYSICAL THERAPY | Facility: CLINIC | Age: 74
End: 2024-03-25
Payer: COMMERCIAL

## 2024-03-25 DIAGNOSIS — S52.255D CLOSED NONDISPLACED COMMINUTED FRACTURE OF SHAFT OF LEFT ULNA WITH ROUTINE HEALING, SUBSEQUENT ENCOUNTER: Primary | ICD-10-CM

## 2024-03-25 PROCEDURE — 97140 MANUAL THERAPY 1/> REGIONS: CPT

## 2024-03-25 PROCEDURE — 97110 THERAPEUTIC EXERCISES: CPT

## 2024-03-25 NOTE — PROGRESS NOTES
"Daily Note     Today's date: 3/25/2024  Patient name: Italia Conway  : 1950  MRN: [id number]  Referring provider: Jim Max DO  Dx:   Encounter Diagnosis     ICD-10-CM    1. Closed nondisplaced comminuted fracture of shaft of left ulna with routine healing, subsequent encounter  S52.255D           Start Time: 1120  Stop Time: 1210  Total time in clinic (min): 50 minutes    Subjective: patient continues to note intermittent soreness in L wrist and thumb following repetitive use. However rest does improve alleviate symptoms. She feels therapy is helping and that pain is trending downwards.      Objective: See treatment diary below      Assessment: Tolerated treatment well. Patient demonstrated fatigue post treatment, exhibited good technique with therapeutic exercises, and would benefit from continued PT. She does require intermittent cueing for reminders to minimize compensatory elbow motions.  Able to tolerate increase resistance with  and wrist PRE's. At this time, would continue to benefit from skilled therapy services in order to maximize function.      Plan: Continue per plan of care.      Precautions: PMH of stroke (R UE + LE affected)     Daily Treatment Diary:    Initial Evaluation Date: 02/15/24; 3/21/24  Compliance 3/21 3/25            Visit Number 10 11            Re-Eval  Y          MC   Foto Captured Y                    3/21  3/25           Manual              Strecthing/PROM of wrist in all direcitons  8'  10'                                         Ther-Ex               UBE 3'/3' 5'/5'/                        AROM wrist  20x  20x           Wrist PREs 3#   2x15 flx/ext/pro/sup 3#   2x15 flx/ext/pro/sup           AAROM wrist                 Wrist flx/ext stretch  4x30\"  4x30\"           Thumb opposition  15x  15x           Thumb abd  15x  15x           Finger web  blue  Squeeze +pull + twist 15x    Thumb opposition 15x    Thumb abd 15x  blue  Squeeze +pull + twist 15x    Thumb " "opposition 15x    Thumb abd 15x           Digi    5 lb 30x  3lb 10x ea finge  7 lb 30x  5lb 10x ea finge                          Ball on wall wrist rolling  15x ea  15x                        Elbow flexed wrist/ext stretch  10x5\"           flexibar red  Twist (flx/ext)  Bend 20x ea ed  Twist (flx/ext)  Bend 20x ea                                       Neuro Re-Ed                                                                                  Ther-Act                                                        Modalities                                                                                          "

## 2024-03-28 ENCOUNTER — OFFICE VISIT (OUTPATIENT)
Dept: PHYSICAL THERAPY | Facility: CLINIC | Age: 74
End: 2024-03-28
Payer: COMMERCIAL

## 2024-03-28 DIAGNOSIS — S52.255D CLOSED NONDISPLACED COMMINUTED FRACTURE OF SHAFT OF LEFT ULNA WITH ROUTINE HEALING, SUBSEQUENT ENCOUNTER: Primary | ICD-10-CM

## 2024-03-28 PROCEDURE — 97140 MANUAL THERAPY 1/> REGIONS: CPT

## 2024-03-28 PROCEDURE — 97110 THERAPEUTIC EXERCISES: CPT

## 2024-03-28 RX ORDER — LEVOTHYROXINE SODIUM 0.07 MG/1
75 TABLET ORAL DAILY
COMMUNITY
Start: 2024-03-13

## 2024-03-28 RX ORDER — AMLODIPINE BESYLATE 10 MG/1
10 TABLET ORAL DAILY
COMMUNITY
Start: 2024-03-15

## 2024-03-28 NOTE — PROGRESS NOTES
"Daily Note     Today's date: 3/28/2024  Patient name: Italia Conway  : 1950  MRN: [id number]  Referring provider: Jim Max DO  Dx:   Encounter Diagnosis     ICD-10-CM    1. Closed nondisplaced comminuted fracture of shaft of left ulna with routine healing, subsequent encounter  S52.255D           Start Time: 1020  Stop Time: 1110  Total time in clinic (min): 50 minutes    Subjective: patient states that her hand is still sore from previous therapy session's increase in volume.       Objective: See treatment diary below      Assessment: Tolerated treatment fair. Patient demonstrated fatigue post treatment, exhibited good technique with therapeutic exercises, and would benefit from continued PT. Due to patient's residual soreness, decreased overall volume of exercises and resistances. Patient able to complete all exercises this date without exacerbation of symptoms. At this time, would continue to benefit from skilled therapy services in order to maximize function.      Plan: Continue per plan of care.      Precautions: PMH of stroke (R UE + LE affected)     Daily Treatment Diary:    Initial Evaluation Date: 02/15/24; 3/21/24  Compliance 3/21 3/25 3/28           Visit Number 10 11 12           Re-Eval  Y          MC   Foto Captured Y                    3/21  3/25 3/28          Manual              Strecthing/PROM of wrist in all direcitons  8'  10' 10'                                        Ther-Ex               UBE 3'/3' 5'/5'/ 5'/5'                       AROM wrist  20x  20x 20x          Wrist PREs 3#   2x15 flx/ext/pro/sup 3#   2x15 flx/ext/pro/sup 2#   2x15 flx/ext/pro/sup          AAROM wrist                 Wrist flx/ext stretch  4x30\"  4x30\" 4x30\"          Thumb opposition  15x  15x           Thumb abd  15x  15x           Finger web  blue  Squeeze +pull + twist 15x    Thumb opposition 15x    Thumb abd 15x  blue  Squeeze +pull + twist 15x    Thumb opposition 15x    Thumb abd 15x " "blue  Squeeze +pull + twist 15x    Thumb opposition 15x    Thumb abd 15x          Digi    5 lb 30x  3lb 10x ea finge  7 lb 30x  5lb 10x ea finge 5 lb 30x  3lb 10x ea finge                         Ball on wall wrist rolling  15x ea  15x 15x                       Elbow flexed wrist/ext stretch  10x5\"           flexibar red  Twist (flx/ext)  Bend 20x ea ed  Twist (flx/ext)  Bend 20x ea                                       Neuro Re-Ed                                                                                  Ther-Act                                                        Modalities                                                                                            "

## 2024-03-29 ENCOUNTER — OFFICE VISIT (OUTPATIENT)
Dept: OBGYN CLINIC | Facility: CLINIC | Age: 74
End: 2024-03-29
Payer: MEDICARE

## 2024-03-29 VITALS
TEMPERATURE: 97.6 F | HEART RATE: 80 BPM | DIASTOLIC BLOOD PRESSURE: 80 MMHG | SYSTOLIC BLOOD PRESSURE: 138 MMHG | BODY MASS INDEX: 22.63 KG/M2 | WEIGHT: 123 LBS | OXYGEN SATURATION: 97 % | HEIGHT: 62 IN

## 2024-03-29 DIAGNOSIS — M79.645 CHRONIC PAIN OF LEFT THUMB: Primary | ICD-10-CM

## 2024-03-29 DIAGNOSIS — M18.12 PRIMARY OSTEOARTHRITIS OF FIRST CARPOMETACARPAL JOINT OF LEFT HAND: ICD-10-CM

## 2024-03-29 DIAGNOSIS — G89.29 CHRONIC PAIN OF LEFT THUMB: Primary | ICD-10-CM

## 2024-03-29 PROCEDURE — 99213 OFFICE O/P EST LOW 20 MIN: CPT | Performed by: ORTHOPAEDIC SURGERY

## 2024-03-29 PROCEDURE — 20600 DRAIN/INJ JOINT/BURSA W/O US: CPT | Performed by: ORTHOPAEDIC SURGERY

## 2024-03-29 RX ORDER — BUPIVACAINE HYDROCHLORIDE 2.5 MG/ML
1 INJECTION, SOLUTION INFILTRATION; PERINEURAL
Status: COMPLETED | OUTPATIENT
Start: 2024-03-29 | End: 2024-03-29

## 2024-03-29 RX ORDER — TRIAMCINOLONE ACETONIDE 40 MG/ML
20 INJECTION, SUSPENSION INTRA-ARTICULAR; INTRAMUSCULAR
Status: COMPLETED | OUTPATIENT
Start: 2024-03-29 | End: 2024-03-29

## 2024-03-29 RX ADMIN — BUPIVACAINE HYDROCHLORIDE 1 ML: 2.5 INJECTION, SOLUTION INFILTRATION; PERINEURAL at 10:30

## 2024-03-29 RX ADMIN — TRIAMCINOLONE ACETONIDE 20 MG: 40 INJECTION, SUSPENSION INTRA-ARTICULAR; INTRAMUSCULAR at 10:30

## 2024-03-29 NOTE — PROGRESS NOTES
ASSESSMENT/PLAN:    Diagnoses and all orders for this visit:    Chronic pain of left thumb    Primary osteoarthritis of first carpometacarpal joint of left hand    Other orders  -     amLODIPine (NORVASC) 10 mg tablet; Take 10 mg by mouth daily  -     levothyroxine 75 mcg tablet; Take 75 mcg by mouth daily        Plan: Treatment was discussed.  She elected to proceed with injection which was performed without difficulty and tolerated well.  She does not want to consider bracing or therapy.  She is scheduled to return for follow-up of her ulnar fracture in about 4 weeks and I will see her at that time and assess her response to the injection.  If she does not respond, bracing may be warranted.  Alternatively, referral to hand surgery for consideration of surgical intervention would be appropriate.    Return for as scheduled.      _____________________________________________________  CHIEF COMPLAINT:  Chief Complaint   Patient presents with    Left Thumb - Pain         SUBJECTIVE:  Italia Conway is a 74 y.o. year old left handed female who presents for evaluation of chronic left thumb pain.  She has noted pain for at least 6 months.  She notes constant pain although this is significantly aggravated with activity.  Pain is localized to the base of the thumb and the radial wrist.  She has been doing physical therapy status post ORIF of a distal ulna fracture and she feels that therapy seems to aggravate her symptoms.  She denies paresthesias.  She has had no specific treatment for her thumb.  She has had no diagnostic imaging specifically geared of her thumb.    PAST MEDICAL HISTORY:  Past Medical History:   Diagnosis Date    Anemia     Disease of thyroid gland     Hyperlipidemia     Hypertension     PONV (postoperative nausea and vomiting)     Raynaud disease     Stroke (HCC)     TIA (transient ischemic attack)     Wrist fracture     Left       PAST SURGICAL HISTORY:  Past Surgical History:   Procedure Laterality  Date    COLONOSCOPY      CT EPIDURAL STEROID INJECTION (GALINA LUMBAR)      HAND SURGERY Right     Mallet finger repair    ORIF WRIST FRACTURE Left 1/4/2024    Procedure: Open reduction internal fixation of distal ulna;  Surgeon: Jim Max;  Location: [facility];  Service: Orthopedics    TONSILLECTOMY      TUBAL LIGATION         FAMILY HISTORY:  Family History   Problem Relation Age of Onset    Other Mother         encephalitis    Other Father         trauma       SOCIAL HISTORY:  Social History     Tobacco Use    Smoking status: Never    Smokeless tobacco: Never   Vaping Use    Vaping status: Never Used   Substance Use Topics    Alcohol use: Never    Drug use: Never       MEDICATIONS:    Current Outpatient Medications:     acetaminophen (TYLENOL) 325 mg tablet, Take 2 tablets (650 mg total) by mouth every 6 (six) hours as needed for mild pain, headaches or fever, Disp: , Rfl: 0    amLODIPine (NORVASC) 10 mg tablet, Take 10 mg by mouth daily, Disp: , Rfl:     aspirin-dipyridamole (AGGRENOX)  mg per 12 hr capsule, Take by mouth, Disp: , Rfl:     atorvastatin (LIPITOR) 80 mg tablet, Take 1 tablet (80 mg total) by mouth every evening, Disp:  , Rfl: 0    Calcium 200 MG TABS, 600 mg, Disp: , Rfl:     CANDESARTAN CILEXETIL PO, Take by mouth daily at bedtime, Disp: , Rfl:     cyanocobalamin 1,000 mcg/mL, Inject 1 mL into a muscle every 30 (thirty) days, Disp: , Rfl:     fluticasone (FLONASE) 50 mcg/act nasal spray, 1 spray into each nostril daily as needed for rhinitis, Disp: , Rfl:     folic acid (FOLVITE) 1 mg tablet, Take by mouth, Disp: , Rfl:     levothyroxine 75 mcg tablet, Take 75 mcg by mouth daily, Disp: , Rfl:     meclizine (ANTIVERT) 25 mg tablet, Take 1 tablet (25 mg total) by mouth every 8 (eight) hours as needed for dizziness, Disp: 30 tablet, Rfl: 0    ondansetron (ZOFRAN) 4 mg tablet, Take 1 tablet (4 mg total) by mouth every 12 (twelve) hours as needed for nausea or vomiting, Disp: 10 tablet, Rfl:  "0    traMADol (ULTRAM) 50 mg tablet, Take 50 mg by mouth every 6 (six) hours as needed, Disp: , Rfl:     [employer] Safety Syringe 25G X 5/8\" 3 ML MISC, ONE INJECTION EVERY MONTH WITH VITAMIN B 12, Disp: , Rfl:     amLODIPine (NORVASC) 2.5 mg tablet, Take 1 tablet (2.5 mg total) by mouth daily (Patient not taking: Reported on 3/29/2024), Disp: 30 tablet, Rfl: 0    gabapentin (NEURONTIN) 100 mg capsule, Take 100-200 mg by mouth daily at bedtime as needed (Patient not taking: Reported on 3/29/2024), Disp: , Rfl:     levothyroxine 25 mcg tablet, Take 75 mcg by mouth daily (Patient not taking: Reported on 3/29/2024), Disp: , Rfl:     levothyroxine 50 mcg tablet, Take 50 mcg by mouth daily (Patient not taking: Reported on 3/29/2024), Disp: , Rfl:     ALLERGIES:  No Known Allergies    Review of systems:   Constitutional: Negative for fatigue, fever or loss of apetite.   HENT: Negative.    Respiratory: Negative for shortness of breath, dyspnea.    Cardiovascular: Negative for chest pain/tightness.   Gastrointestinal: Negative for abdominal pain, N/V.   Endocrine: Negative for cold/heat intolerance, unexplained weight loss/gain.   Genitourinary: Negative for flank pain, dysuria, hematuria.   Musculoskeletal: Positive as in the HPI  Skin: Negative for rash.    Neurological: Negative  Psychiatric/Behavioral: Negative for agitation.  _____________________________________________________  PHYSICAL EXAMINATION:    Blood pressure 138/80, pulse 80, temperature 97.6 °F (36.4 °C), height 5' 2\" (1.575 m), weight 55.8 kg (123 lb), SpO2 97%.    General: well developed and well nourished, alert, oriented times 3, and appears comfortable  Psychiatric: Normal  HEENT: Benign  Cardiovascular: Regular    Pulmonary: No wheezing or stridor  Abdomen: Soft, Nontender  Skin: No masses, erythema, lacerations, fluctation, ulcerations  Neurovascular: Motor and sensory exams are grossly intact and pulses palpable.    MUSCULOSKELETAL " EXAMINATION:    The left thumb exam demonstrates a positive thumb grind and tenderness at the first CMC joint.  With stabilization of thumb circumduction, she denies pain with apposition of the thumb to the index through small fingers.  When the stabilization of the thumb CMC joint is limited, she experiences significant pain with the same activity.  She has a negative Finkelstein's test.  There is prominence of the CMC joint consistent with degenerative disease and mild subluxation.      _____________________________________________________  STUDIES REVIEWED:  X-rays of her wrist previously obtained for the ulnar fracture demonstrate degenerative changes at the left first CMC joint.    PROCEDURES:  Small joint arthrocentesis: L thumb CMC  [place] Protocol:  Consent: Verbal consent obtained.  Consent given by: patient  Patient understanding: patient states understanding of the procedure being performed  Supporting Documentation  Indications: pain   Procedure Details  Location: thumb - L thumb CMC  Needle size: 25 G  Ultrasound guidance: no  Approach: dorsal  Medications administered: 1 mL bupivacaine 0.25 %; 20 mg triamcinolone acetonide 40 mg/mL              Jim Max

## 2024-04-02 ENCOUNTER — TELEPHONE (OUTPATIENT)
Dept: NEUROLOGY | Facility: CLINIC | Age: 74
End: 2024-04-02

## 2024-04-02 NOTE — TELEPHONE ENCOUNTER
Patient wanted schedule NP appointment for stroke in [facility]. Informed her the doctors that see for stroke does not have offices there. She said she will call back and check with her son if he can drive her at the other locations.

## 2024-04-26 ENCOUNTER — OFFICE VISIT (OUTPATIENT)
Dept: OBGYN CLINIC | Facility: CLINIC | Age: 74
End: 2024-04-26
Payer: COMMERCIAL

## 2024-04-26 VITALS
HEART RATE: 75 BPM | DIASTOLIC BLOOD PRESSURE: 70 MMHG | OXYGEN SATURATION: 99 % | BODY MASS INDEX: 22.63 KG/M2 | HEIGHT: 62 IN | TEMPERATURE: 98.4 F | WEIGHT: 123 LBS | SYSTOLIC BLOOD PRESSURE: 125 MMHG

## 2024-04-26 DIAGNOSIS — M18.12 PRIMARY OSTEOARTHRITIS OF FIRST CARPOMETACARPAL JOINT OF LEFT HAND: ICD-10-CM

## 2024-04-26 DIAGNOSIS — S52.255D CLOSED NONDISPLACED COMMINUTED FRACTURE OF SHAFT OF LEFT ULNA WITH ROUTINE HEALING, SUBSEQUENT ENCOUNTER: Primary | ICD-10-CM

## 2024-04-26 PROCEDURE — 99214 OFFICE O/P EST MOD 30 MIN: CPT | Performed by: ORTHOPAEDIC SURGERY

## 2024-04-26 RX ORDER — HYDROXYCHLOROQUINE SULFATE 200 MG/1
200 TABLET, FILM COATED ORAL DAILY
COMMUNITY
Start: 2024-04-16

## 2024-04-26 NOTE — PATIENT INSTRUCTIONS
Patient was fitted with a neoprene wrist wrap for treatment of her CMC arthritis with application of this she notes that the thumb already feels better.  Prescription for Voltaren cream was put in the computer with 1 refill to apply 3-4 times a day to the base of the thumb.  She may use the left arm full activity without restriction.  Will see the patient back in 6 weeks to note her progress.  She did not want to seek surgical evaluation by hand surgery for her first CMC arthritis.

## 2024-04-26 NOTE — PROGRESS NOTES
ASSESSMENT/PLAN:    Diagnoses and all orders for this visit:    Closed nondisplaced comminuted fracture of shaft of left ulna with routine healing, subsequent encounter    Primary osteoarthritis of first carpometacarpal joint of left hand  -     Diclofenac Sodium (VOLTAREN) 1 %; Apply 2 g topically 4 (four) times a day    Other orders  -     hydroxychloroquine (PLAQUENIL) 200 mg tablet; Take 200 mg by mouth daily    Patient was fitted with a neoprene wrist wrap for treatment of her CMC arthritis with application of this she notes that the thumb already feels better.  Prescription for Voltaren cream was put in the computer with 1 refill to apply 3-4 times a day to the base of the thumb.  She may use the left arm full activity without restriction.  Will see the patient back in 6 weeks to note her progress.  She did not want to seek surgical evaluation by hand surgery for her first CMC arthritis.    Return in about 6 weeks (around 6/7/2024) for Recheck.  _____________________________________________________  CHIEF COMPLAINT:  Chief Complaint   Patient presents with    Left Wrist - Post-op     SUBJECTIVE:  Italia Conway is a 74 y.o. year old female who presents for follow up of left thumb first CMC joint arthritis that underwent cortisone injection at her last office visit on 3/29/2024, 4 weeks ago.  She is also following up for her distal ulnar shaft fracture ORIF, 1/4/2024 (4 months ago).  He notes the distal ulna continues to get better with lessening pain.  She notes continued pain about the first CMC joint.  She feels the injection did not provide much benefit.  She has a DEXA scan ordered through her rheumatologist for May 2024.  Denies numbness or tingling of the left hand.    PAST MEDICAL HISTORY:  Past Medical History:   Diagnosis Date    Anemia     Disease of thyroid gland     Hyperlipidemia     Hypertension     PONV (postoperative nausea and vomiting)     Raynaud disease     Stroke (HCC)     TIA (transient  ischemic attack)     Wrist fracture     Left     PAST SURGICAL HISTORY:  Past Surgical History:   Procedure Laterality Date    COLONOSCOPY      CT EPIDURAL STEROID INJECTION (GALINA LUMBAR)      HAND SURGERY Right     Mallet finger repair    ORIF WRIST FRACTURE Left 1/4/2024    Procedure: Open reduction internal fixation of distal ulna;  Surgeon: Jim Max;  Location: [facility];  Service: Orthopedics    TONSILLECTOMY      TUBAL LIGATION       FAMILY HISTORY:  Family History   Problem Relation Age of Onset    Other Mother         encephalitis    Other Father         trauma     SOCIAL HISTORY:  Social History     Tobacco Use    Smoking status: Never    Smokeless tobacco: Never   Vaping Use    Vaping status: Never Used   Substance Use Topics    Alcohol use: Never    Drug use: Never     MEDICATIONS:    Current Outpatient Medications:     acetaminophen (TYLENOL) 325 mg tablet, Take 2 tablets (650 mg total) by mouth every 6 (six) hours as needed for mild pain, headaches or fever, Disp: , Rfl: 0    amLODIPine (NORVASC) 10 mg tablet, Take 10 mg by mouth daily, Disp: , Rfl:     aspirin-dipyridamole (AGGRENOX)  mg per 12 hr capsule, Take by mouth, Disp: , Rfl:     atorvastatin (LIPITOR) 80 mg tablet, Take 1 tablet (80 mg total) by mouth every evening, Disp:  , Rfl: 0    Calcium 200 MG TABS, 600 mg, Disp: , Rfl:     CANDESARTAN CILEXETIL PO, Take by mouth daily at bedtime, Disp: , Rfl:     cyanocobalamin 1,000 mcg/mL, Inject 1 mL into a muscle every 30 (thirty) days, Disp: , Rfl:     Diclofenac Sodium (VOLTAREN) 1 %, Apply 2 g topically 4 (four) times a day, Disp: 20 g, Rfl: 1    fluticasone (FLONASE) 50 mcg/act nasal spray, 1 spray into each nostril daily as needed for rhinitis, Disp: , Rfl:     folic acid (FOLVITE) 1 mg tablet, Take by mouth, Disp: , Rfl:     hydroxychloroquine (PLAQUENIL) 200 mg tablet, Take 200 mg by mouth daily, Disp: , Rfl:     levothyroxine 75 mcg tablet, Take 75 mcg by mouth daily, Disp: ,  "Rfl:     meclizine (ANTIVERT) 25 mg tablet, Take 1 tablet (25 mg total) by mouth every 8 (eight) hours as needed for dizziness, Disp: 30 tablet, Rfl: 0    ondansetron (ZOFRAN) 4 mg tablet, Take 1 tablet (4 mg total) by mouth every 12 (twelve) hours as needed for nausea or vomiting, Disp: 10 tablet, Rfl: 0    traMADol (ULTRAM) 50 mg tablet, Take 50 mg by mouth every 6 (six) hours as needed, Disp: , Rfl:     [employer] Safety Syringe 25G X 5/8\" 3 ML MISC, ONE INJECTION EVERY MONTH WITH VITAMIN B 12, Disp: , Rfl:     amLODIPine (NORVASC) 2.5 mg tablet, Take 1 tablet (2.5 mg total) by mouth daily (Patient not taking: Reported on 3/29/2024), Disp: 30 tablet, Rfl: 0    gabapentin (NEURONTIN) 100 mg capsule, Take 100-200 mg by mouth daily at bedtime as needed (Patient not taking: Reported on 3/29/2024), Disp: , Rfl:     levothyroxine 25 mcg tablet, Take 75 mcg by mouth daily (Patient not taking: Reported on 3/29/2024), Disp: , Rfl:     levothyroxine 50 mcg tablet, Take 50 mcg by mouth daily (Patient not taking: Reported on 3/29/2024), Disp: , Rfl:     ALLERGIES:  No Known Allergies    REVIEW OF SYSTEMS:  Pertinent items are noted in HPI.  A comprehensive review of systems was negative.  _____________________________________________________  PHYSICAL EXAMINATION:  General: well developed and well nourished, alert, and oriented times 3  Psychiatric: Normal  HEENT:  Normocephalic, atraumatic  Cardiovascular:  Regular  Pulmonary: No wheezing or stridor  Skin: No masses, erthema, lacerations, fluctation, ulcerations  Neurovascular: Grossly intact  MUSCULOSKELETAL EXAMINATION:    Left thumb notes tenderness at the first CMC joint both laterally and dorsally.  She is able oppose her thumb to all fingers.  She notes with thumb flexion she has increased pain at the CMC joint.  Light touch sensation is intact.   strength is grossly symmetric only slight deficit in her pincer strength.    Left distal ulna incision is " well-healed.  She has minimal discomfort on the medial side of the ulna shaft.  There is no dorsal or volar discomfort.  Her pronation and supination are both restricted compared to the contralateral side approximately 10 degree lag.  Radial and ulnar deviation are grossly symmetric bilaterally.  _____________________________________________________  STUDIES REVIEWED:  No new studies to review his x-rays were not taken today as she continues to get improvement the distal ulna and symptoms seem as they may be more related to her hardware.    PROCEDURES PERFORMED:  None today        Benoit Duckworth PA-C

## 2024-06-10 ENCOUNTER — OFFICE VISIT (OUTPATIENT)
Dept: OBGYN CLINIC | Facility: CLINIC | Age: 74
End: 2024-06-10
Payer: COMMERCIAL

## 2024-06-10 VITALS
WEIGHT: 122 LBS | DIASTOLIC BLOOD PRESSURE: 70 MMHG | TEMPERATURE: 97.8 F | HEIGHT: 62 IN | HEART RATE: 76 BPM | SYSTOLIC BLOOD PRESSURE: 110 MMHG | OXYGEN SATURATION: 97 % | BODY MASS INDEX: 22.45 KG/M2

## 2024-06-10 DIAGNOSIS — M18.12 PRIMARY OSTEOARTHRITIS OF FIRST CARPOMETACARPAL JOINT OF LEFT HAND: Primary | ICD-10-CM

## 2024-06-10 DIAGNOSIS — M79.645 CHRONIC PAIN OF LEFT THUMB: ICD-10-CM

## 2024-06-10 DIAGNOSIS — S52.255D CLOSED NONDISPLACED COMMINUTED FRACTURE OF SHAFT OF LEFT ULNA WITH ROUTINE HEALING, SUBSEQUENT ENCOUNTER: ICD-10-CM

## 2024-06-10 DIAGNOSIS — G89.29 CHRONIC PAIN OF LEFT THUMB: ICD-10-CM

## 2024-06-10 PROCEDURE — 99213 OFFICE O/P EST LOW 20 MIN: CPT | Performed by: ORTHOPAEDIC SURGERY

## 2024-06-10 NOTE — PATIENT INSTRUCTIONS
Patient was informed that narcotic pain medication would not be prescribed from this office as it is not the best treatment for the thumb and has side effects.  She is to continue using the Voltaren gel and was shown how to use the measuring gauge to get the 2 g can be used 4 times per day.  Is also encouraged to use Tylenol or ibuprofen.  As far as the distal ulna she can be discharged from care and follow-up for her thumb CMC arthritis.  Patient can be seen back in approximately 3 4 to 5 weeks for possible repeat injection of the first CMC joint.  She was also offered hand surgery appointment for possible surgical intervention.  She did not want to do surgery at this point in time she is aware that a hand surgeon will be starting this office in September and may elect to see him in the future.  She may use the neoprene wrist brace as needed.

## 2024-06-10 NOTE — PROGRESS NOTES
ASSESSMENT/PLAN:    Diagnoses and all orders for this visit:    Primary osteoarthritis of first carpometacarpal joint of left hand    Chronic pain of left thumb    Closed nondisplaced comminuted fracture of shaft of left ulna with routine healing, subsequent encounter    Patient was informed that narcotic pain medication would not be prescribed from this office as it is not the best treatment for the thumb and has side effects.  She is to continue using the Voltaren gel and was shown how to use the measuring gauge to get the 2 g can be used 4 times per day.  Is also encouraged to use Tylenol or ibuprofen.  As far as the distal ulna she can be discharged from care and follow-up for her thumb CMC arthritis.  Patient can be seen back in approximately 3 4 to 5 weeks for possible repeat injection of the first CMC joint.  She was also offered hand surgery appointment for possible surgical intervention.  She did not want to do surgery at this point in time she is aware that a hand surgeon will be starting this office in September and may elect to see him in the future.  She may use the neoprene wrist brace as needed.    Return if symptoms worsen or fail to improve.  _____________________________________________________  CHIEF COMPLAINT:  Chief Complaint   Patient presents with    Left Wrist - Post-op     SUBJECTIVE:  Italia Conway is a 74 y.o. year old female who presents for follow up of left wrist CMC arthritis and distal ulna ORIF, DOS 1/4/2024.  Patient is now 5 months post surgery and 10 weeks status post CMC arthritis cortisone injection.  She was fitted with a neoprene wrist wrap for her thumb at her last visit and given Voltaren gel.  She notes that she has good and bad days about the thumb.  She is really not interested in surgery at this point.  She ills the thumb brace provides a little bit of benefit.  She denies any pain over the elbow but does note that she gets some occasional clicking in the wrist  certain wrist motions.  Notes this causes a little bit of difficulty trying to get her contacts in her eyes as she has some slight limitations of pronation and supination yet.  Wanted do formal patient will therapy.  It is too soon for repeat injection.  Jorge x-ray was offered to evaluate the distal ulna but she denied.  On exam this is reasonable.  He requested more Ultram to help with her thumb pain as her last prescription was in January.  She prefers to be seen on a as needed basis and will call if needed.    PAST MEDICAL HISTORY:  Past Medical History:   Diagnosis Date    Anemia     Disease of thyroid gland     Hyperlipidemia     Hypertension     PONV (postoperative nausea and vomiting)     Raynaud disease     Stroke (HCC)     TIA (transient ischemic attack)     Wrist fracture     Left     PAST SURGICAL HISTORY:  Past Surgical History:   Procedure Laterality Date    COLONOSCOPY      CT EPIDURAL STEROID INJECTION (GALINA LUMBAR)      HAND SURGERY Right     Mallet finger repair    ORIF WRIST FRACTURE Left 1/4/2024    Procedure: Open reduction internal fixation of distal ulna;  Surgeon: Jim Max;  Location:  MAIN OR;  Service: Orthopedics    TONSILLECTOMY      TUBAL LIGATION       FAMILY HISTORY:  Family History   Problem Relation Age of Onset    Other Mother         encephalitis    Other Father         trauma     SOCIAL HISTORY:  Social History     Tobacco Use    Smoking status: Never    Smokeless tobacco: Never   Vaping Use    Vaping status: Never Used   Substance Use Topics    Alcohol use: Never    Drug use: Never     MEDICATIONS:    Current Outpatient Medications:     acetaminophen (TYLENOL) 325 mg tablet, Take 2 tablets (650 mg total) by mouth every 6 (six) hours as needed for mild pain, headaches or fever, Disp: , Rfl: 0    amLODIPine (NORVASC) 10 mg tablet, Take 10 mg by mouth daily, Disp: , Rfl:     aspirin-dipyridamole (AGGRENOX)  mg per 12 hr capsule, Take by mouth, Disp: , Rfl:      "atorvastatin (LIPITOR) 80 mg tablet, Take 1 tablet (80 mg total) by mouth every evening, Disp:  , Rfl: 0    Calcium 200 MG TABS, 600 mg, Disp: , Rfl:     CANDESARTAN CILEXETIL PO, Take by mouth daily at bedtime, Disp: , Rfl:     cyanocobalamin 1,000 mcg/mL, Inject 1 mL into a muscle every 30 (thirty) days, Disp: , Rfl:     Diclofenac Sodium (VOLTAREN) 1 %, Apply 2 g topically 4 (four) times a day, Disp: 20 g, Rfl: 1    fluticasone (FLONASE) 50 mcg/act nasal spray, 1 spray into each nostril daily as needed for rhinitis, Disp: , Rfl:     folic acid (FOLVITE) 1 mg tablet, Take by mouth, Disp: , Rfl:     hydroxychloroquine (PLAQUENIL) 200 mg tablet, Take 200 mg by mouth daily, Disp: , Rfl:     levothyroxine 75 mcg tablet, Take 75 mcg by mouth daily, Disp: , Rfl:     meclizine (ANTIVERT) 25 mg tablet, Take 1 tablet (25 mg total) by mouth every 8 (eight) hours as needed for dizziness, Disp: 30 tablet, Rfl: 0    ondansetron (ZOFRAN) 4 mg tablet, Take 1 tablet (4 mg total) by mouth every 12 (twelve) hours as needed for nausea or vomiting, Disp: 10 tablet, Rfl: 0    traMADol (ULTRAM) 50 mg tablet, Take 50 mg by mouth every 6 (six) hours as needed, Disp: , Rfl:     VanishPoint Safety Syringe 25G X 5/8\" 3 ML MISC, ONE INJECTION EVERY MONTH WITH VITAMIN B 12, Disp: , Rfl:     amLODIPine (NORVASC) 2.5 mg tablet, Take 1 tablet (2.5 mg total) by mouth daily (Patient not taking: Reported on 3/29/2024), Disp: 30 tablet, Rfl: 0    gabapentin (NEURONTIN) 100 mg capsule, Take 100-200 mg by mouth daily at bedtime as needed (Patient not taking: Reported on 3/29/2024), Disp: , Rfl:     levothyroxine 25 mcg tablet, Take 75 mcg by mouth daily (Patient not taking: Reported on 3/29/2024), Disp: , Rfl:     levothyroxine 50 mcg tablet, Take 50 mcg by mouth daily (Patient not taking: Reported on 3/29/2024), Disp: , Rfl:     ALLERGIES:  No Known Allergies    REVIEW OF SYSTEMS:  Pertinent items are noted in HPI.  A comprehensive review of systems " was negative.  _____________________________________________________  PHYSICAL EXAMINATION:  General: well developed and well nourished, alert, and oriented times 3  Psychiatric: Normal  HEENT:  Normocephalic, atraumatic  Cardiovascular:  Regular  Pulmonary: No wheezing or stridor  Skin: No masses, erthema, lacerations, fluctation, ulcerations  Neurovascular: Grossly intact  MUSCULOSKELETAL EXAMINATION:    Left ulna notes a well-healed incision there is no pain with palpation.  Ulna moves as a unit.  There is no palpable clicks.  She maintains adequate radial and ulnar deviation actively without pain as well as flexion extension without pain.   strength is symmetric.    Left first CMC joint is with discomfort over the first CMC joint Long thenar eminence and wraparound to the dorsum of the joint.  Faint crepitation with motion of the thumb.  Light touch sensation intact.  Capillary refill is brisk.  Mild bony overgrowth noted.  He is able oppose her thumb to all digits.    _____________________________________________________  STUDIES REVIEWED:  No new studies to review previous x-rays reviewed.  Patient deferred for repeat x-ray.    PROCEDURES PERFORMED:  None today as it is too soon for repeat cortisone injection.        Benoit Duckworth PA-C

## 2024-07-08 ENCOUNTER — DOCTOR'S OFFICE (OUTPATIENT)
Dept: URBAN - NONMETROPOLITAN AREA CLINIC 1 | Facility: CLINIC | Age: 74
Setting detail: OPHTHALMOLOGY
End: 2024-07-08
Payer: MEDICARE

## 2024-07-08 ENCOUNTER — OPTICAL OFFICE (OUTPATIENT)
Dept: URBAN - NONMETROPOLITAN AREA CLINIC 4 | Facility: CLINIC | Age: 74
Setting detail: OPHTHALMOLOGY
End: 2024-07-08
Payer: COMMERCIAL

## 2024-07-08 DIAGNOSIS — H40.023: ICD-10-CM

## 2024-07-08 DIAGNOSIS — H04.123: ICD-10-CM

## 2024-07-08 DIAGNOSIS — Z79.891: ICD-10-CM

## 2024-07-08 DIAGNOSIS — H52.13: ICD-10-CM

## 2024-07-08 DIAGNOSIS — H02.054: ICD-10-CM

## 2024-07-08 PROBLEM — H02.014: Status: ACTIVE | Noted: 2024-07-08

## 2024-07-08 PROCEDURE — 92134 CPTRZ OPH DX IMG PST SGM RTA: CPT | Performed by: OPTOMETRIST

## 2024-07-08 PROCEDURE — S0500 DISPOS CONT LENS: HCPCS | Mod: RT | Performed by: OPTOMETRIST

## 2024-07-08 PROCEDURE — 92014 COMPRE OPH EXAM EST PT 1/>: CPT | Performed by: OPTOMETRIST

## 2024-07-08 ASSESSMENT — CONFRONTATIONAL VISUAL FIELD TEST (CVF)
OS_FINDINGS: FULL
OD_FINDINGS: FULL

## 2024-07-08 ASSESSMENT — LID POSITION - COMMENTS: OS_COMMENTS: +VE EVERTED PUNCTUM LUL

## 2024-07-08 ASSESSMENT — LID EXAM ASSESSMENTS: OS_TRICHIASIS: LUL 1+ 2+

## 2024-07-08 ASSESSMENT — LID POSITION - DERMATOCHALASIS
OD_DERMATOCHALASIS: RUL 1+
OS_DERMATOCHALASIS: LUL 1+

## 2024-07-20 DIAGNOSIS — M18.12 PRIMARY OSTEOARTHRITIS OF FIRST CARPOMETACARPAL JOINT OF LEFT HAND: ICD-10-CM

## 2024-09-03 ENCOUNTER — OFFICE VISIT (OUTPATIENT)
Dept: URGENT CARE | Facility: CLINIC | Age: 74
End: 2024-09-03
Payer: COMMERCIAL

## 2024-09-03 VITALS
BODY MASS INDEX: 22.45 KG/M2 | WEIGHT: 122 LBS | DIASTOLIC BLOOD PRESSURE: 72 MMHG | OXYGEN SATURATION: 98 % | SYSTOLIC BLOOD PRESSURE: 118 MMHG | TEMPERATURE: 97 F | RESPIRATION RATE: 16 BRPM | HEIGHT: 62 IN | HEART RATE: 84 BPM

## 2024-09-03 DIAGNOSIS — S80.862A INSECT BITE OF LEFT LOWER LEG, INITIAL ENCOUNTER: Primary | ICD-10-CM
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
## 2024-09-03 PROCEDURE — 99213 OFFICE O/P EST LOW 20 MIN: CPT | Performed by: PHYSICIAN ASSISTANT

## 2024-09-03 PROCEDURE — S9083 URGENT CARE CENTER GLOBAL: HCPCS | Performed by: PHYSICIAN ASSISTANT

## 2024-09-03 RX ORDER — CEPHALEXIN 500 MG/1
500 CAPSULE ORAL EVERY 8 HOURS SCHEDULED
Qty: 21 CAPSULE | Refills: 0 | Status: SHIPPED | OUTPATIENT
Start: 2024-09-03 | End: 2024-09-10

## 2024-09-03 NOTE — PATIENT INSTRUCTIONS
If bump becomes more red, swollen or painful, take Keflex as prescribed.  Cool/Warm compresses  Monitor for changes  Follow up with PCP in 3-5 days.  Proceed to  ER if symptoms worsen.    If tests have been performed at [facility], our office will contact you with results if changes need to be made to the care plan discussed with you at the visit.  You can review your full results on [facility] MyChart.    Eat yogurt with live and active cultures and/or take a probiotic at least 3 hours before or after antibiotic dose. Monitor stool for diarrhea and/or blood. If this occurs, contact primary care doctor ASAP.

## 2024-09-03 NOTE — PROGRESS NOTES
[facility]        NAME: Italia Conway is a 74 y.o. female  : 1950    MRN: [id number]  DATE: September 3, 2024  TIME: 10:16 AM    Assessment and Plan   Insect bite of left lower leg, initial encounter [S80.862A, W57.XXXA]  1. Insect bite of left lower leg, initial encounter  cephalexin (KEFLEX) 500 mg capsule            Patient Instructions     If bump becomes more red, swollen or painful, take Keflex as prescribed.  Cool/Warm compresses  Monitor for changes  Follow up with PCP in 3-5 days.  Proceed to  ER if symptoms worsen.    If tests have been performed at [facility], our office will contact you with results if changes need to be made to the care plan discussed with you at the visit.  [facility]    Eat yogurt with live and active cultures and/or take a probiotic at least 3 hours before or after antibiotic dose. Monitor stool for diarrhea and/or blood. If this occurs, contact primary care doctor ASAP.     Chief Complaint     Chief Complaint   Patient presents with    Insect Bite     Thinks she got stung by something on left lateral lower leg 1 week ago. Area is red, swollen and warm to touch since.         History of Present Illness       Insect Bite  Pertinent negatives include no chills, fever, joint swelling or numbness.       Review of Systems   Review of Systems   Constitutional:  Negative for chills and fever.   Musculoskeletal:  Negative for joint swelling.   Skin:  Positive for color change.   Neurological:  Negative for numbness.         Current Medications       Current Outpatient Medications:     acetaminophen (TYLENOL) 325 mg tablet, Take 2 tablets (650 mg total) by mouth every 6 (six) hours as needed for mild pain, headaches or fever, Disp: , Rfl: 0    amLODIPine (NORVASC) 10 mg tablet, Take 10 mg by mouth daily, Disp: , Rfl:     aspirin-dipyridamole (AGGRENOX)  mg per 12 hr capsule, Take by mouth, Disp: , Rfl:     atorvastatin  "(LIPITOR) 80 mg tablet, Take 1 tablet (80 mg total) by mouth every evening, Disp:  , Rfl: 0    Calcium 200 MG TABS, 600 mg, Disp: , Rfl:     CANDESARTAN CILEXETIL PO, Take by mouth daily at bedtime, Disp: , Rfl:     cephalexin (KEFLEX) 500 mg capsule, Take 1 capsule (500 mg total) by mouth every 8 (eight) hours for 7 days, Disp: 21 capsule, Rfl: 0    cyanocobalamin 1,000 mcg/mL, Inject 1 mL into a muscle every 30 (thirty) days, Disp: , Rfl:     Diclofenac Sodium (VOLTAREN) 1 %, APPLY 2 GRAMS TO AFFECTED AREA 4 TIMES A DAY, Disp: 100 g, Rfl: 1    fluticasone (FLONASE) 50 mcg/act nasal spray, 1 spray into each nostril daily as needed for rhinitis, Disp: , Rfl:     folic acid (FOLVITE) 1 mg tablet, Take by mouth, Disp: , Rfl:     hydroxychloroquine (PLAQUENIL) 200 mg tablet, Take 200 mg by mouth daily, Disp: , Rfl:     levothyroxine 75 mcg tablet, Take 75 mcg by mouth daily, Disp: , Rfl:     meclizine (ANTIVERT) 25 mg tablet, Take 1 tablet (25 mg total) by mouth every 8 (eight) hours as needed for dizziness, Disp: 30 tablet, Rfl: 0    traMADol (ULTRAM) 50 mg tablet, Take 50 mg by mouth every 6 (six) hours as needed, Disp: , Rfl:     VanishPoint Safety Syringe 25G X 5/8\" 3 ML MISC, ONE INJECTION EVERY MONTH WITH VITAMIN B 12, Disp: , Rfl:     amLODIPine (NORVASC) 2.5 mg tablet, Take 1 tablet (2.5 mg total) by mouth daily (Patient not taking: Reported on 3/29/2024), Disp: 30 tablet, Rfl: 0    Current Allergies     Allergies as of 09/03/2024    (No Known Allergies)            The following portions of the patient's history were reviewed and updated as appropriate: allergies, current medications, past family history, past medical history, past social history, past surgical history and problem list.     Past Medical History:   Diagnosis Date    Anemia     Disease of thyroid gland     Hyperlipidemia     Hypertension     PONV (postoperative nausea and vomiting)     Raynaud disease     Stroke (HCC)     TIA (transient ischemic " "attack)     Wrist fracture     Left       Past Surgical History:   Procedure Laterality Date    COLONOSCOPY      CT EPIDURAL STEROID INJECTION (GALINA LUMBAR)      HAND SURGERY Right     Mallet finger repair    ORIF WRIST FRACTURE Left 1/4/2024    Procedure: Open reduction internal fixation of distal ulna;  Surgeon: Jim Max;  Location: [facility];  Service: Orthopedics    TONSILLECTOMY      TUBAL LIGATION         Family History   Problem Relation Age of Onset    Other Mother         encephalitis    Other Father         trauma         Medications have been verified.        Objective   /72   Pulse 84   Temp (!) 97 °F (36.1 °C)   Resp 16   Ht 5' 2\" (1.575 m)   Wt 55.3 kg (122 lb)   SpO2 98%   BMI 22.31 kg/m²   No LMP recorded. Patient is postmenopausal.       Physical Exam     Physical Exam  Constitutional:       General: She is not in acute distress.     Appearance: She is well-developed.   Cardiovascular:      Rate and Rhythm: Normal rate and regular rhythm.   Pulmonary:      Effort: Pulmonary effort is normal. No respiratory distress.      Breath sounds: Normal breath sounds.   Musculoskeletal:         General: Swelling present. No tenderness.   Skin:     General: Skin is warm.      Findings: Rash present.          Neurological:      Mental Status: She is alert.      Sensory: No sensory deficit.   Psychiatric:         Behavior: Behavior normal.         Thought Content: Thought content normal.         Judgment: Judgment normal.                   "

## 2024-09-10 ENCOUNTER — DOCTOR'S OFFICE (OUTPATIENT)
Dept: URBAN - NONMETROPOLITAN AREA CLINIC 1 | Facility: CLINIC | Age: 74
Setting detail: OPHTHALMOLOGY
End: 2024-09-10
Payer: MEDICARE

## 2024-09-10 DIAGNOSIS — H43.813: ICD-10-CM

## 2024-09-10 DIAGNOSIS — H02.831: ICD-10-CM

## 2024-09-10 DIAGNOSIS — H04.123: ICD-10-CM

## 2024-09-10 DIAGNOSIS — H02.834: ICD-10-CM

## 2024-09-10 DIAGNOSIS — G43.809: ICD-10-CM

## 2024-09-10 DIAGNOSIS — I63.9: ICD-10-CM

## 2024-09-10 DIAGNOSIS — H25.013: ICD-10-CM

## 2024-09-10 DIAGNOSIS — Z79.891: ICD-10-CM

## 2024-09-10 DIAGNOSIS — H40.023: ICD-10-CM

## 2024-09-10 PROCEDURE — 92083 EXTENDED VISUAL FIELD XM: CPT | Performed by: OPTOMETRIST

## 2024-09-10 PROCEDURE — 92012 INTRM OPH EXAM EST PATIENT: CPT | Performed by: OPTOMETRIST

## 2024-09-10 ASSESSMENT — REFRACTION_MANIFEST
OU_VA: 20/25
OD_SPHERE: -0.75
OS_SPHERE: -0.75
OD_VA2: 20/30+2
OS_VA1: 20/25-2
OD_AXIS: 090
OS_VA2: 20/25-2
OD_CYLINDER: -0.75
OS_AXIS: 070
OS_ADD: +2.50
OD_VA1: 20/30+2
OD_ADD: +2.50
OS_CYLINDER: -1.25

## 2024-09-10 ASSESSMENT — REFRACTION_AUTOREFRACTION
OS_SPHERE: -0.75
OS_AXIS: 72
OS_CYLINDER: -1.50
OD_SPHERE: -0.75
OD_AXIS: 91
OD_CYLINDER: -1.50

## 2024-09-10 ASSESSMENT — REFRACTION_CURRENTRX
OS_CYLINDER: SPH
OD_AXIS: 096
OD_AXIS: 091
OD_CYLINDER: -0.75
OD_SPHERE: -2.75
OD_CYLINDER: -1.75
OD_OVR_VA: 20/
OS_SPHERE: -0.75
OD_VPRISM_DIRECTION: PROGS
OS_CYLINDER: -1.00
OD_OVR_VA: 20/
OS_SPHERE: -2.50
OS_AXIS: 071
OS_ADD: +2.50
OD_SPHERE: -0.50
OS_VPRISM_DIRECTION: PROGS
OS_OVR_VA: 20/
OD_ADD: +2.50
OS_OVR_VA: 20/

## 2024-09-10 ASSESSMENT — LID POSITION - COMMENTS: OS_COMMENTS: +VE EVERTED PUNCTUM LUL

## 2024-09-10 ASSESSMENT — CONFRONTATIONAL VISUAL FIELD TEST (CVF)
OS_FINDINGS: FULL
OD_FINDINGS: FULL

## 2024-09-10 ASSESSMENT — TONOMETRY
OD_IOP_MMHG: 15
OS_IOP_MMHG: 15

## 2024-09-10 ASSESSMENT — LID POSITION - DERMATOCHALASIS
OD_DERMATOCHALASIS: RUL 1+
OS_DERMATOCHALASIS: LUL 1+

## 2024-09-10 ASSESSMENT — SUPERFICIAL PUNCTATE KERATITIS (SPK)
OD_SPK: 1+ 2+
OS_SPK: 2+

## 2024-09-10 ASSESSMENT — VISUAL ACUITY
OD_BCVA: 20/25+2
OS_BCVA: 20/25+1

## 2025-03-01 ENCOUNTER — HOSPITAL ENCOUNTER (EMERGENCY)
Facility: HOSPITAL | Age: 75
Discharge: HOME | End: 2025-03-01
Attending: EMERGENCY MEDICINE | Admitting: EMERGENCY MEDICINE
Payer: COMMERCIAL

## 2025-03-01 ENCOUNTER — APPOINTMENT (EMERGENCY)
Dept: CT IMAGING | Facility: HOSPITAL | Age: 75
End: 2025-03-01
Payer: COMMERCIAL

## 2025-03-01 ENCOUNTER — APPOINTMENT (EMERGENCY)
Dept: RADIOLOGY | Facility: HOSPITAL | Age: 75
End: 2025-03-01
Payer: COMMERCIAL

## 2025-03-01 VITALS
HEART RATE: 83 BPM | OXYGEN SATURATION: 99 % | SYSTOLIC BLOOD PRESSURE: 132 MMHG | TEMPERATURE: 98.2 F | DIASTOLIC BLOOD PRESSURE: 106 MMHG | BODY MASS INDEX: 22.82 KG/M2 | RESPIRATION RATE: 18 BRPM | WEIGHT: 124.78 LBS

## 2025-03-01 DIAGNOSIS — K57.92 DIVERTICULITIS: Primary | ICD-10-CM

## 2025-03-01 LAB
ALBUMIN SERPL BCG-MCNC: 4.4 G/DL (ref 3.5–5)
ALP SERPL-CCNC: 65 U/L (ref 34–104)
ALT SERPL W P-5'-P-CCNC: 19 U/L (ref 7–52)
ANION GAP SERPL CALCULATED.3IONS-SCNC: 7 MMOL/L (ref 4–13)
AST SERPL W P-5'-P-CCNC: 31 U/L (ref 13–39)
BACTERIA UR QL AUTO: NORMAL /HPF
BASOPHILS # BLD AUTO: 0.06 THOUSANDS/ÂΜL (ref 0–0.1)
BASOPHILS NFR BLD AUTO: 1 % (ref 0–1)
BILIRUB SERPL-MCNC: 0.57 MG/DL (ref 0.2–1)
BILIRUB UR QL STRIP: NEGATIVE
BNP SERPL-MCNC: 38 PG/ML (ref 0–100)
BUN SERPL-MCNC: 12 MG/DL (ref 5–25)
CALCIUM SERPL-MCNC: 9.5 MG/DL (ref 8.4–10.2)
CARDIAC TROPONIN I PNL SERPL HS: 3 NG/L (ref ?–50)
CHLORIDE SERPL-SCNC: 104 MMOL/L (ref 96–108)
CLARITY UR: CLEAR
CO2 SERPL-SCNC: 27 MMOL/L (ref 21–32)
COLOR UR: YELLOW
CREAT SERPL-MCNC: 0.8 MG/DL (ref 0.6–1.3)
EOSINOPHIL # BLD AUTO: 0.66 THOUSAND/ÂΜL (ref 0–0.61)
EOSINOPHIL NFR BLD AUTO: 6 % (ref 0–6)
ERYTHROCYTE [DISTWIDTH] IN BLOOD BY AUTOMATED COUNT: 12.9 % (ref 11.6–15.1)
FLUAV AG UPPER RESP QL IA.RAPID: NEGATIVE
FLUBV AG UPPER RESP QL IA.RAPID: NEGATIVE
GFR SERPL CREATININE-BSD FRML MDRD: 72 ML/MIN/1.73SQ M
GLUCOSE SERPL-MCNC: 81 MG/DL (ref 65–140)
GLUCOSE UR STRIP-MCNC: NEGATIVE MG/DL
HCT VFR BLD AUTO: 38.7 % (ref 34.8–46.1)
HGB BLD-MCNC: 12.2 G/DL (ref 11.5–15.4)
HGB UR QL STRIP.AUTO: NEGATIVE
IMM GRANULOCYTES # BLD AUTO: 0.06 THOUSAND/UL (ref 0–0.2)
IMM GRANULOCYTES NFR BLD AUTO: 1 % (ref 0–2)
KETONES UR STRIP-MCNC: NEGATIVE MG/DL
LACTATE SERPL-SCNC: 1.7 MMOL/L (ref 0.5–2)
LEUKOCYTE ESTERASE UR QL STRIP: ABNORMAL
LIPASE SERPL-CCNC: 6 U/L (ref 11–82)
LYMPHOCYTES # BLD AUTO: 1.47 THOUSANDS/ÂΜL (ref 0.6–4.47)
LYMPHOCYTES NFR BLD AUTO: 13 % (ref 14–44)
MCH RBC QN AUTO: 30.5 PG (ref 26.8–34.3)
MCHC RBC AUTO-ENTMCNC: 31.5 G/DL (ref 31.4–37.4)
MCV RBC AUTO: 97 FL (ref 82–98)
MONOCYTES # BLD AUTO: 1.34 THOUSAND/ÂΜL (ref 0.17–1.22)
MONOCYTES NFR BLD AUTO: 11 % (ref 4–12)
NEUTROPHILS # BLD AUTO: 8.19 THOUSANDS/ÂΜL (ref 1.85–7.62)
NEUTS SEG NFR BLD AUTO: 68 % (ref 43–75)
NITRITE UR QL STRIP: NEGATIVE
NON-SQ EPI CELLS URNS QL MICRO: NORMAL /HPF
NRBC BLD AUTO-RTO: 0 /100 WBCS
PH UR STRIP.AUTO: 6.5 [PH]
PLATELET # BLD AUTO: 356 THOUSANDS/UL (ref 149–390)
PMV BLD AUTO: 8.7 FL (ref 8.9–12.7)
POTASSIUM SERPL-SCNC: 4.5 MMOL/L (ref 3.5–5.3)
PROT SERPL-MCNC: 7.7 G/DL (ref 6.4–8.4)
PROT UR STRIP-MCNC: NEGATIVE MG/DL
RBC # BLD AUTO: 4 MILLION/UL (ref 3.81–5.12)
RBC #/AREA URNS AUTO: NORMAL /HPF
SARS-COV+SARS-COV-2 AG RESP QL IA.RAPID: NEGATIVE
SODIUM SERPL-SCNC: 138 MMOL/L (ref 135–147)
SP GR UR STRIP.AUTO: <=1.005 (ref 1–1.03)
UROBILINOGEN UR QL STRIP.AUTO: 0.2 E.U./DL
WBC # BLD AUTO: 11.78 THOUSAND/UL (ref 4.31–10.16)
WBC #/AREA URNS AUTO: NORMAL /HPF

## 2025-03-01 PROCEDURE — 96375 TX/PRO/DX INJ NEW DRUG ADDON: CPT

## 2025-03-01 PROCEDURE — 83605 ASSAY OF LACTIC ACID: CPT | Performed by: EMERGENCY MEDICINE

## 2025-03-01 PROCEDURE — 85025 COMPLETE CBC W/AUTO DIFF WBC: CPT | Performed by: EMERGENCY MEDICINE

## 2025-03-01 PROCEDURE — 36415 COLL VENOUS BLD VENIPUNCTURE: CPT | Performed by: EMERGENCY MEDICINE

## 2025-03-01 PROCEDURE — 83690 ASSAY OF LIPASE: CPT | Performed by: EMERGENCY MEDICINE

## 2025-03-01 PROCEDURE — 81001 URINALYSIS AUTO W/SCOPE: CPT | Performed by: EMERGENCY MEDICINE

## 2025-03-01 PROCEDURE — 87811 SARS-COV-2 COVID19 W/OPTIC: CPT | Performed by: EMERGENCY MEDICINE

## 2025-03-01 PROCEDURE — 99285 EMERGENCY DEPT VISIT HI MDM: CPT | Performed by: EMERGENCY MEDICINE

## 2025-03-01 PROCEDURE — 80053 COMPREHEN METABOLIC PANEL: CPT | Performed by: EMERGENCY MEDICINE

## 2025-03-01 PROCEDURE — 96374 THER/PROPH/DIAG INJ IV PUSH: CPT

## 2025-03-01 PROCEDURE — 71045 X-RAY EXAM CHEST 1 VIEW: CPT

## 2025-03-01 PROCEDURE — 87804 INFLUENZA ASSAY W/OPTIC: CPT | Performed by: EMERGENCY MEDICINE

## 2025-03-01 PROCEDURE — 84484 ASSAY OF TROPONIN QUANT: CPT | Performed by: EMERGENCY MEDICINE

## 2025-03-01 PROCEDURE — 96361 HYDRATE IV INFUSION ADD-ON: CPT

## 2025-03-01 PROCEDURE — 83880 ASSAY OF NATRIURETIC PEPTIDE: CPT | Performed by: EMERGENCY MEDICINE

## 2025-03-01 PROCEDURE — 74177 CT ABD & PELVIS W/CONTRAST: CPT

## 2025-03-01 PROCEDURE — 99284 EMERGENCY DEPT VISIT MOD MDM: CPT

## 2025-03-01 RX ORDER — SENNA AND DOCUSATE SODIUM 50; 8.6 MG/1; MG/1
1 TABLET, FILM COATED ORAL DAILY
Qty: 7 TABLET | Refills: 0 | Status: SHIPPED | OUTPATIENT
Start: 2025-03-01

## 2025-03-01 RX ORDER — OXYCODONE AND ACETAMINOPHEN 5; 325 MG/1; MG/1
1 TABLET ORAL EVERY 4 HOURS PRN
Qty: 9 TABLET | Refills: 0 | Status: SHIPPED | OUTPATIENT
Start: 2025-03-01 | End: 2025-03-11

## 2025-03-01 RX ORDER — ONDANSETRON 4 MG/1
4 TABLET, FILM COATED ORAL EVERY 6 HOURS
Qty: 12 TABLET | Refills: 0 | Status: SHIPPED | OUTPATIENT
Start: 2025-03-01

## 2025-03-01 RX ORDER — MORPHINE SULFATE 4 MG/ML
4 INJECTION, SOLUTION INTRAMUSCULAR; INTRAVENOUS ONCE
Status: COMPLETED | OUTPATIENT
Start: 2025-03-01 | End: 2025-03-01

## 2025-03-01 RX ORDER — ONDANSETRON 2 MG/ML
4 INJECTION INTRAMUSCULAR; INTRAVENOUS ONCE
Status: COMPLETED | OUTPATIENT
Start: 2025-03-01 | End: 2025-03-01

## 2025-03-01 RX ADMIN — SODIUM CHLORIDE 500 ML: 0.9 INJECTION, SOLUTION INTRAVENOUS at 15:26

## 2025-03-01 RX ADMIN — ONDANSETRON 4 MG: 2 INJECTION INTRAMUSCULAR; INTRAVENOUS at 15:25

## 2025-03-01 RX ADMIN — IOHEXOL 75 ML: 350 INJECTION, SOLUTION INTRAVENOUS at 16:20

## 2025-03-01 RX ADMIN — MORPHINE SULFATE 4 MG: 4 INJECTION INTRAVENOUS at 15:25

## 2025-03-01 RX ADMIN — AMOXICILLIN AND CLAVULANATE POTASSIUM 1 TABLET: 875; 125 TABLET, FILM COATED ORAL at 17:36

## 2025-03-01 NOTE — DISCHARGE INSTRUCTIONS
You are seen in the emergency department for left lower quadrant abdominal pain, your CT scan results showing uncomplicated diverticulitis, we have prescribed you antibiotics and provided you with follow-up to gastroenterology.  Please  the antibiotics at the pharmacy and take them as directed.  We have also provided you with a prescription for pain medication.  Please pick it over the pharmacy and take it only as needed.  If your symptoms worsen or persist, please return to the emergency department immediately.

## 2025-03-01 NOTE — ED PROVIDER NOTES
Time reflects when diagnosis was documented in both MDM as applicable and the Disposition within this note       Time User Action Codes Description Comment    3/1/2025  5:14 PM Ernesto Bee Add [K57.92] Diverticulitis           ED Disposition       ED Disposition   Discharge    Condition   Stable    Date/Time   Sat Mar 1, 2025  5:14 PM    Comment   Italia Nixonky discharge to home/self care.                   Assessment & Plan       Medical Decision Making  74-year-old female presents to the emergency department with abdominal pain, differential diagnosis include diverticulitis, UTI, nephrolithiasis, pyelonephritis, gastritis/gastroenteritis, viral syndrome, atypical ACS, will perform cardiopulmonary workup, cardiac enzymes, CT abdomen pelvis with IV contrast, basic labs, and reassess.    Discussed results with the patient, provided her with antibiotics, she will follow-up with gastroenterology outpatient, strict return precautions given.  Patient understands and agrees with treatment plan.    Amount and/or Complexity of Data Reviewed  Labs: ordered.  Radiology: ordered.    Risk  OTC drugs.  Prescription drug management.             Medications   morphine injection 4 mg (4 mg Intravenous Given 3/1/25 1525)   ondansetron (ZOFRAN) injection 4 mg (4 mg Intravenous Given 3/1/25 1525)   sodium chloride 0.9 % bolus 500 mL (0 mL Intravenous Stopped 3/1/25 1727)   iohexol (OMNIPAQUE) 350 MG/ML injection (MULTI-DOSE) 75 mL (75 mL Intravenous Given 3/1/25 1620)   amoxicillin-clavulanate (AUGMENTIN) 875-125 mg per tablet 1 tablet (1 tablet Oral Given 3/1/25 1736)       ED Risk Strat Scores   HEART Risk Score      Flowsheet Row Most Recent Value   Heart Score Risk Calculator    History 0 Filed at: 03/01/2025 1544   ECG 0 Filed at: 03/01/2025 1544   Age 2 Filed at: 03/01/2025 1544   Risk Factors 2 Filed at: 03/01/2025 1544   Troponin 0 Filed at: 03/01/2025 1544   HEART Score 4 Filed at: 03/01/2025 1544          HEART Risk  Score      Flowsheet Row Most Recent Value   Heart Score Risk Calculator    History 0 Filed at: 03/01/2025 1544   ECG 0 Filed at: 03/01/2025 1544   Age 2 Filed at: 03/01/2025 1544   Risk Factors 2 Filed at: 03/01/2025 1544   Troponin 0 Filed at: 03/01/2025 1544   HEART Score 4 Filed at: 03/01/2025 1544                              SBIRT 20yo+      Flowsheet Row Most Recent Value   Initial Alcohol Screen: US AUDIT-C     1. How often do you have a drink containing alcohol? 0 Filed at: 03/01/2025 1505   2. How many drinks containing alcohol do you have on a typical day you are drinking?  0 Filed at: 03/01/2025 1505   3b. FEMALE Any Age, or MALE 65+: How often do you have 4 or more drinks on one occassion? 0 Filed at: 03/01/2025 1505   Audit-C Score 0 Filed at: 03/01/2025 1505   HILDA: How many times in the past year have you...    Used an illegal drug or used a prescription medication for non-medical reasons? Never Filed at: 03/01/2025 1505                            History of Present Illness       Chief Complaint   Patient presents with    Abdominal Pain     Lower abdominal pain started last night with nausea. Pain radiates into her back. States burning with urination. Denies diarrhea       Past Medical History:   Diagnosis Date    Anemia     Disease of thyroid gland     Hyperlipidemia     Hypertension     PONV (postoperative nausea and vomiting)     Raynaud disease     Stroke (HCC)     TIA (transient ischemic attack)     Wrist fracture     Left      Past Surgical History:   Procedure Laterality Date    COLONOSCOPY      CT EPIDURAL STEROID INJECTION (GALINA LUMBAR)      HAND SURGERY Right     Mallet finger repair    ORIF WRIST FRACTURE Left 1/4/2024    Procedure: Open reduction internal fixation of distal ulna;  Surgeon: Jim Max;  Location:  MAIN OR;  Service: Orthopedics    TONSILLECTOMY      TUBAL LIGATION        Family History   Problem Relation Age of Onset    Other Mother         encephalitis    Other  Father         trauma      Social History     Tobacco Use    Smoking status: Never    Smokeless tobacco: Never   Vaping Use    Vaping status: Never Used   Substance Use Topics    Alcohol use: Never    Drug use: Never      E-Cigarette/Vaping    E-Cigarette Use Never User       E-Cigarette/Vaping Substances      I have reviewed and agree with the history as documented.     74-year-old female with past medical history listed below presents to the emergency department with complaint of left lower quadrant abdominal pain starting yesterday.  Patient states that she is also had some shortness of breath/exertional dyspnea.  She reports some subjective chills, and fevers.  She denies any chest pain, upper abdominal pain, she denies any bloody stool, bloody urine, or pain with urination.  On initial evaluation, patient has minimal tenderness to palpation on abdomen, vitals are normal.      Abdominal Pain  Associated symptoms: shortness of breath    Associated symptoms: no chest pain, no chills, no cough, no dysuria, no fever, no hematuria, no sore throat and no vomiting        Review of Systems   Constitutional:  Negative for chills and fever.   HENT:  Negative for ear pain and sore throat.    Eyes:  Negative for pain and visual disturbance.   Respiratory:  Positive for shortness of breath. Negative for cough.    Cardiovascular:  Negative for chest pain and palpitations.   Gastrointestinal:  Positive for abdominal pain. Negative for vomiting.   Genitourinary:  Negative for dysuria and hematuria.   Musculoskeletal:  Negative for arthralgias and back pain.   Skin:  Negative for color change and rash.   Neurological:  Negative for seizures and syncope.   All other systems reviewed and are negative.          Objective       ED Triage Vitals   Temperature Pulse Blood Pressure Respirations SpO2 Patient Position - Orthostatic VS   03/01/25 1505 03/01/25 1505 03/01/25 1505 03/01/25 1505 03/01/25 1505 03/01/25 1600   98.2 °F (36.8  °C) 84 168/82 20 92 % Sitting      Temp Source Heart Rate Source BP Location FiO2 (%) Pain Score    03/01/25 1505 03/01/25 1600 -- -- 03/01/25 1505    Temporal Monitor   8      Vitals      Date and Time Temp Pulse SpO2 Resp BP Pain Score FACES Pain Rating User   03/01/25 1730 -- 83 99 % 18 132/106 -- -- AS   03/01/25 1600 -- 78 98 % 18 140/70 -- -- AS   03/01/25 1525 -- -- -- -- -- 8 -- AS   03/01/25 1505 98.2 °F (36.8 °C) 84 92 % 20 168/82 8 -- MD            Physical Exam  Vitals and nursing note reviewed.   Constitutional:       General: She is not in acute distress.     Appearance: She is well-developed.   HENT:      Head: Normocephalic and atraumatic.   Eyes:      Conjunctiva/sclera: Conjunctivae normal.   Cardiovascular:      Rate and Rhythm: Normal rate and regular rhythm.      Heart sounds: No murmur heard.  Pulmonary:      Effort: Pulmonary effort is normal. No respiratory distress.      Breath sounds: Normal breath sounds.   Abdominal:      Palpations: Abdomen is soft.      Tenderness: There is abdominal tenderness in the left lower quadrant.   Musculoskeletal:         General: No swelling.      Cervical back: Neck supple.   Skin:     General: Skin is warm and dry.      Capillary Refill: Capillary refill takes less than 2 seconds.   Neurological:      Mental Status: She is alert.   Psychiatric:         Mood and Affect: Mood normal.         Results Reviewed       Procedure Component Value Units Date/Time    Urine Microscopic [[id number]]  (Normal) Collected: 03/01/25 1642    Lab Status: Final result Specimen: Urine, Clean Catch Updated: 03/01/25 1705     RBC, UA 0-1 /hpf      WBC, UA 2-4 /hpf      Epithelial Cells Occasional /hpf      Bacteria, UA Occasional /hpf     UA w Reflex to Microscopic w Reflex to Culture [[id number]]  (Abnormal) Collected: 03/01/25 1642    Lab Status: Final result Specimen: Urine, Clean Catch Updated: 03/01/25 1652     Color, UA Yellow     Clarity, UA Clear     Specific [place], UA  <=1.005     pH, UA 6.5     Leukocytes, UA Trace     Nitrite, UA Negative     Protein, UA Negative mg/dl      Glucose, UA Negative mg/dl      Ketones, UA Negative mg/dl      Urobilinogen, UA 0.2 E.U./dl      Bilirubin, UA Negative     Occult Blood, UA Negative    B-Type Natriuretic Peptide(BNP) [[id number]]  (Normal) Collected: 03/01/25 1516    Lab Status: Final result Specimen: Blood from Arm, Left Updated: 03/01/25 1605     BNP 38 pg/mL     HS Troponin I 4hr [[id number]]     Lab Status: No result Specimen: Blood     HS Troponin 0hr (reflex protocol) [[id number]]  (Normal) Collected: 03/01/25 1516    Lab Status: Final result Specimen: Blood from Arm, Left Updated: 03/01/25 1557     hs TnI 0hr 3 ng/L     HS Troponin I 2hr [[id number]]     Lab Status: No result Specimen: Blood     CMP [[id number]] Collected: 03/01/25 1516    Lab Status: Final result Specimen: Blood from Arm, Left Updated: 03/01/25 1555     Sodium 138 mmol/L      Potassium 4.5 mmol/L      Chloride 104 mmol/L      CO2 27 mmol/L      ANION GAP 7 mmol/L      BUN 12 mg/dL      Creatinine 0.80 mg/dL      Glucose 81 mg/dL      Calcium 9.5 mg/dL      AST 31 U/L      ALT 19 U/L      Alkaline Phosphatase 65 U/L      Total Protein 7.7 g/dL      Albumin 4.4 g/dL      Total Bilirubin 0.57 mg/dL      eGFR 72 ml/min/1.73sq m     Narrative:      [employer] Kid[employer] guidelines for Chronic Kidney Disease (CKD):     Stage 1 with normal or high GFR (GFR > 90 mL/min/1.73 square meters)    Stage 2 Mild CKD (GFR = 60-89 mL/min/1.73 square meters)    Stage 3A Moderate CKD (GFR = 45-59 mL/min/1.73 square meters)    Stage 3B Moderate CKD (GFR = 30-44 mL/min/1.73 square meters)    Stage 4 Severe CKD (GFR = 15-29 mL/min/1.73 square meters)    Stage 5 End Stage CKD (GFR <15 mL/min/1.73 square meters)  Note: GFR calculation is accurate only with a steady state creatinine    Lipase [[id number]]  (Abnormal) Collected: 03/01/25 1516    Lab Status: Final result Specimen:  Blood from Arm, Left Updated: 03/01/25 1555     Lipase 6 u/L     Lactic acid, plasma (w/reflex if result > 2.0) [[id number]]  (Normal) Collected: 03/01/25 1516    Lab Status: Final result Specimen: Blood from Arm, Left Updated: 03/01/25 1554     LACTIC ACID 1.7 mmol/L     Narrative:      Result may be elevated if tourniquet was used during collection.    FLU/COVID Rapid Antigen (30 min. TAT) - Preferred screening test in ED [[id number]]  (Normal) Collected: 03/01/25 1516    Lab Status: Final result Specimen: Nares from Nose Updated: 03/01/25 1552     SARS COV Rapid Antigen Negative     Influenza A Rapid Antigen Negative     Influenza B Rapid Antigen Negative    Narrative:      This test has been performed using the FlxOneidel Karo 2 FLU+SARS Antigen test under the Emergency Use Authorization (EUA). This test has been validated by the  and verified by the performing laboratory. The Karo uses lateral flow immunofluorescent sandwich assay to detect SARS-COV, Influenza A and Influenza B Antigen.     The Quidel Karo 2 SARS Antigen test does not differentiate between SARS-CoV and SARS-CoV-2.     Negative results are presumptive and may be confirmed with a molecular assay, if necessary, for patient management. Negative results do not rule out SARS-CoV-2 or influenza infection and should not be used as the sole basis for treatment or patient management decisions. A negative test result may occur if the level of antigen in a sample is below the limit of detection of this test.     Positive results are indicative of the presence of viral antigens, but do not rule out bacterial infection or co-infection with other viruses.     All test results should be used as an adjunct to clinical observations and other information available to the provider.    FOR PEDIATRIC PATIENTS - copy/paste COVID Guidelines URL to browser: [email or web address]    CBC and differential  "[[id number]]  (Abnormal) Collected: 25 1516    Lab Status: Final result Specimen: Blood from Arm, Left Updated: 25 1538     WBC 11.78 Thousand/uL      RBC 4.00 Million/uL      Hemoglobin 12.2 g/dL      Hematocrit 38.7 %      MCV 97 fL      MCH 30.5 pg      MCHC 31.5 g/dL      RDW 12.9 %      MPV 8.7 fL      Platelets 356 Thousands/uL      nRBC 0 /100 WBCs      Segmented % 68 %      Immature Grans % 1 %      Lymphocytes % 13 %      Monocytes % 11 %      Eosinophils Relative 6 %      Basophils Relative 1 %      Absolute Neutrophils 8.19 Thousands/µL      Absolute Immature Grans 0.06 Thousand/uL      Absolute Lymphocytes 1.47 Thousands/µL      Absolute Monocytes 1.34 Thousand/µL      Eosinophils Absolute 0.66 Thousand/µL      Basophils Absolute 0.06 Thousands/µL             CT Abdomen pelvis with contrast   Final Interpretation by Michael Camacho MD ([id number])   Imaging findings of uncomplicated sigmoid diverticulitis      The study was marked in EPIC for immediate notification.         Workstation performed: [id number]         X-ray chest 1 view portable    (Results Pending)       Procedures    ED Medication and Procedure Management   Prior to Admission Medications   Prescriptions Last Dose Informant Patient Reported? Taking?   CANDESARTAN CILEXETIL PO  Self Yes No   Sig: Take by mouth daily at bedtime   Calcium 200 MG TABS   Yes No   Si mg   Diclofenac Sodium (VOLTAREN) 1 %   No No   Sig: APPLY 2 GRAMS TO AFFECTED AREA 4 TIMES A DAY   VanishPoint Safety Syringe 25G X 5/8\" 3 ML MISC   Yes No   Sig: ONE INJECTION EVERY MONTH WITH VITAMIN B 12   acetaminophen (TYLENOL) 325 mg tablet   No No   Sig: Take 2 tablets (650 mg total) by mouth every 6 (six) hours as needed for mild pain, headaches or fever   amLODIPine (NORVASC) 10 mg tablet   Yes No   Sig: Take 10 mg by mouth daily   amLODIPine (NORVASC) 2.5 mg tablet   No No   Sig: Take 1 tablet (2.5 mg total) by mouth daily   Patient not taking: " Reported on 3/29/2024   aspirin-dipyridamole (AGGRENOX)  mg per 12 hr capsule   Yes No   Sig: Take by mouth   atorvastatin (LIPITOR) 80 mg tablet   No No   Sig: Take 1 tablet (80 mg total) by mouth every evening   cyanocobalamin 1,000 mcg/mL   Yes No   Sig: Inject 1 mL into a muscle every 30 (thirty) days   fluticasone (FLONASE) 50 mcg/act nasal spray   Yes No   Si spray into each nostril daily as needed for rhinitis   folic acid (FOLVITE) 1 mg tablet   Yes No   Sig: Take by mouth   hydroxychloroquine (PLAQUENIL) 200 mg tablet   Yes No   Sig: Take 200 mg by mouth daily   levothyroxine 75 mcg tablet   Yes No   Sig: Take 75 mcg by mouth daily   meclizine (ANTIVERT) 25 mg tablet   No No   Sig: Take 1 tablet (25 mg total) by mouth every 8 (eight) hours as needed for dizziness   traMADol (ULTRAM) 50 mg tablet   Yes No   Sig: Take 50 mg by mouth every 6 (six) hours as needed      Facility-Administered Medications: None     Discharge Medication List as of 3/1/2025  5:17 PM        START taking these medications    Details   amoxicillin-clavulanate (AUGMENTIN) 875-125 mg per tablet Take 1 tablet by mouth every 12 (twelve) hours for 7 days, Starting Sat 3/1/2025, Until Sat 3/8/2025, Normal      ondansetron (ZOFRAN) 4 mg tablet Take 1 tablet (4 mg total) by mouth every 6 (six) hours, Starting Sat 3/1/2025, Normal      oxyCODONE-acetaminophen (Percocet) 5-325 mg per tablet Take 1 tablet by mouth every 4 (four) hours as needed for moderate pain for up to 10 days Max Daily Amount: 6 tablets, Starting Sat 3/1/2025, Until Tue 3/11/2025 at 2359, Normal           CONTINUE these medications which have NOT CHANGED    Details   acetaminophen (TYLENOL) 325 mg tablet Take 2 tablets (650 mg total) by mouth every 6 (six) hours as needed for mild pain, headaches or fever, Starting Thu 2020, No Print      !! amLODIPine (NORVASC) 10 mg tablet Take 10 mg by mouth daily, Starting Fri 3/15/2024, Historical Med      !! amLODIPine  "(NORVASC) 2.5 mg tablet Take 1 tablet (2.5 mg total) by mouth daily, Starting Thu 3/11/2021, Normal      aspirin-dipyridamole (AGGRENOX)  mg per 12 hr capsule Take by mouth, Historical Med      atorvastatin (LIPITOR) 80 mg tablet Take 1 tablet (80 mg total) by mouth every evening, Starting Thu 12/17/2020, No Print      Calcium 200 MG TABS 600 mg, Starting Thu 10/12/2023, Historical Med      CANDESARTAN CILEXETIL PO Take by mouth daily at bedtime, Historical Med      cyanocobalamin 1,000 mcg/mL Inject 1 mL into a muscle every 30 (thirty) days, Starting Fri 9/2/2022, Historical Med      Diclofenac Sodium (VOLTAREN) 1 % APPLY 2 GRAMS TO AFFECTED AREA 4 TIMES A DAY, Normal      fluticasone (FLONASE) 50 mcg/act nasal spray 1 spray into each nostril daily as needed for rhinitis, Historical Med      folic acid (FOLVITE) 1 mg tablet Take by mouth, Historical Med      hydroxychloroquine (PLAQUENIL) 200 mg tablet Take 200 mg by mouth daily, Starting Tue 4/16/2024, Historical Med      levothyroxine 75 mcg tablet Take 75 mcg by mouth daily, Starting Wed 3/13/2024, Historical Med      meclizine (ANTIVERT) 25 mg tablet Take 1 tablet (25 mg total) by mouth every 8 (eight) hours as needed for dizziness, Starting Wed 7/27/2022, Normal      traMADol (ULTRAM) 50 mg tablet Take 50 mg by mouth every 6 (six) hours as needed, Starting Wed 3/6/2024, Historical Med      VanishPoint Safety Syringe 25G X 5/8\" 3 ML MISC ONE INJECTION EVERY MONTH WITH VITAMIN B 12, Historical Med       !! - Potential duplicate medications found. Please discuss with provider.          ED SEPSIS DOCUMENTATION   Time reflects when diagnosis was documented in both MDM as applicable and the Disposition within this note       Time User Action Codes Description Comment    3/1/2025  5:14 PM Ernesto Bee Add [K57.92] Diverticulitis                  Ernesto Bee, DO  03/01/25 1830    "

## 2025-03-03 ENCOUNTER — TELEPHONE (OUTPATIENT)
Age: 75
End: 2025-03-03

## 2025-03-11 ENCOUNTER — DOCTOR'S OFFICE (OUTPATIENT)
Dept: URBAN - NONMETROPOLITAN AREA CLINIC 1 | Facility: CLINIC | Age: 75
Setting detail: OPHTHALMOLOGY
End: 2025-03-11
Payer: MEDICARE

## 2025-03-11 DIAGNOSIS — H25.013: ICD-10-CM

## 2025-03-11 DIAGNOSIS — H02.831: ICD-10-CM

## 2025-03-11 DIAGNOSIS — H43.813: ICD-10-CM

## 2025-03-11 DIAGNOSIS — I63.9: ICD-10-CM

## 2025-03-11 DIAGNOSIS — H02.834: ICD-10-CM

## 2025-03-11 DIAGNOSIS — Z79.891: ICD-10-CM

## 2025-03-11 DIAGNOSIS — H40.023: ICD-10-CM

## 2025-03-11 DIAGNOSIS — H04.123: ICD-10-CM

## 2025-03-11 DIAGNOSIS — G43.809: ICD-10-CM

## 2025-03-11 PROCEDURE — 92133 CPTRZD OPH DX IMG PST SGM ON: CPT | Performed by: OPTOMETRIST

## 2025-03-11 PROCEDURE — 92014 COMPRE OPH EXAM EST PT 1/>: CPT | Performed by: OPTOMETRIST

## 2025-03-11 ASSESSMENT — REFRACTION_CURRENTRX
OD_SPHERE: -2.75
OS_ADD: +2.50
OD_CYLINDER: -1.75
OD_AXIS: 090
OS_CYLINDER: SPH
OS_SPHERE: -2.50
OS_VPRISM_DIRECTION: PROGS
OD_OVR_VA: 20/
OS_OVR_VA: 20/
OS_AXIS: 066
OS_CYLINDER: -1.00
OD_AXIS: 096
OS_SPHERE: -0.75
OD_ADD: +2.50
OD_OVR_VA: 20/
OS_OVR_VA: 20/
OD_SPHERE: -0.50
OD_VPRISM_DIRECTION: PROGS
OD_CYLINDER: -0.75

## 2025-03-11 ASSESSMENT — REFRACTION_MANIFEST
OD_AXIS: 090
OS_SPHERE: -0.75
OD_VA2: 20/30+2
OD_VA1: 20/30+2
OS_ADD: +2.50
OS_AXIS: 070
OD_CYLINDER: -0.75
OS_VA1: 20/25-2
OS_VA2: 20/25-2
OD_ADD: +2.50
OU_VA: 20/25
OD_SPHERE: -0.75
OS_CYLINDER: -1.25

## 2025-03-11 ASSESSMENT — REFRACTION_AUTOREFRACTION
OD_CYLINDER: -2.00
OS_CYLINDER: -2.00
OD_AXIS: 097
OS_SPHERE: +0.50
OS_AXIS: 073
OD_SPHERE: -0.25

## 2025-03-11 ASSESSMENT — LID POSITION - DERMATOCHALASIS
OS_DERMATOCHALASIS: LUL 1+
OD_DERMATOCHALASIS: RUL 1+

## 2025-03-11 ASSESSMENT — CONFRONTATIONAL VISUAL FIELD TEST (CVF)
OD_FINDINGS: FULL
OS_FINDINGS: FULL

## 2025-03-11 ASSESSMENT — LID POSITION - COMMENTS: OS_COMMENTS: +VE EVERTED PUNCTUM LUL

## 2025-03-11 ASSESSMENT — TONOMETRY
OD_IOP_MMHG: 21
OS_IOP_MMHG: 18

## 2025-03-11 ASSESSMENT — SUPERFICIAL PUNCTATE KERATITIS (SPK)
OD_SPK: 1+ 2+
OS_SPK: 2+

## 2025-03-11 ASSESSMENT — VISUAL ACUITY
OD_BCVA: 20/30+1
OS_BCVA: 20/40+1

## 2025-03-21 RX ORDER — CEPHALEXIN 500 MG/1
CAPSULE ORAL
Qty: 21 CAPSULE | OUTPATIENT
Start: 2025-03-21

## 2025-05-11 ENCOUNTER — APPOINTMENT (EMERGENCY)
Dept: CT IMAGING | Facility: HOSPITAL | Age: 75
DRG: 389 | End: 2025-05-11
Payer: COMMERCIAL

## 2025-05-11 ENCOUNTER — APPOINTMENT (INPATIENT)
Dept: RADIOLOGY | Facility: HOSPITAL | Age: 75
DRG: 389 | End: 2025-05-11
Payer: COMMERCIAL

## 2025-05-11 ENCOUNTER — HOSPITAL ENCOUNTER (INPATIENT)
Facility: HOSPITAL | Age: 75
LOS: 5 days | Discharge: HOME | DRG: 389 | End: 2025-05-16
Attending: EMERGENCY MEDICINE | Admitting: STUDENT IN AN ORGANIZED HEALTH CARE EDUCATION/TRAINING PROGRAM
Payer: COMMERCIAL

## 2025-05-11 DIAGNOSIS — L03.012 PARONYCHIA OF FINGER OF LEFT HAND: ICD-10-CM

## 2025-05-11 DIAGNOSIS — R19.7 DIARRHEA, UNSPECIFIED TYPE: ICD-10-CM

## 2025-05-11 DIAGNOSIS — S61.209A: ICD-10-CM

## 2025-05-11 DIAGNOSIS — K56.609 SBO (SMALL BOWEL OBSTRUCTION) (HCC): Primary | ICD-10-CM

## 2025-05-11 LAB
2HR DELTA HS TROPONIN: 1 NG/L
ALBUMIN SERPL BCG-MCNC: 4.7 G/DL (ref 3.5–5)
ALP SERPL-CCNC: 51 U/L (ref 34–104)
ALT SERPL W P-5'-P-CCNC: 19 U/L (ref 7–52)
ANION GAP SERPL CALCULATED.3IONS-SCNC: 10 MMOL/L (ref 4–13)
AST SERPL W P-5'-P-CCNC: 27 U/L (ref 13–39)
BACTERIA UR QL AUTO: NORMAL /HPF
BASOPHILS # BLD AUTO: 0.06 THOUSANDS/ÂΜL (ref 0–0.1)
BASOPHILS NFR BLD AUTO: 0 % (ref 0–1)
BILIRUB SERPL-MCNC: 0.71 MG/DL (ref 0.2–1)
BILIRUB UR QL STRIP: NEGATIVE
BUN SERPL-MCNC: 19 MG/DL (ref 5–25)
CALCIUM SERPL-MCNC: 10.3 MG/DL (ref 8.4–10.2)
CARDIAC TROPONIN I PNL SERPL HS: 3 NG/L (ref ?–50)
CARDIAC TROPONIN I PNL SERPL HS: 4 NG/L (ref ?–50)
CHLORIDE SERPL-SCNC: 106 MMOL/L (ref 96–108)
CLARITY UR: CLEAR
CO2 SERPL-SCNC: 21 MMOL/L (ref 21–32)
COLOR UR: YELLOW
CREAT SERPL-MCNC: 0.95 MG/DL (ref 0.6–1.3)
EOSINOPHIL # BLD AUTO: 0.33 THOUSAND/ÂΜL (ref 0–0.61)
EOSINOPHIL NFR BLD AUTO: 2 % (ref 0–6)
ERYTHROCYTE [DISTWIDTH] IN BLOOD BY AUTOMATED COUNT: 13.1 % (ref 11.6–15.1)
GFR SERPL CREATININE-BSD FRML MDRD: 58 ML/MIN/1.73SQ M
GLUCOSE SERPL-MCNC: 130 MG/DL (ref 65–140)
GLUCOSE UR STRIP-MCNC: NEGATIVE MG/DL
HCT VFR BLD AUTO: 42.8 % (ref 34.8–46.1)
HGB BLD-MCNC: 13.6 G/DL (ref 11.5–15.4)
HGB UR QL STRIP.AUTO: NEGATIVE
IMM GRANULOCYTES # BLD AUTO: 0.04 THOUSAND/UL (ref 0–0.2)
IMM GRANULOCYTES NFR BLD AUTO: 0 % (ref 0–2)
KETONES UR STRIP-MCNC: NEGATIVE MG/DL
LACTATE SERPL-SCNC: 1.3 MMOL/L (ref 0.5–2)
LEUKOCYTE ESTERASE UR QL STRIP: ABNORMAL
LIPASE SERPL-CCNC: <6 U/L (ref 11–82)
LYMPHOCYTES # BLD AUTO: 1.57 THOUSANDS/ÂΜL (ref 0.6–4.47)
LYMPHOCYTES NFR BLD AUTO: 11 % (ref 14–44)
MCH RBC QN AUTO: 29.7 PG (ref 26.8–34.3)
MCHC RBC AUTO-ENTMCNC: 31.8 G/DL (ref 31.4–37.4)
MCV RBC AUTO: 93 FL (ref 82–98)
MONOCYTES # BLD AUTO: 1.2 THOUSAND/ÂΜL (ref 0.17–1.22)
MONOCYTES NFR BLD AUTO: 9 % (ref 4–12)
NEUTROPHILS # BLD AUTO: 10.75 THOUSANDS/ÂΜL (ref 1.85–7.62)
NEUTS SEG NFR BLD AUTO: 78 % (ref 43–75)
NITRITE UR QL STRIP: NEGATIVE
NON-SQ EPI CELLS URNS QL MICRO: NORMAL /HPF
NRBC BLD AUTO-RTO: 0 /100 WBCS
PH UR STRIP.AUTO: 6 [PH]
PLATELET # BLD AUTO: 314 THOUSANDS/UL (ref 149–390)
PMV BLD AUTO: 8.9 FL (ref 8.9–12.7)
POTASSIUM SERPL-SCNC: 4.4 MMOL/L (ref 3.5–5.3)
PROT SERPL-MCNC: 7.8 G/DL (ref 6.4–8.4)
PROT UR STRIP-MCNC: NEGATIVE MG/DL
RBC # BLD AUTO: 4.58 MILLION/UL (ref 3.81–5.12)
RBC #/AREA URNS AUTO: NORMAL /HPF
SODIUM SERPL-SCNC: 137 MMOL/L (ref 135–147)
SP GR UR STRIP.AUTO: 1.02 (ref 1–1.03)
URINE COMMENT: NORMAL
UROBILINOGEN UR QL STRIP.AUTO: 0.2 E.U./DL
WBC # BLD AUTO: 13.95 THOUSAND/UL (ref 4.31–10.16)
WBC #/AREA URNS AUTO: NORMAL /HPF

## 2025-05-11 PROCEDURE — 71260 CT THORAX DX C+: CPT

## 2025-05-11 PROCEDURE — 84484 ASSAY OF TROPONIN QUANT: CPT | Performed by: EMERGENCY MEDICINE

## 2025-05-11 PROCEDURE — 93005 ELECTROCARDIOGRAM TRACING: CPT

## 2025-05-11 PROCEDURE — 83605 ASSAY OF LACTIC ACID: CPT | Performed by: EMERGENCY MEDICINE

## 2025-05-11 PROCEDURE — 81001 URINALYSIS AUTO W/SCOPE: CPT | Performed by: EMERGENCY MEDICINE

## 2025-05-11 PROCEDURE — 99285 EMERGENCY DEPT VISIT HI MDM: CPT | Performed by: EMERGENCY MEDICINE

## 2025-05-11 PROCEDURE — 83690 ASSAY OF LIPASE: CPT | Performed by: EMERGENCY MEDICINE

## 2025-05-11 PROCEDURE — 71045 X-RAY EXAM CHEST 1 VIEW: CPT

## 2025-05-11 PROCEDURE — 99284 EMERGENCY DEPT VISIT MOD MDM: CPT

## 2025-05-11 PROCEDURE — 74177 CT ABD & PELVIS W/CONTRAST: CPT

## 2025-05-11 PROCEDURE — 96375 TX/PRO/DX INJ NEW DRUG ADDON: CPT

## 2025-05-11 PROCEDURE — 96361 HYDRATE IV INFUSION ADD-ON: CPT

## 2025-05-11 PROCEDURE — 84484 ASSAY OF TROPONIN QUANT: CPT | Performed by: STUDENT IN AN ORGANIZED HEALTH CARE EDUCATION/TRAINING PROGRAM

## 2025-05-11 PROCEDURE — 96374 THER/PROPH/DIAG INJ IV PUSH: CPT

## 2025-05-11 PROCEDURE — 85025 COMPLETE CBC W/AUTO DIFF WBC: CPT | Performed by: EMERGENCY MEDICINE

## 2025-05-11 PROCEDURE — 80053 COMPREHEN METABOLIC PANEL: CPT | Performed by: EMERGENCY MEDICINE

## 2025-05-11 PROCEDURE — 36415 COLL VENOUS BLD VENIPUNCTURE: CPT | Performed by: EMERGENCY MEDICINE

## 2025-05-11 RX ORDER — MIDAZOLAM HYDROCHLORIDE 2 MG/2ML
2 INJECTION, SOLUTION INTRAMUSCULAR; INTRAVENOUS ONCE
Status: COMPLETED | OUTPATIENT
Start: 2025-05-11 | End: 2025-05-11

## 2025-05-11 RX ORDER — ACETAMINOPHEN 10 MG/ML
1000 INJECTION, SOLUTION INTRAVENOUS EVERY 6 HOURS PRN
Status: DISCONTINUED | OUTPATIENT
Start: 2025-05-11 | End: 2025-05-12

## 2025-05-11 RX ORDER — SODIUM CHLORIDE 9 MG/ML
125 INJECTION, SOLUTION INTRAVENOUS CONTINUOUS
Status: DISCONTINUED | OUTPATIENT
Start: 2025-05-11 | End: 2025-05-13

## 2025-05-11 RX ORDER — ONDANSETRON 2 MG/ML
4 INJECTION INTRAMUSCULAR; INTRAVENOUS ONCE
Status: COMPLETED | OUTPATIENT
Start: 2025-05-11 | End: 2025-05-11

## 2025-05-11 RX ORDER — HYDRALAZINE HYDROCHLORIDE 20 MG/ML
5 INJECTION INTRAMUSCULAR; INTRAVENOUS EVERY 6 HOURS PRN
Status: DISCONTINUED | OUTPATIENT
Start: 2025-05-11 | End: 2025-05-16 | Stop reason: HOSPADM

## 2025-05-11 RX ORDER — HYDROMORPHONE HCL/PF 1 MG/ML
0.5 SYRINGE (ML) INJECTION ONCE
Refills: 0 | Status: COMPLETED | OUTPATIENT
Start: 2025-05-11 | End: 2025-05-11

## 2025-05-11 RX ORDER — SODIUM CHLORIDE 9 MG/ML
125 INJECTION, SOLUTION INTRAVENOUS CONTINUOUS
Status: DISCONTINUED | OUTPATIENT
Start: 2025-05-11 | End: 2025-05-11 | Stop reason: SDUPTHER

## 2025-05-11 RX ORDER — HYDROMORPHONE HCL/PF 1 MG/ML
0.5 SYRINGE (ML) INJECTION
Status: DISCONTINUED | OUTPATIENT
Start: 2025-05-11 | End: 2025-05-12

## 2025-05-11 RX ORDER — HEPARIN SODIUM 5000 [USP'U]/ML
5000 INJECTION, SOLUTION INTRAVENOUS; SUBCUTANEOUS EVERY 8 HOURS SCHEDULED
Status: DISCONTINUED | OUTPATIENT
Start: 2025-05-11 | End: 2025-05-16 | Stop reason: HOSPADM

## 2025-05-11 RX ORDER — HYDROMORPHONE HCL IN WATER/PF 6 MG/30 ML
0.2 PATIENT CONTROLLED ANALGESIA SYRINGE INTRAVENOUS
Status: DISCONTINUED | OUTPATIENT
Start: 2025-05-11 | End: 2025-05-12

## 2025-05-11 RX ORDER — ONDANSETRON 2 MG/ML
4 INJECTION INTRAMUSCULAR; INTRAVENOUS EVERY 6 HOURS PRN
Status: DISCONTINUED | OUTPATIENT
Start: 2025-05-11 | End: 2025-05-16 | Stop reason: HOSPADM

## 2025-05-11 RX ORDER — HYDROMORPHONE HCL IN WATER/PF 6 MG/30 ML
0.2 PATIENT CONTROLLED ANALGESIA SYRINGE INTRAVENOUS
Status: DISCONTINUED | OUTPATIENT
Start: 2025-05-11 | End: 2025-05-11

## 2025-05-11 RX ORDER — HYDROMORPHONE HCL/PF 1 MG/ML
0.5 SYRINGE (ML) INJECTION
Status: DISCONTINUED | OUTPATIENT
Start: 2025-05-11 | End: 2025-05-11

## 2025-05-11 RX ORDER — MORPHINE SULFATE 4 MG/ML
4 INJECTION, SOLUTION INTRAMUSCULAR; INTRAVENOUS ONCE
Status: COMPLETED | OUTPATIENT
Start: 2025-05-11 | End: 2025-05-11

## 2025-05-11 RX ADMIN — ONDANSETRON 4 MG: 2 INJECTION INTRAMUSCULAR; INTRAVENOUS at 23:57

## 2025-05-11 RX ADMIN — HYDROMORPHONE HYDROCHLORIDE 0.5 MG: 1 INJECTION, SOLUTION INTRAMUSCULAR; INTRAVENOUS; SUBCUTANEOUS at 21:37

## 2025-05-11 RX ADMIN — ONDANSETRON 4 MG: 2 INJECTION INTRAMUSCULAR; INTRAVENOUS at 19:57

## 2025-05-11 RX ADMIN — SODIUM CHLORIDE 1000 ML: 0.9 INJECTION, SOLUTION INTRAVENOUS at 19:56

## 2025-05-11 RX ADMIN — IOHEXOL 85 ML: 350 INJECTION, SOLUTION INTRAVENOUS at 20:22

## 2025-05-11 RX ADMIN — MORPHINE SULFATE 4 MG: 4 INJECTION INTRAVENOUS at 19:57

## 2025-05-11 RX ADMIN — MIDAZOLAM 2 MG: 1 INJECTION INTRAMUSCULAR; INTRAVENOUS at 21:37

## 2025-05-11 RX ADMIN — HEPARIN SODIUM 5000 UNITS: 5000 INJECTION INTRAVENOUS; SUBCUTANEOUS at 23:24

## 2025-05-11 RX ADMIN — SODIUM CHLORIDE 125 ML/HR: 0.9 INJECTION, SOLUTION INTRAVENOUS at 23:24

## 2025-05-11 NOTE — ED NOTES
"This RN at the bedside to assist pt to the bathroom to collect a urine sample. Pt states, \"Is the provider coming to see me anytime soon? I'm in a lot of pain.\" Explained to pt provider is aware of her pain and will be in to see her. Also explained as soon as med orders are placed I will be in to administer them.     Shelby Swain RN  05/11/25 1929    "

## 2025-05-12 ENCOUNTER — APPOINTMENT (INPATIENT)
Dept: RADIOLOGY | Facility: HOSPITAL | Age: 75
DRG: 389 | End: 2025-05-12
Payer: COMMERCIAL

## 2025-05-12 PROBLEM — K56.609 SMALL BOWEL OBSTRUCTION (HCC): Status: ACTIVE | Noted: 2025-05-12

## 2025-05-12 LAB
ANION GAP SERPL CALCULATED.3IONS-SCNC: 8 MMOL/L (ref 4–13)
ATRIAL RATE: 89 BPM
BASOPHILS # BLD AUTO: 0.03 THOUSANDS/ÂΜL (ref 0–0.1)
BASOPHILS NFR BLD AUTO: 0 % (ref 0–1)
BUN SERPL-MCNC: 14 MG/DL (ref 5–25)
CALCIUM SERPL-MCNC: 8.9 MG/DL (ref 8.4–10.2)
CHLORIDE SERPL-SCNC: 110 MMOL/L (ref 96–108)
CO2 SERPL-SCNC: 21 MMOL/L (ref 21–32)
CREAT SERPL-MCNC: 0.74 MG/DL (ref 0.6–1.3)
EOSINOPHIL # BLD AUTO: 0.05 THOUSAND/ÂΜL (ref 0–0.61)
EOSINOPHIL NFR BLD AUTO: 0 % (ref 0–6)
ERYTHROCYTE [DISTWIDTH] IN BLOOD BY AUTOMATED COUNT: 13.2 % (ref 11.6–15.1)
GFR SERPL CREATININE-BSD FRML MDRD: 78 ML/MIN/1.73SQ M
GLUCOSE SERPL-MCNC: 136 MG/DL (ref 65–140)
HCT VFR BLD AUTO: 40.6 % (ref 34.8–46.1)
HGB BLD-MCNC: 12.7 G/DL (ref 11.5–15.4)
IMM GRANULOCYTES # BLD AUTO: 0.05 THOUSAND/UL (ref 0–0.2)
IMM GRANULOCYTES NFR BLD AUTO: 0 % (ref 0–2)
LYMPHOCYTES # BLD AUTO: 0.87 THOUSANDS/ÂΜL (ref 0.6–4.47)
LYMPHOCYTES NFR BLD AUTO: 7 % (ref 14–44)
MCH RBC QN AUTO: 30.2 PG (ref 26.8–34.3)
MCHC RBC AUTO-ENTMCNC: 31.3 G/DL (ref 31.4–37.4)
MCV RBC AUTO: 97 FL (ref 82–98)
MONOCYTES # BLD AUTO: 1.14 THOUSAND/ÂΜL (ref 0.17–1.22)
MONOCYTES NFR BLD AUTO: 9 % (ref 4–12)
NEUTROPHILS # BLD AUTO: 10.51 THOUSANDS/ÂΜL (ref 1.85–7.62)
NEUTS SEG NFR BLD AUTO: 84 % (ref 43–75)
NRBC BLD AUTO-RTO: 0 /100 WBCS
P AXIS: 43 DEGREES
PLATELET # BLD AUTO: 268 THOUSANDS/UL (ref 149–390)
PMV BLD AUTO: 9.2 FL (ref 8.9–12.7)
POTASSIUM SERPL-SCNC: 3.9 MMOL/L (ref 3.5–5.3)
PR INTERVAL: 130 MS
QRS AXIS: 5 DEGREES
QRSD INTERVAL: 78 MS
QT INTERVAL: 372 MS
QTC INTERVAL: 452 MS
RBC # BLD AUTO: 4.2 MILLION/UL (ref 3.81–5.12)
SODIUM SERPL-SCNC: 139 MMOL/L (ref 135–147)
T WAVE AXIS: 68 DEGREES
VENTRICULAR RATE: 89 BPM
WBC # BLD AUTO: 12.65 THOUSAND/UL (ref 4.31–10.16)

## 2025-05-12 PROCEDURE — 85025 COMPLETE CBC W/AUTO DIFF WBC: CPT | Performed by: STUDENT IN AN ORGANIZED HEALTH CARE EDUCATION/TRAINING PROGRAM

## 2025-05-12 PROCEDURE — 74022 RADEX COMPL AQT ABD SERIES: CPT

## 2025-05-12 PROCEDURE — 80048 BASIC METABOLIC PNL TOTAL CA: CPT | Performed by: STUDENT IN AN ORGANIZED HEALTH CARE EDUCATION/TRAINING PROGRAM

## 2025-05-12 RX ORDER — HYDROMORPHONE HCL IN WATER/PF 6 MG/30 ML
0.2 PATIENT CONTROLLED ANALGESIA SYRINGE INTRAVENOUS
Status: DISCONTINUED | OUTPATIENT
Start: 2025-05-12 | End: 2025-05-16 | Stop reason: HOSPADM

## 2025-05-12 RX ORDER — ACETAMINOPHEN 10 MG/ML
1000 INJECTION, SOLUTION INTRAVENOUS EVERY 6 HOURS SCHEDULED
Status: DISCONTINUED | OUTPATIENT
Start: 2025-05-12 | End: 2025-05-14

## 2025-05-12 RX ORDER — LIDOCAINE 40 MG/G
CREAM TOPICAL 4 TIMES DAILY PRN
Status: DISCONTINUED | OUTPATIENT
Start: 2025-05-12 | End: 2025-05-16 | Stop reason: HOSPADM

## 2025-05-12 RX ORDER — ECHINACEA PURPUREA EXTRACT 125 MG
1 TABLET ORAL
Status: DISCONTINUED | OUTPATIENT
Start: 2025-05-12 | End: 2025-05-16 | Stop reason: HOSPADM

## 2025-05-12 RX ORDER — PROMETHAZINE HYDROCHLORIDE 25 MG/ML
25 INJECTION, SOLUTION INTRAMUSCULAR; INTRAVENOUS EVERY 6 HOURS PRN
Status: DISCONTINUED | OUTPATIENT
Start: 2025-05-12 | End: 2025-05-16 | Stop reason: HOSPADM

## 2025-05-12 RX ORDER — LIDOCAINE 40 MG/G
CREAM TOPICAL
Status: DISPENSED
Start: 2025-05-12 | End: 2025-05-12

## 2025-05-12 RX ADMIN — HEPARIN SODIUM 5000 UNITS: 5000 INJECTION INTRAVENOUS; SUBCUTANEOUS at 04:37

## 2025-05-12 RX ADMIN — ONDANSETRON 4 MG: 2 INJECTION INTRAMUSCULAR; INTRAVENOUS at 11:55

## 2025-05-12 RX ADMIN — SALINE NASAL SPRAY 1 SPRAY: 1.5 SOLUTION NASAL at 20:05

## 2025-05-12 RX ADMIN — SODIUM CHLORIDE 125 ML/HR: 0.9 INJECTION, SOLUTION INTRAVENOUS at 23:32

## 2025-05-12 RX ADMIN — SODIUM CHLORIDE 125 ML/HR: 0.9 INJECTION, SOLUTION INTRAVENOUS at 06:19

## 2025-05-12 RX ADMIN — ACETAMINOPHEN 1000 MG: 10 INJECTION, SOLUTION INTRAVENOUS at 23:11

## 2025-05-12 RX ADMIN — SODIUM CHLORIDE 125 ML/HR: 0.9 INJECTION, SOLUTION INTRAVENOUS at 16:25

## 2025-05-12 RX ADMIN — LIDOCAINE: 40 CREAM TOPICAL at 06:18

## 2025-05-12 RX ADMIN — PHENOL 1 SPRAY: 1.4 SPRAY ORAL at 14:56

## 2025-05-12 RX ADMIN — HEPARIN SODIUM 5000 UNITS: 5000 INJECTION INTRAVENOUS; SUBCUTANEOUS at 23:11

## 2025-05-12 RX ADMIN — SALINE NASAL SPRAY 1 SPRAY: 1.5 SOLUTION NASAL at 12:27

## 2025-05-12 RX ADMIN — HYDROMORPHONE HYDROCHLORIDE 0.5 MG: 1 INJECTION, SOLUTION INTRAMUSCULAR; INTRAVENOUS; SUBCUTANEOUS at 11:55

## 2025-05-12 RX ADMIN — HEPARIN SODIUM 5000 UNITS: 5000 INJECTION INTRAVENOUS; SUBCUTANEOUS at 14:07

## 2025-05-12 RX ADMIN — PROMETHAZINE HYDROCHLORIDE 25 MG: 25 INJECTION INTRAMUSCULAR; INTRAVENOUS at 14:07

## 2025-05-12 RX ADMIN — HYDROMORPHONE HYDROCHLORIDE 0.5 MG: 1 INJECTION, SOLUTION INTRAMUSCULAR; INTRAVENOUS; SUBCUTANEOUS at 04:36

## 2025-05-12 RX ADMIN — SODIUM CHLORIDE 125 ML/HR: 0.9 INJECTION, SOLUTION INTRAVENOUS at 07:47

## 2025-05-12 RX ADMIN — ACETAMINOPHEN 1000 MG: 10 INJECTION, SOLUTION INTRAVENOUS at 07:16

## 2025-05-12 NOTE — H&P
H&P - Surgery-General   Name: Italia Conway 75 y.o. female I MRN: [id number]  Unit/Bed#: -01 I Date of Admission: 5/11/2025   Date of Service: 5/12/2025 I Hospital Day: 1     Assessment & Plan  Small bowel obstruction (HCC)  76yo F admitted with nausea, bloating, abdominal pain x 1 day and CT findings of SBO with transition point in the RLQ    CTAP 5/11/2025: Dilation of numerous loops of small bowel up to 3.4 cm with engorgement of the vasa recta and interloop fluid and transition point in the right abdomen   IMPRESSION: Small bowel obstruction with transition point in the right abdomen.    VSS and WNL  Mild leukocytosis 12.6 (13.9)  Hgb,electrolytes, LA, LFTs, Lipase WNL  NGT with 500ml output since placement  Cr 0.7    Abd exam: soft, mildly distended, tender to deep palpation throughout, no peritoneal signs    PMHx: PVD (right popliteal occusion s/p RLE arterioplasty 2/11/2025, on Aggrenox), Hypertension, Raynaud's/crest syndrome, cerebral vascular disease with hx of occlusive CVA 2020 with R-sided residual weakness, HLD, hypothyroidism, diverticulitis treated outpt with abx in March 2025    PSHx: Left wrist ORIF, tonsils, tubal ligation, last colonoscopy 2017, negative cologuard 2024, Right leg arteriogram with angioplasty     Plan:   -No emergent surgical intervention warranted. Conservative management of SBO with NGT decompression and bowel rest.  -NPO/IVF  -Pain/nausea control  -Serial exams/repeat labs  -subcu heparin     History of Present Illness        Italia Conway is a 75 y.o. female who presented to the ED last evening with severe diffuse abdominal pain, bloating, nausea and dry heaves.x 1 day. Her abdominal pain is constant. She has no hx of similar pain in the past. Denies F/C, change in bowel habits, urinary symptoms, diarrhea, constipation hematochezia, CP, SOB, dizziness. Her last BM was yesterday and normal.        While in the ED, lab studies showed a mild leukocytosis at 13 and  were otherwise essentially nl. CT showed a distended stomach with significant solid food, SBO with transition point in the RLQ and an interloop fluid collection. An NGT was placed for decompression with xray done after to confirm placement.        This morning she continues to have moderate constant diffuse abd pain and mild bloating. Denies nausea. Denies any flatus or BM today.    Review of Systems   Constitutional:  Negative for chills, fatigue and fever.   HENT:  Negative for congestion, ear pain and sore throat.    Eyes:  Negative for pain and visual disturbance.   Respiratory:  Negative for cough, shortness of breath and wheezing.    Cardiovascular:  Negative for chest pain and palpitations.   Gastrointestinal:  Positive for abdominal distention and abdominal pain. Negative for constipation, diarrhea, nausea and vomiting.   Genitourinary:  Negative for dysuria and hematuria.   Musculoskeletal:  Negative for arthralgias and back pain.   Skin:  Negative for color change and rash.   Neurological:  Negative for dizziness, seizures, syncope and weakness.   Psychiatric/Behavioral:  Negative for agitation, behavioral problems and confusion.    All other systems reviewed and are negative.    Historical Information   Past Medical History:   Diagnosis Date    Anemia     Disease of thyroid gland     Hyperlipidemia     Hypertension     PONV (postoperative nausea and vomiting)     Raynaud disease     Stroke (HCC)     TIA (transient ischemic attack)     Wrist fracture     Left     Past Surgical History:   Procedure Laterality Date    COLONOSCOPY      CT EPIDURAL STEROID INJECTION (GALINA LUMBAR)      HAND SURGERY Right     Mallet finger repair    ORIF WRIST FRACTURE Left 1/4/2024    Procedure: Open reduction internal fixation of distal ulna;  Surgeon: Jim Max;  Location:  MAIN OR;  Service: Orthopedics    TONSILLECTOMY      TUBAL LIGATION       Social History     Tobacco Use    Smoking status: Never    Smokeless  tobacco: Never   Vaping Use    Vaping status: Never Used   Substance and Sexual Activity    Alcohol use: Not Currently    Drug use: Not Currently    Sexual activity: Not on file     E-Cigarette/Vaping    E-Cigarette Use Never User      E-Cigarette/Vaping Substances     Family history non-contributory    Objective :  Temp:  [97.2 °F (36.2 °C)-97.8 °F (36.6 °C)] 97.7 °F (36.5 °C)  HR:  [78-98] 78  BP: (141-174)/(79-90) 146/88  Resp:  [15-18] 18  SpO2:  [94 %-100 %] 94 %  O2 Device: None (Room air)    Lines/Drains/Airways       Active Status       Name Placement date Placement time Site Days    NG/OG/Enteral Tube Nasogastric 16 Fr Left nare 05/11/25 2149  Left nare  less than 1                  Physical Exam  Vitals and nursing note reviewed.   Constitutional:       General: She is not in acute distress.     Appearance: Normal appearance. She is well-developed and normal weight.   HENT:      Head: Normocephalic and atraumatic.      Comments: NGT in place. Slowly draining light bilious fluid with thick particulate matter. Tube was flushed repeatedly until draining better.  Eyes:      Conjunctiva/sclera: Conjunctivae normal.   Cardiovascular:      Rate and Rhythm: Normal rate and regular rhythm.      Heart sounds: No murmur heard.  Pulmonary:      Effort: Pulmonary effort is normal. No respiratory distress.      Breath sounds: Normal breath sounds.   Abdominal:      General: Abdomen is flat. There is distension.      Palpations: Abdomen is soft. There is no mass.      Tenderness: There is abdominal tenderness. There is no guarding or rebound.      Hernia: No hernia is present.      Comments: Soft, mildly distended, tender to deep palpation throughout, no tenderness to light palpation, no peritoneal signs   Musculoskeletal:         General: No swelling.      Cervical back: Neck supple.   Skin:     General: Skin is warm and dry.      Capillary Refill: Capillary refill takes less than 2 seconds.   Neurological:       General: No focal deficit present.      Mental Status: She is alert and oriented to person, place, and time.   Psychiatric:         Mood and Affect: Mood normal.         Behavior: Behavior normal.         Lab Results: I have reviewed the following results:  Recent Labs     05/11/25  1851 05/11/25 1956 05/11/25 2203 05/12/25  0439   WBC 13.95*  --   --  12.65*   HGB 13.6  --   --  12.7   HCT 42.8  --   --  40.6     --   --  268   SODIUM 137  --   --  139   K 4.4  --   --  3.9     --   --  110*   CO2 21  --   --  21   BUN 19  --   --  14   CREATININE 0.95  --   --  0.74   GLUC 130  --   --  136   AST 27  --   --   --    ALT 19  --   --   --    ALB 4.7  --   --   --    TBILI 0.71  --   --   --    ALKPHOS 51  --   --   --    HSTNI0  --  3  --   --    HSTNI2  --   --  4  --    LACTICACID  --  1.3  --   --              VTE Pharmacologic Prophylaxis: VTE covered by:  heparin (porcine), Subcutaneous, 5,000 Units at 05/12/25 0437     VTE Mechanical Prophylaxis: sequential compression device

## 2025-05-12 NOTE — PLAN OF CARE
Problem: PAIN - ADULT  Goal: Verbalizes/displays adequate comfort level or baseline comfort level  Description: Interventions:- Encourage patient to monitor pain and request assistance- Assess pain using appropriate pain scale- Administer analgesics based on type and severity of pain and evaluate response- Implement non-pharmacological measures as appropriate and evaluate response- Consider cultural and social influences on pain and pain management- Notify physician/advanced practitioner if interventions unsuccessful or patient reports new pain  Outcome: Progressing     Problem: INFECTION - ADULT  Goal: Absence or prevention of progression during hospitalization  Description: INTERVENTIONS:- Assess and monitor for signs and symptoms of infection- Monitor lab/diagnostic results- Monitor all insertion sites, i.e. indwelling lines, tubes, and drains- Monitor endotracheal if appropriate and nasal secretions for changes in amount and color- [place] appropriate cooling/warming therapies per order- Administer medications as ordered- Instruct and encourage patient and family to use good hand hygiene technique- Identify and instruct in appropriate isolation precautions for identified infection/condition  Outcome: Progressing  Goal: Absence of fever/infection during neutropenic period  Description: INTERVENTIONS:- Monitor WBC  Outcome: Progressing     Problem: DISCHARGE PLANNING  Goal: Discharge to home or other facility with appropriate resources  Description: INTERVENTIONS:- Identify barriers to discharge w/patient and caregiver- Arrange for needed discharge resources and transportation as appropriate- Identify discharge learning needs (meds, wound care, etc.)- Arrange for interpretive services to assist at discharge as needed- Refer to Case Management Department for coordinating discharge planning if the patient needs post-hospital services based on physician/advanced practitioner order or complex needs related to  functional status, cognitive ability, or social support system  Outcome: Progressing     Problem: Knowledge Deficit  Goal: Patient/family/caregiver demonstrates understanding of disease process, treatment plan, medications, and discharge instructions  Description: Complete learning assessment and assess knowledge base.Interventions:- Provide teaching at level of understanding- Provide teaching via preferred learning methods  Outcome: Progressing     Problem: GASTROINTESTINAL - ADULT  Goal: Minimal or absence of nausea and/or vomiting  Description: INTERVENTIONS:- Administer IV fluids if ordered to ensure adequate hydration- Maintain NPO status until nausea and vomiting are resolved- Nasogastric tube if ordered- Administer ordered antiemetic medications as needed- Provide nonpharmacologic comfort measures as appropriate- Advance diet as tolerated, if ordered- Consider nutrition services referral to assist patient with adequate nutrition and appropriate food choices  Outcome: Progressing  Goal: Maintains or returns to baseline bowel function  Description: INTERVENTIONS:- Assess bowel function- Encourage oral fluids to ensure adequate hydration- Administer IV fluids if ordered to ensure adequate hydration- Administer ordered medications as needed- Encourage mobilization and activity- Consider nutritional services referral to assist patient with adequate nutrition and appropriate food choices  Outcome: Progressing  Goal: Maintains adequate nutritional intake  Description: INTERVENTIONS:- Monitor percentage of each meal consumed- Identify factors contributing to decreased intake, treat as appropriate- Assist with meals as needed- Monitor I&O, weight, and lab values if indicated- Obtain nutrition services referral as needed  Outcome: Progressing

## 2025-05-12 NOTE — PLAN OF CARE
Problem: PAIN - ADULT  Goal: Verbalizes/displays adequate comfort level or baseline comfort level  Description: Interventions:- Encourage patient to monitor pain and request assistance- Assess pain using appropriate pain scale- Administer analgesics based on type and severity of pain and evaluate response- Implement non-pharmacological measures as appropriate and evaluate response- Consider cultural and social influences on pain and pain management- Notify physician/advanced practitioner if interventions unsuccessful or patient reports new pain  Outcome: Progressing     Problem: INFECTION - ADULT  Goal: Absence or prevention of progression during hospitalization  Description: INTERVENTIONS:- Assess and monitor for signs and symptoms of infection- Monitor lab/diagnostic results- Monitor all insertion sites, i.e. indwelling lines, tubes, and drains- Monitor endotracheal if appropriate and nasal secretions for changes in amount and color- [place] appropriate cooling/warming therapies per order- Administer medications as ordered- Instruct and encourage patient and family to use good hand hygiene technique- Identify and instruct in appropriate isolation precautions for identified infection/condition  Outcome: Progressing  Goal: Absence of fever/infection during neutropenic period  Description: INTERVENTIONS:- Monitor WBC  Outcome: Progressing     Problem: DISCHARGE PLANNING  Goal: Discharge to home or other facility with appropriate resources  Description: INTERVENTIONS:- Identify barriers to discharge w/patient and caregiver- Arrange for needed discharge resources and transportation as appropriate- Identify discharge learning needs (meds, wound care, etc.)- Arrange for interpretive services to assist at discharge as needed- Refer to Case Management Department for coordinating discharge planning if the patient needs post-hospital services based on physician/advanced practitioner order or complex needs related to  functional status, cognitive ability, or social support system  Outcome: Progressing     Problem: Knowledge Deficit  Goal: Patient/family/caregiver demonstrates understanding of disease process, treatment plan, medications, and discharge instructions  Description: Complete learning assessment and assess knowledge base.Interventions:- Provide teaching at level of understanding- Provide teaching via preferred learning methods  Outcome: Progressing     Problem: GASTROINTESTINAL - ADULT  Goal: Minimal or absence of nausea and/or vomiting  Description: INTERVENTIONS:- Administer IV fluids if ordered to ensure adequate hydration- Maintain NPO status until nausea and vomiting are resolved- Nasogastric tube if ordered- Administer ordered antiemetic medications as needed- Provide nonpharmacologic comfort measures as appropriate- Advance diet as tolerated, if ordered- Consider nutrition services referral to assist patient with adequate nutrition and appropriate food choices  Outcome: Progressing  Goal: Maintains or returns to baseline bowel function  Description: INTERVENTIONS:- Assess bowel function- Encourage oral fluids to ensure adequate hydration- Administer IV fluids if ordered to ensure adequate hydration- Administer ordered medications as needed- Encourage mobilization and activity- Consider nutritional services referral to assist patient with adequate nutrition and appropriate food choices  Outcome: Progressing  Goal: Maintains adequate nutritional intake  Description: INTERVENTIONS:- Monitor percentage of each meal consumed- Identify factors contributing to decreased intake, treat as appropriate- Assist with meals as needed- Monitor I&O, weight, and lab values if indicated- Obtain nutrition services referral as needed  Outcome: Progressing

## 2025-05-12 NOTE — ED PROVIDER NOTES
Time reflects when diagnosis was documented in both MDM as applicable and the Disposition within this note       Time User Action Codes Description Comment    5/11/2025  9:31 PM Karey Altamiranoia Add [K56.609] SBO (small bowel obstruction) ([facility])           ED Disposition       ED Disposition   Admit    Condition   Stable    Date/Time   Sun May 11, 2025  9:31 PM    Comment                  Assessment & Plan       Medical Decision Making  Ddx: diverticulitis, appendicitis, gastroenteritis, ischemic bowel, perforated viscous     Amount and/or Complexity of Data Reviewed  Labs: ordered.  Radiology: ordered.    Risk  OTC drugs.  Prescription drug management.  Decision regarding hospitalization.             Medications   hydrALAZINE (APRESOLINE) injection 5 mg (has no administration in time range)   morphine injection 4 mg (4 mg Intravenous Given 5/11/25 1957)   ondansetron (ZOFRAN) injection 4 mg (4 mg Intravenous Given 5/11/25 1957)   sodium chloride 0.9 % bolus 1,000 mL (0 mL Intravenous Stopped 5/11/25 2142)   iohexol (OMNIPAQUE) 350 MG/ML injection (MULTI-DOSE) 85 mL (85 mL Intravenous Given 5/11/25 2022)   HYDROmorphone (DILAUDID) injection 0.5 mg (0.5 mg Intravenous Given 5/11/25 2137)   midazolam (VERSED) injection 2 mg (2 mg Intravenous Given 5/11/25 2137)       ED Risk Strat Scores                    No data recorded        SBIRT 20yo+      Flowsheet Row Most Recent Value   Initial Alcohol Screen: US AUDIT-C     1. How often do you have a drink containing alcohol? 0 Filed at: 05/11/2025 1908   2. How many drinks containing alcohol do you have on a typical day you are drinking?  0 Filed at: 05/11/2025 1908   3b. FEMALE Any Age, or MALE 65+: How often do you have 4 or more drinks on one occassion? 0 Filed at: 05/11/2025 1908   Audit-C Score 0 Filed at: 05/11/2025 1908   HILDA: How many times in the past year have you...    Used an illegal drug or used a prescription medication for non-medical reasons? Never Filed at:  05/11/2025 1908                            History of Present Illness       Chief Complaint   Patient presents with    Abdominal Pain     Per pt started with abdominal pain this afternoon around 1pm. + nausea. Denies vomiting.        Past Medical History:   Diagnosis Date    Anemia     Disease of thyroid gland     Hyperlipidemia     Hypertension     PONV (postoperative nausea and vomiting)     PVD (peripheral vascular disease) (HCC)     Raynaud disease     Stroke (HCC)     TIA (transient ischemic attack)     Wrist fracture     Left      Past Surgical History:   Procedure Laterality Date    COLONOSCOPY      CT EPIDURAL STEROID INJECTION (GALINA LUMBAR)      HAND SURGERY Right     Mallet finger repair    ORIF WRIST FRACTURE Left 1/4/2024    Procedure: Open reduction internal fixation of distal ulna;  Surgeon: Jim Max;  Location:  MAIN OR;  Service: Orthopedics    TONSILLECTOMY      TUBAL LIGATION        Family History   Problem Relation Age of Onset    Other Mother         encephalitis    Other Father         trauma      Social History     Tobacco Use    Smoking status: Never    Smokeless tobacco: Never   Vaping Use    Vaping status: Never Used   Substance Use Topics    Alcohol use: Not Currently    Drug use: Not Currently      E-Cigarette/Vaping    E-Cigarette Use Never User       E-Cigarette/Vaping Substances      I have reviewed and agree with the history as documented.     75 yr old female with one day of abdominal pain and nausea with dry heaving. Patient states her pain is diffuse and constant.  She states that it radiates from her lower abdomen throughout her chest and into her back.  She does have a history of diverticulitis.  Denies eating any bad foods and denies vomiting or diarrhea.        Review of Systems   Constitutional:  Negative for chills and fever.   HENT:  Negative for ear pain and sore throat.    Eyes:  Negative for pain and visual disturbance.   Respiratory:  Negative for cough and  shortness of breath.    Cardiovascular:  Negative for chest pain and palpitations.   Gastrointestinal:  Positive for abdominal pain and nausea. Negative for vomiting.   Genitourinary:  Negative for dysuria and hematuria.   Musculoskeletal:  Negative for arthralgias and back pain.   Skin:  Negative for color change and rash.   Neurological:  Negative for seizures and syncope.   All other systems reviewed and are negative.          Objective       ED Triage Vitals   Temperature Pulse Blood Pressure Respirations SpO2 Patient Position - Orthostatic VS   05/11/25 1841 05/11/25 1841 05/11/25 1841 05/11/25 1841 05/11/25 1841 05/11/25 1900   97.8 °F (36.6 °C) 89 (!) 174/81 18 99 % Lying      Temp Source Heart Rate Source BP Location FiO2 (%) Pain Score    05/11/25 1841 05/11/25 1841 05/11/25 1841 -- 05/11/25 1841    Temporal Monitor Left arm  9      Vitals      Date and Time Temp Pulse SpO2 Resp BP Pain Score FACES Pain Rating User   05/12/25 1622 -- 85 96 % -- 154/87 -- -- DII   05/12/25 1229 -- 87 95 % -- 147/76 -- -- DII   05/12/25 1155 -- -- -- -- -- 8 -- JS   05/12/25 0830 -- -- 95 % -- -- No Pain -- MW   05/12/25 0708 97.7 °F (36.5 °C) 78 94 % 18 146/88 -- -- DII   05/12/25 0436 -- -- -- -- -- 7 -- SB   05/12/25 0430 -- 98 -- 17 141/79 -- -- SB   05/11/25 2246 -- -- 96 % -- -- 8 -- SB   05/11/25 2222 97.2 °F (36.2 °C) -- -- -- -- -- -- MR   05/11/25 2222 -- 87 96 % 17 148/85 -- -- DII   05/11/25 2137 -- -- -- -- -- 9 -- AR   05/11/25 2115 -- 87 98 % 16 156/84 -- -- AR   05/11/25 1957 -- -- -- -- -- 10 - Worst Possible Pain -- MD   05/11/25 1945 -- 89 100 % 15 161/90 -- -- AR   05/11/25 1915 -- -- -- -- -- 9 -- AR   05/11/25 1900 -- 87 100 % 16 162/88 -- -- AR   05/11/25 1841 97.8 °F (36.6 °C) 89 99 % 18 174/81 9 -- NM            Physical Exam  Vitals and nursing note reviewed.   Constitutional:       General: She is in acute distress.      Appearance: She is well-developed.   HENT:      Head: Normocephalic and  atraumatic.      Mouth/Throat:      Mouth: Mucous membranes are moist.   Eyes:      Extraocular Movements: Extraocular movements intact.      Conjunctiva/sclera: Conjunctivae normal.   Cardiovascular:      Rate and Rhythm: Normal rate and regular rhythm.      Heart sounds: No murmur heard.  Pulmonary:      Effort: Pulmonary effort is normal. No respiratory distress.      Breath sounds: Normal breath sounds.   Abdominal:      General: Abdomen is flat. Bowel sounds are normal. There is distension.      Palpations: Abdomen is soft.      Tenderness: There is generalized abdominal tenderness. There is guarding. There is no rebound.      Hernia: No hernia is present.   Musculoskeletal:         General: No swelling.      Cervical back: Neck supple.   Skin:     General: Skin is warm and dry.      Capillary Refill: Capillary refill takes less than 2 seconds.   Neurological:      General: No focal deficit present.      Mental Status: She is alert and oriented to person, place, and time.   Psychiatric:         Mood and Affect: Mood normal.         Results Reviewed       Procedure Component Value Units Date/Time    HS Troponin I 2hr [[id number]]  (Normal) Collected: 05/11/25 2203    Lab Status: Final result Specimen: Blood from Arm, Right Updated: 05/11/25 2236     hs TnI 2hr 4 ng/L      Delta 2hr hsTnI 1 ng/L     Lactic acid, plasma (w/reflex if result > 2.0) [[id number]]  (Normal) Collected: 05/11/25 1956    Lab Status: Final result Specimen: Blood from Arm, Right Updated: 05/11/25 2037     LACTIC ACID 1.3 mmol/L     Narrative:      Result may be elevated if tourniquet was used during collection.    HS Troponin 0hr (reflex protocol) [[id number]]  (Normal) Collected: 05/11/25 1956    Lab Status: Final result Specimen: Blood from Arm, Right Updated: 05/11/25 2027     hs TnI 0hr 3 ng/L     Urine Microscopic [[id number]] Collected: 05/11/25 1922    Lab Status: Final result Specimen: Urine, Clean Catch Updated: 05/11/25 1934     RBC,  UA None Seen /hpf      WBC, UA 2-4 /hpf      Epithelial Cells Occasional /hpf      Bacteria, UA Occasional /hpf      URINE COMMENT Less than 10 ml urine sent for analysis concentrated microscopic on low volume urine    CMP [[id number]]  (Abnormal) Collected: 05/11/25 1851    Lab Status: Final result Specimen: Blood from Arm, Right Updated: 05/11/25 1933     Sodium 137 mmol/L      Potassium 4.4 mmol/L      Chloride 106 mmol/L      CO2 21 mmol/L      ANION GAP 10 mmol/L      BUN 19 mg/dL      Creatinine 0.95 mg/dL      Glucose 130 mg/dL      Calcium 10.3 mg/dL      AST 27 U/L      ALT 19 U/L      Alkaline Phosphatase 51 U/L      Total Protein 7.8 g/dL      Albumin 4.7 g/dL      Total Bilirubin 0.71 mg/dL      eGFR 58 ml/min/1.73sq m     Narrative:      [employer] Kid[employer] guidelines for Chronic Kidney Disease (CKD):     Stage 1 with normal or high GFR (GFR > 90 mL/min/1.73 square meters)    Stage 2 Mild CKD (GFR = 60-89 mL/min/1.73 square meters)    Stage 3A Moderate CKD (GFR = 45-59 mL/min/1.73 square meters)    Stage 3B Moderate CKD (GFR = 30-44 mL/min/1.73 square meters)    Stage 4 Severe CKD (GFR = 15-29 mL/min/1.73 square meters)    Stage 5 End Stage CKD (GFR <15 mL/min/1.73 square meters)  Note: GFR calculation is accurate only with a steady state creatinine    Lipase [[id number]]  (Abnormal) Collected: 05/11/25 1851    Lab Status: Final result Specimen: Blood from Arm, Right Updated: 05/11/25 1933     Lipase <6 u/L     UA w Reflex to Microscopic w Reflex to Culture [[id number]]  (Abnormal) Collected: 05/11/25 1922    Lab Status: Final result Specimen: Urine, Clean Catch Updated: 05/11/25 1930     Color, UA Yellow     Clarity, UA Clear     Specific Gravity, UA 1.025     pH, UA 6.0     Leukocytes, UA Small     Nitrite, UA Negative     Protein, UA Negative mg/dl      Glucose, UA Negative mg/dl      Ketones, UA Negative mg/dl      Urobilinogen, UA 0.2 E.U./dl      Bilirubin, UA Negative     Occult  Blood, UA Negative    CBC and differential [[id number]]  (Abnormal) Collected: 05/11/25 1851    Lab Status: Final result Specimen: Blood from Arm, Right Updated: 05/11/25 1856     WBC 13.95 Thousand/uL      RBC 4.58 Million/uL      Hemoglobin 13.6 g/dL      Hematocrit 42.8 %      MCV 93 fL      MCH 29.7 pg      MCHC 31.8 g/dL      RDW 13.1 %      MPV 8.9 fL      Platelets 314 Thousands/uL      nRBC 0 /100 WBCs      Segmented % 78 %      Immature Grans % 0 %      Lymphocytes % 11 %      Monocytes % 9 %      Eosinophils Relative 2 %      Basophils Relative 0 %      Absolute Neutrophils 10.75 Thousands/µL      Absolute Immature Grans 0.04 Thousand/uL      Absolute Lymphocytes 1.57 Thousands/µL      Absolute Monocytes 1.20 Thousand/µL      Eosinophils Absolute 0.33 Thousand/µL      Basophils Absolute 0.06 Thousands/µL             XR abdomen obstruction series   Final Interpretation by Roberth Tomlinson DO (05/12 3608)      Persistent dilated loops of small bowel suggestive of small bowel obstruction.         Resident: BERNARD MCCARTHY I, the attending radiologist, have reviewed the images and agree with the final report above.      Workstation performed: [id number]         XR chest portable   Final Interpretation by Roberth Tomlinson DO (05/12 9421)      Enteric tube with sideport projecting at the level of the gastroesophageal junction. Distal tip is below the diaphragm in the left upper quadrant. Question small hiatal hernia.      No acute cardiopulmonary abnormalities.            Resident: BERNARD MCCARTHY I, the attending radiologist, have reviewed the images and agree with the final report above.      Workstation performed: [id number]         CT chest abdomen pelvis w contrast   Final Interpretation by Johanna Mckinley MD (05/11 2107)      Small bowel obstruction with transition point in the right abdomen.      Findings discussed with Dr. Altamirano at 9:05 p.m., 5/11/2025               Workstation performed:  [id number]             Procedures    ED Medication and Procedure Management   Prior to Admission Medications   Prescriptions Last Dose Informant Patient Reported? Taking?   CANDESARTAN CILEXETIL PO  Self Yes No   Sig: Take by mouth daily at bedtime   Calcium 200 MG TABS   Yes No   Si mg   Diclofenac Sodium (VOLTAREN) 1 %   No No   Sig: APPLY 2 GRAMS TO AFFECTED AREA 4 TIMES A DAY   VanishPoint Safety Syringe 25G X 5/8\" 3 ML MISC   Yes No   Sig: ONE INJECTION EVERY MONTH WITH VITAMIN B 12   acetaminophen (TYLENOL) 325 mg tablet   No No   Sig: Take 2 tablets (650 mg total) by mouth every 6 (six) hours as needed for mild pain, headaches or fever   amLODIPine (NORVASC) 10 mg tablet 5/10/2025  Yes Yes   Sig: Take 10 mg by mouth daily   amLODIPine (NORVASC) 2.5 mg tablet   No No   Sig: Take 1 tablet (2.5 mg total) by mouth daily   Patient not taking: Reported on 3/29/2024   aspirin-dipyridamole (AGGRENOX)  mg per 12 hr capsule   Yes No   Sig: Take by mouth   atorvastatin (LIPITOR) 80 mg tablet   No No   Sig: Take 1 tablet (80 mg total) by mouth every evening   cyanocobalamin 1,000 mcg/mL   Yes No   Sig: Inject 1 mL into a muscle every 30 (thirty) days   fluticasone (FLONASE) 50 mcg/act nasal spray   Yes No   Si spray into each nostril daily as needed for rhinitis   folic acid (FOLVITE) 1 mg tablet   Yes No   Sig: Take by mouth   hydroxychloroquine (PLAQUENIL) 200 mg tablet   Yes No   Sig: Take 200 mg by mouth daily   levothyroxine 75 mcg tablet   Yes No   Sig: Take 75 mcg by mouth daily   meclizine (ANTIVERT) 25 mg tablet   No No   Sig: Take 1 tablet (25 mg total) by mouth every 8 (eight) hours as needed for dizziness   ondansetron (ZOFRAN) 4 mg tablet   No No   Sig: Take 1 tablet (4 mg total) by mouth every 6 (six) hours   senna-docusate sodium (SENOKOT-S) 8.6-50 mg per tablet   No No   Sig: Take 1 tablet by mouth daily   traMADol (ULTRAM) 50 mg tablet   Yes No   Sig: Take 50 mg by mouth every 6 (six) " hours as needed      Facility-Administered Medications: None     Current Discharge Medication List        CONTINUE these medications which have NOT CHANGED    Details   !! amLODIPine (NORVASC) 10 mg tablet Take 10 mg by mouth daily      acetaminophen (TYLENOL) 325 mg tablet Take 2 tablets (650 mg total) by mouth every 6 (six) hours as needed for mild pain, headaches or fever  Refills: 0    Associated Diagnoses: Acute CVA (cerebrovascular accident) (HCC)      !! amLODIPine (NORVASC) 2.5 mg tablet Take 1 tablet (2.5 mg total) by mouth daily  Qty: 30 tablet, Refills: 0    Associated Diagnoses: Hypertensive urgency; Hypertension, essential      aspirin-dipyridamole (AGGRENOX)  mg per 12 hr capsule Take by mouth      atorvastatin (LIPITOR) 80 mg tablet Take 1 tablet (80 mg total) by mouth every evening  Qty:  , Refills: 0    Associated Diagnoses: Acute CVA (cerebrovascular accident) ([facility])      Calcium 200 MG TABS 600 mg      CANDESARTAN CILEXETIL PO Take by mouth daily at bedtime      cyanocobalamin 1,000 mcg/mL Inject 1 mL into a muscle every 30 (thirty) days      Diclofenac Sodium (VOLTAREN) 1 % APPLY 2 GRAMS TO AFFECTED AREA 4 TIMES A DAY  Qty: 100 g, Refills: 1    Associated Diagnoses: Primary osteoarthritis of first carpometacarpal joint of left hand      fluticasone (FLONASE) 50 mcg/act nasal spray 1 spray into each nostril daily as needed for rhinitis      folic acid (FOLVITE) 1 mg tablet Take by mouth      hydroxychloroquine (PLAQUENIL) 200 mg tablet Take 200 mg by mouth daily      levothyroxine 75 mcg tablet Take 75 mcg by mouth daily      meclizine (ANTIVERT) 25 mg tablet Take 1 tablet (25 mg total) by mouth every 8 (eight) hours as needed for dizziness  Qty: 30 tablet, Refills: 0    Associated Diagnoses: Vertigo      ondansetron (ZOFRAN) 4 mg tablet Take 1 tablet (4 mg total) by mouth every 6 (six) hours  Qty: 12 tablet, Refills: 0    Associated Diagnoses: Diverticulitis      senna-docusate sodium  "(SENOKOT-S) 8.6-50 mg per tablet Take 1 tablet by mouth daily  Qty: 7 tablet, Refills: 0    Associated Diagnoses: Diverticulitis      traMADol (ULTRAM) 50 mg tablet Take 50 mg by mouth every 6 (six) hours as needed      VanishPoint Safety Syringe 25G X 5/8\" 3 ML MISC ONE INJECTION EVERY MONTH WITH VITAMIN B 12       !! - Potential duplicate medications found. Please discuss with provider.        No discharge procedures on file.  ED SEPSIS DOCUMENTATION   Time reflects when diagnosis was documented in both MDM as applicable and the Disposition within this note       Time User Action Codes Description Comment    5/11/2025  9:31 PM Colette Altamirano Add [K56.609] SBO (small bowel obstruction) ([facility])                  Colette Altamirano DO  05/12/25 2148    "

## 2025-05-12 NOTE — ASSESSMENT & PLAN NOTE
74yo F admitted with nausea, bloating, abdominal pain x 1 day and CT findings of SBO with transition point in the RLQ

## 2025-05-12 NOTE — PLAN OF CARE
Problem: PAIN - ADULT  Goal: Verbalizes/displays adequate comfort level or baseline comfort level  Description: Interventions:- Encourage patient to monitor pain and request assistance- Assess pain using appropriate pain scale- Administer analgesics based on type and severity of pain and evaluate response- Implement non-pharmacological measures as appropriate and evaluate response- Consider cultural and social influences on pain and pain management- Notify physician/advanced practitioner if interventions unsuccessful or patient reports new pain  Outcome: Progressing     Problem: INFECTION - ADULT  Goal: Absence or prevention of progression during hospitalization  Description: INTERVENTIONS:- Assess and monitor for signs and symptoms of infection- Monitor lab/diagnostic results- Monitor all insertion sites, i.e. indwelling lines, tubes, and drains- Monitor endotracheal if appropriate and nasal secretions for changes in amount and color- [place] appropriate cooling/warming therapies per order- Administer medications as ordered- Instruct and encourage patient and family to use good hand hygiene technique- Identify and instruct in appropriate isolation precautions for identified infection/condition  Outcome: Progressing  Goal: Absence of fever/infection during neutropenic period  Description: INTERVENTIONS:- Monitor WBC  Outcome: Progressing

## 2025-05-12 NOTE — UTILIZATION REVIEW
Initial Clinical Review    Admission: Date/Time/Statement:   Admission Orders (From admission, onward)       Ordered        05/11/25 2132  INPATIENT ADMISSION  Once                          Orders Placed This Encounter   Procedures    INPATIENT ADMISSION     Standing Status:   Standing     Number of Occurrences:   1     Level of Care:   Med Surg [16]     Estimated length of stay:   More than 2 Midnights     Certification:   I certify that inpatient services are medically necessary for this patient for a duration of greater than two midnights. See H&P and MD Progress Notes for additional information about the patient's course of treatment.     ED Arrival Information       Expected   -    Arrival   5/11/2025 18:30    Acuity   Urgent              Means of arrival   Walk-In    Escorted by   Self    Service   Surgery-General    Admission type   Emergency              Arrival complaint   Abdominal Pain             Chief Complaint   Patient presents with    Abdominal Pain     Per pt started with abdominal pain this afternoon around 1pm. + nausea. Denies vomiting.        Initial Presentation: 75 y.o. female to ED via walk-in from home  Present to ED with severe diffuse abdominal pain, bloating, nausea and dry heaves.x 1 day.  Her last BM was yesterday and normal. NG tube placed in ER.   PMHX PVD (right popliteal occusion s/p RLE arterioplasty 2/11/2025, on Aggrenox), Hypertension, Raynaud's/crest syndrome, cerebral vascular disease with hx of occlusive CVA 2020 with R-sided residual weakness, HLD, hypothyroidism, diverticulitis treated outpt with abx in March 2025   Admitted to [facility] with DX: Small bowel obstruction   on exam: hypertensive; continues with abdominal pain and bloating. NGT Slowly draining light bilious fluid with thick particulate matter. Pain 9/10 ; WBC 13.95  CT showed a distended stomach with significant solid food, SBO with transition point in the RLQ and an interloop fluid collection.   PLAN: cont ivf;  maintain NGT; monitor labs; serial abdominal exams; NPO ; pain/ nausea control (see below)        Date: 5/12/25      Day 2: Inpatient  Abdomen is soft and mildly distended, no significant tenderness;  500 mL NG tube output recorded since insertion   Plan: cont ivf; start ofirmev iv; maintain NGT; monitor labs; serial abdominal exams; NPO ; pain/ nausea control (see below); f/u obstruction series      Date: 5/13/25     Day 3: Has surpassed a 2nd midnight with active treatments and services.  The patient states her pain improved overnight. She denies any nausea this morning. She may have passed some flatus this morning. 200 mL of NG tube output recorded overnight. This appears to be more clear.   On examination the patient's abdomen is soft with minimal distention in the lower abdomen, no significant tenderness today; K 3.4  Plan: cont ivf; cont ofirmev iv; maintain NGT; monitor labs; serial abdominal exams; NPO ; pain/ nausea control (see below); f/u obstruction series    ED Treatment-Medication Administration from 05/11/2025 1830 to 05/11/2025 2217         Date/Time Order Dose Route Action     05/11/2025 1957 morphine injection 4 mg 4 mg Intravenous Given     05/11/2025 1957 ondansetron (ZOFRAN) injection 4 mg 4 mg Intravenous Given     05/11/2025 1956 sodium chloride 0.9 % bolus 1,000 mL 1,000 mL Intravenous New Bag     05/11/2025 2022 iohexol (OMNIPAQUE) 350 MG/ML injection (MULTI-DOSE) 85 mL 85 mL Intravenous Given     05/11/2025 2137 HYDROmorphone (DILAUDID) injection 0.5 mg 0.5 mg Intravenous Given     05/11/2025 2137 midazolam (VERSED) injection 2 mg 2 mg Intravenous Given            Scheduled Medications:  acetaminophen, 1,000 mg, Intravenous, Q6H DAMION (started 5/12)   heparin (porcine), 5,000 Units, Subcutaneous, Q8H DAMION        Continuous IV Infusions:  sodium chloride, 125 mL/hr, Intravenous, Continuous      PRN Meds:  hydrALAZINE, 5 mg, Intravenous, Q6H PRN  lidocaine, , Topical, 4x Daily PRN  ondansetron,  4 mg, Intravenous, Q6H PRN: 5/11 x1; 5/12 x1    phenol, 1 spray, Mouth/Throat, Q2H PRN  promethazine, 25 mg, Intravenous, Q6H PRN: 5/12 x1   sodium chloride, 1 spray, Each Nare, Q1H PRN  HYDROmorphone, 0.5 mg, Intravenous, Q3H PRN: 5/12 x2      ED Triage Vitals [05/11/25 1841]   Temperature Pulse Respirations Blood Pressure SpO2 Pain Score   97.8 °F (36.6 °C) 89 18 (!) 174/81 99 % 9     Weight (last 2 days)       Date/Time Weight    05/11/25 1841 57.7 (127.21)            Vital Signs (last 3 days)       Date/Time Temp Pulse Resp BP MAP (mmHg) SpO2 O2 Device Patient Position - Orthostatic VS [place] Coma Scale Score Pain    05/13/25 0750 -- -- -- -- -- -- -- -- 15 --    05/13/25 0741 -- 87 18 133/70 91 96 % -- -- -- No Pain    05/12/25 2234 -- 92 17 153/85 108 95 % -- -- -- --    05/12/25 1944 -- -- -- -- -- 95 % None (Room air) -- 15 No Pain    05/12/25 16:22:51 -- 85 -- 154/87 109 96 % -- -- -- --    05/12/25 12:29:16 -- 87 -- 147/76 100 95 % -- -- -- --    05/12/25 1155 -- -- -- -- -- -- -- -- -- 8    05/12/25 0830 -- -- -- -- -- 95 % None (Room air) -- 15 No Pain    05/12/25 07:08:52 97.7 °F (36.5 °C) 78 18 146/88 107 94 % -- -- -- --    05/12/25 0436 -- -- -- -- -- -- -- -- -- 7    05/12/25 0430 -- 98 17 141/79 100 -- -- Lying -- --    05/11/25 2246 -- -- -- -- -- 96 % None (Room air) -- 15 8    05/11/25 22:22:06 97.2 °F (36.2 °C) 87 17 148/85 106 96 % -- -- -- --    05/11/25 2137 -- -- -- -- -- -- -- -- -- 9    05/11/25 2115 -- 87 16 156/84 114 98 % None (Room air) Lying -- --    05/11/25 1957 -- -- -- -- -- -- -- -- -- 10 - Worst Possible Pain    05/11/25 1945 -- 89 15 161/90 118 100 % None (Room air) Lying -- --    05/11/25 1915 -- -- -- -- -- -- -- -- -- 9    05/11/25 1907 -- -- -- -- -- -- -- -- 15 --    05/11/25 1900 -- 87 16 162/88 118 100 % None (Room air) Lying -- --    05/11/25 1841 97.8 °F (36.6 °C) 89 18 174/81 -- 99 % None (Room air) -- -- 9              Pertinent Labs/Diagnostic Test Results:    Radiology:  XR abdomen obstruction series   Final Interpretation by Roberth Tomlinson DO (05/12 4382)      Persistent dilated loops of small bowel suggestive of small bowel obstruction.         Resident: BERNARD MCCARTHY I, the attending radiologist, have reviewed the images and agree with the final report above.      Workstation performed: [id number]         XR chest portable   Final Interpretation by Roberth Tomlinson DO (05/12 5822)      Enteric tube with sideport projecting at the level of the gastroesophageal junction. Distal tip is below the diaphragm in the left upper quadrant. Question small hiatal hernia.      No acute cardiopulmonary abnormalities.            Resident: BERNARD MCCARTHY I, the attending radiologist, have reviewed the images and agree with the final report above.      Workstation performed: [id number]         CT chest abdomen pelvis w contrast   Final Interpretation by Johanna Mckinley MD (05/11 2107)      Small bowel obstruction with transition point in the right abdomen.      Findings discussed with Dr. Altamirano at 9:05 p.m., 5/11/2025               Workstation performed: [id number]         XR abdomen complete inc upright and/or decubitus    (Results Pending)     Cardiology:  ECG 12 lead   Final Result by Juan Bettencourt MD (05/12 2437)   Normal sinus rhythm   Normal ECG   When compared with ECG of 27-Jul-2022 09:33,   ST no longer depressed in Inferior leads   Confirmed by Juan Bettencourt ([id number]) on 5/12/2025 7:52:02 AM          Results from last 7 days   Lab Units 05/13/25 0518 05/12/25 0439 05/11/25  1851   WBC Thousand/uL 8.02 12.65* 13.95*   HEMOGLOBIN g/dL 11.5 12.7 13.6   HEMATOCRIT % 36.4 40.6 42.8   PLATELETS Thousands/uL 224 268 314   TOTAL NEUT ABS Thousands/µL 5.51 10.51* 10.75*       Results from last 7 days   Lab Units 05/13/25 0518 05/12/25 0439 05/11/25  1851   SODIUM mmol/L 140 139 137   POTASSIUM mmol/L 3.4* 3.9 4.4   CHLORIDE mmol/L 113* 110* 106   CO2 mmol/L  21 21 21   ANION GAP mmol/L 6 8 10   BUN mg/dL 12 14 19   CREATININE mg/dL 0.69 0.74 0.95   EGFR ml/min/1.73sq m 84 78 58   CALCIUM mg/dL 7.5* 8.9 10.3*     Results from last 7 days   Lab Units 05/11/25  1851   AST U/L 27   ALT U/L 19   ALK PHOS U/L 51   TOTAL PROTEIN g/dL 7.8   ALBUMIN g/dL 4.7   TOTAL BILIRUBIN mg/dL 0.71       Results from last 7 days   Lab Units 05/13/25  0518 05/12/25  0439 05/11/25  1851   GLUCOSE RANDOM mg/dL 69 136 130       Results from last 7 days   Lab Units 05/11/25  2203 05/11/25 1956   HS TNI 0HR ng/L  --  3   HS TNI 2HR ng/L 4  --    HSTNI D2 ng/L 1  --        Results from last 7 days   Lab Units 05/11/25 1956   LACTIC ACID mmol/L 1.3       Results from last 7 days   Lab Units 05/11/25  1851   LIPASE u/L <6*       Results from last 7 days   Lab Units 05/11/25  1922   CLARITY UA  Clear   COLOR UA  Yellow   SPEC GRAV UA  1.025   PH UA  6.0   GLUCOSE UA mg/dl Negative   KETONES UA mg/dl Negative   BLOOD UA  Negative   PROTEIN UA mg/dl Negative   NITRITE UA  Negative   BILIRUBIN UA  Negative   UROBILINOGEN UA E.U./dl 0.2   LEUKOCYTES UA  Small*   WBC UA /hpf 2-4   RBC UA /hpf None Seen   BACTERIA UA /hpf Occasional   EPITHELIAL CELLS WET PREP /hpf Occasional       Past Medical History:   Diagnosis Date    Anemia     Disease of thyroid gland     Hyperlipidemia     Hypertension     PONV (postoperative nausea and vomiting)     PVD (peripheral vascular disease) (HCC)     Raynaud disease     Stroke (HCC)     TIA (transient ischemic attack)     Wrist fracture     Left     Present on Admission:  **None**      Admitting Diagnosis: SBO (small bowel obstruction) ([facility]) [K56.609]  Abdominal pain [R10.9]  Age/Sex: 75 y.o. female    Network Utilization Review Department  ATTENTION: Please call with any questions or concerns to [phone] and carefully listen to the prompts so that you are directed to the right person. All voicemails are confidential.   For Discharge needs, contact Care Management  DC Support Team at [phone] opt. 2  Send all requests for admission clinical reviews, approved or denied determinations and any other requests to dedicated fax number below belonging to the campus where the patient is receiving treatment. List of dedicated fax numbers for the Facilities:  FACILITY NAME UR FAX NUMBER   ADMISSION DENIALS (Administrative/Medical Necessity) [phone]   DISCHARGE SUPPORT TEAM (NETWORK) [phone]   PARENT CHILD HEALTH (Maternity/NICU/Pediatrics) [phone]   [facility] [phone]   [facility] [phone]   [facility] [phone]   [facility] [phone]   [facility] [phone]   [facility] [phone]   [facility] [phone]   [facility] [phone]   [facility] [phone]   [facility] [phone]   [facility] [phone]   [facility] [phone]

## 2025-05-13 ENCOUNTER — APPOINTMENT (INPATIENT)
Dept: RADIOLOGY | Facility: HOSPITAL | Age: 75
DRG: 389 | End: 2025-05-13
Payer: COMMERCIAL

## 2025-05-13 LAB
ANION GAP SERPL CALCULATED.3IONS-SCNC: 6 MMOL/L (ref 4–13)
BASOPHILS # BLD AUTO: 0.02 THOUSANDS/ÂΜL (ref 0–0.1)
BASOPHILS NFR BLD AUTO: 0 % (ref 0–1)
BUN SERPL-MCNC: 12 MG/DL (ref 5–25)
CALCIUM SERPL-MCNC: 7.5 MG/DL (ref 8.4–10.2)
CHLORIDE SERPL-SCNC: 113 MMOL/L (ref 96–108)
CO2 SERPL-SCNC: 21 MMOL/L (ref 21–32)
CREAT SERPL-MCNC: 0.69 MG/DL (ref 0.6–1.3)
EOSINOPHIL # BLD AUTO: 0.27 THOUSAND/ÂΜL (ref 0–0.61)
EOSINOPHIL NFR BLD AUTO: 3 % (ref 0–6)
ERYTHROCYTE [DISTWIDTH] IN BLOOD BY AUTOMATED COUNT: 13.3 % (ref 11.6–15.1)
GFR SERPL CREATININE-BSD FRML MDRD: 84 ML/MIN/1.73SQ M
GLUCOSE SERPL-MCNC: 127 MG/DL (ref 65–140)
GLUCOSE SERPL-MCNC: 58 MG/DL (ref 65–140)
GLUCOSE SERPL-MCNC: 69 MG/DL (ref 65–140)
HCT VFR BLD AUTO: 36.4 % (ref 34.8–46.1)
HGB BLD-MCNC: 11.5 G/DL (ref 11.5–15.4)
IMM GRANULOCYTES # BLD AUTO: 0.02 THOUSAND/UL (ref 0–0.2)
IMM GRANULOCYTES NFR BLD AUTO: 0 % (ref 0–2)
LYMPHOCYTES # BLD AUTO: 1.35 THOUSANDS/ÂΜL (ref 0.6–4.47)
LYMPHOCYTES NFR BLD AUTO: 17 % (ref 14–44)
MCH RBC QN AUTO: 30.3 PG (ref 26.8–34.3)
MCHC RBC AUTO-ENTMCNC: 31.6 G/DL (ref 31.4–37.4)
MCV RBC AUTO: 96 FL (ref 82–98)
MONOCYTES # BLD AUTO: 0.85 THOUSAND/ÂΜL (ref 0.17–1.22)
MONOCYTES NFR BLD AUTO: 11 % (ref 4–12)
NEUTROPHILS # BLD AUTO: 5.51 THOUSANDS/ÂΜL (ref 1.85–7.62)
NEUTS SEG NFR BLD AUTO: 69 % (ref 43–75)
NRBC BLD AUTO-RTO: 0 /100 WBCS
PLATELET # BLD AUTO: 224 THOUSANDS/UL (ref 149–390)
PMV BLD AUTO: 8.8 FL (ref 8.9–12.7)
POTASSIUM SERPL-SCNC: 3.4 MMOL/L (ref 3.5–5.3)
RBC # BLD AUTO: 3.79 MILLION/UL (ref 3.81–5.12)
SODIUM SERPL-SCNC: 140 MMOL/L (ref 135–147)
WBC # BLD AUTO: 8.02 THOUSAND/UL (ref 4.31–10.16)

## 2025-05-13 PROCEDURE — 74019 RADEX ABDOMEN 2 VIEWS: CPT

## 2025-05-13 PROCEDURE — 80048 BASIC METABOLIC PNL TOTAL CA: CPT | Performed by: STUDENT IN AN ORGANIZED HEALTH CARE EDUCATION/TRAINING PROGRAM

## 2025-05-13 PROCEDURE — 82948 REAGENT STRIP/BLOOD GLUCOSE: CPT

## 2025-05-13 PROCEDURE — 85025 COMPLETE CBC W/AUTO DIFF WBC: CPT | Performed by: NURSE PRACTITIONER

## 2025-05-13 RX ORDER — DEXTROSE MONOHYDRATE, SODIUM CHLORIDE, AND POTASSIUM CHLORIDE 50; 1.49; 9 G/1000ML; G/1000ML; G/1000ML
100 INJECTION, SOLUTION INTRAVENOUS CONTINUOUS
Status: DISCONTINUED | OUTPATIENT
Start: 2025-05-13 | End: 2025-05-15

## 2025-05-13 RX ORDER — DIATRIZOATE MEGLUMINE AND DIATRIZOATE SODIUM 660; 100 MG/ML; MG/ML
120 SOLUTION ORAL; RECTAL
Status: COMPLETED | OUTPATIENT
Start: 2025-05-13 | End: 2025-05-13

## 2025-05-13 RX ORDER — DEXTROSE MONOHYDRATE 25 G/50ML
25 INJECTION, SOLUTION INTRAVENOUS ONCE
Status: COMPLETED | OUTPATIENT
Start: 2025-05-13 | End: 2025-05-13

## 2025-05-13 RX ADMIN — HEPARIN SODIUM 5000 UNITS: 5000 INJECTION INTRAVENOUS; SUBCUTANEOUS at 21:55

## 2025-05-13 RX ADMIN — SODIUM CHLORIDE 125 ML/HR: 0.9 INJECTION, SOLUTION INTRAVENOUS at 07:40

## 2025-05-13 RX ADMIN — ACETAMINOPHEN 1000 MG: 10 INJECTION, SOLUTION INTRAVENOUS at 05:30

## 2025-05-13 RX ADMIN — HEPARIN SODIUM 5000 UNITS: 5000 INJECTION INTRAVENOUS; SUBCUTANEOUS at 13:00

## 2025-05-13 RX ADMIN — ACETAMINOPHEN 1000 MG: 10 INJECTION, SOLUTION INTRAVENOUS at 11:53

## 2025-05-13 RX ADMIN — PHENOL 1 SPRAY: 1.4 SPRAY ORAL at 11:55

## 2025-05-13 RX ADMIN — ACETAMINOPHEN 1000 MG: 10 INJECTION, SOLUTION INTRAVENOUS at 23:36

## 2025-05-13 RX ADMIN — SALINE NASAL SPRAY 1 SPRAY: 1.5 SOLUTION NASAL at 16:07

## 2025-05-13 RX ADMIN — ACETAMINOPHEN 1000 MG: 10 INJECTION, SOLUTION INTRAVENOUS at 17:41

## 2025-05-13 RX ADMIN — SALINE NASAL SPRAY 1 SPRAY: 1.5 SOLUTION NASAL at 07:41

## 2025-05-13 RX ADMIN — DEXTROSE, SODIUM CHLORIDE, AND POTASSIUM CHLORIDE 100 ML/HR: 5; .9; .15 INJECTION INTRAVENOUS at 13:10

## 2025-05-13 RX ADMIN — HEPARIN SODIUM 5000 UNITS: 5000 INJECTION INTRAVENOUS; SUBCUTANEOUS at 05:30

## 2025-05-13 RX ADMIN — DEXTROSE, SODIUM CHLORIDE, AND POTASSIUM CHLORIDE 100 ML/HR: 5; .9; .15 INJECTION INTRAVENOUS at 23:36

## 2025-05-13 RX ADMIN — DEXTROSE MONOHYDRATE 25 ML: 25 INJECTION, SOLUTION INTRAVENOUS at 13:05

## 2025-05-13 RX ADMIN — SALINE NASAL SPRAY 1 SPRAY: 1.5 SOLUTION NASAL at 21:57

## 2025-05-13 RX ADMIN — DIATRIZOATE MEGLUMINE AND DIATRIZOATE SODIUM 120 ML: 660; 100 LIQUID ORAL; RECTAL at 09:27

## 2025-05-13 NOTE — QUICK NOTE
Patient seen and examined after abdominal imaging    The patient denies any nausea.  The NG tube has been clamped since 930 this morning.  She did have multiple loose bowel movements.  Her pain has resolved.    Abdominal obstruction series with Gastrografin shows resolution of the small bowel obstruction with passage of contrast into the colon    NG tube removed, slight nosebleed following removal which resolved before I left the room.  Continue saline nasal spray  Patient may have ice chips tonight

## 2025-05-13 NOTE — PROGRESS NOTES
Progress Note - Surgery-General   Name: Italia Conway 75 y.o. female I MRN: [id number]  Unit/Bed#: -01 I Date of Admission: 5/11/2025   Date of Service: 5/13/2025 I Hospital Day: 2    Assessment & Plan  Small bowel obstruction (HCC)  76yo F admitted with nausea, bloating, abdominal pain x 1 day and CT findings of SBO with transition point in the RLQ    The patient states her pain improved overnight.  She denies any nausea this morning.  She may have passed some flatus this morning.  200 mL of NG tube output recorded overnight.  This appears to be more clear.    On examination the patient's abdomen is soft with minimal distention in the lower abdomen, no significant tenderness today    Plan:  Will complete a Gastrografin study today for further evaluation of the patient's small bowel obstruction.  If the patient becomes nauseated with Gastrografin administration or there appears to be a complete small bowel obstruction, the patient will then require operative intervention.  If Gastrografin passes through to the colon, may consider NG tube removal and slow advancement of diet  Continue n.p.o. and IV fluids for now  Continue to hold Aggrenox    Please contact the SecureChat role for the Surgery-General service with any questions/concerns.    Subjective   The patient states she feels a bit better today.  She denies any pain overnight.  She also did not have any recurrence of her nausea.  The patient states she may have passed a small amount of flatus when she used the bathroom this morning.    Objective :  HR:  [85-92] 87  BP: (133-154)/(70-87) 133/70  Resp:  [17-18] 18  SpO2:  [95 %-96 %] 96 %  O2 Device: None (Room air)    I/O         05/11 0701 05/12 0700 05/12 0701 05/13 0700 05/13 0701 05/14 0700    NG/GT  60     IV Piggyback 1000      Total Intake(mL/kg) 1000 (17.3) 60 (1)     Urine (mL/kg/hr) 0 600 (0.4) 200 (1.3)    Emesis/NG output 500 1050     Total Output 500 1650 200    Net +500 -1590 -200            Unmeasured Urine Occurrence 2 x 1 x     Unmeasured Emesis Occurrence 1 x            Lines/Drains/Airways       Active Status       Name Placement date Placement time Site Days    NG/OG/Enteral Tube Nasogastric 16 Fr Left nare 05/11/25 2149  Left nare  1                  Physical Exam  Constitutional:       Appearance: Normal appearance.   HENT:      Head: Normocephalic and atraumatic.      Mouth/Throat:      Mouth: Mucous membranes are moist.   Cardiovascular:      Rate and Rhythm: Normal rate and regular rhythm.   Pulmonary:      Effort: Pulmonary effort is normal.      Breath sounds: Normal breath sounds.   Abdominal:      General: There is distension (mild).      Palpations: Abdomen is soft.      Tenderness: There is no abdominal tenderness. There is no guarding or rebound.   Musculoskeletal:         General: Normal range of motion.   Skin:     General: Skin is warm and dry.   Neurological:      General: No focal deficit present.      Mental Status: She is alert and oriented to person, place, and time.           Lab Results: I have reviewed the following results:  Recent Labs     05/11/25  1851 05/11/25  1956 05/11/25  2203 05/12/25  0439 05/13/25  0518   WBC 13.95*  --   --    < > 8.02   HGB 13.6  --   --    < > 11.5   HCT 42.8  --   --    < > 36.4     --   --    < > 224   SODIUM 137  --   --    < > 140   K 4.4  --   --    < > 3.4*     --   --    < > 113*   CO2 21  --   --    < > 21   BUN 19  --   --    < > 12   CREATININE 0.95  --   --    < > 0.69   GLUC 130  --   --    < > 69   AST 27  --   --   --   --    ALT 19  --   --   --   --    ALB 4.7  --   --   --   --    TBILI 0.71  --   --   --   --    ALKPHOS 51  --   --   --   --    HSTNI0  --  3  --   --   --    HSTNI2  --   --  4  --   --    LACTICACID  --  1.3  --   --   --     < > = values in this interval not displayed.       Imaging Results Review: No pertinent imaging studies reviewed.  Other Study Results Review: No additional pertinent  studies reviewed.    VTE Pharmacologic Prophylaxis: VTE covered by:  heparin (porcine), Subcutaneous, 5,000 Units at 05/13/25 0530     VTE Mechanical Prophylaxis: sequential compression device

## 2025-05-13 NOTE — ASSESSMENT & PLAN NOTE
76yo F admitted with nausea, bloating, abdominal pain x 1 day and CT findings of SBO with transition point in the RLQ    The patient states her pain improved overnight.  She denies any nausea this morning.  She may have passed some flatus this morning.  200 mL of NG tube output recorded overnight.  This appears to be more clear.    On examination the patient's abdomen is soft with minimal distention in the lower abdomen, no significant tenderness today    Plan:  Will complete a Gastrografin study today for further evaluation of the patient's small bowel obstruction.  If the patient becomes nauseated with Gastrografin administration or there appears to be a complete small bowel obstruction, the patient will then require operative intervention.  If Gastrografin passes through to the colon, may consider NG tube removal and slow advancement of diet  Continue n.p.o. and IV fluids for now  Continue to hold Aggrenox

## 2025-05-13 NOTE — PLAN OF CARE
Problem: PAIN - ADULT  Goal: Verbalizes/displays adequate comfort level or baseline comfort level  Description: Interventions:- Encourage patient to monitor pain and request assistance- Assess pain using appropriate pain scale- Administer analgesics based on type and severity of pain and evaluate response- Implement non-pharmacological measures as appropriate and evaluate response- Consider cultural and social influences on pain and pain management- Notify physician/advanced practitioner if interventions unsuccessful or patient reports new pain  Outcome: Progressing     Problem: INFECTION - ADULT  Goal: Absence or prevention of progression during hospitalization  Description: INTERVENTIONS:- Assess and monitor for signs and symptoms of infection- Monitor lab/diagnostic results- Monitor all insertion sites, i.e. indwelling lines, tubes, and drains- Monitor endotracheal if appropriate and nasal secretions for changes in amount and color- [place] appropriate cooling/warming therapies per order- Administer medications as ordered- Instruct and encourage patient and family to use good hand hygiene technique- Identify and instruct in appropriate isolation precautions for identified infection/condition  Outcome: Progressing  Goal: Absence of fever/infection during neutropenic period  Description: INTERVENTIONS:- Monitor WBC  Outcome: Progressing

## 2025-05-14 RX ORDER — ACETAMINOPHEN 10 MG/ML
1000 INJECTION, SOLUTION INTRAVENOUS EVERY 6 HOURS PRN
Status: DISCONTINUED | OUTPATIENT
Start: 2025-05-14 | End: 2025-05-16 | Stop reason: HOSPADM

## 2025-05-14 RX ORDER — ZOLPIDEM TARTRATE 5 MG/1
5 TABLET ORAL
Status: DISCONTINUED | OUTPATIENT
Start: 2025-05-14 | End: 2025-05-16 | Stop reason: HOSPADM

## 2025-05-14 RX ORDER — POTASSIUM CHLORIDE 14.9 MG/ML
20 INJECTION INTRAVENOUS ONCE
Status: DISCONTINUED | OUTPATIENT
Start: 2025-05-14 | End: 2025-05-14

## 2025-05-14 RX ADMIN — HEPARIN SODIUM 5000 UNITS: 5000 INJECTION INTRAVENOUS; SUBCUTANEOUS at 05:58

## 2025-05-14 RX ADMIN — HEPARIN SODIUM 5000 UNITS: 5000 INJECTION INTRAVENOUS; SUBCUTANEOUS at 14:58

## 2025-05-14 RX ADMIN — HYDROMORPHONE HYDROCHLORIDE 0.2 MG: 0.2 INJECTION, SOLUTION INTRAMUSCULAR; INTRAVENOUS; SUBCUTANEOUS at 04:25

## 2025-05-14 RX ADMIN — DEXTROSE, SODIUM CHLORIDE, AND POTASSIUM CHLORIDE 100 ML/HR: 5; .9; .15 INJECTION INTRAVENOUS at 18:36

## 2025-05-14 RX ADMIN — SALINE NASAL SPRAY 1 SPRAY: 1.5 SOLUTION NASAL at 10:38

## 2025-05-14 RX ADMIN — DEXTROSE, SODIUM CHLORIDE, AND POTASSIUM CHLORIDE 100 ML/HR: 5; .9; .15 INJECTION INTRAVENOUS at 08:44

## 2025-05-14 RX ADMIN — HEPARIN SODIUM 5000 UNITS: 5000 INJECTION INTRAVENOUS; SUBCUTANEOUS at 21:59

## 2025-05-14 RX ADMIN — ZOLPIDEM TARTRATE 5 MG: 5 TABLET, FILM COATED ORAL at 21:59

## 2025-05-14 NOTE — PLAN OF CARE
Problem: PAIN - ADULT  Goal: Verbalizes/displays adequate comfort level or baseline comfort level  Description: Interventions:- Encourage patient to monitor pain and request assistance- Assess pain using appropriate pain scale- Administer analgesics based on type and severity of pain and evaluate response- Implement non-pharmacological measures as appropriate and evaluate response- Consider cultural and social influences on pain and pain management- Notify physician/advanced practitioner if interventions unsuccessful or patient reports new pain  Outcome: Progressing     Problem: INFECTION - ADULT  Goal: Absence or prevention of progression during hospitalization  Description: INTERVENTIONS:- Assess and monitor for signs and symptoms of infection- Monitor lab/diagnostic results- Monitor all insertion sites, i.e. indwelling lines, tubes, and drains- Monitor endotracheal if appropriate and nasal secretions for changes in amount and color- [place] appropriate cooling/warming therapies per order- Administer medications as ordered- Instruct and encourage patient and family to use good hand hygiene technique- Identify and instruct in appropriate isolation precautions for identified infection/condition  Outcome: Progressing  Goal: Absence of fever/infection during neutropenic period  Description: INTERVENTIONS:- Monitor WBC  Outcome: Progressing     Problem: DISCHARGE PLANNING  Goal: Discharge to home or other facility with appropriate resources  Description: INTERVENTIONS:- Identify barriers to discharge w/patient and caregiver- Arrange for needed discharge resources and transportation as appropriate- Identify discharge learning needs (meds, wound care, etc.)- Arrange for interpretive services to assist at discharge as needed- Refer to Case Management Department for coordinating discharge planning if the patient needs post-hospital services based on physician/advanced practitioner order or complex needs related to  functional status, cognitive ability, or social support system  Outcome: Progressing     Problem: Knowledge Deficit  Goal: Patient/family/caregiver demonstrates understanding of disease process, treatment plan, medications, and discharge instructions  Description: Complete learning assessment and assess knowledge base.Interventions:- Provide teaching at level of understanding- Provide teaching via preferred learning methods  Outcome: Progressing     Problem: GASTROINTESTINAL - ADULT  Goal: Minimal or absence of nausea and/or vomiting  Description: INTERVENTIONS:- Administer IV fluids if ordered to ensure adequate hydration- Maintain NPO status until nausea and vomiting are resolved- Nasogastric tube if ordered- Administer ordered antiemetic medications as needed- Provide nonpharmacologic comfort measures as appropriate- Advance diet as tolerated, if ordered- Consider nutrition services referral to assist patient with adequate nutrition and appropriate food choices  Outcome: Progressing  Goal: Maintains or returns to baseline bowel function  Description: INTERVENTIONS:- Assess bowel function- Encourage oral fluids to ensure adequate hydration- Administer IV fluids if ordered to ensure adequate hydration- Administer ordered medications as needed- Encourage mobilization and activity- Consider nutritional services referral to assist patient with adequate nutrition and appropriate food choices  Outcome: Progressing  Goal: Maintains adequate nutritional intake  Description: INTERVENTIONS:- Monitor percentage of each meal consumed- Identify factors contributing to decreased intake, treat as appropriate- Assist with meals as needed- Monitor I&O, weight, and lab values if indicated- Obtain nutrition services referral as needed  Outcome: Progressing     Problem: Nutrition/Hydration-ADULT  Goal: Nutrient/Hydration intake appropriate for improving, restoring or maintaining nutritional needs  Description: Monitor and assess  patient's nutrition/hydration status for malnutrition. Collaborate with interdisciplinary team and initiate plan and interventions as ordered.  Monitor patient's weight and dietary intake as ordered or per policy. Utilize nutrition screening tool and intervene as necessary. Determine patient's food preferences and provide high-protein, high-caloric foods as appropriate. INTERVENTIONS:- Monitor oral intake, urinary output, labs, and treatment plans- Assess nutrition and hydration status and recommend course of action- Evaluate amount of meals eaten- Assist patient with eating if necessary - Allow adequate time for meals- Recommend/ encourage appropriate diets, oral nutritional supplements, and vitamin/mineral supplements- Order, calculate, and assess calorie counts as needed- Recommend, monitor, and adjust tube feedings and TPN/PPN based on assessed needs- Assess need for intravenous fluids- Provide specific nutrition/hydration education as appropriate- Include patient/family/caregiver in decisions related to nutrition  Outcome: Progressing

## 2025-05-14 NOTE — PROGRESS NOTES
Progress Note - Surgery-General   Name: Italia Conway 75 y.o. female I MRN: [id number]  Unit/Bed#: -01 I Date of Admission: 5/11/2025   Date of Service: 5/14/2025 I Hospital Day: 3     Assessment & Plan  Small bowel obstruction (HCC)  76 yo F admitted with nausea, bloating, abdominal pain x 1 day and CT findings of SBO with transition point in the RLQ    Gastrografin challenge 5/13:  - Gastrografin passes into the colon.  - There is no residual small bowel dilatation.    Obstruction series 5/12:  - Persistent dilated loops of small bowel suggestive of small bowel obstruction.     - Afebrile, VSS on room air  - No new labs today   - 5/13 - CBC unremarkable   - 5/13 - Hypokalemia 3.4     - NGT removed 5/13 and pt denies any nausea or vomiting  - Continues to move her bowels for loose, liquid stool     Plan:   - Diet advanced to clear liquids this morning  - IVF hydration continues to run   - Monitor diet tolerance, continued bowel function   - Pain/nausea control PRN   - Encourage ambulation, oob to chair as able   - No further nose bleeds since NGT removal but continued saline spray as needed   - Hold Aggrenox today, consider resuming tomorrow with continued improvement and diet tolerance  - Home Ambien reordered for sleep PRN     Please contact the SecureChat role for the Surgery-General service with any questions/concerns.    Subjective   Patient states she is feeling better but is not back to baseline yet. She continues to have some intermittent discomfort of the abdomen. Patient feels slightly bloated but this is improving. She denies any nausea or vomiting since NGT removal. Patient has been passing flatus and passing liquid, loose stool since gastrografin. She has no fevers, chills, shortness of breath, or chest tightness.     Objective :  Temp:  [97.3 °F (36.3 °C)-97.5 °F (36.4 °C)] 97.5 °F (36.4 °C)  HR:  [79-90] 84  BP: (127-150)/(68-90) 143/74  Resp:  [16-18] 18  SpO2:  [94 %-98 %] 95 %  O2  Device: None (Room air)    I/O         05/12 0701 05/13 0700 05/13 0701 05/14 0700 05/14 0701  05/15 0700    NG/GT 60      IV Piggyback       Total Intake(mL/kg) 60 (1)      Urine (mL/kg/hr) 600 (0.4) 600 (0.4)     Emesis/NG output 1050      Total Output 1650 600     Net -1590 -600            Unmeasured Urine Occurrence 1 x            Physical Exam  General: no acute distress, pt appears well and comfortable, much improved from previous visit by this provider  Skin: warm and dry to touch  Pulmonary: normal effort  Abdominal: active bowel sounds, soft, mildly distended abdomen, mild discomfort to palpation of the mid-abdomen but patient denies true pain, no guarding or rebound  Neuro: alert and oriented     Lab Results: I have reviewed the following results:  Recent Labs     05/11/25  1851 05/11/25  1956 05/11/25  2203 05/12/25  0439 05/13/25  0518   WBC 13.95*  --   --    < > 8.02   HGB 13.6  --   --    < > 11.5   HCT 42.8  --   --    < > 36.4     --   --    < > 224   SODIUM 137  --   --    < > 140   K 4.4  --   --    < > 3.4*     --   --    < > 113*   CO2 21  --   --    < > 21   BUN 19  --   --    < > 12   CREATININE 0.95  --   --    < > 0.69   GLUC 130  --   --    < > 69   AST 27  --   --   --   --    ALT 19  --   --   --   --    ALB 4.7  --   --   --   --    TBILI 0.71  --   --   --   --    ALKPHOS 51  --   --   --   --    HSTNI0  --  3  --   --   --    HSTNI2  --   --  4  --   --    LACTICACID  --  1.3  --   --   --     < > = values in this interval not displayed.     Imaging Results Review: No pertinent imaging studies reviewed.  Other Study Results Review: No additional pertinent studies reviewed.    VTE Pharmacologic Prophylaxis: VTE covered by:  heparin (porcine), Subcutaneous, 5,000 Units at 05/14/25 0558     VTE Mechanical Prophylaxis: sequential compression device    Katrina Elizabeth Billig, PA-C  5/14/2025

## 2025-05-14 NOTE — ASSESSMENT & PLAN NOTE
76 yo F admitted with nausea, bloating, abdominal pain x 1 day and CT findings of SBO with transition point in the RLQ    Gastrografin challenge 5/13:  - Gastrografin passes into the colon.  - There is no residual small bowel dilatation.    Obstruction series 5/12:  - Persistent dilated loops of small bowel suggestive of small bowel obstruction.     - Afebrile, VSS on room air  - No new labs today   - 5/13 - CBC unremarkable   - 5/13 - Hypokalemia 3.4     - NGT removed 5/13 and pt denies any nausea or vomiting  - Continues to move her bowels for loose, liquid stool     Plan:   - Diet advanced to clear liquids this morning  - IVF hydration continues to run   - Monitor diet tolerance, continued bowel function   - Pain/nausea control PRN   - Encourage ambulation, oob to chair as able   - No further nose bleeds since NGT removal but continued saline spray as needed   - Hold Aggrenox today, consider resuming tomorrow with continued improvement and diet tolerance  - Home Ambien reordered for sleep PRN

## 2025-05-15 LAB
ANION GAP SERPL CALCULATED.3IONS-SCNC: 6 MMOL/L (ref 4–13)
BUN SERPL-MCNC: 3 MG/DL (ref 5–25)
CALCIUM SERPL-MCNC: 7.9 MG/DL (ref 8.4–10.2)
CHLORIDE SERPL-SCNC: 110 MMOL/L (ref 96–108)
CO2 SERPL-SCNC: 23 MMOL/L (ref 21–32)
CREAT SERPL-MCNC: 0.62 MG/DL (ref 0.6–1.3)
GFR SERPL CREATININE-BSD FRML MDRD: 87 ML/MIN/1.73SQ M
GLUCOSE SERPL-MCNC: 136 MG/DL (ref 65–140)
POTASSIUM SERPL-SCNC: 3.5 MMOL/L (ref 3.5–5.3)
SODIUM SERPL-SCNC: 139 MMOL/L (ref 135–147)

## 2025-05-15 PROCEDURE — 80048 BASIC METABOLIC PNL TOTAL CA: CPT

## 2025-05-15 RX ORDER — LEVOTHYROXINE SODIUM 75 UG/1
75 TABLET ORAL
Status: DISCONTINUED | OUTPATIENT
Start: 2025-05-16 | End: 2025-05-16 | Stop reason: HOSPADM

## 2025-05-15 RX ORDER — ATORVASTATIN CALCIUM 40 MG/1
80 TABLET, FILM COATED ORAL EVERY EVENING
Status: DISCONTINUED | OUTPATIENT
Start: 2025-05-15 | End: 2025-05-16 | Stop reason: HOSPADM

## 2025-05-15 RX ORDER — MECLIZINE HYDROCHLORIDE 25 MG/1
25 TABLET ORAL EVERY 8 HOURS PRN
Status: DISCONTINUED | OUTPATIENT
Start: 2025-05-15 | End: 2025-05-16 | Stop reason: HOSPADM

## 2025-05-15 RX ORDER — ASPIRIN AND DIPYRIDAMOLE 25; 200 MG/1; MG/1
1 CAPSULE, EXTENDED RELEASE ORAL EVERY 12 HOURS SCHEDULED
Status: DISCONTINUED | OUTPATIENT
Start: 2025-05-15 | End: 2025-05-16 | Stop reason: HOSPADM

## 2025-05-15 RX ORDER — AMLODIPINE BESYLATE 10 MG/1
10 TABLET ORAL DAILY
Status: DISCONTINUED | OUTPATIENT
Start: 2025-05-16 | End: 2025-05-16 | Stop reason: HOSPADM

## 2025-05-15 RX ADMIN — HEPARIN SODIUM 5000 UNITS: 5000 INJECTION INTRAVENOUS; SUBCUTANEOUS at 21:42

## 2025-05-15 RX ADMIN — ATORVASTATIN CALCIUM 80 MG: 40 TABLET, FILM COATED ORAL at 18:02

## 2025-05-15 RX ADMIN — DEXTROSE, SODIUM CHLORIDE, AND POTASSIUM CHLORIDE 100 ML/HR: 5; .9; .15 INJECTION INTRAVENOUS at 03:01

## 2025-05-15 RX ADMIN — ASPIRIN AND DIPYRIDAMOLE 1 CAPSULE: 25; 200 CAPSULE, EXTENDED RELEASE ORAL at 21:42

## 2025-05-15 RX ADMIN — ZOLPIDEM TARTRATE 5 MG: 5 TABLET, FILM COATED ORAL at 21:42

## 2025-05-15 NOTE — PROGRESS NOTES
Progress Note - Surgery-General   Name: Italia Conway 75 y.o. female I MRN: [id number]  Unit/Bed#: -01 I Date of Admission: 5/11/2025   Date of Service: 5/15/2025 I Hospital Day: 4    Assessment & Plan  Small bowel obstruction (HCC)  74 yo F admitted with nausea, bloating, abdominal pain x 1 day and CT findings of SBO with transition point in the RLQ    Gastrografin challenge 5/13:  - Gastrografin passes into the colon.  - There is no residual small bowel dilatation.    Obstruction series 5/12:  - Persistent dilated loops of small bowel suggestive of small bowel obstruction.     - Afebrile, VSS on room air    - 5/13 - CBC unremarkable   - BMP WNL today    - NGT removed 5/13 and pt denies any nausea or vomiting and has been tolerating clears, would like more  - Continues to move her bowels for loose, liquid stool, 3 times this am  -only mild abd pain and overall feels much better     Plan:   - Diet advanced to clear liquids with T&C, will advance as tolerated  - D/c IV fluids and telemetry   - Monitor diet tolerance, continued bowel function   - Pain/nausea control PRN   - Encourage ambulation, oob to chair as able   - No further nose bleeds since NGT removal but continued saline spray as needed   - consider restarting home medications including Aggrenox today, will discuss with Dr Seth  - Home Marielaien reordered for sleep PRN     Please contact the SecureChat role for the Surgery-General service with any questions/concerns.    Subjective   Patient is seen in bed.  She states that she is feeling much better today.  She was able to eat clears without any nausea vomiting or increased abdominal pain.  She is asking if she can have something more.  She says she has very mild right-sided abdominal pain that is significantly improved from previous days.  Passing flatus, and had 3 liquid stools this morning.    Objective :  Temp:  [97 °F (36.1 °C)-97.9 °F (36.6 °C)] 97 °F (36.1 °C)  HR:  [81-92] 83  BP:  (123-164)/(71-87) 130/83  Resp:  [18] 18  SpO2:  [94 %-98 %] 95 %  O2 Device: None (Room air)    I/O         05/13 0701 05/14 0700 05/14 0701  05/15 0700 05/15 0701 05/16 0700    P.O.  360     NG/GT       Total Intake(mL/kg)  360 (6.2)     Urine (mL/kg/hr) 600 (0.4) 1350 (1)     Emesis/NG output       Total Output 600 1350     Net -600 -990            Unmeasured Urine Occurrence  1 x             Physical Exam  Vitals reviewed.   Constitutional:       General: She is not in acute distress.     Appearance: She is not ill-appearing, toxic-appearing or diaphoretic.   HENT:      Head: Normocephalic and atraumatic.      Mouth/Throat:      Mouth: Mucous membranes are moist.      Pharynx: No oropharyngeal exudate.     Eyes:      General:         Left eye: No discharge.       Cardiovascular:      Rate and Rhythm: Normal rate.      Heart sounds: Normal heart sounds.   Pulmonary:      Effort: Pulmonary effort is normal. No respiratory distress.      Breath sounds: Normal breath sounds.   Abdominal:      General: There is no distension.      Palpations: Abdomen is soft. There is no mass.      Tenderness: There is abdominal tenderness. There is no guarding or rebound.      Hernia: No hernia is present.      Comments: Abdomen is soft, nondistended with normoactive bowel sounds.  Very mild tenderness right mid abdomen.     Musculoskeletal:         General: Normal range of motion.     Skin:     General: Skin is warm and dry.      Coloration: Skin is not jaundiced.     Neurological:      General: No focal deficit present.      Mental Status: She is alert.     Psychiatric:         Mood and Affect: Mood normal.         Lab Results: I have reviewed the following results:  Recent Labs     05/13/25  0518 05/15/25  0447   WBC 8.02  --    HGB 11.5  --    HCT 36.4  --      --    SODIUM 140 139   K 3.4* 3.5   * 110*   CO2 21 23   BUN 12 3*   CREATININE 0.69 0.62   GLUC 69 136       Imaging Results Review: No pertinent imaging  studies reviewed.  Other Study Results Review: No additional pertinent studies reviewed.    VTE Pharmacologic Prophylaxis: VTE covered by:  heparin (porcine), Subcutaneous, 5,000 Units at 05/14/25 4412     VTE Mechanical Prophylaxis: sequential compression device

## 2025-05-15 NOTE — PLAN OF CARE
Problem: PAIN - ADULT  Goal: Verbalizes/displays adequate comfort level or baseline comfort level  Description: Interventions:- Encourage patient to monitor pain and request assistance- Assess pain using appropriate pain scale- Administer analgesics based on type and severity of pain and evaluate response- Implement non-pharmacological measures as appropriate and evaluate response- Consider cultural and social influences on pain and pain management- Notify physician/advanced practitioner if interventions unsuccessful or patient reports new pain  Outcome: Progressing     Problem: INFECTION - ADULT  Goal: Absence or prevention of progression during hospitalization  Description: INTERVENTIONS:- Assess and monitor for signs and symptoms of infection- Monitor lab/diagnostic results- Monitor all insertion sites, i.e. indwelling lines, tubes, and drains- Monitor endotracheal if appropriate and nasal secretions for changes in amount and color- [place] appropriate cooling/warming therapies per order- Administer medications as ordered- Instruct and encourage patient and family to use good hand hygiene technique- Identify and instruct in appropriate isolation precautions for identified infection/condition  Outcome: Progressing  Goal: Absence of fever/infection during neutropenic period  Description: INTERVENTIONS:- Monitor WBC  Outcome: Progressing     Problem: DISCHARGE PLANNING  Goal: Discharge to home or other facility with appropriate resources  Description: INTERVENTIONS:- Identify barriers to discharge w/patient and caregiver- Arrange for needed discharge resources and transportation as appropriate- Identify discharge learning needs (meds, wound care, etc.)- Arrange for interpretive services to assist at discharge as needed- Refer to Case Management Department for coordinating discharge planning if the patient needs post-hospital services based on physician/advanced practitioner order or complex needs related to  functional status, cognitive ability, or social support system  Outcome: Progressing     Problem: Knowledge Deficit  Goal: Patient/family/caregiver demonstrates understanding of disease process, treatment plan, medications, and discharge instructions  Description: Complete learning assessment and assess knowledge base.Interventions:- Provide teaching at level of understanding- Provide teaching via preferred learning methods  Outcome: Progressing     Problem: GASTROINTESTINAL - ADULT  Goal: Minimal or absence of nausea and/or vomiting  Description: INTERVENTIONS:- Administer IV fluids if ordered to ensure adequate hydration- Maintain NPO status until nausea and vomiting are resolved- Nasogastric tube if ordered- Administer ordered antiemetic medications as needed- Provide nonpharmacologic comfort measures as appropriate- Advance diet as tolerated, if ordered- Consider nutrition services referral to assist patient with adequate nutrition and appropriate food choices  Outcome: Progressing  Goal: Maintains or returns to baseline bowel function  Description: INTERVENTIONS:- Assess bowel function- Encourage oral fluids to ensure adequate hydration- Administer IV fluids if ordered to ensure adequate hydration- Administer ordered medications as needed- Encourage mobilization and activity- Consider nutritional services referral to assist patient with adequate nutrition and appropriate food choices  Outcome: Progressing  Goal: Maintains adequate nutritional intake  Description: INTERVENTIONS:- Monitor percentage of each meal consumed- Identify factors contributing to decreased intake, treat as appropriate- Assist with meals as needed- Monitor I&O, weight, and lab values if indicated- Obtain nutrition services referral as needed  Outcome: Progressing     Problem: Nutrition/Hydration-ADULT  Goal: Nutrient/Hydration intake appropriate for improving, restoring or maintaining nutritional needs  Description: Monitor and assess  patient's nutrtion/hydration status for malnutrition. Collaborate with interdisciplinary team and initiate plan and interventions as ordered.  Monitor patient's weight and dietary intake as ordered or per policy. Utilize nutrition screening tool and intervene as necessary. Determine patient's food preferences and provide high-protein, high-caloric foods as appropriate. INTERVENTIONS:- Monitor oral intake, urinary output, labs, and treatment plans- Assess nutrition and hydration status and recommend course of action- Evaluate amount of meals eaten- Assist patient with eating if necessary - Allow adequate time for meals- Recommend/ encourage appropriate diets, oral nutritional supplements, and vitamin/mineral supplements- Order, calculate, and assess calorie counts as needed- Recommend, monitor, and adjust tube feedings and TPN/PPN based on assessed needs- Assess need for intravenous fluids- Provide specific nutrition/hydration education as appropriate- Include patient/family/caregiver in decisions related to nutrition  Outcome: Progressing

## 2025-05-15 NOTE — ASSESSMENT & PLAN NOTE
74 yo F admitted with nausea, bloating, abdominal pain x 1 day and CT findings of SBO with transition point in the RLQ    Gastrografin challenge 5/13:  - Gastrografin passes into the colon.  - There is no residual small bowel dilatation.    Obstruction series 5/12:  - Persistent dilated loops of small bowel suggestive of small bowel obstruction.     - Afebrile, VSS on room air    - 5/13 - CBC unremarkable   - BMP WNL today    - NGT removed 5/13 and pt denies any nausea or vomiting and has been tolerating clears, would like more  - Continues to move her bowels for loose, liquid stool, 3 times this am  -only mild abd pain and overall feels much better     Plan:   - Diet advanced to clear liquids with T&C, will advance as tolerated  - D/c IV fluids and telemetry   - Monitor diet tolerance, continued bowel function   - Pain/nausea control PRN   - Encourage ambulation, oob to chair as able   - No further nose bleeds since NGT removal but continued saline spray as needed   - consider restarting home medications including Aggrenox today, will discuss with Dr Seth  - Home Brien reordernegrito for sleep PRN

## 2025-05-16 VITALS
WEIGHT: 127.21 LBS | OXYGEN SATURATION: 96 % | RESPIRATION RATE: 18 BRPM | SYSTOLIC BLOOD PRESSURE: 134 MMHG | DIASTOLIC BLOOD PRESSURE: 70 MMHG | TEMPERATURE: 97.5 F | HEIGHT: 62 IN | BODY MASS INDEX: 23.41 KG/M2 | HEART RATE: 70 BPM

## 2025-05-16 PROBLEM — S61.209A: Status: ACTIVE | Noted: 2025-05-16

## 2025-05-16 RX ORDER — GINSENG 100 MG
1 CAPSULE ORAL 2 TIMES DAILY
Status: DISCONTINUED | OUTPATIENT
Start: 2025-05-16 | End: 2025-05-16 | Stop reason: HOSPADM

## 2025-05-16 RX ADMIN — AMLODIPINE BESYLATE 10 MG: 10 TABLET ORAL at 09:16

## 2025-05-16 RX ADMIN — LEVOTHYROXINE SODIUM 75 MCG: 0.07 TABLET ORAL at 05:21

## 2025-05-16 RX ADMIN — BACITRACIN 1 SMALL APPLICATION: 500 OINTMENT TOPICAL at 11:27

## 2025-05-16 RX ADMIN — ASPIRIN AND DIPYRIDAMOLE 1 CAPSULE: 25; 200 CAPSULE, EXTENDED RELEASE ORAL at 09:16

## 2025-05-16 RX ADMIN — LIDOCAINE: 40 CREAM TOPICAL at 11:27

## 2025-05-16 NOTE — PROGRESS NOTES
Progress Note - Surgery-General   Name: Italia Conway 75 y.o. female I MRN: [id number]  Unit/Bed#: -01 I Date of Admission: 5/11/2025   Date of Service: 5/16/2025 I Hospital Day: 5    Assessment & Plan  Small bowel obstruction (HCC)  74 yo F admitted with nausea, bloating, abdominal pain x 1 day and CT findings of SBO with transition point in the RLQ    Gastrografin challenge 5/13:  - Gastrografin passes into the colon.  - There is no residual small bowel dilatation.    Afebrile, VSS on room air  No AM labs  NGT removed 5/13, advanced to soft diet this morning, mild nausea and RLQ discomfort after breakfast   + flatus, BM yesterday     Plan:   - advanced to soft diet, monitor for toleration, will check back in after lunch - if feeling well can consider hospital dc with outpatient follow up with GI  - PRN analgesia/antiemetics   - Encourage ambulation, oob to chair as able   - home meds reordered   - DVT ppx : heparin & SCDs  Open wound of digit of hand  Patient disclosed chronic wound of L hand 4th digit today   Reports wound has been present for several months, nonhealing, has been seeing rheumatologist for treatment who referred her to wound care. Unfortunately she missed her appointment 2/2 hospitalization   Wound appears superficial with slough covering wound bed, no necrosis, no purulence expressed, no warmth to touch. See photo under physical exam.    Plan: recommend antibiotic ointment and expedited follow up with wound care center on dc     Please contact the SecureChat role for the Surgery-General service with any questions/concerns.    Subjective   No acute events overnight. Feels ok this morning. Tried soft diet for breakfast, mild nausea and RLQ abdominal discomfort after eating. Passing small amount of flatus, last BM yesterday. No episodes of emesis. No urinary complaints. No fever/chill.   Patient alerted provider to non-healing wounds on right fourth digit today. States that she has had  these wounds for many months, has been following with rheumatology, but has not seen improvement. Was scheduled to see wound care but unfortunately had to cancel 2/2 hospitalization.     Objective :  Temp:  [96.6 °F (35.9 °C)-97.5 °F (36.4 °C)] 97.5 °F (36.4 °C)  HR:  [70-87] 70  BP: (131-140)/(70-98) 134/70  Resp:  [18] 18  SpO2:  [95 %-98 %] 96 %  O2 Device: None (Room air)    I/O         05/14 0701  05/15 0700 05/15 0701  05/16 0700 05/16 0701  05/17 0700    P.O. 360 1200 300    I.V. (mL/kg)  10 (0.2)     Total Intake(mL/kg) 360 (6.2) 1210 (21) 300 (5.2)    Urine (mL/kg/hr) 1350 (1)      Total Output 1350      Net -990 +1210 +300           Unmeasured Urine Occurrence 1 x              Physical Exam  Vitals reviewed.   Constitutional:       Appearance: She is not ill-appearing, toxic-appearing or diaphoretic.   HENT:      Head: Normocephalic and atraumatic.     Cardiovascular:      Rate and Rhythm: Normal rate.   Pulmonary:      Effort: No respiratory distress.   Abdominal:      General: There is no distension.      Palpations: Abdomen is soft.      Tenderness: There is abdominal tenderness (RLQ). There is no guarding or rebound.     Skin:     General: Skin is warm and dry.      Comments: Non healing wounds to L 4th digit      Neurological:      General: No focal deficit present.      Mental Status: She is alert and oriented to person, place, and time.     Psychiatric:         Mood and Affect: Mood normal.         Behavior: Behavior normal.       L 4th digit         Lab Results: I have reviewed the following results:  Recent Labs     05/15/25  0447   SODIUM 139   K 3.5   *   CO2 23   BUN 3*   CREATININE 0.62   GLUC 136       Imaging Results Review: No pertinent imaging studies reviewed.  Other Study Results Review: No additional pertinent studies reviewed.    VTE Pharmacologic Prophylaxis: VTE covered by:  heparin (porcine), Subcutaneous, 5,000 Units at 05/15/25 6167     VTE Mechanical Prophylaxis: sequential  compression device    Gwendolyn Dallas,PAC  05/16/25

## 2025-05-16 NOTE — ASSESSMENT & PLAN NOTE
Patient disclosed chronic wound of L hand 4th digit today   Reports wound has been present for several months, nonhealing, has been seeing rheumatologist for treatment who referred her to wound care. Unfortunately she missed her appointment 2/2 hospitalization   Wound appears superficial with slough covering wound bed, no necrosis, no purulence expressed, no warmth to touch. See photo under physical exam.    Plan: recommend antibiotic ointment and expedited follow up with wound care center on dc

## 2025-05-16 NOTE — PLAN OF CARE
Pt tolerated surgical soft diet without N/V.  Pt given ambien to help sleep last night.  Pt for probable d/c to home today.  No labs ordered for this am.  Will continue to monitor and support as needed.    Problem: PAIN - ADULT  Goal: Verbalizes/displays adequate comfort level or baseline comfort level  Description: Interventions:- Encourage patient to monitor pain and request assistance- Assess pain using appropriate pain scale- Administer analgesics based on type and severity of pain and evaluate response- Implement non-pharmacological measures as appropriate and evaluate response- Consider cultural and social influences on pain and pain management- Notify physician/advanced practitioner if interventions unsuccessful or patient reports new pain  Outcome: Progressing     Problem: INFECTION - ADULT  Goal: Absence or prevention of progression during hospitalization  Description: INTERVENTIONS:- Assess and monitor for signs and symptoms of infection- Monitor lab/diagnostic results- Monitor all insertion sites, i.e. indwelling lines, tubes, and drains- Monitor endotracheal if appropriate and nasal secretions for changes in amount and color- [place] appropriate cooling/warming therapies per order- Administer medications as ordered- Instruct and encourage patient and family to use good hand hygiene technique- Identify and instruct in appropriate isolation precautions for identified infection/condition  Outcome: Progressing  Goal: Absence of fever/infection during neutropenic period  Description: INTERVENTIONS:- Monitor WBC  Outcome: Progressing     Problem: DISCHARGE PLANNING  Goal: Discharge to home or other facility with appropriate resources  Description: INTERVENTIONS:- Identify barriers to discharge w/patient and caregiver- Arrange for needed discharge resources and transportation as appropriate- Identify discharge learning needs (meds, wound care, etc.)- Arrange for interpretive services to assist at discharge as  needed- Refer to Case Management Department for coordinating discharge planning if the patient needs post-hospital services based on physician/advanced practitioner order or complex needs related to functional status, cognitive ability, or social support system  Outcome: Progressing     Problem: Knowledge Deficit  Goal: Patient/family/caregiver demonstrates understanding of disease process, treatment plan, medications, and discharge instructions  Description: Complete learning assessment and assess knowledge base.Interventions:- Provide teaching at level of understanding- Provide teaching via preferred learning methods  Outcome: Progressing     Problem: GASTROINTESTINAL - ADULT  Goal: Minimal or absence of nausea and/or vomiting  Description: INTERVENTIONS:- Administer IV fluids if ordered to ensure adequate hydration- Maintain NPO status until nausea and vomiting are resolved- Nasogastric tube if ordered- Administer ordered antiemetic medications as needed- Provide nonpharmacologic comfort measures as appropriate- Advance diet as tolerated, if ordered- Consider nutrition services referral to assist patient with adequate nutrition and appropriate food choices  Outcome: Progressing  Goal: Maintains or returns to baseline bowel function  Description: INTERVENTIONS:- Assess bowel function- Encourage oral fluids to ensure adequate hydration- Administer IV fluids if ordered to ensure adequate hydration- Administer ordered medications as needed- Encourage mobilization and activity- Consider nutritional services referral to assist patient with adequate nutrition and appropriate food choices  Outcome: Progressing  Goal: Maintains adequate nutritional intake  Description: INTERVENTIONS:- Monitor percentage of each meal consumed- Identify factors contributing to decreased intake, treat as appropriate- Assist with meals as needed- Monitor I&O, weight, and lab values if indicated- Obtain nutrition services referral as  needed  Outcome: Progressing     Problem: Nutrition/Hydration-ADULT  Goal: Nutrient/Hydration intake appropriate for improving, restoring or maintaining nutritional needs  Description: Monitor and assess patient's nutrition/hydration status for malnutrition. Collaborate with interdisciplinary team and initiate plan and interventions as ordered.  Monitor patient's weight and dietary intake as ordered or per policy. Utilize nutrition screening tool and intervene as necessary. Determine patient's food preferences and provide high-protein, high-caloric foods as appropriate. INTERVENTIONS:- Monitor oral intake, urinary output, labs, and treatment plans- Assess nutrition and hydration status and recommend course of action- Evaluate amount of meals eaten- Assist patient with eating if necessary - Allow adequate time for meals- Recommend/ encourage appropriate diets, oral nutritional supplements, and vitamin/mineral supplements- Order, calculate, and assess calorie counts as needed- Recommend, monitor, and adjust tube feedings and TPN/PPN based on assessed needs- Assess need for intravenous fluids- Provide specific nutrition/hydration education as appropriate- Include patient/family/caregiver in decisions related to nutrition  Outcome: Progressing

## 2025-05-16 NOTE — DISCHARGE SUMMARY
Discharge Summary   Italia Conway 75 y.o. female MRN: [id number]  Unit/Bed#: -01 Encounter: [id number]  5/16/2025    Admission Date: 5/11/2025   Discharge Date: 5/16/2025  Length of Stay: 5 days  Attending: Skylar Hancock  Primary Service: General Surgery    Admitting Diagnosis:   SBO (small bowel obstruction) (HCC) [K56.609]  Abdominal pain [R10.9]    Discharge Diagnoses:   Principal Problem:    Small bowel obstruction (HCC)  Active Problems:    Open wound of digit of hand    PMH/Secondary Diagnoses:  Past Medical History:   Diagnosis Date    Anemia     Aphasia as late effect of cerebrovascular accident (CVA)     expressive    CVA (cerebral vascular accident) (HCC)     Hyperlipidemia     Hypertension     Hypothyroidism     PVD (peripheral vascular disease) (HCC)     Raynaud disease     SBO (small bowel obstruction) (HCC)     TIA (transient ischemic attack)     Weakness as late effect of cerebrovascular accident (CVA)     right sided    Wrist fracture     Left     Past Surgical History:   Procedure Laterality Date    COLONOSCOPY      CT EPIDURAL STEROID INJECTION (GALINA LUMBAR)      HAND SURGERY Right     Mallet finger repair    ORIF WRIST FRACTURE Left 1/4/2024    Procedure: Open reduction internal fixation of distal ulna;  Surgeon: Jim Max;  Location:  MAIN OR;  Service: Orthopedics    TONSILLECTOMY      TUBAL LIGATION         Inpatient Medications:  Scheduled Meds:  Current Facility-Administered Medications   Medication Dose Route Frequency Provider Last Rate    acetaminophen  1,000 mg Intravenous Q6H PRN Katrina Elizabeth Billig, PA-C      amLODIPine  10 mg Oral Daily LIZA [address]      aspirin-dipyridamole  1 capsule Oral Q12H [facility] LIZA [address]      atorvastatin  80 mg Oral QPM IL[address]      bacitracin  1 small application Topical BID Gwendolyn Dallas PA-C      heparin (porcine)  5,000 Units Subcutaneous Q8H [facility] Skylar Hancock,  DO      hydrALAZINE  5 mg Intravenous Q6H PRN Skylar Hancock, DO      HYDROmorphone  0.2 mg Intravenous Q3H PRN Katrina Elizabeth Billig, PA-C      levothyroxine  75 mcg Oral Early Morning Melissa Shaniqua Hobson, CRNP      lidocaine   Topical 4x Daily PRN Skylar Hancock, DO      meclizine  25 mg Oral Q8H PRN Melissaminor Hobson, CRNP      ondansetron  4 mg Intravenous Q6H PRN Skylar Hancock, DO      phenol  1 spray Mouth/Throat Q2H PRN Melissa Shaniqua Hobson, CRNP      promethazine  25 mg Intravenous Q6H PRN Skylar Hancock, DO      sodium chloride  1 spray Each Nare Q1H PRN Melissa Hobson, CRNP      zolpidem  5 mg Oral HS PRN Katrina Elizabeth Billig, PA-C       Continuous Infusions:   PRN Meds:  acetaminophen    hydrALAZINE    HYDROmorphone **OR** [DISCONTINUED] HYDROmorphone    lidocaine    meclizine    ondansetron    phenol    promethazine    sodium chloride    zolpidem    Allergies:  Allergies[1]      Imaging Studies:  XR abdomen complete inc upright and/or decubitus  Result Date: 5/13/2025  Impression: Gastrografin passes into the colon. There is no residual small bowel dilatation. Workstation performed: [id number]     XR abdomen obstruction series  Result Date: 5/12/2025  Impression: Persistent dilated loops of small bowel suggestive of small bowel obstruction. Resident: BERNARD MCCARTHY I, the attending radiologist, have reviewed the images and agree with the final report above. Workstation performed: [id number]     XR chest portable  Result Date: 5/12/2025  Impression: Enteric tube with sideport projecting at the level of the gastroesophageal junction. Distal tip is below the diaphragm in the left upper quadrant. Question small hiatal hernia. No acute cardiopulmonary abnormalities. Resident: BERNARD MCCARTHY I, the attending radiologist, have reviewed the images and agree with the final report above. Workstation performed: [id number]     CT chest abdomen pelvis w  "contrast  Result Date: 5/11/2025  Impression: Small bowel obstruction with transition point in the right abdomen. Findings discussed with Dr. Altamirano at 9:05 p.m., 5/11/2025 Workstation performed: [id number]     Procedures/Surgeries Performed:  Orders Placed This Encounter   Procedures    Wound cleansing and dressings Anterior;Left Finger D4, ring     Procedure name not found.    Consultants:   None    History of Present Illness: As per Stefani Deluca PA-C : \"Italia Conway is a 75 y.o. female who presented to the ED last evening with severe diffuse abdominal pain, bloating, nausea and dry heaves.x 1 day. Her abdominal pain is constant. She has no hx of similar pain in the past. Denies F/C, change in bowel habits, urinary symptoms, diarrhea, constipation hematochezia, CP, SOB, dizziness. Her last BM was yesterday and normal.        While in the ED, lab studies showed a mild leukocytosis at 13 and were otherwise essentially nl. CT showed a distended stomach with significant solid food, SBO with transition point in the RLQ and an interloop fluid collection. An NGT was placed for decompression with xray done after to confirm placement.        This morning she continues to have moderate constant diffuse abd pain and mild bloating. Denies nausea. Denies any flatus or BM today.\"      Summary of Hospital Course: Pt presented with s/sx and dx imaging consistent with SBO. She was treated conservatively with bowel rest, IV fluids, as needed IV analgesics/antiemetics.  Hospital day 2 a Gastrografin study was performed which was diagnostic and therapeutic. It showed resolution of the small bowel obstruction with evidence of contrast in the colon and stimulated liquid bowel movement.  Diet was slowly advanced.  Day of discharge she was tolerating a regular diet, her abdominal pain resolved, her abdominal exam was benign, & she was passing flatus and having liquid bowel movements.      Complications: None      Plan Upon " Discharge:  Outpatient follow-up with GI-for workup of persistent diarrhea prior to this admission  Outpatient follow-up with wound care-for recommendations regarding left fingertip wound  Follow-up with general surgery as needed        Condition at Discharge: stable       Discharge instructions/Information to patient and family:   See after visit summary for information provided to patient and family. All instructions were discussed and the patient was understanding. All questions answered.     Provisions for Follow-Up Care:  See after visit summary for information related to follow-up care and any pertinent home health orders.      PCP: Essie Lerner DO    Disposition: Home    Planned Readmission: No    Discharge Statement   I spent 20 minutes discharging the patient. This time was spent on the day of discharge. I had direct contact with the patient on the day of discharge. Additional documentation is required if more than 30 minutes were spent on discharge.     Discharge Medications:  See after visit summary for reconciled discharge medications provided to patient and family.      Mary Garcia PA-C   5/16/2025           [1]   Allergies  Allergen Reactions    Penicillins Rash

## 2025-05-16 NOTE — ASSESSMENT & PLAN NOTE
76 yo F admitted with nausea, bloating, abdominal pain x 1 day and CT findings of SBO with transition point in the RLQ    Gastrografin challenge 5/13:  - Gastrografin passes into the colon.  - There is no residual small bowel dilatation.    Afebrile, VSS on room air  No AM labs  NGT removed 5/13, advanced to soft diet this morning, mild nausea and RLQ discomfort after breakfast   + flatus, BM yesterday     Plan:   - advanced to soft diet, monitor for toleration, will check back in after lunch - if feeling well can consider hospital dc with outpatient follow up with GI  - PRN analgesia/antiemetics   - Encourage ambulation, oob to chair as able   - home meds reordered   - DVT ppx : heparin & SCDs

## 2025-05-16 NOTE — CASE MANAGEMENT
Case Management Discharge Planning Note    Patient name Italia Conway  Location /-01 MRN [id number]  : 1950 Date 2025       Current Admission Date: 2025  Current Admission Diagnosis:Small bowel obstruction (HCC)   Patient Active Problem List    Diagnosis Date Noted    Open wound of digit of hand 2025    Small bowel obstruction (HCC) 2025    Chronic pain of left thumb 2024    Primary osteoarthritis of first carpometacarpal joint of left hand 2024    Age-related osteoporosis without current pathological fracture 03/15/2024    Closed nondisplaced comminuted fracture of shaft of left ulna with routine healing, subsequent encounter 2024    Pain in left forearm 2023    Closed nondisplaced comminuted fracture of shaft of left ulna 2023    Intractable chronic migraine without aura and with status migrainosus 03/10/2021    Fall 2020    TIA (transient ischemic attack) 12/10/2020    History of CVA (cerebrovascular accident) 2020    Hypertensive urgency 2020    Hypertension, essential 2020    QT prolongation 2020    Hypothyroidism (acquired) 2020      LOS (days): 5  Geometric Mean LOS (GMLOS) (days): 2.3  Days to GMLOS:-2.2     OBJECTIVE:  Risk of Unplanned Readmission Score: 13.13         Current admission status: Inpatient   Preferred Pharmacy:   [facility]/pharmacy #[id number] - [place], [place] - [address] [facility]  [address] [facility] [address]  Phone: [phone] Fax: [phone]    Primary Care Provider: Essie Lerner DO    [facility]  Secondary Insurance:     DISCHARGE DETAILS:    Discharge planning discussed with:: Patient     Treatment Team Recommendation: Home  Discharge Destination Plan:: Home  Transport at Discharge : Family     IMM Given (Date):: 25  IMM Given to:: Patient       CM reviewed IMM with pt; No questions or concerns. Pt in agreement with rights, and is  aware of the right to appeal d/c once medically stable if desired. IMM signed.     Pt states she will have a ride home at time of DC. CM will continue to follow for DC planning needs

## 2025-05-16 NOTE — DISCHARGE INSTR - AVS FIRST PAGE
Return to ED with any changes to overall health including severe nausea/vomiting, inability to keep solids/liquids down, severe abdominal pain, fever/chills, chest pain, shortness of breath, etc.     Please follow up with GI as soon as possible after hospital discharge.    Please call wound care center for expedited outpatient follow up to discuss nonhealing wounds of left hand.     Follow up as needed with general surgery.

## 2025-05-16 NOTE — PLAN OF CARE
Problem: PAIN - ADULT  Goal: Verbalizes/displays adequate comfort level or baseline comfort level  Description: Interventions:- Encourage patient to monitor pain and request assistance- Assess pain using appropriate pain scale- Administer analgesics based on type and severity of pain and evaluate response- Implement non-pharmacological measures as appropriate and evaluate response- Consider cultural and social influences on pain and pain management- Notify physician/advanced practitioner if interventions unsuccessful or patient reports new pain  Outcome: Progressing     Problem: INFECTION - ADULT  Goal: Absence or prevention of progression during hospitalization  Description: INTERVENTIONS:- Assess and monitor for signs and symptoms of infection- Monitor lab/diagnostic results- Monitor all insertion sites, i.e. indwelling lines, tubes, and drains- Monitor endotracheal if appropriate and nasal secretions for changes in amount and color- [place] appropriate cooling/warming therapies per order- Administer medications as ordered- Instruct and encourage patient and family to use good hand hygiene technique- Identify and instruct in appropriate isolation precautions for identified infection/condition  Outcome: Progressing  Goal: Absence of fever/infection during neutropenic period  Description: INTERVENTIONS:- Monitor WBC  Outcome: Progressing     Problem: DISCHARGE PLANNING  Goal: Discharge to home or other facility with appropriate resources  Description: INTERVENTIONS:- Identify barriers to discharge w/patient and caregiver- Arrange for needed discharge resources and transportation as appropriate- Identify discharge learning needs (meds, wound care, etc.)- Arrange for interpretive services to assist at discharge as needed- Refer to Case Management Department for coordinating discharge planning if the patient needs post-hospital services based on physician/advanced practitioner order or complex needs related to  functional status, cognitive ability, or social support system  Outcome: Progressing     Problem: Knowledge Deficit  Goal: Patient/family/caregiver demonstrates understanding of disease process, treatment plan, medications, and discharge instructions  Description: Complete learning assessment and assess knowledge base.Interventions:- Provide teaching at level of understanding- Provide teaching via preferred learning methods  Outcome: Progressing     Problem: GASTROINTESTINAL - ADULT  Goal: Minimal or absence of nausea and/or vomiting  Description: INTERVENTIONS:- Administer IV fluids if ordered to ensure adequate hydration- Maintain NPO status until nausea and vomiting are resolved- Nasogastric tube if ordered- Administer ordered antiemetic medications as needed- Provide nonpharmacologic comfort measures as appropriate- Advance diet as tolerated, if ordered- Consider nutrition services referral to assist patient with adequate nutrition and appropriate food choices  Outcome: Progressing  Goal: Maintains or returns to baseline bowel function  Description: INTERVENTIONS:- Assess bowel function- Encourage oral fluids to ensure adequate hydration- Administer IV fluids if ordered to ensure adequate hydration- Administer ordered medications as needed- Encourage mobilization and activity- Consider nutritional services referral to assist patient with adequate nutrition and appropriate food choices  Outcome: Progressing  Goal: Maintains adequate nutritional intake  Description: INTERVENTIONS:- Monitor percentage of each meal consumed- Identify factors contributing to decreased intake, treat as appropriate- Assist with meals as needed- Monitor I&O, weight, and lab values if indicated- Obtain nutrition services referral as needed  Outcome: Progressing     Problem: Nutrition/Hydration-ADULT  Goal: Nutrient/Hydration intake appropriate for improving, restoring or maintaining nutritional needs  Description: Monitor and assess  patient's nutrition/hydration status for malnutrition. Collaborate with interdisciplinary team and initiate plan and interventions as ordered.  Monitor patient's weight and dietary intake as ordered or per policy. Utilize nutrition screening tool and intervene as necessary. Determine patient's food preferences and provide high-protein, high-caloric foods as appropriate. INTERVENTIONS:- Monitor oral intake, urinary output, labs, and treatment plans- Assess nutrition and hydration status and recommend course of action- Evaluate amount of meals eaten- Assist patient with eating if necessary - Allow adequate time for meals- Recommend/ encourage appropriate diets, oral nutritional supplements, and vitamin/mineral supplements- Order, calculate, and assess calorie counts as needed- Recommend, monitor, and adjust tube feedings and TPN/PPN based on assessed needs- Assess need for intravenous fluids- Provide specific nutrition/hydration education as appropriate- Include patient/family/caregiver in decisions related to nutrition  Outcome: Progressing

## 2025-05-19 NOTE — UTILIZATION REVIEW
NOTIFICATION OF ADMISSION DISCHARGE   This is a Notification of Discharge from [facility]. Please be advised that this patient has been discharge from our facility. Below you will find the admission and discharge date and time including the patient’s disposition.   UTILIZATION REVIEW CONTACT:  Utilization Review Assistants  Network Utilization Review Department  Phone: [phone] x carefully listen to the prompts. All voicemails are confidential.  Email: [email or web address]     ADMISSION INFORMATION  PRESENTATION DATE: 5/11/2025  6:37 PM  OBERVATION ADMISSION DATE: N/A  INPATIENT ADMISSION DATE: 5/11/25  9:32 PM   DISCHARGE DATE: 5/16/2025  3:34 PM   DISPOSITION:Home/Self Care    Network Utilization Review Department  ATTENTION: Please call with any questions or concerns to [phone] and carefully listen to the prompts so that you are directed to the right person. All voicemails are confidential.   For Discharge needs, contact Care Management DC Support Team at [phone] opt. 2  Send all requests for admission clinical reviews, approved or denied determinations and any other requests to dedicated fax number below belonging to the campus where the patient is receiving treatment. List of dedicated fax numbers for the Facilities:  FACILITY NAME UR FAX NUMBER   ADMISSION DENIALS (Administrative/Medical Necessity) [phone]   DISCHARGE SUPPORT TEAM ([facility]) [phone]   PARENT CHILD HEALTH (Maternity/NICU/Pediatrics) [phone]   [facility] [phone]   [facility] [phone]   [facility] [phone]   [facility] [phone]   [facility] [phone]   [facility] [phone]   [facility] [phone]   [facility] [phone]   [facility]  [facility] [phone]   [facility] [phone]   [facility] [phone]   [facility] [phone]

## 2025-05-23 ENCOUNTER — OFFICE VISIT (OUTPATIENT)
Facility: CLINIC | Age: 75
End: 2025-05-23
Payer: COMMERCIAL

## 2025-05-23 ENCOUNTER — HOSPITAL ENCOUNTER (OUTPATIENT)
Dept: RADIOLOGY | Facility: CLINIC | Age: 75
End: 2025-05-23
Payer: COMMERCIAL

## 2025-05-23 VITALS
DIASTOLIC BLOOD PRESSURE: 65 MMHG | HEIGHT: 62 IN | TEMPERATURE: 98.7 F | RESPIRATION RATE: 20 BRPM | HEART RATE: 75 BPM | SYSTOLIC BLOOD PRESSURE: 135 MMHG | WEIGHT: 122 LBS | BODY MASS INDEX: 22.45 KG/M2

## 2025-05-23 DIAGNOSIS — S61.209A OPEN WOUND OF FINGER, INITIAL ENCOUNTER: ICD-10-CM

## 2025-05-23 DIAGNOSIS — S61.209A OPEN WOUND OF FINGER, INITIAL ENCOUNTER: Primary | ICD-10-CM

## 2025-05-23 DIAGNOSIS — I73.00 RAYNAUD'S DISEASE WITHOUT GANGRENE: ICD-10-CM

## 2025-05-23 PROCEDURE — 73130 X-RAY EXAM OF HAND: CPT

## 2025-05-23 PROCEDURE — 99213 OFFICE O/P EST LOW 20 MIN: CPT

## 2025-05-23 RX ORDER — LIDOCAINE 40 MG/G
CREAM TOPICAL ONCE
Status: COMPLETED | OUTPATIENT
Start: 2025-05-23 | End: 2025-05-23

## 2025-05-23 RX ORDER — MUPIROCIN 20 MG/G
OINTMENT TOPICAL DAILY
Qty: 15 G | Refills: 0 | Status: SHIPPED | OUTPATIENT
Start: 2025-05-23 | End: 2025-06-06

## 2025-05-23 RX ADMIN — LIDOCAINE: 40 CREAM TOPICAL at 08:10

## 2025-05-23 NOTE — PATIENT INSTRUCTIONS
Orders Placed This Encounter   Procedures    Wound cleansing and dressings Anterior;Left Finger D4, ring     Wash your hands with soap and water.  Remove old dressing, discard into plastic bag and place in trash.  Cleanse the wound with mld, unscented soap (Dove)  prior to applying a clean dressing. Do not use tissue or cotton balls. Do not scrub the wound. Pat dry using gauze.    Shower yes     Apply Bactroban to the finger wound.    Cover with gauze  Secure with tape  Change dressing daily    Watch for signs of infection (redness,warmth to the touch, increased pain, odor, pus, swelling, fever, chills, nausea, vomiting). If you notice any of these call the wound center or proceed to the ER.     Standing Status:   Future     Expiration Date:   5/30/2025

## 2025-05-23 NOTE — PROGRESS NOTES
Patient ID: Italia Conway is a 75 y.o. female Date of Birth 1950       Chief Complaint   Patient presents with    New Patient Visit     BL hands       Allergies:  Penicillins    Diagnosis:      Diagnosis ICD-10-CM Associated Orders   1. Open wound of finger, initial encounter  S61.209A lidocaine (LMX) 4 % cream     Wound cleansing and dressings Anterior;Left Finger D4, ring     mupirocin (BACTROBAN) 2 % ointment     XR hand 3+ vw left      2. Raynaud's disease without gangrene  I73.00               Assessment & Plan:  Left hand 4th finger with full-thickness wound related to underlying Raynaud's.  Will recommend to begin wound management of Bactroban ointment to the wound daily and as needed- RX sent to pharmacy.  Discussed with patient importance of maintaining medication regimen as per rheumatology as this will help with blood flow to help heal the wound.  Order placed for x-ray due to chronic wound to rule out underlying osteomyelitis, if there is concern on x-ray will refer to orthopedics.  Discussed with patient to use gloves when washing dishes and not to submerge the wound in any water to prevent infection. Will hold off on debridement for now.   See wound orders below  Wound is not showing any clinical s/s of infection  Counseled patient on the importance of adequate protein intake (3-4 servings per day) to promote wound healing.  Keep wounds covered at all times, and do not leave wounds open to air as this is an infection risk. No harsh cleansers such as hydrogen peroxide, antibacterial soap or alcohol. Do not submerge wound or soak wound in any body of water, such as bath tub, pool or ocean/lake, etc.  Follow-up in 1 week(s). Call sooner with questions or concerns or any signs of infection such as redness, swelling, increased/purulent drainage, fever or chills, and increased pain.  Maintain follow-up with rheumatology.  Patient verbalized understanding and agrees with plan of care.           Subjective:   5/23/25: Patient presents to the wound care center for initial visit of left fourth finger wound.  Patient has a history significant for CVA and Raynaud's disease.  Patient states that the wound to her left hand has been present for approximately 1 month.  Patient states that she has been using Neosporin to the wound and that dermatology had recommended her to use clobetasol cream.  Patient states that neither ointment has really provided significant wound healing.  Patient states that she follows with rheumatologist and has Nitropaste that she applies interdigitally and was also on oral medications to help with her blood flow.  States that she tries to keep the area covered however she also admits to leaving the wound open to air at times.  Patient reports having a similar wound to her right hand previously which healed without any issues.  Patient offers no wound related complaints, reports stable amount of discomfort to the tip of her finger, denies increased drainage.  No fever or chills.        The following portions of the patient's history were reviewed and updated as appropriate:   Patient Active Problem List  Diagnosis    History of CVA (cerebrovascular accident)    Hypertensive urgency    Hypertension, essential    QT prolongation    Hypothyroidism (acquired)    TIA (transient ischemic attack)    Fall    Intractable chronic migraine without aura and with status migrainosus    Pain in left forearm    Closed nondisplaced comminuted fracture of shaft of left ulna    Closed nondisplaced comminuted fracture of shaft of left ulna with routine healing, subsequent encounter    Age-related osteoporosis without current pathological fracture    Chronic pain of left thumb    Primary osteoarthritis of first carpometacarpal joint of left hand    Small bowel obstruction (HCC)    Open wound of digit of hand     Past Medical History:  Diagnosis Date    Anemia     Aphasia as late effect of cerebrovascular  accident (CVA)     expressive    CVA (cerebral vascular accident) (HCC)     Hyperlipidemia     Hypertension     Hypothyroidism     PVD (peripheral vascular disease) (HCC)     Raynaud disease     SBO (small bowel obstruction) (HCC)     TIA (transient ischemic attack)     Weakness as late effect of cerebrovascular accident (CVA)     right sided    Wrist fracture     Left     Past Surgical History:  Procedure Laterality Date    COLONOSCOPY      CT EPIDURAL STEROID INJECTION (GALINA LUMBAR)      HAND SURGERY Right     Mallet finger repair    ORIF WRIST FRACTURE Left 2024    Procedure: Open reduction internal fixation of distal ulna;  Surgeon: Jim Max;  Location:  MAIN OR;  Service: Orthopedics    TONSILLECTOMY      TUBAL LIGATION       Family History  Problem Relation Name Age of Onset    Other Mother          encephalitis    Other Father          trauma      Social History  Socioeconomic History    Marital status: Unknown   Tobacco Use    Smoking status: Never    Smokeless tobacco: Never   Vaping Use    Vaping status: Never Used   Substance and Sexual Activity    Alcohol use: Not Currently    Drug use: Not Currently     Social Drivers of Health     Food Insecurity: No Food Insecurity (2025)    Nursing - Inadequate Food Risk Classification     Ran Out of Food in the Last Year: Never true   Transportation Needs: No Transportation Needs (2025)    Nursing - Transportation Risk Classification     Lack of Transportation: No    Received from [facility]   Intimate Partner Violence: Unknown (2025)    Nursing IPS     Physically Hurt by Someone: No     Hurt or Threatened by Someone: No   Housing Stability: Unknown (2025)    Nursing: Inadequate Housing Risk Classification     Unable to Pay for Housing in the Last Year: No     Has Housin        Current Outpatient Medications:     mupirocin (BACTROBAN) 2 % ointment, Apply topically in the morning for 14 days Apply to open wound  "daily and as needed., Disp: 15 g, Rfl: 0    acetaminophen (TYLENOL) 325 mg tablet, Take 2 tablets (650 mg total) by mouth every 6 (six) hours as needed for mild pain, headaches or fever, Disp: , Rfl: 0    amLODIPine (NORVASC) 10 mg tablet, Take 10 mg by mouth daily, Disp: , Rfl:     amLODIPine (NORVASC) 2.5 mg tablet, Take 1 tablet (2.5 mg total) by mouth daily (Patient not taking: Reported on 3/29/2024), Disp: 30 tablet, Rfl: 0    aspirin-dipyridamole (AGGRENOX)  mg per 12 hr capsule, Take by mouth, Disp: , Rfl:     atorvastatin (LIPITOR) 80 mg tablet, Take 1 tablet (80 mg total) by mouth every evening, Disp:  , Rfl: 0    Calcium 200 MG TABS, 600 mg, Disp: , Rfl:     CANDESARTAN CILEXETIL PO, Take by mouth daily at bedtime, Disp: , Rfl:     cyanocobalamin 1,000 mcg/mL, Inject 1 mL into a muscle every 30 (thirty) days, Disp: , Rfl:     Diclofenac Sodium (VOLTAREN) 1 %, APPLY 2 GRAMS TO AFFECTED AREA 4 TIMES A DAY, Disp: 100 g, Rfl: 1    fluticasone (FLONASE) 50 mcg/act nasal spray, 1 spray into each nostril daily as needed for rhinitis, Disp: , Rfl:     folic acid (FOLVITE) 1 mg tablet, Take by mouth, Disp: , Rfl:     hydroxychloroquine (PLAQUENIL) 200 mg tablet, Take 200 mg by mouth daily, Disp: , Rfl:     levothyroxine 75 mcg tablet, Take 75 mcg by mouth daily, Disp: , Rfl:     meclizine (ANTIVERT) 25 mg tablet, Take 1 tablet (25 mg total) by mouth every 8 (eight) hours as needed for dizziness, Disp: 30 tablet, Rfl: 0    ondansetron (ZOFRAN) 4 mg tablet, Take 1 tablet (4 mg total) by mouth every 6 (six) hours, Disp: 12 tablet, Rfl: 0    senna-docusate sodium (SENOKOT-S) 8.6-50 mg per tablet, Take 1 tablet by mouth daily, Disp: 7 tablet, Rfl: 0    traMADol (ULTRAM) 50 mg tablet, Take 50 mg by mouth every 6 (six) hours as needed, Disp: , Rfl:     V[employer] Safety Syringe 25G X 5/8\" 3 ML MISC, ONE INJECTION EVERY MONTH WITH VITAMIN B 12, Disp: , Rfl:   No current facility-administered medications for this " "visit.    Review of Systems   Constitutional:  Negative for chills and fever.   HENT:  Negative for congestion and sneezing.    Respiratory:  Negative for cough and shortness of breath.    Skin:  Positive for wound.        L hand   Psychiatric/Behavioral:  Negative for agitation.        Objective:  /65   Pulse 75   Temp 98.7 °F (37.1 °C)   Resp 20   Ht 5' 2\" (1.575 m)   Wt 55.3 kg (122 lb)   BMI 22.31 kg/m²         Physical Exam  Constitutional:       General: She is awake. She is not in acute distress.     Appearance: She is not ill-appearing, toxic-appearing or diaphoretic.   HENT:      Head: Normocephalic and atraumatic.      Right Ear: External ear normal.      Left Ear: External ear normal.     Eyes:      Conjunctiva/sclera: Conjunctivae normal.     Pulmonary:      Effort: Pulmonary effort is normal. No respiratory distress.     Skin:     General: Skin is warm and dry.      Findings: Wound present.      Comments: 1.  Left hand fourth finger with full-thickness wound with mixed pink/yellow tissue.  No purulent drainage or malodor.  Periwound is slightly discolored, consistent with Raynaud's phenomenon, and is cool to touch. No erythema, warmth or lymphangitic streaking to suggest cellulitis.  Refer to wound assessment for additional details.     Neurological:      Mental Status: She is alert.     Psychiatric:         Mood and Affect: Mood normal.         Behavior: Behavior normal. Behavior is cooperative.         Wound 05/11/25 Finger D4, ring Anterior;Left (Active)   Wound Image   05/23/25 0805   Wound Description Pink;Yellow 05/23/25 0808   Non-staged Wound Description Full thickness 05/23/25 0808   Wound Length (cm) 1.7 cm 05/23/25 0808   Wound Width (cm) 0.7 cm 05/23/25 0808   Wound Depth (cm) 0.2 cm 05/23/25 0808   Wound Surface Area (cm^2) 0.93 cm^2 05/23/25 0808   Wound Volume (cm^3) 0.125 cm^3 05/23/25 0808   Calculated Wound Volume (cm^3) 0.24 cm^3 05/23/25 0808   Ana-wound Assessment " "Intact 05/23/25 0808             Lab Results   Component Value Date    HGBA1C 6.3 (H) 03/10/2021       Wound Instructions:  Orders Placed This Encounter   Procedures    Wound cleansing and dressings Anterior;Left Finger D4, ring     Wash your hands with soap and water.  Remove old dressing, discard into plastic bag and place in trash.  Cleanse the wound with mld, unscented soap (Dove)  prior to applying a clean dressing. Do not use tissue or cotton balls. Do not scrub the wound. Pat dry using gauze.    Shower: You may shower but must keep the dressing/ dressing dry. You can cover the dressing with a cast cover that can be purchased online.      Apply Bactroban to the finger wound.    Cover with gauze  Secure with tape  Change dressing daily    Watch for signs of infection (redness,warmth to the touch, increased pain, odor, pus, swelling, fever, chills, nausea, vomiting). If you notice any of these call the wound center or proceed to the ER.     Standing Status:   Future     Expiration Date:   5/30/2025    XR hand 3+ vw left     Chronic wound of left hand 4th finger. Rule out osteomyelitis.     Standing Status:   Future     Number of Occurrences:   1     Expected Date:   5/23/2025     Expiration Date:   5/23/2029     Scheduling Instructions:      Bring along any outside films relating to this procedure.                 LIZA Salgado, MADHURI, AMARILYS    Portions of the record may have been created with voice recognition software. Occasional wrong word or \"sound alike\" substitutions may have occurred due to the inherent limitations of voice recognition software. Read the chart carefully and recognize, using context, where substitutions have occurred.          Total time spent today:    Total time (face-to-face and non-face-to-face) spent on today's visit was 15 minutes. This includes preparation for the visits (i.e. reviewing test results from date recent hospitalizations, ER/Urgent Care/primary care visits and " recent consultant office visits), performance of a medically appropriate history and examination, and orders for medications or testing.

## 2025-05-23 NOTE — PROGRESS NOTES
Wound Procedure Treatment Anterior;Left Finger D4, ring    Performed by: Gilbert Sanchez RN  Authorized by: LIZA Sultana  Associated wounds:   Wound 05/11/25 Finger D4, ring Anterior;Left    Applied topical:  Mupirocin ointment   Applied secondary dressing:  Gauze and Other   Dressing secured with:  Tape     Surgilast

## 2025-05-28 ENCOUNTER — RESULTS FOLLOW-UP (OUTPATIENT)
Dept: WOUND CARE | Facility: CLINIC | Age: 75
End: 2025-05-28

## 2025-05-28 NOTE — TELEPHONE ENCOUNTER
Late entry for 5/27/25: Called patient to discuss x-ray results of left hand. Patient appreciated call and verbalized understanding. All questions/concerned answered. Patient has follow-up scheduled, and knows to call office with questions or concerns.

## 2025-06-03 ENCOUNTER — TELEPHONE (OUTPATIENT)
Age: 75
End: 2025-06-03

## 2025-06-03 ENCOUNTER — OFFICE VISIT (OUTPATIENT)
Facility: CLINIC | Age: 75
End: 2025-06-03
Payer: COMMERCIAL

## 2025-06-03 ENCOUNTER — TELEPHONE (OUTPATIENT)
Dept: VASCULAR SURGERY | Facility: CLINIC | Age: 75
End: 2025-06-03

## 2025-06-03 VITALS
DIASTOLIC BLOOD PRESSURE: 61 MMHG | RESPIRATION RATE: 16 BRPM | TEMPERATURE: 98.3 F | HEART RATE: 87 BPM | SYSTOLIC BLOOD PRESSURE: 131 MMHG

## 2025-06-03 DIAGNOSIS — I73.01 RAYNAUD DISEASE WITH GANGRENE (HCC): ICD-10-CM

## 2025-06-03 DIAGNOSIS — S61.209A OPEN WOUND OF FINGER, INITIAL ENCOUNTER: Primary | ICD-10-CM

## 2025-06-03 DIAGNOSIS — S61.209A: Primary | ICD-10-CM

## 2025-06-03 PROCEDURE — 99212 OFFICE O/P EST SF 10 MIN: CPT | Performed by: FAMILY MEDICINE

## 2025-06-03 PROCEDURE — 99214 OFFICE O/P EST MOD 30 MIN: CPT | Performed by: FAMILY MEDICINE

## 2025-06-03 RX ORDER — LIDOCAINE 40 MG/G
CREAM TOPICAL ONCE
Status: COMPLETED | OUTPATIENT
Start: 2025-06-03 | End: 2025-06-03

## 2025-06-03 RX ADMIN — LIDOCAINE 1 APPLICATION: 40 CREAM TOPICAL at 13:02

## 2025-06-03 NOTE — PROGRESS NOTES
Patient ID: Italia Conway is a 75 y.o. female Date of Birth 1950       Chief Complaint   Patient presents with   • Follow Up Wound Care Visit       Allergies:  Penicillins    Diagnosis:      Diagnosis ICD-10-CM Associated Orders   1. Open wound of finger, initial encounter  S61.209A lidocaine (LMX) 4 % cream     Wound cleansing and dressings Anterior;Left Finger D4, ring     Ambulatory Referral to Vascular Surgery     Wound Procedure Treatment Anterior;Left Finger D4, ring      2. Raynaud disease with gangrene ([facility])  I73.01 Ambulatory Referral to Vascular Surgery              Assessment & Plan:  R 4th finger wound w/ ischemic change d/t Raynauds: No significant change to measurements, wound is demarcating and ischemic nature.  Thin stable dry eschar present overlying wound bed -concern for gangrenous change. Periwound pink without acute erythema, increased warmth.  Digit itself is cool to palpation -palmar surface of distal digit cool and slightly dusky in appearance.  Delayed cap refill, >3 sec. No purulence or malodor.  No surrounding induration, fluctuance, crepitus.  No acute erythema, increased warmth, lymphangitic streaking.  Recommend changing treatment to Betadine soaked Adaptic followed by gauze and rolled gauze changing daily  May cleanse with gentle Dove soap and water prior to dressing changes  Importance of never leaving wound open to air, it should be kept covered and protected at all times.  If dressing should become wet or soiled, please change dressing.  Currently follows with rheumatology for Raynaud's -is prescribed Nitropaste and tadalafil.  She reports inconsistent use of this as she does not like the side effects of tadalafil (reports it makes her not feel right).  Strongly suggest that she reach out to her rheumatologist regarding any medication side effects/concerns as alternate options may be available.  She expressed understanding  Considering pain, wound  "progression/demarcation/gangrenous change, inconsistent use of medication for Raynaud's, STAT referral to vascular placed.  Discussed referral with patient who agrees with plan.   Xray L hand 5/23/25 reviewed. \"IMPRESSION: No acute osseous abnormality.  Degenerative changes as described.\"  Reviewed importance of close monitoring of wound. Should patient experience any acute change or any of the following symptoms including but not limited to fever, chills, increased pain at the wound, swelling, purulent drainage, foul odor, etc... to immediately proceed to emergency department. Pt expresses understanding.   F/u in 1 week or sooner if needed     Portions of the record may have been created with voice recognition software. Occasional wrong word or \"sound alike\" substitutions may have occurred due to the inherent limitations of voice recognition software. Read the chart carefully and recognize, using context, where substitutions have occurred.    Subjective:   \"5/23/25: Patient presents to the wound care center for initial visit of left fourth finger wound.  Patient has a history significant for CVA and Raynaud's disease.  Patient states that the wound to her left hand has been present for approximately 1 month.  Patient states that she has been using Neosporin to the wound and that dermatology had recommended her to use clobetasol cream.  Patient states that neither ointment has really provided significant wound healing.  Patient states that she follows with rheumatologist and has Nitropaste that she applies interdigitally and was also on oral medications to help with her blood flow.  States that she tries to keep the area covered however she also admits to leaving the wound open to air at times.  Patient reports having a similar wound to her right hand previously which healed without any issues.  Patient offers no wound related complaints, reports stable amount of discomfort to the tip of her finger, denies increased " "drainage.  No fever or chills.\"    *Above HPI Hx/summary per chart review of pts initial visit to wound center w/ my colleague Loreto DE JESUS\"    6/3/2025: Italia presents today for follow-up.  She reports she continues with pain.  Has been leaving wound open to air on occasion but states for the most part she keeps it covered.  Has been applying Bactroban.  States that considering her history of stroke, she uses her left hand for most things as she has some weakness on her right side and finds it difficult to keep a dressing in place.  Denies fever, chills.  She reports she feels a little nauseous today but states she has not eaten lunch yet.  Otherwise, denies additional new acute complaints.      The following portions of the patient's history were reviewed and updated as appropriate:   Problem List[1]  Past Medical History[2]  Past Surgical History[3]  Family History[4]   Social History[5]   Current Medications[6]    Review of Systems   Constitutional:  Negative for chills and fever.   Gastrointestinal:  Positive for nausea.   Skin:  Positive for wound.   Neurological:         Some right-sided weakness s/p CVA   Psychiatric/Behavioral:  The patient is not nervous/anxious.        Objective:  /61   Pulse 87   Temp 98.3 °F (36.8 °C)   Resp 16   Pain Score:   4     Physical Exam  Vitals and nursing note reviewed.   Constitutional:       General: She is awake. She is not in acute distress.     Appearance: She is not ill-appearing, toxic-appearing or diaphoretic.   HENT:      Head: Normocephalic and atraumatic.     Eyes:      Conjunctiva/sclera: Conjunctivae normal.       Cardiovascular:      Pulses:           Radial pulses are 2+ on the right side and 2+ on the left side.      Comments: Good radial pulses bilaterally, however significantly delayed cap refill  Pulmonary:      Effort: Pulmonary effort is normal. No respiratory distress.     Skin:     General: Skin is warm and dry.      Capillary " Refill: Capillary refill takes more than 3 seconds.      Findings: Wound present.      Comments: 1- No significant change to measurements, wound is demarcating and ischemic nature.  Thin stable dry eschar present overlying wound bed -concern for gangrenous change. Periwound pink without acute erythema, increased warmth.  Digit itself is cool to palpation -palmar surface of distal digit cool and slightly dusky in appearance.  No purulence or malodor.  No surrounding induration, fluctuance, crepitus.  No acute erythema, increased warmth, lymphangitic streaking.    See full wound assessment below     Neurological:      Mental Status: She is alert.     Psychiatric:         Mood and Affect: Mood normal.         Behavior: Behavior normal. Behavior is cooperative.         Wound 05/11/25 Finger D4, ring Anterior;Left (Active)   Wound Image    06/03/25 1258   Wound Description Yellow;Brown;Dry;Pink 06/03/25 1259   Non-staged Wound Description Full thickness 06/03/25 1259   Wound Length (cm) 1.9 cm 06/03/25 1259   Wound Width (cm) 0.9 cm 06/03/25 1259   Wound Depth (cm) 0.1 cm 06/03/25 1259   Wound Surface Area (cm^2) 1.34 cm^2 06/03/25 1259   Wound Volume (cm^3) 0.09 cm^3 06/03/25 1259   Calculated Wound Volume (cm^3) 0.17 cm^3 06/03/25 1259   Change in Wound Size % 29.17 06/03/25 1259   Drainage Amount Scant 06/03/25 1259   Drainage Description Serous 06/03/25 1259   Ana-wound Assessment Intact 06/03/25 1259           Lab Results   Component Value Date    HGBA1C 6.3 (H) 03/10/2021       Wound Instructions:  Orders Placed This Encounter   Procedures   • Wound cleansing and dressings Anterior;Left Finger D4, ring     Wash your hands with soap and water.  Remove old dressing, discard into plastic bag and place in trash.  Cleanse the wound with mld, unscented soap (Dove)  prior to applying a clean dressing. Do not use tissue or cotton balls. Do not scrub the wound. Pat dry using gauze.     Shower: You may shower but must keep  the dressing/ dressing dry. You can cover the dressing with a cast cover that can be purchased online.       Apply betadine moistened adaptic (oily dressing) to the finger wound.    Cover with gauze  Secure with tape  Change dressing daily     Watch for signs of infection (redness,warmth to the touch, increased pain, odor, pus, swelling, fever, chills, nausea, vomiting). If you notice any of these call the wound center or proceed to the ER.    Make an appt with Vascular doctor.     Standing Status:   Future     Expected Date:   6/3/2025     Expiration Date:   6/10/2025   • Wound Procedure Treatment Anterior;Left Finger D4, ring     This order was created via procedure documentation   • Ambulatory Referral to Vascular Surgery     Standing Status:   Future     Expiration Date:   6/3/2026     Referral Priority:   STAT     Referral Type:   Consult - AMB     Referral Reason:   Specialty Services Required     Requested Specialty:   Vascular Surgery     Number of Visits Requested:   1     Expiration Date:   6/3/2026     Shelby Encinas MD         [1]  Patient Active Problem List  Diagnosis   • History of CVA (cerebrovascular accident)   • Hypertensive urgency   • Hypertension, essential   • QT prolongation   • Hypothyroidism (acquired)   • TIA (transient ischemic attack)   • Fall   • Intractable chronic migraine without aura and with status migrainosus   • Pain in left forearm   • Closed nondisplaced comminuted fracture of shaft of left ulna   • Closed nondisplaced comminuted fracture of shaft of left ulna with routine healing, subsequent encounter   • Age-related osteoporosis without current pathological fracture   • Chronic pain of left thumb   • Primary osteoarthritis of first carpometacarpal joint of left hand   • Small bowel obstruction (HCC)   • Open wound of digit of hand   [2]  Past Medical History:  Diagnosis Date   • Anemia    • Aphasia as late effect of cerebrovascular accident (CVA)     expressive   • CVA  (cerebral vascular accident) ([facility])    • Hyperlipidemia    • Hypertension    • Hypothyroidism    • PVD (peripheral vascular disease) ([facility])    • Raynaud disease    • SBO (small bowel obstruction) ([facility])    • TIA (transient ischemic attack)    • Weakness as late effect of cerebrovascular accident (CVA)     right sided   • Wrist fracture     Left   [3]  Past Surgical History:  Procedure Laterality Date   • COLONOSCOPY     • CT EPIDURAL STEROID INJECTION (GALINA LUMBAR)     • HAND SURGERY Right     Mallet finger repair   • ORIF WRIST FRACTURE Left 2024    Procedure: Open reduction internal fixation of distal ulna;  Surgeon: Jim Max;  Location:  MAIN OR;  Service: Orthopedics   • TONSILLECTOMY     • TUBAL LIGATION     [4]  Family History  Problem Relation Name Age of Onset   • Other Mother          encephalitis   • Other Father          trauma   [5]  Social History  Socioeconomic History   • Marital status: Unknown   Tobacco Use   • Smoking status: Never   • Smokeless tobacco: Never   Vaping Use   • Vaping status: Never Used   Substance and Sexual Activity   • Alcohol use: Not Currently   • Drug use: Not Currently     Social Drivers of Health     Food Insecurity: No Food Insecurity (2025)    Nursing - Inadequate Food Risk Classification    • Ran Out of Food in the Last Year: Never true   Transportation Needs: No Transportation Needs (2025)    Nursing - Transportation Risk Classification    • Lack of Transportation: No    Received from [facility]   Intimate Partner Violence: Unknown (2025)    Nursing IPS    • Physically Hurt by Someone: No    • Hurt or Threatened by Someone: No   Housing Stability: Unknown (2025)    Nursing: Inadequate Housing Risk Classification    • Unable to Pay for Housing in the Last Year: No    • Has Housin   [6]    Current Outpatient Medications:   •  acetaminophen (TYLENOL) 325 mg tablet, Take 2 tablets (650 mg total) by mouth every 6 (six)  "hours as needed for mild pain, headaches or fever, Disp: , Rfl: 0  •  amLODIPine (NORVASC) 10 mg tablet, Take 10 mg by mouth daily, Disp: , Rfl:   •  amLODIPine (NORVASC) 2.5 mg tablet, Take 1 tablet (2.5 mg total) by mouth daily (Patient not taking: Reported on 3/29/2024), Disp: 30 tablet, Rfl: 0  •  aspirin-dipyridamole (AGGRENOX)  mg per 12 hr capsule, Take by mouth, Disp: , Rfl:   •  atorvastatin (LIPITOR) 80 mg tablet, Take 1 tablet (80 mg total) by mouth every evening, Disp:  , Rfl: 0  •  Calcium 200 MG TABS, 600 mg, Disp: , Rfl:   •  CANDESARTAN CILEXETIL PO, Take by mouth daily at bedtime, Disp: , Rfl:   •  cyanocobalamin 1,000 mcg/mL, Inject 1 mL into a muscle every 30 (thirty) days, Disp: , Rfl:   •  Diclofenac Sodium (VOLTAREN) 1 %, APPLY 2 GRAMS TO AFFECTED AREA 4 TIMES A DAY, Disp: 100 g, Rfl: 1  •  fluticasone (FLONASE) 50 mcg/act nasal spray, 1 spray into each nostril daily as needed for rhinitis, Disp: , Rfl:   •  folic acid (FOLVITE) 1 mg tablet, Take by mouth, Disp: , Rfl:   •  hydroxychloroquine (PLAQUENIL) 200 mg tablet, Take 200 mg by mouth daily, Disp: , Rfl:   •  levothyroxine 75 mcg tablet, Take 75 mcg by mouth daily, Disp: , Rfl:   •  meclizine (ANTIVERT) 25 mg tablet, Take 1 tablet (25 mg total) by mouth every 8 (eight) hours as needed for dizziness, Disp: 30 tablet, Rfl: 0  •  mupirocin (BACTROBAN) 2 % ointment, Apply topically in the morning for 14 days Apply to open wound daily and as needed., Disp: 15 g, Rfl: 0  •  ondansetron (ZOFRAN) 4 mg tablet, Take 1 tablet (4 mg total) by mouth every 6 (six) hours, Disp: 12 tablet, Rfl: 0  •  senna-docusate sodium (SENOKOT-S) 8.6-50 mg per tablet, Take 1 tablet by mouth daily, Disp: 7 tablet, Rfl: 0  •  traMADol (ULTRAM) 50 mg tablet, Take 50 mg by mouth every 6 (six) hours as needed, Disp: , Rfl:   •  VanishPoint Safety Syringe 25G X 5/8\" 3 ML MISC, ONE INJECTION EVERY MONTH WITH VITAMIN B 12, Disp: , Rfl:   No current facility-administered " medications for this visit.

## 2025-06-03 NOTE — PATIENT INSTRUCTIONS
Orders Placed This Encounter   Procedures    Wound cleansing and dressings Anterior;Left Finger D4, ring     Wash your hands with soap and water.  Remove old dressing, discard into plastic bag and place in trash.  Cleanse the wound with mld, unscented soap (Dove)  prior to applying a clean dressing. Do not use tissue or cotton balls. Do not scrub the wound. Pat dry using gauze.     Shower: You may shower but must keep the dressing/ dressing dry. You can cover the dressing with a cast cover that can be purchased online.       Apply betadine moistened adaptic (oily dressing) to the finger wound.    Cover with gauze  Secure with tape  Change dressing daily     Watch for signs of infection (redness,warmth to the touch, increased pain, odor, pus, swelling, fever, chills, nausea, vomiting). If you notice any of these call the wound center or proceed to the ER.    Make an appt with Vascular doctor.     Standing Status:   Future     Expected Date:   6/3/2025     Expiration Date:   6/10/2025    Ambulatory Referral to Vascular Surgery     Standing Status:   Future     Expiration Date:   6/3/2026     Referral Priority:   STAT     Referral Type:   Consult - AMB     Referral Reason:   Specialty Services Required     Requested Specialty:   Vascular Surgery     Number of Visits Requested:   1     Expiration Date:   6/3/2026

## 2025-06-03 NOTE — TELEPHONE ENCOUNTER
"Hello,    The following message was sent via e-mail to the leadership team:     Please advise if you can help facilitate the following overbook request:    Patient Name: Italia Conway    Patient MRN: [id number]    Call back #: [phone]    Insurance: medicare/aetna    Department:Vascular    Speciality: Vascular     Reason for overbook request: STAT REFERRAL    Comments (Write \"N/a\" if no comments): pt has open wound of LUE. Scheduled two appts at pts request.     Requested doctor and location: [facility]    Date of current appointment: 7/8/25      Thank you.    "

## 2025-06-03 NOTE — TELEPHONE ENCOUNTER
----- Message from Sarah BRANHAM sent at 6/3/2025  4:04 PM EDT -----  Regarding: FW: appointment/imaging    ----- Message -----  From: Sergio Ritchie MD  Sent: 6/3/2025   2:24 PM EDT  To: [facility]  Subject: appointment/imaging                              Hello,This patient was recently seen by wound care and has a left finger wound. She needs an OV in the next 2-3 weeks (w/ me) and a BUE duplex w/ digit pressures (ordered) before appointment.Thanks

## 2025-06-03 NOTE — PROGRESS NOTES
Wound Procedure Treatment Anterior;Left Finger D4, ring    Performed by: Raya Damian RN  Authorized by: Shelby Encinas MD  Associated wounds:   Wound 05/11/25 Finger D4, ring Anterior;Left    Wound cleansed with:  NSS   Applied topical:  Betadine   Applied primary dressing:  Non adherent contact layer   Applied secondary dressing:  Gauze   Dressing secured with:  Laura, Tape and Surgilast

## 2025-06-04 ENCOUNTER — TELEPHONE (OUTPATIENT)
Dept: VASCULAR SURGERY | Facility: CLINIC | Age: 75
End: 2025-06-04

## 2025-06-11 ENCOUNTER — OFFICE VISIT (OUTPATIENT)
Facility: CLINIC | Age: 75
End: 2025-06-11
Payer: COMMERCIAL

## 2025-06-11 VITALS
SYSTOLIC BLOOD PRESSURE: 151 MMHG | TEMPERATURE: 98.3 F | DIASTOLIC BLOOD PRESSURE: 81 MMHG | RESPIRATION RATE: 18 BRPM | HEART RATE: 83 BPM

## 2025-06-11 DIAGNOSIS — I73.01 RAYNAUD DISEASE WITH GANGRENE (HCC): Primary | ICD-10-CM

## 2025-06-11 PROCEDURE — 99214 OFFICE O/P EST MOD 30 MIN: CPT | Performed by: FAMILY MEDICINE

## 2025-06-11 PROCEDURE — 99213 OFFICE O/P EST LOW 20 MIN: CPT | Performed by: FAMILY MEDICINE

## 2025-06-11 NOTE — PROGRESS NOTES
Wound Procedure Treatment Anterior;Left Finger D4, ring    Performed by: Raya Damian RN  Authorized by: Shelby Encinas MD  Associated wounds:   Wound 05/11/25 Finger D4, ring Anterior;Left    Wound cleansed with:  NSS   Applied topical:  Betadine   Applied secondary dressing:  Gauze   Dressing secured with:  Laura, Tape and Surgilast

## 2025-06-11 NOTE — PATIENT INSTRUCTIONS
Orders Placed This Encounter   Procedures    Wound cleansing and dressings Anterior;Left Finger D4, ring     Wash your hands with soap and water.  Remove old dressing, discard into plastic bag and place in trash.  Cleanse the wound with mld, unscented soap (Dove)  prior to applying a clean dressing. Do not use tissue or cotton balls. Do not scrub the wound. Pat dry using gauze.     Shower: You may shower but must keep the dressing/ dressing dry. You can cover the dressing with a cast cover that can be purchased online.       Apply betadine to the finger wound.    Cover with gauze  Secure with tape  Change dressing daily     Watch for signs of infection (redness,warmth to the touch, increased pain, odor, pus, swelling, fever, chills, nausea, vomiting). If you notice any of these call the wound center or proceed to the ER.     Keep appointment with Vascular doctor.    Follow up with wound care at [facility] or [facility] wound care    Consider hyperbaric oxygen therapy.     Standing Status:   Future     Expected Date:   6/11/2025     Expiration Date:   6/18/2025

## 2025-06-11 NOTE — PROGRESS NOTES
Patient ID: Italia Conway is a 75 y.o. female Date of Birth 1950       Chief Complaint   Patient presents with    Follow Up Wound Care Visit       Allergies:  Penicillins    Diagnosis:      Diagnosis ICD-10-CM Associated Orders   1. Raynaud disease with gangrene ([facility])  I73.01 Wound cleansing and dressings Anterior;Left Finger D4, ring     Wound Procedure Treatment Anterior;Left Finger D4, ring              Assessment & Plan:  R 4th finger wound w/ ischemic change d/t Raynauds: Again w/out significant change to measurements, wound is demarcating and ischemic in nature.  Thin stable dry eschar present overlying wound bed -concern for gangrenous change. Periwound w/ mild edema and dusky, purple-pink without acute erythema, increased warmth.  Digit itself is cool to palpation -palmar surface of distal digit cool, dusky and cyanotic in appearance.  No cap refill at distal end of digit, extremely delayed at wound periphery. No purulence or malodor.  No surrounding induration, fluctuance, crepitus.  No acute erythema, increased warmth, lymphangitic streaking.  Betadine BID  May cleanse with gentle Dove soap and water prior to dressing changes  Importance of never leaving wound open to air, it should be kept covered and protected at all times.  If dressing should become wet or soiled, please change dressing.  Currently follows with rheumatology for Raynauds -is prescribed Nitropaste and tadalafil with history of inconsistent use due to side effects.  She reports she did reach out to her rheumatologist who recommended she go to plastics for Botox injections.  Saw plastics last week.   Vascular referral placed at our last visit scheduled for July.  Vascular ordered upper limb arterial duplex which patient scheduled for 6/19/2025.  Should study show concern for acute arterial occlusion, patient may be candidate for hyperbaric oxygen therapy for acute occlusion and digit salvage.  Had general discussion about  "hyperbarics with patient in the event that she is a candidate (pending formal consultation). As she has transportation difficulties, she would prefer to stay local at the [facility].  I did call to discuss case/patient's candidacy for possible HBO at the [facility] center who stated they we will discuss with their provider and follow-up.  I greatly appreciate their time and care coordination.  Xray L hand 5/23/25 reviewed. \"IMPRESSION: No acute osseous abnormality.  Degenerative changes as described.\"  Reviewed importance of close monitoring of wound. Should patient experience any acute change or any of the following symptoms including but not limited to fever, chills, increased pain at the wound, swelling, purulent drainage, foul odor, etc... to immediately proceed to emergency department. Pt expresses understanding.   F/u in 1 week or sooner if needed     Portions of the record may have been created with voice recognition software. Occasional wrong word or \"sound alike\" substitutions may have occurred due to the inherent limitations of voice recognition software. Read the chart carefully and recognize, using context, where substitutions have occurred.    Subjective:   \"5/23/25: Patient presents to the wound care center for initial visit of left fourth finger wound.  Patient has a history significant for CVA and Raynaud's disease.  Patient states that the wound to her left hand has been present for approximately 1 month.  Patient states that she has been using Neosporin to the wound and that dermatology had recommended her to use clobetasol cream.  Patient states that neither ointment has really provided significant wound healing.  Patient states that she follows with rheumatologist and has Nitropaste that she applies interdigitally and was also on oral medications to help with her blood flow.  States that she tries to keep the area covered however she also admits to leaving the wound open to air at times.  " "Patient reports having a similar wound to her right hand previously which healed without any issues.  Patient offers no wound related complaints, reports stable amount of discomfort to the tip of her finger, denies increased drainage.  No fever or chills.\"    *Above HPI Hx/summary per chart review of pts initial visit to wound center w/ my colleague Loreto Josee DE JESUS\"    6/3/2025: Italia presents today for follow-up.  She reports she continues with pain.  Has been leaving wound open to air on occasion but states for the most part she keeps it covered.  Has been applying Bactroban.  States that considering her history of stroke, she uses her left hand for most things as she has some weakness on her right side and finds it difficult to keep a dressing in place.  Denies fever, chills.  She reports she feels a little nauseous today but states she has not eaten lunch yet.  Otherwise, denies additional new acute complaints.    6/11/2025: Italia presents today for follow-up.  She reports that since her last visit, she followed up with her rheumatologist and plastics.  Received Botox injections.  She reports that the plastic surgeon also told her to put Betadine on her wound.  She has not had fever or chills.  Denies other new acute complaints today.      The following portions of the patient's history were reviewed and updated as appropriate:   Problem List[1]  Past Medical History[2]  Past Surgical History[3]  Family History[4]   Social History[5]   Current Medications[6]    Review of Systems   Constitutional:  Negative for chills and fever.   Skin:  Positive for wound.   Neurological:         Right-sided weakness s/p CVA   Psychiatric/Behavioral:  The patient is not nervous/anxious.        Objective:  /81   Pulse 83   Temp 98.3 °F (36.8 °C)   Resp 18   Pain Score:   4     Physical Exam  Vitals and nursing note reviewed.   Constitutional:       General: She is awake. She is not in acute distress.     " Appearance: She is not ill-appearing, toxic-appearing or diaphoretic.   HENT:      Head: Normocephalic and atraumatic.     Eyes:      Conjunctiva/sclera: Conjunctivae normal.       Cardiovascular:      Pulses:           Radial pulses are 2+ on the right side and 2+ on the left side.      Comments: Good radial pulses bilaterally, however significantly delayed cap refill  Pulmonary:      Effort: Pulmonary effort is normal. No respiratory distress.     Skin:     General: Skin is warm and dry.      Capillary Refill: No cap refill at distal end of digit     Findings: Wound present.      Comments: 1- Again w/out significant change to measurements, wound is demarcating and ischemic in nature.  Thin stable dry eschar present overlying wound bed -concern for gangrenous change. Periwound w/ mild edema and dusky, purple-pink without acute erythema, increased warmth.  Digit itself is cool to palpation -palmar surface of distal digit cool, dusky and cyanotic in appearance.  No cap refill at distal end of digit, extremely delayed at wound periphery. No purulence or malodor.  No surrounding induration, fluctuance, crepitus.  No acute erythema, increased warmth, lymphangitic streaking.    See full wound assessment below     Neurological:      Mental Status: She is alert.     Psychiatric:         Mood and Affect: Mood normal.         Behavior: Behavior normal. Behavior is cooperative.       Wound 05/11/25 Finger D4, ring Anterior;Left (Active)   Wound Image   06/11/25 1051   Wound Description Yellow;Brown;Dry;Pink;Gray 06/11/25 1053   Non-staged Wound Description Full thickness 06/11/25 1053   Wound Length (cm) 2 cm 06/11/25 1053   Wound Width (cm) 1 cm 06/11/25 1053   Wound Depth (cm) 0.1 cm 06/11/25 1053   Wound Surface Area (cm^2) 1.57 cm^2 06/11/25 1053   Wound Volume (cm^3) 0.105 cm^3 06/11/25 1053   Calculated Wound Volume (cm^3) 0.2 cm^3 06/11/25 1053   Change in Wound Size % 16.67 06/11/25 1053   Drainage Amount None  06/11/25 1053   Drainage Description Serous 06/03/25 1259   Ana-wound Assessment Intact;Purple 06/11/25 1053           Lab Results   Component Value Date    HGBA1C 6.3 (H) 03/10/2021       Wound Instructions:  Orders Placed This Encounter   Procedures    Wound cleansing and dressings Anterior;Left Finger D4, ring     Wash your hands with soap and water.  Remove old dressing, discard into plastic bag and place in trash.  Cleanse the wound with mld, unscented soap (Dove)  prior to applying a clean dressing. Do not use tissue or cotton balls. Do not scrub the wound. Pat dry using gauze.     Shower: You may shower but must keep the dressing/ dressing dry. You can cover the dressing with a cast cover that can be purchased online.       Apply betadine to the finger wound.    Cover with gauze  Secure with tape  Change dressing daily     Watch for signs of infection (redness,warmth to the touch, increased pain, odor, pus, swelling, fever, chills, nausea, vomiting). If you notice any of these call the wound center or proceed to the ER.     Keep appointment with Vascular doctor.    Follow up with wound care at [facility] or [facility] wound care    Consider hyperbaric oxygen therapy.     Standing Status:   Future     Expected Date:   6/11/2025     Expiration Date:   6/18/2025    Wound Procedure Treatment Anterior;Left Finger D4, ring     This order was created via procedure documentation     Shelby Encinas MD         [1]   Patient Active Problem List  Diagnosis    History of CVA (cerebrovascular accident)    Hypertensive urgency    Hypertension, essential    QT prolongation    Hypothyroidism (acquired)    TIA (transient ischemic attack)    Fall    Intractable chronic migraine without aura and with status migrainosus    Pain in left forearm    Closed nondisplaced comminuted fracture of shaft of left ulna    Closed nondisplaced comminuted fracture of shaft of left ulna with routine healing, subsequent  encounter    Age-related osteoporosis without current pathological fracture    Chronic pain of left thumb    Primary osteoarthritis of first carpometacarpal joint of left hand    Small bowel obstruction (HCC)    Open wound of digit of hand   [2]   Past Medical History:  Diagnosis Date    Anemia     Aphasia as late effect of cerebrovascular accident (CVA)     expressive    CVA (cerebral vascular accident) (HCC)     Hyperlipidemia     Hypertension     Hypothyroidism     PVD (peripheral vascular disease) (HCC)     Raynaud disease     SBO (small bowel obstruction) (HCC)     TIA (transient ischemic attack)     Weakness as late effect of cerebrovascular accident (CVA)     right sided    Wrist fracture     Left   [3]   Past Surgical History:  Procedure Laterality Date    COLONOSCOPY      CT EPIDURAL STEROID INJECTION (GALINA LUMBAR)      HAND SURGERY Right     Mallet finger repair    ORIF WRIST FRACTURE Left 1/4/2024    Procedure: Open reduction internal fixation of distal ulna;  Surgeon: Jim Max;  Location:  MAIN OR;  Service: Orthopedics    TONSILLECTOMY      TUBAL LIGATION     [4]   Family History  Problem Relation Name Age of Onset    Other Mother          encephalitis    Other Father          trauma   [5]   Social History  Socioeconomic History    Marital status: Unknown   Tobacco Use    Smoking status: Never    Smokeless tobacco: Never   Vaping Use    Vaping status: Never Used   Substance and Sexual Activity    Alcohol use: Not Currently    Drug use: Not Currently     Social Drivers of Health     Food Insecurity: No Food Insecurity (5/11/2025)    Nursing - Inadequate Food Risk Classification     Ran Out of Food in the Last Year: Never true   Transportation Needs: No Transportation Needs (5/11/2025)    Nursing - Transportation Risk Classification     Lack of Transportation: No    Received from [employer]    Social Connections   Intimate Partner Violence: Unknown (5/11/2025)    Nursing IPS     Physically Hurt by  Someone: No     Hurt or Threatened by Someone: No   Housing Stability: Unknown (2025)    Nursing: Inadequate Housing Risk Classification     Unable to Pay for Housing in the Last Year: No     Has Housin   [6]   Current Outpatient Medications:     acetaminophen (TYLENOL) 325 mg tablet, Take 2 tablets (650 mg total) by mouth every 6 (six) hours as needed for mild pain, headaches or fever, Disp: , Rfl: 0    amLODIPine (NORVASC) 10 mg tablet, Take 10 mg by mouth daily, Disp: , Rfl:     amLODIPine (NORVASC) 2.5 mg tablet, Take 1 tablet (2.5 mg total) by mouth daily (Patient not taking: Reported on 3/29/2024), Disp: 30 tablet, Rfl: 0    aspirin-dipyridamole (AGGRENOX)  mg per 12 hr capsule, Take by mouth, Disp: , Rfl:     atorvastatin (LIPITOR) 80 mg tablet, Take 1 tablet (80 mg total) by mouth every evening, Disp:  , Rfl: 0    Calcium 200 MG TABS, 600 mg, Disp: , Rfl:     CANDESARTAN CILEXETIL PO, Take by mouth daily at bedtime, Disp: , Rfl:     cyanocobalamin 1,000 mcg/mL, Inject 1 mL into a muscle every 30 (thirty) days, Disp: , Rfl:     Diclofenac Sodium (VOLTAREN) 1 %, APPLY 2 GRAMS TO AFFECTED AREA 4 TIMES A DAY, Disp: 100 g, Rfl: 1    fluticasone (FLONASE) 50 mcg/act nasal spray, 1 spray into each nostril daily as needed for rhinitis, Disp: , Rfl:     folic acid (FOLVITE) 1 mg tablet, Take by mouth, Disp: , Rfl:     hydroxychloroquine (PLAQUENIL) 200 mg tablet, Take 200 mg by mouth daily, Disp: , Rfl:     levothyroxine 75 mcg tablet, Take 75 mcg by mouth daily, Disp: , Rfl:     meclizine (ANTIVERT) 25 mg tablet, Take 1 tablet (25 mg total) by mouth every 8 (eight) hours as needed for dizziness, Disp: 30 tablet, Rfl: 0    mupirocin (BACTROBAN) 2 % ointment, Apply topically in the morning for 14 days Apply to open wound daily and as needed., Disp: 15 g, Rfl: 0    ondansetron (ZOFRAN) 4 mg tablet, Take 1 tablet (4 mg total) by mouth every 6 (six) hours, Disp: 12 tablet, Rfl: 0    senna-docusate sodium  "(SENOKOT-S) 8.6-50 mg per tablet, Take 1 tablet by mouth daily, Disp: 7 tablet, Rfl: 0    traMADol (ULTRAM) 50 mg tablet, Take 50 mg by mouth every 6 (six) hours as needed, Disp: , Rfl:     [employer] Safety Syringe 25G X 5/8\" 3 ML MISC, ONE INJECTION EVERY MONTH WITH VITAMIN B 12, Disp: , Rfl:     "

## 2025-06-16 ENCOUNTER — OPTICAL OFFICE (OUTPATIENT)
Dept: URBAN - NONMETROPOLITAN AREA CLINIC 4 | Facility: CLINIC | Age: 75
Setting detail: OPHTHALMOLOGY
End: 2025-06-16
Payer: COMMERCIAL

## 2025-06-16 ENCOUNTER — RX ONLY (RX ONLY)
Age: 75
End: 2025-06-16

## 2025-06-16 ENCOUNTER — DOCTOR'S OFFICE (OUTPATIENT)
Dept: URBAN - NONMETROPOLITAN AREA CLINIC 1 | Facility: CLINIC | Age: 75
Setting detail: OPHTHALMOLOGY
End: 2025-06-16
Payer: MEDICARE

## 2025-06-16 DIAGNOSIS — H02.834: ICD-10-CM

## 2025-06-16 DIAGNOSIS — H52.13: ICD-10-CM

## 2025-06-16 DIAGNOSIS — H25.013: ICD-10-CM

## 2025-06-16 DIAGNOSIS — G43.809: ICD-10-CM

## 2025-06-16 DIAGNOSIS — H43.813: ICD-10-CM

## 2025-06-16 DIAGNOSIS — H40.023: ICD-10-CM

## 2025-06-16 DIAGNOSIS — Z79.891: ICD-10-CM

## 2025-06-16 DIAGNOSIS — H04.123: ICD-10-CM

## 2025-06-16 DIAGNOSIS — H02.831: ICD-10-CM

## 2025-06-16 DIAGNOSIS — I63.9: ICD-10-CM

## 2025-06-16 PROCEDURE — S0500 DISPOS CONT LENS: HCPCS | Mod: RT | Performed by: OPTOMETRIST

## 2025-06-16 PROCEDURE — 92083 EXTENDED VISUAL FIELD XM: CPT | Performed by: OPTOMETRIST

## 2025-06-16 PROCEDURE — 92014 COMPRE OPH EXAM EST PT 1/>: CPT | Performed by: OPTOMETRIST

## 2025-06-16 PROCEDURE — 76514 ECHO EXAM OF EYE THICKNESS: CPT | Performed by: OPTOMETRIST

## 2025-06-16 ASSESSMENT — LID POSITION - DERMATOCHALASIS
OD_DERMATOCHALASIS: RUL 1+
OS_DERMATOCHALASIS: LUL 1+

## 2025-06-16 ASSESSMENT — REFRACTION_AUTOREFRACTION
OD_CYLINDER: -2.00
OD_SPHERE: +0.25
OS_AXIS: 87
OS_CYLINDER: -2.25
OS_SPHERE: +0.75
OD_AXIS: 88

## 2025-06-16 ASSESSMENT — REFRACTION_CURRENTRX
OD_ADD: +2.50
OD_VPRISM_DIRECTION: PROGS
OS_SPHERE: -0.75
OD_SPHERE: -2.75
OD_OVR_VA: 20/
OS_CYLINDER: -1.00
OD_AXIS: 096
OS_VPRISM_DIRECTION: PROGS
OS_OVR_VA: 20/
OS_AXIS: 066
OD_OVR_VA: 20/
OS_SPHERE: -2.50
OS_ADD: +2.50
OD_SPHERE: -0.50
OD_AXIS: 090
OS_CYLINDER: SPH
OD_CYLINDER: -1.75
OS_OVR_VA: 20/
OD_CYLINDER: -0.75

## 2025-06-16 ASSESSMENT — SUPERFICIAL PUNCTATE KERATITIS (SPK)
OD_SPK: 1+ 2+
OS_SPK: 2+

## 2025-06-16 ASSESSMENT — PACHYMETRY
OS_CT_UM: 507
OD_CT_CORRECTION: 4
OS_CT_CORRECTION: 3
OD_CT_UM: 493

## 2025-06-16 ASSESSMENT — TONOMETRY
OS_IOP_MMHG: 16
OD_IOP_MMHG: 16

## 2025-06-16 ASSESSMENT — REFRACTION_MANIFEST
OD_ADD: +2.50
OU_VA: 20/25
OS_CYLINDER: -1.25
OS_VA2: 20/25-2
OD_VA2: 20/30+2
OS_AXIS: 070
OD_AXIS: 090
OS_SPHERE: -0.75
OS_VA1: 20/25-2
OD_SPHERE: -0.75
OD_VA1: 20/30+2
OS_ADD: +2.50
OD_CYLINDER: -0.75

## 2025-06-16 ASSESSMENT — LID POSITION - COMMENTS: OS_COMMENTS: +VE EVERTED PUNCTUM LUL

## 2025-06-16 ASSESSMENT — CONFRONTATIONAL VISUAL FIELD TEST (CVF)
OD_FINDINGS: FULL
OS_FINDINGS: FULL

## 2025-06-16 ASSESSMENT — VISUAL ACUITY
OS_BCVA: 20/30-2
OD_BCVA: 20/30-1

## 2025-06-19 ENCOUNTER — HOSPITAL ENCOUNTER (OUTPATIENT)
Facility: CLINIC | Age: 75
Discharge: HOME | End: 2025-06-19
Attending: STUDENT IN AN ORGANIZED HEALTH CARE EDUCATION/TRAINING PROGRAM
Payer: COMMERCIAL

## 2025-06-19 DIAGNOSIS — S61.209A: ICD-10-CM

## 2025-06-19 PROCEDURE — 93930 UPPER EXTREMITY STUDY: CPT

## 2025-06-20 ENCOUNTER — OFFICE VISIT (OUTPATIENT)
Facility: CLINIC | Age: 75
End: 2025-06-20
Payer: COMMERCIAL

## 2025-06-20 VITALS
RESPIRATION RATE: 18 BRPM | TEMPERATURE: 97.4 F | HEART RATE: 71 BPM | SYSTOLIC BLOOD PRESSURE: 131 MMHG | DIASTOLIC BLOOD PRESSURE: 64 MMHG

## 2025-06-20 DIAGNOSIS — I73.01 RAYNAUD DISEASE WITH GANGRENE (HCC): Primary | ICD-10-CM

## 2025-06-20 PROCEDURE — 99213 OFFICE O/P EST LOW 20 MIN: CPT | Performed by: FAMILY MEDICINE

## 2025-06-20 PROCEDURE — 99214 OFFICE O/P EST MOD 30 MIN: CPT | Performed by: FAMILY MEDICINE

## 2025-06-20 RX ORDER — LIDOCAINE 40 MG/G
CREAM TOPICAL ONCE
Status: COMPLETED | OUTPATIENT
Start: 2025-06-20 | End: 2025-06-20

## 2025-06-20 RX ADMIN — LIDOCAINE 1 APPLICATION: 40 CREAM TOPICAL at 11:13

## 2025-06-20 NOTE — PROGRESS NOTES
Wound Procedure Treatment Anterior;Left Finger D4, ring    Performed by: Karmen Kraus RN  Authorized by: Shelby Encinas MD  Associated wounds:   Wound 05/11/25 Finger D4, ring Anterior;Left    Applied topical:  Medihoney gel   Applied secondary dressing:  Gauze   Dressing secured with:  Laura and Tape

## 2025-06-20 NOTE — PROGRESS NOTES
Patient ID: Italia Conway is a 75 y.o. female Date of Birth 1950       Chief Complaint   Patient presents with   • Follow Up Wound Care Visit       Allergies:  Penicillins    Diagnosis:      Diagnosis ICD-10-CM Associated Orders   1. Raynaud disease with gangrene ([facility])  I73.01 Wound cleansing and dressings Anterior;Left Finger D4, ring     lidocaine (LMX) 4 % cream     Wound Procedure Treatment Anterior;Left Finger D4, ring              Assessment & Plan:  R 4th finger wound w/ ischemic change d/t Raynauds: Wound is again without significant change to measurements, demarcated and ischemic in nature.  Thin stable dry eschar present overlying wound bed -concern for gangrenous change.  Immediate periwound periwound pink w/ mild edema wider periwound (particularly at palmar aspect) area dusky, purple-pink without acute erythema, increased warmth.  Digit itself is cool to palpation -palmar surface of distal digit cool, dusky and cyanotic in appearance.  There appears to be return of very sluggish cap refill at distal end of digit, at lateral nailbed no cap refill present - (this part of exam is overall improved since botox injections in hand from plastics for Raynaud's) No purulence or malodor.  No surrounding induration, fluctuance, crepitus.  No acute erythema, increased warmth, lymphangitic streaking.  Considering return of sluggish cap refill at distal end, will change tx to [facility] for gentle debridement   May cleanse with gentle Dove soap and water prior to dressing changes  Importance of never leaving wound open to air, it should be kept covered and protected at all times.  If dressing should become wet or soiled, please change dressing.  Following with rheumatology for Raynauds -is prescribed Nitropaste and tadalafil with history of inconsistent use due to side effects.  She reports she did reach out to her rheumatologist who recommended she go to plastics for Botox injections which she did 2 weeks  "ago  Vascular apt scheduled for July.  Vascular ordered upper limb arterial duplex which patient had completed on 6/19 - final read pending  [facility]. [facility]  [facility] hand specialist which patient reports she did today and was told to continue to follow w/ wound center.   Xray L hand 5/23/25 reviewed. \"IMPRESSION: No acute osseous abnormality.  Degenerative changes as described.\"  Reviewed importance of close monitoring of wound. Should patient experience any acute change or any of the following symptoms including but not limited to fever, chills, increased pain at the wound, swelling, purulent drainage, foul odor, etc... to immediately proceed to emergency department. Pt expresses understanding.   F/u in 1-2 weeks or sooner if needed     Portions of the record may have been created with voice recognition software. Occasional wrong word or \"sound alike\" substitutions may have occurred due to the inherent limitations of voice recognition software. Read the chart carefully and recognize, using context, where substitutions have occurred.    Subjective:   \"5/23/25: Patient presents to the wound care center for initial visit of left fourth finger wound.  Patient has a history significant for CVA and Raynaud's disease.  Patient states that the wound to her left hand has been present for approximately 1 month.  Patient states that she has been using Neosporin to the wound and that dermatology had recommended her to use clobetasol cream.  Patient states that neither ointment has really provided significant wound healing.  Patient states that she follows with rheumatologist and has Nitropaste that she applies interdigitally and was also on oral medications to help with her blood flow.  States that she tries to keep the area covered however she also admits to leaving the wound open to air at times.  Patient reports having a similar wound to her right hand previously which healed without any " "issues.  Patient offers no wound related complaints, reports stable amount of discomfort to the tip of her finger, denies increased drainage.  No fever or chills.\"    *Above HPI Hx/summary per chart review of pts initial visit to wound center w/ my colleague Loreto DE JESUS\"    6/3/2025: Italia presents today for follow-up.  She reports she continues with pain.  Has been leaving wound open to air on occasion but states for the most part she keeps it covered.  Has been applying Bactroban.  States that considering her history of stroke, she uses her left hand for most things as she has some weakness on her right side and finds it difficult to keep a dressing in place.  Denies fever, chills.  She reports she feels a little nauseous today but states she has not eaten lunch yet.  Otherwise, denies additional new acute complaints.    6/11/2025: Italia presents today for follow-up.  She reports that since her last visit, she followed up with her rheumatologist and plastics.  Received Botox injections.  She reports that the plastic surgeon also told her to put Betadine on her wound.  She has not had fever or chills.  Denies other new acute complaints today.    6/20/2025: Italia presents today for follow-up.  Since her last visit, she states that she went to [facility] for a wound care visit and to ask about HBO.  She was told she was not a candidate but that it would probably help.  She also states that she sometimes covers her wound (mostly when she is out and about) and other times when she is at home leaves it open to air.  For her dressings, she mostly has been just using lidocaine which she got over-the-counter and sitting on top of her wound and letting it absorb.  Sometimes uses the Betadine.  She reports that she continues with pain, overall unchanged since her initial visit.  Denies fever, chills.  No other new acute complaints today.      The following portions of the patient's history were reviewed and " updated as appropriate:   Problem List[1]  Past Medical History[2]  Past Surgical History[3]  Family History[4]   Social History[5]   Current Medications[6]    Review of Systems   Constitutional:  Negative for chills and fever.   Skin:  Positive for wound.   Neurological:         Right-sided weakness s/p CVA   Psychiatric/Behavioral:  The patient is not nervous/anxious.        Objective:  /64   Pulse 71   Temp (!) 97.4 °F (36.3 °C)   Resp 18   Pain Score:   6     Physical Exam  Vitals and nursing note reviewed.   Constitutional:       General: She is awake. She is not in acute distress.     Appearance: She is not ill-appearing, toxic-appearing or diaphoretic.   HENT:      Head: Normocephalic and atraumatic.     Eyes:      Conjunctiva/sclera: Conjunctivae normal.       Cardiovascular:      Pulses:           Radial pulses are 2+ on the right side and 2+ on the left side.      Comments: Good radial pulses bilaterally, however significantly delayed cap refill  Pulmonary:      Effort: Pulmonary effort is normal. No respiratory distress.     Skin:     General: Skin is warm and dry.      Findings: Wound present.      Comments: 1- Wound is again without significant change to measurements, demarcated and ischemic in nature.  Thin stable dry eschar present overlying wound bed -concern for gangrenous change.  Immediate periwound periwound pink w/ mild edema wider periwound area (particularly at palmar aspect) dusky, purple-pink without acute erythema, increased warmth.  Digit itself is cool to palpation -palmar surface of distal digit cool, dusky and cyanotic in appearance.  There appears to be return of very sluggish cap refill at distal end of digit, at lateral nailbed no cap refill present - (this part of exam is overall improved since botox injections in hand from plastics for Raynaud's) No purulence or malodor.  No surrounding induration, fluctuance, crepitus.  No acute erythema, increased warmth, lymphangitic  streaking.    See full wound assessment below     Neurological:      Mental Status: She is alert.     Psychiatric:         Mood and Affect: Mood normal.         Behavior: Behavior normal. Behavior is cooperative.           Wound 05/11/25 Finger D4, ring Anterior;Left (Active)   Wound Image   06/20/25 1110   Wound Description Yellow;Brown;Gray;Gangrene 06/20/25 1111   Non-staged Wound Description Full thickness 06/20/25 1111   Wound Length (cm) 2 cm 06/20/25 1111   Wound Width (cm) 1.2 cm 06/20/25 1111   Wound Depth (cm) 0.1 cm 06/20/25 1111   Wound Surface Area (cm^2) 1.88 cm^2 06/20/25 1111   Wound Volume (cm^3) 0.126 cm^3 06/20/25 1111   Calculated Wound Volume (cm^3) 0.24 cm^3 06/20/25 1111   Change in Wound Size % 0 06/20/25 1111   Drainage Amount ANTHONY 06/20/25 1111   Drainage Description Serous 06/03/25 1259   Ana-wound Assessment Erythema 06/20/25 1111           Lab Results   Component Value Date    HGBA1C 6.3 (H) 03/10/2021       Wound Instructions:  Orders Placed This Encounter   Procedures   • Wound cleansing and dressings Anterior;Left Finger D4, ring     Wound cleansing and dressings Anterior;Left Finger D4, ring     Wash your hands with soap and water.  Remove old dressing, discard into plastic bag and place in trash.  Cleanse the wound with mld, unscented soap (Dove)  prior to applying a clean dressing. Do not use tissue or cotton balls. Do not scrub the wound. Pat dry using gauze.     Shower: You may shower but must keep the dressing/ dressing dry. You can cover the dressing with a cast cover that can be purchased online.       Apply medihoney to the finger wound.    Cover with gauze  Secure with tape  Change dressing daily    KEEP WOUND COVERED AT ALL TIMES!!     Watch for signs of infection (redness,warmth to the touch, increased pain, odor, pus, swelling, fever, chills, nausea, vomiting). If you notice any of these call the wound center or proceed to the ER.     Keep appointment with Vascular  doctor.     Follow up with [facility] wound care in 1-2 weeks     Standing Status:   Future     Expiration Date:   6/27/2025   • Wound Procedure Treatment Anterior;Left Finger D4, ring     This order was created via procedure documentation     Shelby Encinas MD         [1]  Patient Active Problem List  Diagnosis   • History of CVA (cerebrovascular accident)   • Hypertensive urgency   • Hypertension, essential   • QT prolongation   • Hypothyroidism (acquired)   • TIA (transient ischemic attack)   • Fall   • Intractable chronic migraine without aura and with status migrainosus   • Pain in left forearm   • Closed nondisplaced comminuted fracture of shaft of left ulna   • Closed nondisplaced comminuted fracture of shaft of left ulna with routine healing, subsequent encounter   • Age-related osteoporosis without current pathological fracture   • Chronic pain of left thumb   • Primary osteoarthritis of first carpometacarpal joint of left hand   • Small bowel obstruction (HCC)   • Open wound of digit of hand   [2]  Past Medical History:  Diagnosis Date   • Anemia    • Aphasia as late effect of cerebrovascular accident (CVA)     expressive   • CVA (cerebral vascular accident) (HCC)    • Hyperlipidemia    • Hypertension    • Hypothyroidism    • PVD (peripheral vascular disease) (HCC)    • Raynaud disease    • SBO (small bowel obstruction) (HCC)    • TIA (transient ischemic attack)    • Weakness as late effect of cerebrovascular accident (CVA)     right sided   • Wrist fracture     Left   [3]  Past Surgical History:  Procedure Laterality Date   • COLONOSCOPY     • CT EPIDURAL STEROID INJECTION (GALINA LUMBAR)     • HAND SURGERY Right     Mallet finger repair   • ORIF WRIST FRACTURE Left 1/4/2024    Procedure: Open reduction internal fixation of distal ulna;  Surgeon: Jim Max;  Location:  MAIN OR;  Service: Orthopedics   • TONSILLECTOMY     • TUBAL LIGATION     [4]  Family History  Problem Relation Name Age of  Onset   • Other Mother          encephalitis   • Other Father          trauma   [5]  Social History  Socioeconomic History   • Marital status: Unknown   Tobacco Use   • Smoking status: Never   • Smokeless tobacco: Never   Vaping Use   • Vaping status: Never Used   Substance and Sexual Activity   • Alcohol use: Not Currently   • Drug use: Not Currently     Social Drivers of Health     Food Insecurity: No Food Insecurity (5/11/2025)    Nursing - Inadequate Food Risk Classification    • Ran Out of Food in the Last Year: Never true   Transportation Needs: No Transportation Needs (5/11/2025)    Nursing - Transportation Risk Classification    • Lack of Transportation: No    Received from [facility]   Intimate Partner Violence: Unknown (5/11/2025)    Nursing IPS    • Physically Hurt by Someone: No    • Hurt or Threatened by Someone: No   Housing Stability: Unknown (5/11/2025)    Nursing: Inadequate Housing Risk Classification    • Unable to Pay for Housing in the Last Year: No    • Has Housing: No   [6]    Current Outpatient Medications:   •  acetaminophen (TYLENOL) 325 mg tablet, Take 2 tablets (650 mg total) by mouth every 6 (six) hours as needed for mild pain, headaches or fever, Disp: , Rfl: 0  •  amLODIPine (NORVASC) 10 mg tablet, Take 10 mg by mouth daily, Disp: , Rfl:   •  amLODIPine (NORVASC) 2.5 mg tablet, Take 1 tablet (2.5 mg total) by mouth daily (Patient not taking: Reported on 3/29/2024), Disp: 30 tablet, Rfl: 0  •  aspirin-dipyridamole (AGGRENOX)  mg per 12 hr capsule, Take by mouth, Disp: , Rfl:   •  atorvastatin (LIPITOR) 80 mg tablet, Take 1 tablet (80 mg total) by mouth every evening, Disp:  , Rfl: 0  •  Calcium 200 MG TABS, 600 mg, Disp: , Rfl:   •  CANDESARTAN CILEXETIL PO, Take by mouth daily at bedtime, Disp: , Rfl:   •  cyanocobalamin 1,000 mcg/mL, Inject 1 mL into a muscle every 30 (thirty) days, Disp: , Rfl:   •  Diclofenac Sodium (VOLTAREN) 1 %, APPLY 2 GRAMS TO  "AFFECTED AREA 4 TIMES A DAY, Disp: 100 g, Rfl: 1  •  fluticasone (FLONASE) 50 mcg/act nasal spray, 1 spray into each nostril daily as needed for rhinitis, Disp: , Rfl:   •  folic acid (FOLVITE) 1 mg tablet, Take by mouth, Disp: , Rfl:   •  hydroxychloroquine (PLAQUENIL) 200 mg tablet, Take 200 mg by mouth daily, Disp: , Rfl:   •  levothyroxine 75 mcg tablet, Take 75 mcg by mouth daily, Disp: , Rfl:   •  meclizine (ANTIVERT) 25 mg tablet, Take 1 tablet (25 mg total) by mouth every 8 (eight) hours as needed for dizziness, Disp: 30 tablet, Rfl: 0  •  mupirocin (BACTROBAN) 2 % ointment, Apply topically in the morning for 14 days Apply to open wound daily and as needed., Disp: 15 g, Rfl: 0  •  ondansetron (ZOFRAN) 4 mg tablet, Take 1 tablet (4 mg total) by mouth every 6 (six) hours, Disp: 12 tablet, Rfl: 0  •  senna-docusate sodium (SENOKOT-S) 8.6-50 mg per tablet, Take 1 tablet by mouth daily, Disp: 7 tablet, Rfl: 0  •  traMADol (ULTRAM) 50 mg tablet, Take 50 mg by mouth every 6 (six) hours as needed, Disp: , Rfl:   •  [employer] Safety Syringe 25G X 5/8\" 3 ML MISC, ONE INJECTION EVERY MONTH WITH VITAMIN B 12, Disp: , Rfl: " Otezla Counseling: The side effects of Otezla were discussed with the patient, including but not limited to worsening or new depression, weight loss, diarrhea, nausea, upper respiratory tract infection, and headache. Patient instructed to call the office should any adverse effect occur.  The patient verbalized understanding of the proper use and possible adverse effects of Otezla.  All the patient's questions and concerns were addressed.

## 2025-06-20 NOTE — PATIENT INSTRUCTIONS
Orders Placed This Encounter   Procedures    Wound cleansing and dressings Anterior;Left Finger D4, ring     Wound cleansing and dressings Anterior;Left Finger D4, ring     Wash your hands with soap and water.  Remove old dressing, discard into plastic bag and place in trash.  Cleanse the wound with mld, unscented soap (Dove)  prior to applying a clean dressing. Do not use tissue or cotton balls. Do not scrub the wound. Pat dry using gauze.     Shower: You may shower but must keep the dressing/ dressing dry. You can cover the dressing with a cast cover that can be purchased online.       Apply medihoney to the finger wound.    Cover with gauze  Secure with tape  Change dressing daily    KEEP WOUND COVERED AT ALL TIMES!!     Watch for signs of infection (redness,warmth to the touch, increased pain, odor, pus, swelling, fever, chills, nausea, vomiting). If you notice any of these call the wound center or proceed to the ER.     Keep appointment with Vascular doctor.     Follow up with [facility] wound care in 1-2 weeks     Standing Status:   Future     Expiration Date:   6/27/2025

## 2025-06-21 PROCEDURE — 93930 UPPER EXTREMITY STUDY: CPT | Performed by: SURGERY

## 2025-07-01 NOTE — PROGRESS NOTES
Patient ID: Italai Conway is a 75 y.o. female Date of Birth 1950       Chief Complaint   Patient presents with   • Follow Up Wound Care Visit     finger       Allergies:  Penicillins    Diagnosis:      Diagnosis ICD-10-CM Associated Orders   1. Raynaud disease with gangrene ([facility])  I73.01 lidocaine (LMX) 4 % cream     Wound Procedure Treatment Anterior;Left Finger D4, ring     Wound cleansing and dressings              Assessment & Plan:    Italia presented today for follow-up.  Though overall arterial examination has improved post Botox injection with plastics and now that she is consistently taking her Cialis, unfortunately, wound is now probing to bone.  Intermittent adherence to wound care recommendations, see below assessment/plan/HPI.  Consider wound probing to bone in patient with worsening pain, recommend she proceed to emergency department.  Patient expressed understanding and agrees to proceed.  Notified Naye charge RN at [facility] who is awaiting patient's arrival.     R 4th finger wound w/ ischemic change d/t Raynauds: No change to measurements however wound is more boggy, eschar wet in appearance.  Upon further examination appears to be probing directly to bone which can be seen in clinical media below.  Periwound is pink without acute erythema, increased warmth.  No surrounding induration, fluctuance, crepitus.  No purulence or malodor.  Distal end of digit with sluggish cap refill and is cool to palpation (at previous exams, patient was noted to not have any cap refill at this location however post Botox injections from plastics and patient reports of now taking her Cialis, this part of her exam has improved).  Patient was previously recommended to apply Betadine however patient was not doing this.  At her last visit as her arterial status was noted to have improved on exam, patient was recommended to apply Medihoney to initiate gentle debridement. However against advice reports she mostly has  "been applying topical lidocaine which she gets over-the-counter due to pain but does try to apply the Medihoney after this and has been doing it multiple times a day.  Also against advice/recommendations, she reports she has been soaking it sometimes when she is in the bathtub.    Due to concern for exposed bone and her increased pain recommend she immediately proceed to emergency department  Following with rheumatology for Raynauds -is prescribed Nitropaste and tadalafil with history of inconsistent use due to side effects.  She reports she did reach out to her rheumatologist who recommended she go to plastics for Botox injections which she did 3-4 weeks ago.  She states she now has been taking her Cialis more consistently  Vascular apt scheduled for July.  Vascular ordered upper limb arterial duplex which patient had completed on 6/19.  No evidence of significant upper extremity arterial occlusive disease, digit pressure within normal limits for healing potential.  Xray L hand 5/23/25 \"IMPRESSION: No acute osseous abnormality.  Degenerative changes as described.  Recommended to proceed to ER.    Portions of the record may have been created with voice recognition software. Occasional wrong word or \"sound alike\" substitutions may have occurred due to the inherent limitations of voice recognition software. Read the chart carefully and recognize, using context, where substitutions have occurred.    Subjective:   \"5/23/25: Patient presents to the wound care center for initial visit of left fourth finger wound.  Patient has a history significant for CVA and Raynaud's disease.  Patient states that the wound to her left hand has been present for approximately 1 month.  Patient states that she has been using Neosporin to the wound and that dermatology had recommended her to use clobetasol cream.  Patient states that neither ointment has really provided significant wound healing.  Patient states that she follows with " "rheumatologist and has Nitropaste that she applies interdigitally and was also on oral medications to help with her blood flow.  States that she tries to keep the area covered however she also admits to leaving the wound open to air at times.  Patient reports having a similar wound to her right hand previously which healed without any issues.  Patient offers no wound related complaints, reports stable amount of discomfort to the tip of her finger, denies increased drainage.  No fever or chills.\"    *Above HPI Hx/summary per chart review of pts initial visit to wound center w/ my colleague Loreto DE JESUS\"    6/3/2025: Italia presents today for follow-up.  She reports she continues with pain.  Has been leaving wound open to air on occasion but states for the most part she keeps it covered.  Has been applying Bactroban.  States that considering her history of stroke, she uses her left hand for most things as she has some weakness on her right side and finds it difficult to keep a dressing in place.  Denies fever, chills.  She reports she feels a little nauseous today but states she has not eaten lunch yet.  Otherwise, denies additional new acute complaints.    6/11/2025: Italia presents today for follow-up.  She reports that since her last visit, she followed up with her rheumatologist and plastics.  Received Botox injections.  She reports that the plastic surgeon also told her to put Betadine on her wound.  She has not had fever or chills.  Denies other new acute complaints today.    6/20/2025: Italia presents today for follow-up.  Since her last visit, she states that she went to [facility] for a wound care visit and to ask about HBO.  She was told she was not a candidate but that it would probably help.  She also states that she sometimes covers her wound (mostly when she is out and about) and other times when she is at home leaves it open to air.  For her dressings, she mostly has been just using " lidocaine which she got over-the-counter and sitting on top of her wound and letting it absorb.  Sometimes uses the Betadine.  She reports that she continues with pain, overall unchanged since her initial visit.  Denies fever, chills.  No other new acute complaints today.    7/2/2025: Italia presents today for follow-up.  She reports that she was last seen, she has been using Medihoney but also is applying topical lidocaine multiple times a day against recommendations.  She states her pain has been constant. She also reports she has been soaking her wound in her bathtub when she is bathing but not a long time.  Denies fever, chills.      The following portions of the patient's history were reviewed and updated as appropriate:   Problem List[1]  Past Medical History[2]  Past Surgical History[3]  Family History[4]   Social History[5]   Current Medications[6]    Review of Systems   Constitutional:  Negative for chills and fever.   Skin:  Positive for wound.   Neurological:         Right-sided weakness s/p CVA   Psychiatric/Behavioral:  The patient is not nervous/anxious.        Objective:  /70   Pulse 78   Temp (!) 97 °F (36.1 °C)   Resp 18         Physical Exam  Vitals and nursing note reviewed.   Constitutional:       General: She is awake. She is not in acute distress.     Appearance: She is not ill-appearing, toxic-appearing or diaphoretic.   HENT:      Head: Normocephalic and atraumatic.     Eyes:      Conjunctiva/sclera: Conjunctivae normal.       Cardiovascular:      Rate and Rhythm: Normal rate.      Pulses:           Radial pulses are 2+ on the right side and 2+ on the left side.      Comments: Good radial pulses bilaterally, however significantly delayed cap refill  Pulmonary:      Effort: Pulmonary effort is normal. No respiratory distress.     Skin:     General: Skin is warm and dry.      Findings: Wound present.      Comments: 1- No change to measurements however wound is more boggy, eschar wet  in appearance.  Upon further examination appears to be probing directly to bone which can be seen in clinical media below.  Periwound is pink without acute erythema, increased warmth.  No surrounding induration, fluctuance, crepitus.  No purulence or malodor.  Distal end of digit with sluggish cap refill and is cool to palpation (at previous exams, patient was noted to not have any cap refill at this location however post Botox injections from plastics and patient reports of now taking her Cialis, this part of her exam has improved).    See full wound assessment below     Neurological:      Mental Status: She is alert.     Psychiatric:         Mood and Affect: Mood normal.         Behavior: Behavior normal. Behavior is cooperative.             Wound 05/11/25 Finger D4, ring Anterior;Left (Active)   Wound Image           07/02/25 1014   Wound Description Yellow;Brown;Gray;Gangrene 07/02/25 1013   Non-staged Wound Description Full thickness 07/02/25 1013   Wound Length (cm) 2 cm 07/02/25 1013   Wound Width (cm) 1.2 cm 07/02/25 1013   Wound Depth (cm) 0.1 cm 07/02/25 1013   Wound Surface Area (cm^2) 1.88 cm^2 07/02/25 1013   Wound Volume (cm^3) 0.126 cm^3 07/02/25 1013   Calculated Wound Volume (cm^3) 0.24 cm^3 07/02/25 1013   Change in Wound Size % 0 07/02/25 1013   Drainage Amount None 07/02/25 1013   Drainage Description Serous 06/03/25 1259   Ana-wound Assessment Erythema 07/02/25 1013               Lab Results   Component Value Date    HGBA1C 6.3 (H) 03/10/2021       Wound Instructions:  Orders Placed This Encounter   Procedures   • Wound Procedure Treatment Anterior;Left Finger D4, ring     This order was created via procedure documentation   • Wound cleansing and dressings     Proceed to the ER     Standing Status:   Future     Expiration Date:   7/9/2025       Shelby Encinas MD         [1]  Patient Active Problem List  Diagnosis   • History of CVA (cerebrovascular accident)   • Hypertensive urgency   •  Hypertension, essential   • QT prolongation   • Hypothyroidism (acquired)   • TIA (transient ischemic attack)   • Fall   • Intractable chronic migraine without aura and with status migrainosus   • Pain in left forearm   • Closed nondisplaced comminuted fracture of shaft of left ulna   • Closed nondisplaced comminuted fracture of shaft of left ulna with routine healing, subsequent encounter   • Age-related osteoporosis without current pathological fracture   • Chronic pain of left thumb   • Primary osteoarthritis of first carpometacarpal joint of left hand   • Small bowel obstruction (HCC)   • Open wound of digit of hand   [2]  Past Medical History:  Diagnosis Date   • Anemia    • Aphasia as late effect of cerebrovascular accident (CVA)     expressive   • CVA (cerebral vascular accident) ([facility])    • Hyperlipidemia    • Hypertension    • Hypothyroidism    • PVD (peripheral vascular disease) ([facility])    • Raynaud disease    • SBO (small bowel obstruction) ([facility])    • TIA (transient ischemic attack)    • Weakness as late effect of cerebrovascular accident (CVA)     right sided   • Wrist fracture     Left   [3]  Past Surgical History:  Procedure Laterality Date   • COLONOSCOPY     • CT EPIDURAL STEROID INJECTION (GALINA LUMBAR)     • HAND SURGERY Right     Mallet finger repair   • ORIF WRIST FRACTURE Left 1/4/2024    Procedure: Open reduction internal fixation of distal ulna;  Surgeon: Jim Max;  Location:  MAIN OR;  Service: Orthopedics   • TONSILLECTOMY     • TUBAL LIGATION     [4]  Family History  Problem Relation Name Age of Onset   • Other Mother          encephalitis   • Other Father          trauma   [5]  Social History  Socioeconomic History   • Marital status: Unknown   Tobacco Use   • Smoking status: Never   • Smokeless tobacco: Never   Vaping Use   • Vaping status: Never Used   Substance and Sexual Activity   • Alcohol use: Not Currently   • Drug use: Not Currently     Social Drivers of Health     Food  Insecurity: No Food Insecurity (5/11/2025)    Nursing - Inadequate Food Risk Classification    • Ran Out of Food in the Last Year: Never true   Transportation Needs: No Transportation Needs (5/11/2025)    Nursing - Transportation Risk Classification    • Lack of Transportation: No    Received from [facility]   Intimate Partner Violence: Unknown (5/11/2025)    Nursing IPS    • Physically Hurt by Someone: No    • Hurt or Threatened by Someone: No   Housing Stability: Unknown (5/11/2025)    Nursing: Inadequate Housing Risk Classification    • Unable to Pay for Housing in the Last Year: No    • Has Housing: No   [6]    Current Outpatient Medications:   •  acetaminophen (TYLENOL) 325 mg tablet, Take 2 tablets (650 mg total) by mouth every 6 (six) hours as needed for mild pain, headaches or fever, Disp: , Rfl: 0  •  amLODIPine (NORVASC) 10 mg tablet, Take 10 mg by mouth daily, Disp: , Rfl:   •  amLODIPine (NORVASC) 2.5 mg tablet, Take 1 tablet (2.5 mg total) by mouth daily (Patient not taking: Reported on 3/29/2024), Disp: 30 tablet, Rfl: 0  •  aspirin-dipyridamole (AGGRENOX)  mg per 12 hr capsule, Take by mouth, Disp: , Rfl:   •  atorvastatin (LIPITOR) 80 mg tablet, Take 1 tablet (80 mg total) by mouth every evening, Disp:  , Rfl: 0  •  Calcium 200 MG TABS, 600 mg, Disp: , Rfl:   •  CANDESARTAN CILEXETIL PO, Take by mouth daily at bedtime, Disp: , Rfl:   •  cyanocobalamin 1,000 mcg/mL, Inject 1 mL into a muscle every 30 (thirty) days, Disp: , Rfl:   •  Diclofenac Sodium (VOLTAREN) 1 %, APPLY 2 GRAMS TO AFFECTED AREA 4 TIMES A DAY, Disp: 100 g, Rfl: 1  •  fluticasone (FLONASE) 50 mcg/act nasal spray, 1 spray into each nostril daily as needed for rhinitis, Disp: , Rfl:   •  folic acid (FOLVITE) 1 mg tablet, Take by mouth, Disp: , Rfl:   •  hydroxychloroquine (PLAQUENIL) 200 mg tablet, Take 200 mg by mouth daily, Disp: , Rfl:   •  levothyroxine 75 mcg tablet, Take 75 mcg by mouth daily, Disp: ,  "Rfl:   •  meclizine (ANTIVERT) 25 mg tablet, Take 1 tablet (25 mg total) by mouth every 8 (eight) hours as needed for dizziness, Disp: 30 tablet, Rfl: 0  •  mupirocin (BACTROBAN) 2 % ointment, Apply topically in the morning for 14 days Apply to open wound daily and as needed., Disp: 15 g, Rfl: 0  •  ondansetron (ZOFRAN) 4 mg tablet, Take 1 tablet (4 mg total) by mouth every 6 (six) hours, Disp: 12 tablet, Rfl: 0  •  senna-docusate sodium (SENOKOT-S) 8.6-50 mg per tablet, Take 1 tablet by mouth daily, Disp: 7 tablet, Rfl: 0  •  traMADol (ULTRAM) 50 mg tablet, Take 50 mg by mouth every 6 (six) hours as needed, Disp: , Rfl:   •  [employer] Safety Syringe 25G X 5/8\" 3 ML MISC, ONE INJECTION EVERY MONTH WITH VITAMIN B 12, Disp: , Rfl:   No current facility-administered medications for this visit."

## 2025-07-02 ENCOUNTER — HOSPITAL ENCOUNTER (EMERGENCY)
Facility: HOSPITAL | Age: 75
Discharge: HOME | End: 2025-07-02
Attending: EMERGENCY MEDICINE
Payer: COMMERCIAL

## 2025-07-02 ENCOUNTER — APPOINTMENT (EMERGENCY)
Dept: RADIOLOGY | Facility: HOSPITAL | Age: 75
End: 2025-07-02
Payer: COMMERCIAL

## 2025-07-02 ENCOUNTER — OFFICE VISIT (OUTPATIENT)
Facility: CLINIC | Age: 75
End: 2025-07-02
Payer: COMMERCIAL

## 2025-07-02 ENCOUNTER — TELEPHONE (OUTPATIENT)
Age: 75
End: 2025-07-02

## 2025-07-02 VITALS
RESPIRATION RATE: 18 BRPM | SYSTOLIC BLOOD PRESSURE: 134 MMHG | DIASTOLIC BLOOD PRESSURE: 70 MMHG | TEMPERATURE: 97 F | HEART RATE: 78 BPM

## 2025-07-02 VITALS
OXYGEN SATURATION: 100 % | BODY MASS INDEX: 22.82 KG/M2 | WEIGHT: 124.78 LBS | RESPIRATION RATE: 18 BRPM | SYSTOLIC BLOOD PRESSURE: 145 MMHG | HEART RATE: 79 BPM | DIASTOLIC BLOOD PRESSURE: 67 MMHG | TEMPERATURE: 97 F

## 2025-07-02 DIAGNOSIS — I73.01 RAYNAUD DISEASE WITH GANGRENE (HCC): Primary | ICD-10-CM

## 2025-07-02 DIAGNOSIS — Z51.89 VISIT FOR WOUND CHECK: Primary | ICD-10-CM

## 2025-07-02 DIAGNOSIS — T81.30XA WOUND DEHISCENCE: ICD-10-CM

## 2025-07-02 LAB
ANION GAP SERPL CALCULATED.3IONS-SCNC: 8 MMOL/L (ref 4–13)
BASOPHILS # BLD AUTO: 0.06 THOUSANDS/ÂΜL (ref 0–0.1)
BASOPHILS NFR BLD AUTO: 1 % (ref 0–1)
BUN SERPL-MCNC: 15 MG/DL (ref 5–25)
CALCIUM SERPL-MCNC: 9.9 MG/DL (ref 8.4–10.2)
CHLORIDE SERPL-SCNC: 107 MMOL/L (ref 96–108)
CO2 SERPL-SCNC: 26 MMOL/L (ref 21–32)
CREAT SERPL-MCNC: 0.88 MG/DL (ref 0.6–1.3)
EOSINOPHIL # BLD AUTO: 0.19 THOUSAND/ÂΜL (ref 0–0.61)
EOSINOPHIL NFR BLD AUTO: 2 % (ref 0–6)
ERYTHROCYTE [DISTWIDTH] IN BLOOD BY AUTOMATED COUNT: 13.2 % (ref 11.6–15.1)
GFR SERPL CREATININE-BSD FRML MDRD: 64 ML/MIN/1.73SQ M
GLUCOSE SERPL-MCNC: 83 MG/DL (ref 65–140)
HCT VFR BLD AUTO: 41.3 % (ref 34.8–46.1)
HGB BLD-MCNC: 13.5 G/DL (ref 11.5–15.4)
IMM GRANULOCYTES # BLD AUTO: 0.02 THOUSAND/UL (ref 0–0.2)
IMM GRANULOCYTES NFR BLD AUTO: 0 % (ref 0–2)
LYMPHOCYTES # BLD AUTO: 1.47 THOUSANDS/ÂΜL (ref 0.6–4.47)
LYMPHOCYTES NFR BLD AUTO: 19 % (ref 14–44)
MCH RBC QN AUTO: 31.3 PG (ref 26.8–34.3)
MCHC RBC AUTO-ENTMCNC: 32.7 G/DL (ref 31.4–37.4)
MCV RBC AUTO: 96 FL (ref 82–98)
MONOCYTES # BLD AUTO: 0.67 THOUSAND/ÂΜL (ref 0.17–1.22)
MONOCYTES NFR BLD AUTO: 8 % (ref 4–12)
NEUTROPHILS # BLD AUTO: 5.54 THOUSANDS/ÂΜL (ref 1.85–7.62)
NEUTS SEG NFR BLD AUTO: 70 % (ref 43–75)
NRBC BLD AUTO-RTO: 0 /100 WBCS
PLATELET # BLD AUTO: 291 THOUSANDS/UL (ref 149–390)
PMV BLD AUTO: 8.8 FL (ref 8.9–12.7)
POTASSIUM SERPL-SCNC: 4.4 MMOL/L (ref 3.5–5.3)
RBC # BLD AUTO: 4.31 MILLION/UL (ref 3.81–5.12)
SODIUM SERPL-SCNC: 141 MMOL/L (ref 135–147)
WBC # BLD AUTO: 7.95 THOUSAND/UL (ref 4.31–10.16)

## 2025-07-02 PROCEDURE — 99214 OFFICE O/P EST MOD 30 MIN: CPT | Performed by: FAMILY MEDICINE

## 2025-07-02 PROCEDURE — 85025 COMPLETE CBC W/AUTO DIFF WBC: CPT | Performed by: EMERGENCY MEDICINE

## 2025-07-02 PROCEDURE — 96374 THER/PROPH/DIAG INJ IV PUSH: CPT

## 2025-07-02 PROCEDURE — 36415 COLL VENOUS BLD VENIPUNCTURE: CPT | Performed by: EMERGENCY MEDICINE

## 2025-07-02 PROCEDURE — 99285 EMERGENCY DEPT VISIT HI MDM: CPT | Performed by: EMERGENCY MEDICINE

## 2025-07-02 PROCEDURE — 99213 OFFICE O/P EST LOW 20 MIN: CPT | Performed by: FAMILY MEDICINE

## 2025-07-02 PROCEDURE — 80048 BASIC METABOLIC PNL TOTAL CA: CPT | Performed by: EMERGENCY MEDICINE

## 2025-07-02 PROCEDURE — 73140 X-RAY EXAM OF FINGER(S): CPT

## 2025-07-02 PROCEDURE — 96375 TX/PRO/DX INJ NEW DRUG ADDON: CPT

## 2025-07-02 PROCEDURE — 99283 EMERGENCY DEPT VISIT LOW MDM: CPT

## 2025-07-02 RX ORDER — OXYCODONE AND ACETAMINOPHEN 5; 325 MG/1; MG/1
1 TABLET ORAL EVERY 8 HOURS PRN
Qty: 12 TABLET | Refills: 0 | Status: SHIPPED | OUTPATIENT
Start: 2025-07-02 | End: 2025-07-06

## 2025-07-02 RX ORDER — LIDOCAINE 40 MG/G
CREAM TOPICAL ONCE
Status: COMPLETED | OUTPATIENT
Start: 2025-07-02 | End: 2025-07-02

## 2025-07-02 RX ORDER — ONDANSETRON 2 MG/ML
4 INJECTION INTRAMUSCULAR; INTRAVENOUS ONCE
Status: COMPLETED | OUTPATIENT
Start: 2025-07-02 | End: 2025-07-02

## 2025-07-02 RX ORDER — LIDOCAINE HYDROCHLORIDE 10 MG/ML
5 INJECTION, SOLUTION EPIDURAL; INFILTRATION; INTRACAUDAL; PERINEURAL ONCE
Status: COMPLETED | OUTPATIENT
Start: 2025-07-02 | End: 2025-07-02

## 2025-07-02 RX ADMIN — LIDOCAINE: 40 CREAM TOPICAL at 10:12

## 2025-07-02 RX ADMIN — MORPHINE SULFATE 2 MG: 2 INJECTION, SOLUTION INTRAMUSCULAR; INTRAVENOUS at 12:20

## 2025-07-02 RX ADMIN — LIDOCAINE HYDROCHLORIDE 5 ML: 10 INJECTION, SOLUTION EPIDURAL; INFILTRATION; INTRACAUDAL; PERINEURAL at 14:31

## 2025-07-02 RX ADMIN — ONDANSETRON 4 MG: 2 INJECTION INTRAMUSCULAR; INTRAVENOUS at 12:19

## 2025-07-02 NOTE — ED PROVIDER NOTES
Time reflects when diagnosis was documented in both MDM as applicable and the Disposition within this note       Time User Action Codes Description Comment    7/2/2025  1:27 PM Colette Altamirano Add [Z51.89] Visit for wound check     7/2/2025  1:28 PM Colette Altamirano Add [T81.30XA] Wound dehiscence           ED Disposition       ED Disposition   Discharge    Condition   Stable    Date/Time   Wed Jul 2, 2025  1:27 PM    Comment   Italia Conway discharge to home/self care.                   Assessment & Plan       Medical Decision Making  Ddx: osteomylitis, cellulitis, wound dehiscence    Amount and/or Complexity of Data Reviewed  Labs: ordered.  Radiology: ordered and independent interpretation performed.    Risk  Prescription drug management.        ED Course as of 07/02/25 1653   Wed Jul 02, 2025   1327 There is no evidence of any wound infection.  I discussed this case with the hand surgeon Dr. Reyes who states that this is a chronic wound and she can follow-up outpatient.       Medications   morphine injection 2 mg (2 mg Intravenous Given 7/2/25 1220)   ondansetron (ZOFRAN) injection 4 mg (4 mg Intravenous Given 7/2/25 1219)   lidocaine (PF) (XYLOCAINE-MPF) 1 % injection 5 mL (5 mL Infiltration Given 7/2/25 1431)       ED Risk Strat Scores                    No data recorded        SBIRT 22yo+      Flowsheet Row Most Recent Value   Initial Alcohol Screen: US AUDIT-C     1. How often do you have a drink containing alcohol? 0 Filed at: 07/02/2025 1105   2. How many drinks containing alcohol do you have on a typical day you are drinking?  0 Filed at: 07/02/2025 1105   3b. FEMALE Any Age, or MALE 65+: How often do you have 4 or more drinks on one occassion? 0 Filed at: 07/02/2025 1105   Audit-C Score 0 Filed at: 07/02/2025 1105   HILDA: How many times in the past year have you...    Used an illegal drug or used a prescription medication for non-medical reasons? Never Filed at: 07/02/2025 1105                             History of Present Illness       Chief Complaint   Patient presents with    Wound Check     Sent by PCP for wound of L fourth digit. Hx of raynaud.        Past Medical History[1]   Past Surgical History[2]   Family History[3]   Social History[4]   E-Cigarette/Vaping    E-Cigarette Use Never User       E-Cigarette/Vaping Substances      I have reviewed and agree with the history as documented.     This is a 75-year-old female presenting to the ED for evaluation of a wound to her left fourth finger.  Patient states that she has a history of Raynaud's and for the last several months she has been having a wound to this finger.  She was initially seen by the hand surgeon and referred to wound care.  Her wound care doctor told her that the wound is not healing and she needed to present to the ED for evaluation and debridement.        Review of Systems   Constitutional:  Negative for chills and fever.   HENT:  Negative for ear pain and sore throat.    Eyes:  Negative for pain and visual disturbance.   Respiratory:  Negative for cough and shortness of breath.    Cardiovascular:  Negative for chest pain and palpitations.   Gastrointestinal:  Negative for abdominal pain and vomiting.   Genitourinary:  Negative for dysuria and hematuria.   Musculoskeletal:  Negative for arthralgias and back pain.   Skin:  Positive for color change and wound. Negative for rash.   Neurological:  Negative for seizures and syncope.   All other systems reviewed and are negative.          Objective       ED Triage Vitals   Temperature Pulse Blood Pressure Respirations SpO2 Patient Position - Orthostatic VS   07/02/25 1105 07/02/25 1102 07/02/25 1102 07/02/25 1102 07/02/25 1102 07/02/25 1102   (!) 97 °F (36.1 °C) 73 165/74 16 100 % Sitting      Temp Source Heart Rate Source BP Location FiO2 (%) Pain Score    07/02/25 1105 07/02/25 1102 07/02/25 1102 -- 07/02/25 1220    Temporal Monitor Right arm  7      Vitals      Date and Time  Temp Pulse SpO2 Resp BP Pain Score FACES Pain Rating User   07/02/25 1415 -- 79 100 % 18 145/67 -- -- CG   07/02/25 1230 -- 76 94 % 16 139/73 -- --    07/02/25 1220 -- -- -- -- -- 7 --    07/02/25 1105 97 °F (36.1 °C) -- -- -- -- -- --    07/02/25 1102 -- 73 100 % 16 165/74 -- --             Physical Exam  Vitals and nursing note reviewed.   Constitutional:       General: She is in acute distress.      Appearance: Normal appearance. She is well-developed.   HENT:      Head: Normocephalic and atraumatic.      Right Ear: External ear normal.      Left Ear: External ear normal.      Nose: Nose normal.     Eyes:      Extraocular Movements: Extraocular movements intact.      Conjunctiva/sclera: Conjunctivae normal.       Cardiovascular:      Heart sounds: No murmur heard.  Pulmonary:      Effort: Pulmonary effort is normal. No respiratory distress.   Abdominal:      Tenderness: There is no abdominal tenderness.     Musculoskeletal:         General: Swelling, tenderness and signs of injury present.      Cervical back: Normal range of motion and neck supple.      Comments: See wound picture     Skin:     General: Skin is warm and dry.      Capillary Refill: Capillary refill takes less than 2 seconds.     Neurological:      General: No focal deficit present.      Mental Status: She is alert and oriented to person, place, and time. Mental status is at baseline.      Cranial Nerves: No cranial nerve deficit.      Sensory: No sensory deficit.      Motor: No weakness.      Coordination: Coordination normal.      Gait: Gait normal.      Deep Tendon Reflexes: Reflexes normal.     Psychiatric:         Mood and Affect: Mood normal.         Results Reviewed       Procedure Component Value Units Date/Time    Basic metabolic panel [[id number]] Collected: 07/02/25 1220    Lab Status: Final result Specimen: Blood from Arm, Right Updated: 07/02/25 1247     Sodium 141 mmol/L      Potassium 4.4 mmol/L      Chloride 107 mmol/L       CO2 26 mmol/L      ANION GAP 8 mmol/L      BUN 15 mg/dL      Creatinine 0.88 mg/dL      Glucose 83 mg/dL      Calcium 9.9 mg/dL      eGFR 64 ml/min/1.73sq m     Narrative:      National Kid[employer] Foundation guidelines for Chronic Kidney Disease (CKD):     Stage 1 with normal or high GFR (GFR > 90 mL/min/1.73 square meters)    Stage 2 Mild CKD (GFR = 60-89 mL/min/1.73 square meters)    Stage 3A Moderate CKD (GFR = 45-59 mL/min/1.73 square meters)    Stage 3B Moderate CKD (GFR = 30-44 mL/min/1.73 square meters)    Stage 4 Severe CKD (GFR = 15-29 mL/min/1.73 square meters)    Stage 5 End Stage CKD (GFR <15 mL/min/1.73 square meters)  Note: GFR calculation is accurate only with a steady state creatinine    CBC and differential [[id number]]  (Abnormal) Collected: 25 1220    Lab Status: Final result Specimen: Blood from Arm, Right Updated: 25 1226     WBC 7.95 Thousand/uL      RBC 4.31 Million/uL      Hemoglobin 13.5 g/dL      Hematocrit 41.3 %      MCV 96 fL      MCH 31.3 pg      MCHC 32.7 g/dL      RDW 13.2 %      MPV 8.8 fL      Platelets 291 Thousands/uL      nRBC 0 /100 WBCs      Segmented % 70 %      Immature Grans % 0 %      Lymphocytes % 19 %      Monocytes % 8 %      Eosinophils Relative 2 %      Basophils Relative 1 %      Absolute Neutrophils 5.54 Thousands/µL      Absolute Immature Grans 0.02 Thousand/uL      Absolute Lymphocytes 1.47 Thousands/µL      Absolute Monocytes 0.67 Thousand/µL      Eosinophils Absolute 0.19 Thousand/µL      Basophils Absolute 0.06 Thousands/µL             XR finger fourth digit-ring LEFT   ED Interpretation by Colette Altamirano DO ( [id number])   No osteo            Procedures    ED Medication and Procedure Management   Prior to Admission Medications   Prescriptions Last Dose Informant Patient Reported? Taking?   CANDESARTAN CILEXETIL PO  Self Yes No   Sig: Take by mouth daily at bedtime   Calcium 200 MG TABS   Yes No   Si mg   Diclofenac Sodium  "(VOLTAREN) 1 %   No No   Sig: APPLY 2 GRAMS TO AFFECTED AREA 4 TIMES A DAY   VanishPoint Safety Syringe 25G X 5/8\" 3 ML MISC   Yes No   Sig: ONE INJECTION EVERY MONTH WITH VITAMIN B 12   acetaminophen (TYLENOL) 325 mg tablet   No No   Sig: Take 2 tablets (650 mg total) by mouth every 6 (six) hours as needed for mild pain, headaches or fever   amLODIPine (NORVASC) 10 mg tablet   Yes No   Sig: Take 10 mg by mouth daily   amLODIPine (NORVASC) 2.5 mg tablet   No No   Sig: Take 1 tablet (2.5 mg total) by mouth daily   Patient not taking: Reported on 3/29/2024   aspirin-dipyridamole (AGGRENOX)  mg per 12 hr capsule   Yes No   Sig: Take by mouth   atorvastatin (LIPITOR) 80 mg tablet   No No   Sig: Take 1 tablet (80 mg total) by mouth every evening   cyanocobalamin 1,000 mcg/mL   Yes No   Sig: Inject 1 mL into a muscle every 30 (thirty) days   fluticasone (FLONASE) 50 mcg/act nasal spray   Yes No   Si spray into each nostril daily as needed for rhinitis   folic acid (FOLVITE) 1 mg tablet   Yes No   Sig: Take by mouth   hydroxychloroquine (PLAQUENIL) 200 mg tablet   Yes No   Sig: Take 200 mg by mouth daily   levothyroxine 75 mcg tablet   Yes No   Sig: Take 75 mcg by mouth daily   meclizine (ANTIVERT) 25 mg tablet   No No   Sig: Take 1 tablet (25 mg total) by mouth every 8 (eight) hours as needed for dizziness   mupirocin (BACTROBAN) 2 % ointment   No No   Sig: Apply topically in the morning for 14 days Apply to open wound daily and as needed.   ondansetron (ZOFRAN) 4 mg tablet   No No   Sig: Take 1 tablet (4 mg total) by mouth every 6 (six) hours   senna-docusate sodium (SENOKOT-S) 8.6-50 mg per tablet   No No   Sig: Take 1 tablet by mouth daily   traMADol (ULTRAM) 50 mg tablet   Yes No   Sig: Take 50 mg by mouth every 6 (six) hours as needed      Facility-Administered Medications Last Administration Doses Remaining   lidocaine (LMX) 4 % cream 2025 10:12 AM 0        Discharge Medication List as of 2025  " 1:37 PM        START taking these medications    Details   oxyCODONE-acetaminophen (Percocet) 5-325 mg per tablet Take 1 tablet by mouth every 8 (eight) hours as needed for severe pain for up to 4 days Max Daily Amount: 3 tablets, Starting Wed 7/2/2025, Until Sun 7/6/2025 at 2359, Normal           CONTINUE these medications which have NOT CHANGED    Details   acetaminophen (TYLENOL) 325 mg tablet Take 2 tablets (650 mg total) by mouth every 6 (six) hours as needed for mild pain, headaches or fever, Starting Thu 12/17/2020, No Print      !! amLODIPine (NORVASC) 10 mg tablet Take 10 mg by mouth daily, Starting Fri 3/15/2024, Historical Med      !! amLODIPine (NORVASC) 2.5 mg tablet Take 1 tablet (2.5 mg total) by mouth daily, Starting Thu 3/11/2021, Normal      aspirin-dipyridamole (AGGRENOX)  mg per 12 hr capsule Take by mouth, Historical Med      atorvastatin (LIPITOR) 80 mg tablet Take 1 tablet (80 mg total) by mouth every evening, Starting Thu 12/17/2020, No Print      Calcium 200 MG TABS 600 mg, Starting Thu 10/12/2023, Historical Med      CANDESARTAN CILEXETIL PO Take by mouth daily at bedtime, Historical Med      cyanocobalamin 1,000 mcg/mL Inject 1 mL into a muscle every 30 (thirty) days, Starting Fri 9/2/2022, Historical Med      Diclofenac Sodium (VOLTAREN) 1 % APPLY 2 GRAMS TO AFFECTED AREA 4 TIMES A DAY, Normal      fluticasone (FLONASE) 50 mcg/act nasal spray 1 spray into each nostril daily as needed for rhinitis, Historical Med      folic acid (FOLVITE) 1 mg tablet Take by mouth, Historical Med      hydroxychloroquine (PLAQUENIL) 200 mg tablet Take 200 mg by mouth daily, Starting Tue 4/16/2024, Historical Med      levothyroxine 75 mcg tablet Take 75 mcg by mouth daily, Starting Wed 3/13/2024, Historical Med      meclizine (ANTIVERT) 25 mg tablet Take 1 tablet (25 mg total) by mouth every 8 (eight) hours as needed for dizziness, Starting Wed 7/27/2022, Normal      mupirocin (BACTROBAN) 2 % ointment  "Apply topically in the morning for 14 days Apply to open wound daily and as needed., Starting Fri 5/23/2025, Until Fri 6/6/2025, Normal      ondansetron (ZOFRAN) 4 mg tablet Take 1 tablet (4 mg total) by mouth every 6 (six) hours, Starting Sat 3/1/2025, Normal      senna-docusate sodium (SENOKOT-S) 8.6-50 mg per tablet Take 1 tablet by mouth daily, Starting Sat 3/1/2025, Normal      traMADol (ULTRAM) 50 mg tablet Take 50 mg by mouth every 6 (six) hours as needed, Starting Wed 3/6/2024, Historical Med      VanishPoint Safety Syringe 25G X 5/8\" 3 ML MISC ONE INJECTION EVERY MONTH WITH VITAMIN B 12, Historical Med       !! - Potential duplicate medications found. Please discuss with provider.          ED SEPSIS DOCUMENTATION   Time reflects when diagnosis was documented in both MDM as applicable and the Disposition within this note       Time User Action Codes Description Comment    7/2/2025  1:27 PM Colette Altamirano [Z51.89] Visit for wound check     7/2/2025  1:28 PM Colette Altamirano [T81.30XA] Wound dehiscence                      [1]   Past Medical History:  Diagnosis Date    Anemia     Aphasia as late effect of cerebrovascular accident (CVA)     expressive    CVA (cerebral vascular accident) (HCC)     Hyperlipidemia     Hypertension     Hypothyroidism     PVD (peripheral vascular disease) (HCC)     Raynaud disease     SBO (small bowel obstruction) (HCC)     TIA (transient ischemic attack)     Weakness as late effect of cerebrovascular accident (CVA)     right sided    Wrist fracture     Left   [2]   Past Surgical History:  Procedure Laterality Date    COLONOSCOPY      CT EPIDURAL STEROID INJECTION (GALINA LUMBAR)      HAND SURGERY Right     Mallet finger repair    ORIF WRIST FRACTURE Left 1/4/2024    Procedure: Open reduction internal fixation of distal ulna;  Surgeon: Jim Max;  Location: [facility];  Service: Orthopedics    TONSILLECTOMY      TUBAL LIGATION     [3]   Family History  Problem Relation Name " Age of Onset    Other Mother          encephalitis    Other Father          trauma   [4]   Social History  Tobacco Use    Smoking status: Never    Smokeless tobacco: Never   Vaping Use    Vaping status: Never Used   Substance Use Topics    Alcohol use: Not Currently    Drug use: Not Currently        Colette Altamirano DO  07/02/25 9510

## 2025-07-02 NOTE — PROGRESS NOTES
Wound Procedure Treatment Anterior;Left Finger D4, ring    Performed by: Gilbert Sanchez RN  Authorized by: Shelby Encinas MD  Associated wounds:   Wound 05/11/25 Finger D4, ring Anterior;Left    Applied secondary dressing:  Gauze   Dressing secured with:  Laura and Tape

## 2025-07-02 NOTE — PATIENT INSTRUCTIONS
Orders Placed This Encounter   Procedures    Wound Procedure Treatment Anterior;Left Finger D4, ring     This order was created via procedure documentation    Wound cleansing and dressings     Proceed to the ER     Standing Status:   Future     Expiration Date:   7/9/2025

## 2025-07-08 RX ORDER — ZOLPIDEM TARTRATE 5 MG/1
5 TABLET ORAL
COMMUNITY
Start: 2025-06-04

## 2025-07-08 RX ORDER — NITROGLYCERIN 20 MG/G
0.5 OINTMENT TOPICAL EVERY 8 HOURS
COMMUNITY

## 2025-07-08 RX ORDER — OMEPRAZOLE 20 MG/1
CAPSULE, DELAYED RELEASE ORAL DAILY
COMMUNITY
Start: 2025-05-30

## 2025-07-08 RX ORDER — LATANOPROST 50 UG/ML
1 SOLUTION/ DROPS OPHTHALMIC
COMMUNITY
Start: 2025-06-07

## 2025-07-08 RX ORDER — CLOBETASOL PROPIONATE 0.5 MG/G
OINTMENT TOPICAL
COMMUNITY
Start: 2025-05-20

## 2025-07-08 RX ORDER — TADALAFIL 5 MG/1
1 TABLET ORAL DAILY
COMMUNITY
Start: 2025-05-20

## 2025-07-10 ENCOUNTER — OFFICE VISIT (OUTPATIENT)
Dept: OBGYN CLINIC | Facility: CLINIC | Age: 75
End: 2025-07-10
Payer: COMMERCIAL

## 2025-07-10 VITALS — HEIGHT: 62 IN | BODY MASS INDEX: 22.71 KG/M2 | WEIGHT: 123.4 LBS

## 2025-07-10 DIAGNOSIS — L98.492 SKIN ULCER OF FINGER WITH FAT LAYER EXPOSED (HCC): ICD-10-CM

## 2025-07-10 PROCEDURE — 99214 OFFICE O/P EST MOD 30 MIN: CPT | Performed by: STUDENT IN AN ORGANIZED HEALTH CARE EDUCATION/TRAINING PROGRAM

## 2025-07-10 NOTE — PROGRESS NOTES
ASSESSMENT/PLAN:    Assessment & Plan  Skin ulcer of finger with fat layer exposed (HCC)  The patient has a ischemic ulcer to the dorsum of her left ring finger secondary to her Raynaud's disease which does not appear acutely infected on exam. I discussed treatment options with the patient. She has attempted essentially all conservative options aside from hyperbaric therapy. Surgical options would be either debridement or amputation. If she wanted to proceed with surgery we would need to understand the depth of involvement. With a superficial ulcer we could trial debridement but if there is extensive necrosis then debridement is less viable and we may need to consider amputation. We will obtain an MRI to determine this.     I discussed with the patient that this is a chronic issue that does appear to be progressing and has the potential to progress further with uncertain prognosis.     Recommended she continue her topical regimen and observe for signs of infection    I have reviewed the clinical notes from wound management     I indenpendently reviewed the imaging of the left fourth finger xrays from 7/2/25 and discussed these with patient. I also reviewed the radiology reports.     Patient to follow up after mri  Orders:    Ambulatory Referral to Orthopedic Surgery    MRI finger left wo and w contrast; Future      The patient verbalized understanding of exam findings and treatment plan.       _____________________________________________________  CHIEF COMPLAINT:  Left finger wound      SUBJECTIVE:  Italia Conway is a 75 y.o. right hand dominant female hx of CVA and Raynauds disease presenting for evaluation of left ring finger wound. The patient states the wound has been present for several months.Based on review of records, She was seen by wound care on 5/23/25 for her initial eval but had reported symptoms for 1-2 months prior and had already been seen by dermatology and rheumatology. She has used multiple  "topical medications including neosporin, clobetasol cream, nitropaste, as well as other balms and ointments, She has also recently had botox injections  about the base of the finger. She reports continued pain at the tip of the finger with a dried scab that has been roughly stable over the past month. No signs of infection. She was referred given her lack of improvement with the topical medications.       Occupation/hobbies: retired      PAST MEDICAL HISTORY:  Past Medical History[1]    PAST SURGICAL HISTORY:  Past Surgical History[2]    FAMILY HISTORY:  Family History[3]    SOCIAL HISTORY:  Social History[4]    MEDICATIONS:  Current Medications[5]    ALLERGIES:  Allergies[6]    REVIEW OF SYSTEMS:  Pertinent items are noted in HPI.  A comprehensive review of systems was negative.    LABS:  HgA1c:   Lab Results   Component Value Date    HGBA1C 6.3 (H) 03/10/2021     BMP:   Lab Results   Component Value Date    CALCIUM 9.9 07/02/2025    K 4.4 07/02/2025    CO2 26 07/02/2025     07/02/2025    BUN 15 07/02/2025    CREATININE 0.88 07/02/2025         _____________________________________________________  PHYSICAL EXAMINATION:  Vital signs: Ht 5' 2\" (1.575 m)   Wt 56 kg (123 lb 6.4 oz)   BMI 22.57 kg/m²   General: well developed and well nourished, alert, oriented times 3, and appears comfortable  Psychiatric: Normal  HEENT: Trachea Midline, No torticollis  Cardiovascular: No discernable arrhythmia  Pulmonary: No wheezing or stridor  Abdomen: No rebound or guarding  Extremities: No peripheral edema  Skin: No masses, erythema, lacerations, fluctation, ulcerations  Neurovascular: Sensation Intact to the Median, Ulnar, Radial Nerve, Motor Intact to the Median, Ulnar, Radial Nerve, and Pulses Intact    MUSCULOSKELETAL EXAMINATION:  Left ring finger  Ulceration present on the ulnar aspect of the ring finger near the DIP joint which does involve the ulnar nail fold as well as about 20% over the sterile matrix and extends " into the germal matrix. Although there is no exposed bone the base of the ulceration is quite firm. No erythema at this time and no tenderness. No drainage. No significant erythema or drainage present. See clinical images from 7/02/2025 of which are similar to todays appearnce.  TTP:  DIP joint: yes  Range of Motion:  Elbow: extension/flexion intact  Forearm: pronation/supination intact  Wrist: extension/flexion intact  Motor Exam: firing AIN/PIN/U  Sensory Exam: Sensation intact to light touch in FDWS (radial), volar IF (median), volar SF (ulnar)  Vascular Exam: < 2 sec capillary refill     _____________________________________________________  STUDIES REVIEWED:  I reviewed imaging in PACS from 7/02/2025 of the left fourth digit which demonstrates no acute fracture or dislocation. No evidence of osteomyelitis.       PROCEDURES PERFORMED:  Procedures  None performed today.     Scribe Attestation      I,:  Maya Pineda PA-C am acting as a scribe while in the presence of the attending physician.:       I,:  Sergio Reyes MD personally performed the services described in this documentation    as scribed in my presence.:                    [1]   Past Medical History:  Diagnosis Date    Anemia     Aphasia as late effect of cerebrovascular accident (CVA)     expressive    CVA (cerebral vascular accident) (HCC)     Hyperlipidemia     Hypertension     Hypothyroidism     PVD (peripheral vascular disease) (HCC)     Raynaud disease     SBO (small bowel obstruction) (HCC)     TIA (transient ischemic attack)     Weakness as late effect of cerebrovascular accident (CVA)     right sided    Wrist fracture     Left   [2]   Past Surgical History:  Procedure Laterality Date    COLONOSCOPY      CT EPIDURAL STEROID INJECTION (GALINA LUMBAR)      HAND SURGERY Right     Mallet finger repair    ORIF WRIST FRACTURE Left 1/4/2024    Procedure: Open reduction internal fixation of distal ulna;  Surgeon: Jim Max;  Location: [facility];  Service: Orthopedics    TONSILLECTOMY      TUBAL LIGATION     [3]   Family History  Problem Relation Name Age of Onset    Other Mother          encephalitis    Other Father          trauma   [4]   Social History  Tobacco Use    Smoking status: Never    Smokeless tobacco: Never   Vaping Use    Vaping status: Never Used   Substance Use Topics    Alcohol use: Not Currently    Drug use: Not Currently   [5]   Current Outpatient Medications:     acetaminophen (TYLENOL) 325 mg tablet, Take 2 tablets (650 mg total) by mouth every 6 (six) hours as needed for mild pain, headaches or fever, Disp: , Rfl: 0    amLODIPine (NORVASC) 10 mg tablet, Take 10 mg by mouth in the morning., Disp: , Rfl:     aspirin-dipyridamole (AGGRENOX)  mg per 12 hr capsule, Take by mouth, Disp: , Rfl:     atorvastatin (LIPITOR) 80 mg tablet, Take 1 tablet (80 mg total) by mouth every evening, Disp:  , Rfl: 0    Calcium 200 MG TABS, 600 mg, Disp: , Rfl:     CANDESARTAN CILEXETIL PO, Take by mouth daily at bedtime, Disp: , Rfl:     clobetasol (TEMOVATE) 0.05 % ointment, APPLY TWICE A DAY X 2 WEEKS THEN ONCE A DAY X 1 WEEK THEN AS NEEDED ., Disp: , Rfl:     cyanocobalamin 1,000 mcg/mL, Inject 1 mL into a muscle every 30 (thirty) days, Disp: , Rfl:     Diclofenac Sodium (VOLTAREN) 1 %, APPLY 2 GRAMS TO AFFECTED AREA 4 TIMES A DAY, Disp: 100 g, Rfl: 1    fluticasone (FLONASE) 50 mcg/act nasal spray, 1 spray into each nostril daily as needed for rhinitis, Disp: , Rfl:     folic acid (FOLVITE) 1 mg tablet, Take by mouth, Disp: , Rfl:     hydroxychloroquine (PLAQUENIL) 200 mg tablet, Take 200 mg by mouth in the morning., Disp: , Rfl:     latanoprost (XALATAN) 0.005 % ophthalmic solution, Administer 1 drop to both eyes daily at bedtime, Disp: , Rfl:     levothyroxine 75 mcg tablet, Take 75 mcg by mouth in the morning., Disp: , Rfl:     meclizine (ANTIVERT) 25 mg tablet, Take 1 tablet (25 mg total) by mouth every 8 (eight) hours as needed for  "dizziness, Disp: 30 tablet, Rfl: 0    mupirocin (BACTROBAN) 2 % ointment, Apply topically in the morning for 14 days Apply to open wound daily and as needed., Disp: 15 g, Rfl: 0    Nitro-Bid 2 % ointment, 0.5 inches every 8 (eight) hours remove at bedtime to affected fingers, Disp: , Rfl:     omeprazole (PriLOSEC) 20 mg delayed release capsule, daily 30 minutes to 1 hour before a meal, Disp: , Rfl:     ondansetron (ZOFRAN) 4 mg tablet, Take 1 tablet (4 mg total) by mouth every 6 (six) hours, Disp: 12 tablet, Rfl: 0    senna-docusate sodium (SENOKOT-S) 8.6-50 mg per tablet, Take 1 tablet by mouth daily, Disp: 7 tablet, Rfl: 0    tadalafil (CIALIS) 5 MG tablet, Take 1 tablet by mouth in the morning, Disp: , Rfl:     traMADol (ULTRAM) 50 mg tablet, Take 50 mg by mouth every 6 (six) hours as needed, Disp: , Rfl:     VanishPoint Safety Syringe 25G X 5/8\" 3 ML MISC, , Disp: , Rfl:     zolpidem (AMBIEN) 5 mg tablet, Take 5 mg by mouth, Disp: , Rfl:   [6]   Allergies  Allergen Reactions    Penicillins Rash     Patient reports she is not allergic to penicillin     "

## 2025-07-12 ENCOUNTER — HOSPITAL ENCOUNTER (OUTPATIENT)
Dept: MRI IMAGING | Facility: HOSPITAL | Age: 75
Discharge: HOME | End: 2025-07-12
Attending: ORTHOPAEDIC SURGERY
Payer: COMMERCIAL

## 2025-07-12 DIAGNOSIS — S61.205A UNSPECIFIED OPEN WOUND OF LEFT RING FINGER WITHOUT DAMAGE TO NAIL, INITIAL ENCOUNTER: ICD-10-CM

## 2025-07-12 PROCEDURE — 73218 MRI UPPER EXTREMITY W/O DYE: CPT

## 2025-07-18 ENCOUNTER — TELEPHONE (OUTPATIENT)
Facility: CLINIC | Age: 75
End: 2025-07-18

## 2025-07-18 NOTE — TELEPHONE ENCOUNTER
Talked to italia re making a follow up appt     Italia said that she is having a procedure done and would like to make an appt after that.  8/18 procedure

## 2025-07-21 ENCOUNTER — OFFICE VISIT (OUTPATIENT)
Dept: URGENT CARE | Facility: CLINIC | Age: 75
End: 2025-07-21
Payer: COMMERCIAL

## 2025-07-21 VITALS
HEART RATE: 94 BPM | TEMPERATURE: 96.6 F | SYSTOLIC BLOOD PRESSURE: 146 MMHG | BODY MASS INDEX: 22.82 KG/M2 | RESPIRATION RATE: 18 BRPM | OXYGEN SATURATION: 98 % | WEIGHT: 124 LBS | DIASTOLIC BLOOD PRESSURE: 84 MMHG | HEIGHT: 62 IN

## 2025-07-21 DIAGNOSIS — T14.8XXA SPLINTER IN SKIN: Primary | ICD-10-CM

## 2025-07-21 PROCEDURE — 99214 OFFICE O/P EST MOD 30 MIN: CPT

## 2025-07-21 PROCEDURE — S9083 URGENT CARE CENTER GLOBAL: HCPCS

## 2025-07-21 RX ORDER — GINSENG 100 MG
1 CAPSULE ORAL ONCE
Status: COMPLETED | OUTPATIENT
Start: 2025-07-21 | End: 2025-07-21

## 2025-07-21 RX ADMIN — Medication 1 SMALL APPLICATION: at 18:57

## 2025-07-21 NOTE — PATIENT INSTRUCTIONS
Keep area clean & dry.  Monitor for signs of infection.  Tylenol/ibuprofen for pain/fever.    Follow up with PCP in 3-5 days.  Proceed to  ER if symptoms worsen.    If tests are performed, our office will contact you with results only if changes need to made to the care plan discussed with you at the visit. You can review your full results on [facility] MyChart.

## 2025-07-21 NOTE — PROGRESS NOTES
"[facility]  Name: Italia Conway      : 1950      MRN: [id number]  Encounter Provider: LIZA German  Encounter Date: 2025   Encounter department: [facility] [place]  :  Assessment & Plan  Splinter in skin    Orders:    Foreign body removal    bacitracin topical ointment 1 small application      Universal Protocol:  procedure performed by consultantConsent: Verbal consent obtained  Risks and benefits: risks, benefits and alternatives were discussed  Consent given by: patient  Time out: Immediately prior to procedure a \"time out\" was called to verify the correct patient, procedure, equipment, support staff and site/side marked as required.  Patient understanding: patient states understanding of the procedure being performed  Patient consent: the patient's understanding of the procedure matches consent given  Procedure consent: procedure consent matches procedure scheduled  Required items: required blood products, implants, devices, and special equipment available  Patient identity confirmed: verbally with patient  Foreign body removal    Date/Time: 2025 5:30 PM    Performed by: LIZA German  Authorized by: Yoan Farrell DO  Body area: skin  General location: upper extremity  Location details: left ring finger    Anesthesia:  Local anesthetic: freeze spray offered however patient declined.  Patient cooperative: yes  Localization method: visualized  Removal mechanism: forceps  Dressing: antibiotic ointment and dressing applied  Depth: subcutaneous  Complexity: simple  1 objects recovered.  Objects recovered: wooden splinter  Post-procedure assessment: foreign body removed  Patient tolerance: patient tolerated the procedure well with no immediate complications  Comments: Small wooden splinter removed from LEFT 4th finger. Bacitracin applied over affected area and covered with band aide.       Patient tolerated FB " removal well. Procedure completed without complication. No need for prophylactic ABX at this time given that FB was superficial and removed without difficulty. Keep area clean and dry. Monitor for signs of developing infection. Tylenol/ibuprofen for pain/fever. Increased fluids & rest. May continue with other OTC and supportive therapies at home. Continue to follow with hand specialist for chronic wound to LEFT 4th finger. Encouraged to follow up closely with family physician or healthcare provider. ED precautions provided/discussed. Patient in agreement with plan & verbalized understanding.        Patient Instructions    Keep area clean & dry.  Monitor for signs of infection.  Tylenol/ibuprofen for pain/fever.    Follow up with PCP in 3-5 days.  Proceed to  ER if symptoms worsen.    If tests are performed, our office will contact you with results only if changes need to made to the care plan discussed with you at the visit. You can review your full results on [facility] MyChart.    Chief Complaint:   Chief Complaint   Patient presents with    Foreign Body in Skin     C/O splinter to left 4th finger just proximal to knuckle. Got splinter while trimming bushes today. Patient reports she has Raynaud's syndrome. Has an issue with distal portion of same finger going to wound care and seeing a hand specialist for same.     History of Present Illness   Patient is a 75 year old female who presents today for possible foreign body present to the dorsal surface of the LEFT 4th finger. She reports that the splinter was obtained earlier today when trying to pick flowers off of a bush, and patient states that she believes the foreign body present to her finger is a thorn. Patient states that her discomfort is currently rated as 1/10 and made worse with palpation. She did not try to remove the foreign body on her own. She denies experiencing any abnormal numbness or tingling to the affected area. Of note, patient does have a  pre-existing wound to the tip of the LEFT 4th finger which she states is related to her Raynaud's diagnosis. She states that she is currently following with Dr. Joseph (hand specialist) for this wound.        Foreign Body in Skin  This is a new problem. The current episode started today. The problem occurs constantly. The problem has been unchanged. Associated symptoms include myalgias. Pertinent negatives include no arthralgias, change in bowel habit, chest pain, chills, coughing, diaphoresis, fatigue, fever, headaches, joint swelling, nausea, numbness, rash, vomiting or weakness. Exacerbated by: palpation. She has tried nothing for the symptoms.         Review of Systems   Constitutional:  Negative for activity change, appetite change, chills, diaphoresis, fatigue and fever.   Respiratory:  Negative for cough, chest tightness and shortness of breath.    Cardiovascular:  Negative for chest pain and palpitations.   Gastrointestinal:  Negative for change in bowel habit, nausea and vomiting.   Musculoskeletal:  Positive for myalgias. Negative for arthralgias, back pain, gait problem and joint swelling.   Skin:  Positive for wound (pre-existing & currently being cared for by specialist). Negative for color change, pallor and rash.        Foreign body in LEFT 4th finger   Neurological:  Negative for dizziness, weakness, light-headedness, numbness and headaches.     Past Medical History   Past Medical History[1]  Past Surgical History[2]  Family History[3]  she reports that she has never smoked. She has never used smokeless tobacco. She reports that she does not currently use alcohol. She reports that she does not currently use drugs.  Current Outpatient Medications   Medication Instructions    acetaminophen (TYLENOL) 650 mg, Oral, Every 6 hours PRN    amLODIPine (NORVASC) 10 mg, Daily    aspirin-dipyridamole (AGGRENOX)  mg per 12 hr capsule Take by mouth    atorvastatin (LIPITOR) 80 mg, Oral, Every evening     "Calcium 600 mg    CANDESARTAN CILEXETIL PO Daily at bedtime    clobetasol (TEMOVATE) 0.05 % ointment APPLY TWICE A DAY X 2 WEEKS THEN ONCE A DAY X 1 WEEK THEN AS NEEDED .    cyanocobalamin 1,000 mcg/mL 1 mL, Every 30 days    Diclofenac Sodium (VOLTAREN) 1 % APPLY 2 GRAMS TO AFFECTED AREA 4 TIMES A DAY    fluticasone (FLONASE) 50 mcg/act nasal spray 1 spray, Daily PRN    folic acid (FOLVITE) 1 mg tablet Take by mouth    hydroxychloroquine (PLAQUENIL) 200 mg, Daily    latanoprost (XALATAN) 0.005 % ophthalmic solution 1 drop, Daily at bedtime    levothyroxine 75 mcg, Daily    meclizine (ANTIVERT) 25 mg, Oral, Every 8 hours PRN    mupirocin (BACTROBAN) 2 % ointment Topical, Daily, Apply to open wound daily and as needed.    Nitro-Bid 2 % ointment 0.5 inches, Every 8 hours    omeprazole (PriLOSEC) 20 mg delayed release capsule Daily    ondansetron (ZOFRAN) 4 mg, Oral, Every 6 hours    senna-docusate sodium (SENOKOT-S) 8.6-50 mg per tablet 1 tablet, Oral, Daily    tadalafil (CIALIS) 5 MG tablet 1 tablet, Daily    traMADol (ULTRAM) 50 mg, Every 6 hours PRN    Vanish[employer] Safety Syringe 25G X 5/8\" 3 ML MISC     zolpidem (AMBIEN) 5 mg   Allergies[4]     Objective   /84   Pulse 94   Temp (!) 96.6 °F (35.9 °C)   Resp 18   Ht 5' 2\" (1.575 m)   Wt 56.2 kg (124 lb)   SpO2 98%   BMI 22.68 kg/m²      Physical Exam  Vitals and nursing note reviewed.   Constitutional:       General: She is not in acute distress.     Appearance: Normal appearance. She is well-developed. She is not ill-appearing, toxic-appearing or diaphoretic.   HENT:      Head: Normocephalic and atraumatic.     Cardiovascular:      Rate and Rhythm: Normal rate and regular rhythm.      Pulses: Normal pulses.      Heart sounds: Normal heart sounds.   Pulmonary:      Effort: Pulmonary effort is normal. No respiratory distress.      Breath sounds: Normal breath sounds. No stridor. No wheezing, rhonchi or rales.     Musculoskeletal:         General: " "Tenderness and signs of injury present. No swelling.      Right hand: Normal.      Left hand: Deformity, tenderness and bony tenderness present. No swelling or lacerations. Normal range of motion.        Hands:       Cervical back: Normal range of motion and neck supple.      Comments: Chronic wound present to tip of LEFT 4th finger with deformity. Patient reports that this is unchanged in appearance and states that she is following with a specialist for this. Visible dark foreign body noted to dorsal aspect of LEFT 4th finger between PIP & MCP joint. Area is mildly TTP. There is no visible bleeding, drainage, swelling, or associated erythema noted. Full ROM present to affected finger.      Skin:     General: Skin is warm and dry.      Capillary Refill: Capillary refill takes less than 2 seconds.      Findings: Signs of injury and wound present.      Comments: Chronic wound to tip of LEFT 4th finger. Foreign body also present to LEFT 4th finger.      Neurological:      General: No focal deficit present.      Mental Status: She is alert and oriented to person, place, and time.      Motor: No weakness.      Gait: Gait normal.     Psychiatric:         Mood and Affect: Mood normal.         Behavior: Behavior normal.         Thought Content: Thought content normal.         Judgment: Judgment normal.         Portions of the record may have been created with voice recognition software.  Occasional wrong word or \"sound a like\" substitutions may have occurred due to the inherent limitations of voice recognition software.  Read the chart carefully and recognize, using context, where substitutions have occurred.           [1]   Past Medical History:  Diagnosis Date    Anemia     Aphasia as late effect of cerebrovascular accident (CVA)     expressive    CVA (cerebral vascular accident) (HCC)     Hyperlipidemia     Hypertension     Hypothyroidism     PVD (peripheral vascular disease) (HCC)     Raynaud disease     SBO (small " bowel obstruction) (HCC)     TIA (transient ischemic attack)     Weakness as late effect of cerebrovascular accident (CVA)     right sided    Wrist fracture     Left   [2]   Past Surgical History:  Procedure Laterality Date    COLONOSCOPY      CT EPIDURAL STEROID INJECTION (GALINA LUMBAR)      HAND SURGERY Right     Mallet finger repair    ORIF WRIST FRACTURE Left 1/4/2024    Procedure: Open reduction internal fixation of distal ulna;  Surgeon: Jim Max;  Location: [facility];  Service: Orthopedics    TONSILLECTOMY      TUBAL LIGATION     [3]   Family History  Problem Relation Name Age of Onset    Other Mother          encephalitis    Other Father          trauma   [4]   Allergies  Allergen Reactions    Penicillins Rash     Patient reports she is not allergic to penicillin

## 2025-07-25 ENCOUNTER — APPOINTMENT (EMERGENCY)
Dept: CT IMAGING | Facility: HOSPITAL | Age: 75
End: 2025-07-25
Payer: COMMERCIAL

## 2025-07-25 ENCOUNTER — HOSPITAL ENCOUNTER (EMERGENCY)
Facility: HOSPITAL | Age: 75
Discharge: HOME | End: 2025-07-26
Attending: STUDENT IN AN ORGANIZED HEALTH CARE EDUCATION/TRAINING PROGRAM | Admitting: STUDENT IN AN ORGANIZED HEALTH CARE EDUCATION/TRAINING PROGRAM
Payer: COMMERCIAL

## 2025-07-25 VITALS
HEART RATE: 78 BPM | WEIGHT: 124 LBS | OXYGEN SATURATION: 97 % | SYSTOLIC BLOOD PRESSURE: 164 MMHG | DIASTOLIC BLOOD PRESSURE: 87 MMHG | RESPIRATION RATE: 16 BRPM | TEMPERATURE: 98.6 F | BODY MASS INDEX: 22.68 KG/M2

## 2025-07-25 DIAGNOSIS — R11.0 NAUSEA: ICD-10-CM

## 2025-07-25 DIAGNOSIS — R10.30 LOWER ABDOMINAL PAIN: Primary | ICD-10-CM

## 2025-07-25 DIAGNOSIS — R42 INTERMITTENT LIGHTHEADEDNESS: ICD-10-CM

## 2025-07-25 LAB
ALBUMIN SERPL BCG-MCNC: 5.2 G/DL (ref 3.5–5)
ALP SERPL-CCNC: 49 U/L (ref 34–104)
ALT SERPL W P-5'-P-CCNC: 19 U/L (ref 7–52)
ANION GAP SERPL CALCULATED.3IONS-SCNC: 11 MMOL/L (ref 4–13)
AST SERPL W P-5'-P-CCNC: 35 U/L (ref 13–39)
BASOPHILS # BLD AUTO: 0.06 THOUSANDS/ÂΜL (ref 0–0.1)
BASOPHILS NFR BLD AUTO: 1 % (ref 0–1)
BILIRUB DIRECT SERPL-MCNC: 0.11 MG/DL (ref 0–0.2)
BILIRUB SERPL-MCNC: 0.66 MG/DL (ref 0.2–1)
BILIRUB UR QL STRIP: NEGATIVE
BUN SERPL-MCNC: 14 MG/DL (ref 5–25)
CALCIUM SERPL-MCNC: 9.7 MG/DL (ref 8.4–10.2)
CHLORIDE SERPL-SCNC: 107 MMOL/L (ref 96–108)
CLARITY UR: CLEAR
CO2 SERPL-SCNC: 19 MMOL/L (ref 21–32)
COLOR UR: NORMAL
CREAT SERPL-MCNC: 0.87 MG/DL (ref 0.6–1.3)
EOSINOPHIL # BLD AUTO: 0.32 THOUSAND/ÂΜL (ref 0–0.61)
EOSINOPHIL NFR BLD AUTO: 4 % (ref 0–6)
ERYTHROCYTE [DISTWIDTH] IN BLOOD BY AUTOMATED COUNT: 12.7 % (ref 11.6–15.1)
GFR SERPL CREATININE-BSD FRML MDRD: 64 ML/MIN/1.73SQ M
GLUCOSE SERPL-MCNC: 88 MG/DL (ref 65–140)
GLUCOSE UR STRIP-MCNC: NEGATIVE MG/DL
HCT VFR BLD AUTO: 43.5 % (ref 34.8–46.1)
HGB BLD-MCNC: 13.9 G/DL (ref 11.5–15.4)
HGB UR QL STRIP.AUTO: NEGATIVE
IMM GRANULOCYTES # BLD AUTO: 0.02 THOUSAND/UL (ref 0–0.2)
IMM GRANULOCYTES NFR BLD AUTO: 0 % (ref 0–2)
KETONES UR STRIP-MCNC: NEGATIVE MG/DL
LACTATE SERPL-SCNC: 1.1 MMOL/L (ref 0.5–2)
LEUKOCYTE ESTERASE UR QL STRIP: NEGATIVE
LYMPHOCYTES # BLD AUTO: 2.12 THOUSANDS/ÂΜL (ref 0.6–4.47)
LYMPHOCYTES NFR BLD AUTO: 26 % (ref 14–44)
MCH RBC QN AUTO: 31.2 PG (ref 26.8–34.3)
MCHC RBC AUTO-ENTMCNC: 32 G/DL (ref 31.4–37.4)
MCV RBC AUTO: 98 FL (ref 82–98)
MONOCYTES # BLD AUTO: 0.92 THOUSAND/ÂΜL (ref 0.17–1.22)
MONOCYTES NFR BLD AUTO: 11 % (ref 4–12)
NEUTROPHILS # BLD AUTO: 4.85 THOUSANDS/ÂΜL (ref 1.85–7.62)
NEUTS SEG NFR BLD AUTO: 58 % (ref 43–75)
NITRITE UR QL STRIP: NEGATIVE
NRBC BLD AUTO-RTO: 0 /100 WBCS
PH UR STRIP.AUTO: 6.5 [PH]
PLATELET # BLD AUTO: 228 THOUSANDS/UL (ref 149–390)
PMV BLD AUTO: 10.3 FL (ref 8.9–12.7)
POTASSIUM SERPL-SCNC: 5.7 MMOL/L (ref 3.5–5.3)
PROT SERPL-MCNC: 8.2 G/DL (ref 6.4–8.4)
PROT UR STRIP-MCNC: NEGATIVE MG/DL
RBC # BLD AUTO: 4.45 MILLION/UL (ref 3.81–5.12)
SODIUM SERPL-SCNC: 137 MMOL/L (ref 135–147)
SP GR UR STRIP.AUTO: <=1.005 (ref 1–1.03)
UROBILINOGEN UR QL STRIP.AUTO: 0.2 E.U./DL
WBC # BLD AUTO: 8.29 THOUSAND/UL (ref 4.31–10.16)

## 2025-07-25 PROCEDURE — 99284 EMERGENCY DEPT VISIT MOD MDM: CPT

## 2025-07-25 PROCEDURE — 96365 THER/PROPH/DIAG IV INF INIT: CPT

## 2025-07-25 PROCEDURE — 96366 THER/PROPH/DIAG IV INF ADDON: CPT

## 2025-07-25 PROCEDURE — 99284 EMERGENCY DEPT VISIT MOD MDM: CPT | Performed by: STUDENT IN AN ORGANIZED HEALTH CARE EDUCATION/TRAINING PROGRAM

## 2025-07-25 PROCEDURE — 80076 HEPATIC FUNCTION PANEL: CPT | Performed by: STUDENT IN AN ORGANIZED HEALTH CARE EDUCATION/TRAINING PROGRAM

## 2025-07-25 PROCEDURE — 81003 URINALYSIS AUTO W/O SCOPE: CPT | Performed by: STUDENT IN AN ORGANIZED HEALTH CARE EDUCATION/TRAINING PROGRAM

## 2025-07-25 PROCEDURE — 83605 ASSAY OF LACTIC ACID: CPT | Performed by: STUDENT IN AN ORGANIZED HEALTH CARE EDUCATION/TRAINING PROGRAM

## 2025-07-25 PROCEDURE — 74177 CT ABD & PELVIS W/CONTRAST: CPT

## 2025-07-25 PROCEDURE — 85025 COMPLETE CBC W/AUTO DIFF WBC: CPT | Performed by: STUDENT IN AN ORGANIZED HEALTH CARE EDUCATION/TRAINING PROGRAM

## 2025-07-25 PROCEDURE — 36415 COLL VENOUS BLD VENIPUNCTURE: CPT | Performed by: STUDENT IN AN ORGANIZED HEALTH CARE EDUCATION/TRAINING PROGRAM

## 2025-07-25 PROCEDURE — 96375 TX/PRO/DX INJ NEW DRUG ADDON: CPT

## 2025-07-25 PROCEDURE — 80048 BASIC METABOLIC PNL TOTAL CA: CPT | Performed by: STUDENT IN AN ORGANIZED HEALTH CARE EDUCATION/TRAINING PROGRAM

## 2025-07-25 RX ORDER — ONDANSETRON 2 MG/ML
4 INJECTION INTRAMUSCULAR; INTRAVENOUS ONCE
Status: COMPLETED | OUTPATIENT
Start: 2025-07-25 | End: 2025-07-25

## 2025-07-25 RX ORDER — SODIUM CHLORIDE, SODIUM GLUCONATE, SODIUM ACETATE, POTASSIUM CHLORIDE, MAGNESIUM CHLORIDE, SODIUM PHOSPHATE, DIBASIC, AND POTASSIUM PHOSPHATE .53; .5; .37; .037; .03; .012; .00082 G/100ML; G/100ML; G/100ML; G/100ML; G/100ML; G/100ML; G/100ML
1000 INJECTION, SOLUTION INTRAVENOUS ONCE
Status: COMPLETED | OUTPATIENT
Start: 2025-07-25 | End: 2025-07-25

## 2025-07-25 RX ORDER — KETOROLAC TROMETHAMINE 30 MG/ML
15 INJECTION, SOLUTION INTRAMUSCULAR; INTRAVENOUS ONCE
Status: COMPLETED | OUTPATIENT
Start: 2025-07-25 | End: 2025-07-25

## 2025-07-25 RX ADMIN — KETOROLAC TROMETHAMINE 15 MG: 30 INJECTION, SOLUTION INTRAMUSCULAR; INTRAVENOUS at 21:48

## 2025-07-25 RX ADMIN — SODIUM CHLORIDE, SODIUM GLUCONATE, SODIUM ACETATE, POTASSIUM CHLORIDE, MAGNESIUM CHLORIDE, SODIUM PHOSPHATE, DIBASIC, AND POTASSIUM PHOSPHATE 1000 ML: .53; .5; .37; .037; .03; .012; .00082 INJECTION, SOLUTION INTRAVENOUS at 21:48

## 2025-07-25 RX ADMIN — IOHEXOL 75 ML: 350 INJECTION, SOLUTION INTRAVENOUS at 22:44

## 2025-07-25 RX ADMIN — ONDANSETRON 4 MG: 2 INJECTION INTRAMUSCULAR; INTRAVENOUS at 21:46

## 2025-07-26 PROCEDURE — 96374 THER/PROPH/DIAG INJ IV PUSH: CPT

## 2025-07-26 RX ORDER — NAPROXEN 500 MG/1
500 TABLET ORAL 2 TIMES DAILY WITH MEALS
Qty: 30 TABLET | Refills: 0 | Status: SHIPPED | OUTPATIENT
Start: 2025-07-26

## 2025-07-26 RX ORDER — KETOROLAC TROMETHAMINE 30 MG/ML
15 INJECTION, SOLUTION INTRAMUSCULAR; INTRAVENOUS ONCE
Status: COMPLETED | OUTPATIENT
Start: 2025-07-26 | End: 2025-07-26

## 2025-07-26 RX ORDER — ONDANSETRON 4 MG/1
4 TABLET, ORALLY DISINTEGRATING ORAL EVERY 6 HOURS PRN
Qty: 20 TABLET | Refills: 0 | Status: SHIPPED | OUTPATIENT
Start: 2025-07-26

## 2025-07-26 RX ADMIN — KETOROLAC TROMETHAMINE 15 MG: 30 INJECTION, SOLUTION INTRAMUSCULAR; INTRAVENOUS at 00:24

## 2025-07-26 NOTE — ED PROVIDER NOTES
Time reflects when diagnosis was documented in both MDM as applicable and the Disposition within this note       Time User Action Codes Description Comment    7/26/2025 12:25 AM Matt Greene [R10.30] Lower abdominal pain     7/26/2025 12:25 AM Matt Greene [R42] Intermittent lightheadedness     7/26/2025 12:25 AM Matt Greene [R11.0] Nausea           ED Disposition       ED Disposition   Discharge    Condition   Stable    Date/Time   Sat Jul 26, 2025 12:25 AM    Comment   Italiamarina Conway discharge to home/self care.                   Assessment & Plan       Medical Decision Making  - Vital signs reviewed.  Patient has tenderness to palpation along the lower abdomen.  The abdomen is soft.  No rebound/guarding.  No signs of surgical abdomen at this time.  Per patient, she has a history of diverticulitis/small bowel obstruction.  Patient also expresses urinary frequency/urgency.  No CVA tenderness.  Will obtain labs, urinalysis.  Will require CT imaging.  Will hydrate with IV fluid bolus, treat symptoms with IV Zofran/Toradol.    - Laboratory workup is reassuring--signs of mild dehydration.  Lightheadedness may be related to volume status. No focal deficits. Low suspicion for CVA/TIA IVF administered. CT imaging without acute findings.  Repeat abdominal exam is benign.  Pain greatly improved with IV Toradol. PRN Naproxen/Zofran ODT prescribed. PCP follow up, other recommendations/return precautions discussed. Stable for discharge.     Problems Addressed:  Intermittent lightheadedness: acute illness or injury  Lower abdominal pain: acute illness or injury  Nausea: acute illness or injury    Amount and/or Complexity of Data Reviewed  Labs: ordered.  Radiology: ordered.    Risk  Prescription drug management.             Medications   multi-electrolyte (Plasmalyte-A/Isolyte-S PH 7.4/Normosol-R) IV bolus 1,000 mL (0 mL Intravenous Stopped 7/25/25 0808)   ketorolac (TORADOL) injection 15 mg (15 mg  Intravenous Given 7/25/25 2148)   ondansetron (ZOFRAN) injection 4 mg (4 mg Intravenous Given 7/25/25 2146)   iohexol (OMNIPAQUE) 350 MG/ML injection (MULTI-DOSE) 75 mL (75 mL Intravenous Given 7/25/25 2244)   ketorolac (TORADOL) injection 15 mg (15 mg Intravenous Given 7/26/25 0024)       ED Risk Strat Scores                    No data recorded        SBIRT 22yo+      Flowsheet Row Most Recent Value   Initial Alcohol Screen: US AUDIT-C     1. How often do you have a drink containing alcohol? 0 Filed at: 07/25/2025 1946   2. How many drinks containing alcohol do you have on a typical day you are drinking?  0 Filed at: 07/25/2025 1946   3a. Male UNDER 65: How often do you have five or more drinks on one occasion? 0 Filed at: 07/25/2025 1946   3b. FEMALE Any Age, or MALE 65+: How often do you have 4 or more drinks on one occassion? 0 Filed at: 07/25/2025 1946   Audit-C Score 0 Filed at: 07/25/2025 1946   HILDA: How many times in the past year have you...    Used an illegal drug or used a prescription medication for non-medical reasons? Never Filed at: 07/25/2025 1946          History of Present Illness       Chief Complaint   Patient presents with    Abdominal Pain     Bilateral lower abdominal pain +nausea      Past Medical History[1]   Past Surgical History[2]   Family History[3]   Social History[4]   E-Cigarette/Vaping    E-Cigarette Use Never User       E-Cigarette/Vaping Substances      I have reviewed and agree with the history as documented.     75 M. Hx of SBO, diverticulitis. Presents with lightheadedness, lower abdominal pain, nausea/loose stools. Symptoms worsening x 1 day.  Patient denies fever/chills.  Has been passing flatus.  Denies vomiting.  In addition, patient expresses urinary frequency/urgency.  Taking Tylenol/meclizine without improvement.  Patient denies vertiginous dizziness.      History provided by:  Patient  Abdominal Pain  Associated symptoms: anorexia, chills, diarrhea, flatus and nausea     Associated symptoms: no chest pain, no constipation, no cough, no dysuria, no fever, no hematuria, no shortness of breath and no vomiting      Review of Systems   Constitutional:  Positive for activity change, appetite change and chills. Negative for fever.   Respiratory:  Negative for cough, chest tightness and shortness of breath.    Cardiovascular:  Negative for chest pain.   Gastrointestinal:  Positive for abdominal pain, anorexia, diarrhea, flatus and nausea. Negative for constipation and vomiting.   Genitourinary:  Positive for frequency, pelvic pain and urgency. Negative for decreased urine volume, dysuria, flank pain and hematuria.   Musculoskeletal:  Negative for arthralgias, back pain and myalgias.     Objective       ED Triage Vitals [07/25/25 1946]   Temperature Pulse Blood Pressure Respirations SpO2 Patient Position - Orthostatic VS   98.6 °F (37 °C) 78 164/87 16 97 % Sitting      Temp Source Heart Rate Source BP Location FiO2 (%) Pain Score    Temporal Monitor Left arm -- 5      Vitals      Date and Time Temp Pulse SpO2 Resp BP Pain Score FACES Pain Rating User   07/26/25 0024 -- -- -- -- -- 4 --    07/25/25 2226 -- -- -- -- -- 4 --    07/25/25 2148 -- -- -- -- -- 7 --    07/25/25 1946 98.6 °F (37 °C) 78 97 % 16 164/87 5 -- AD            Physical Exam  Vitals and nursing note reviewed.   Constitutional:       General: She is not in acute distress.     Appearance: She is ill-appearing. She is not toxic-appearing.   HENT:      Head: Normocephalic and atraumatic.     Cardiovascular:      Rate and Rhythm: Normal rate and regular rhythm.      Heart sounds: Normal heart sounds. No murmur heard.  Pulmonary:      Effort: Pulmonary effort is normal. No respiratory distress.      Breath sounds: Normal breath sounds. No wheezing or rhonchi.   Chest:      Chest wall: No tenderness.   Abdominal:      General: Bowel sounds are normal. There is no distension.      Palpations: Abdomen is soft.      Tenderness:  There is abdominal tenderness in the right lower quadrant, suprapubic area and left lower quadrant. There is no right CVA tenderness, left CVA tenderness, guarding or rebound.      Hernia: No hernia is present.     Skin:     General: Skin is warm and dry.      Coloration: Skin is not cyanotic, mottled or pale.     Neurological:      Mental Status: She is alert and oriented to person, place, and time.     Psychiatric:         Mood and Affect: Mood normal.         Behavior: Behavior normal.         Results Reviewed       Procedure Component Value Units Date/Time    Lactic acid, plasma (w/reflex if result > 2.0) [[id number]]  (Normal) Collected: 07/25/25 2242    Lab Status: Final result Specimen: Blood from Arm, Right Updated: 07/25/25 2309     LACTIC ACID 1.1 mmol/L     Narrative:      Result may be elevated if tourniquet was used during collection.    Basic metabolic panel [[id number]]  (Abnormal) Collected: 07/25/25 2131    Lab Status: Final result Specimen: Blood from Arm, Right Updated: 07/25/25 2216     Sodium 137 mmol/L      Potassium 5.7 mmol/L      Chloride 107 mmol/L      CO2 19 mmol/L      ANION GAP 11 mmol/L      BUN 14 mg/dL      Creatinine 0.87 mg/dL      Glucose 88 mg/dL      Calcium 9.7 mg/dL      eGFR 64 ml/min/1.73sq m     Narrative:      [employer] guidelines for Chronic Kidney Disease (CKD):     Stage 1 with normal or high GFR (GFR > 90 mL/min/1.73 square meters)    Stage 2 Mild CKD (GFR = 60-89 mL/min/1.73 square meters)    Stage 3A Moderate CKD (GFR = 45-59 mL/min/1.73 square meters)    Stage 3B Moderate CKD (GFR = 30-44 mL/min/1.73 square meters)    Stage 4 Severe CKD (GFR = 15-29 mL/min/1.73 square meters)    Stage 5 End Stage CKD (GFR <15 mL/min/1.73 square meters)  Note: GFR calculation is accurate only with a steady state creatinine    Hepatic function panel [[id number]]  (Abnormal) Collected: 07/25/25 2131    Lab Status: Final result Specimen: Blood from Arm, Right  Updated: 07/25/25 2216     Total Bilirubin 0.66 mg/dL      Bilirubin, Direct 0.11 mg/dL      Alkaline Phosphatase 49 U/L      AST 35 U/L      ALT 19 U/L      Total Protein 8.2 g/dL      Albumin 5.2 g/dL     UA w Reflex to Microscopic w Reflex to Culture [[id number]] Collected: 07/25/25 2130    Lab Status: Final result Specimen: Urine, Clean Catch Updated: 07/25/25 2209     Color, UA Straw     Clarity, UA Clear     Specific Gravity, UA <=1.005     pH, UA 6.5     Leukocytes, UA Negative     Nitrite, UA Negative     Protein, UA Negative mg/dl      Glucose, UA Negative mg/dl      Ketones, UA Negative mg/dl      Urobilinogen, UA 0.2 E.U./dl      Bilirubin, UA Negative     Occult Blood, UA Negative    CBC and differential [[id number]] Collected: 07/25/25 2131    Lab Status: Final result Specimen: Blood from Arm, Right Updated: 07/25/25 2201     WBC 8.29 Thousand/uL      RBC 4.45 Million/uL      Hemoglobin 13.9 g/dL      Hematocrit 43.5 %      MCV 98 fL      MCH 31.2 pg      MCHC 32.0 g/dL      RDW 12.7 %      MPV 10.3 fL      Platelets 228 Thousands/uL      nRBC 0 /100 WBCs      Segmented % 58 %      Immature Grans % 0 %      Lymphocytes % 26 %      Monocytes % 11 %      Eosinophils Relative 4 %      Basophils Relative 1 %      Absolute Neutrophils 4.85 Thousands/µL      Absolute Immature Grans 0.02 Thousand/uL      Absolute Lymphocytes 2.12 Thousands/µL      Absolute Monocytes 0.92 Thousand/µL      Eosinophils Absolute 0.32 Thousand/µL      Basophils Absolute 0.06 Thousands/µL             CT abdomen pelvis with contrast   Final Interpretation by Roberto Mcdaniel MD (07/26 0010)      No bowel obstruction or acute inflammatory findings identified in the abdomen or pelvis.   No free air or free fluid.      Diverticulosis coli.         Workstation performed: [id number]             Procedures    ED Medication and Procedure Management   Prior to Admission Medications   Prescriptions Last Dose Informant Patient Reported? Taking?  "  CANDESARTAN CILEXETIL PO  Self Yes No   Sig: Take by mouth daily at bedtime   Calcium 200 MG TABS   Yes No   Si mg   Diclofenac Sodium (VOLTAREN) 1 %   No No   Sig: APPLY 2 GRAMS TO AFFECTED AREA 4 TIMES A DAY   Nitro-Bid 2 % ointment   Yes No   Si.5 inches every 8 (eight) hours remove at bedtime to affected fingers   VanishPoint Safety Syringe 25G X \" 3 ML MISC   Yes No   acetaminophen (TYLENOL) 325 mg tablet   No No   Sig: Take 2 tablets (650 mg total) by mouth every 6 (six) hours as needed for mild pain, headaches or fever   amLODIPine (NORVASC) 10 mg tablet   Yes No   Sig: Take 10 mg by mouth in the morning.   aspirin-dipyridamole (AGGRENOX)  mg per 12 hr capsule   Yes No   Sig: Take by mouth   atorvastatin (LIPITOR) 80 mg tablet   No No   Sig: Take 1 tablet (80 mg total) by mouth every evening   clobetasol (TEMOVATE) 0.05 % ointment   Yes No   Sig: APPLY TWICE A DAY X 2 WEEKS THEN ONCE A DAY X 1 WEEK THEN AS NEEDED .   cyanocobalamin 1,000 mcg/mL   Yes No   Sig: Inject 1 mL into a muscle every 30 (thirty) days   fluticasone (FLONASE) 50 mcg/act nasal spray   Yes No   Si spray into each nostril daily as needed for rhinitis   folic acid (FOLVITE) 1 mg tablet   Yes No   Sig: Take by mouth   hydroxychloroquine (PLAQUENIL) 200 mg tablet   Yes No   Sig: Take 200 mg by mouth in the morning.   latanoprost (XALATAN) 0.005 % ophthalmic solution   Yes No   Sig: Administer 1 drop to both eyes daily at bedtime   levothyroxine 75 mcg tablet   Yes No   Sig: Take 75 mcg by mouth in the morning.   meclizine (ANTIVERT) 25 mg tablet   No No   Sig: Take 1 tablet (25 mg total) by mouth every 8 (eight) hours as needed for dizziness   mupirocin (BACTROBAN) 2 % ointment   No No   Sig: Apply topically in the morning for 14 days Apply to open wound daily and as needed.   omeprazole (PriLOSEC) 20 mg delayed release capsule   Yes No   Sig: daily 30 minutes to 1 hour before a meal   Patient taking differently: 20 " mg daily 30 minutes to 1 hour before a meal   ondansetron (ZOFRAN) 4 mg tablet   No No   Sig: Take 1 tablet (4 mg total) by mouth every 6 (six) hours   Patient taking differently: Take 4 mg by mouth every 8 (eight) hours as needed   senna-docusate sodium (SENOKOT-S) 8.6-50 mg per tablet   No No   Sig: Take 1 tablet by mouth daily   tadalafil (CIALIS) 5 MG tablet   Yes No   Sig: Take 1 tablet by mouth in the morning   traMADol (ULTRAM) 50 mg tablet   Yes No   Sig: Take 50 mg by mouth every 6 (six) hours as needed   zolpidem (AMBIEN) 5 mg tablet   Yes No   Sig: Take 5 mg by mouth   Patient taking differently: Take 5 mg by mouth daily at bedtime as needed      Facility-Administered Medications: None     Discharge Medication List as of 7/26/2025 12:29 AM        START taking these medications    Details   naproxen (Naprosyn) 500 mg tablet Take 1 tablet (500 mg total) by mouth 2 (two) times a day with meals, Starting Sat 7/26/2025, Normal      ondansetron (ZOFRAN-ODT) 4 mg disintegrating tablet Take 1 tablet (4 mg total) by mouth every 6 (six) hours as needed for nausea or vomiting, Starting Sat 7/26/2025, Normal           CONTINUE these medications which have NOT CHANGED    Details   acetaminophen (TYLENOL) 325 mg tablet Take 2 tablets (650 mg total) by mouth every 6 (six) hours as needed for mild pain, headaches or fever, Starting Thu 12/17/2020, No Print      amLODIPine (NORVASC) 10 mg tablet Take 10 mg by mouth in the morning., Starting Fri 3/15/2024, Historical Med      aspirin-dipyridamole (AGGRENOX)  mg per 12 hr capsule Take by mouth, Historical Med      atorvastatin (LIPITOR) 80 mg tablet Take 1 tablet (80 mg total) by mouth every evening, Starting Thu 12/17/2020, No Print      Calcium 200 MG TABS 600 mg, Starting Thu 10/12/2023, Historical Med      CANDESARTAN CILEXETIL PO Take by mouth daily at bedtime, Historical Med      clobetasol (TEMOVATE) 0.05 % ointment APPLY TWICE A DAY X 2 WEEKS THEN ONCE A DAY X  "1 WEEK THEN AS NEEDED ., Historical Med      cyanocobalamin 1,000 mcg/mL Inject 1 mL into a muscle every 30 (thirty) days, Starting Fri 9/2/2022, Historical Med      Diclofenac Sodium (VOLTAREN) 1 % APPLY 2 GRAMS TO AFFECTED AREA 4 TIMES A DAY, Normal      fluticasone (FLONASE) 50 mcg/act nasal spray 1 spray into each nostril daily as needed for rhinitis, Historical Med      folic acid (FOLVITE) 1 mg tablet Take by mouth, Historical Med      hydroxychloroquine (PLAQUENIL) 200 mg tablet Take 200 mg by mouth in the morning., Starting Tue 4/16/2024, Historical Med      latanoprost (XALATAN) 0.005 % ophthalmic solution Administer 1 drop to both eyes daily at bedtime, Starting Sat 6/7/2025, Historical Med      levothyroxine 75 mcg tablet Take 75 mcg by mouth in the morning., Starting Wed 3/13/2024, Historical Med      meclizine (ANTIVERT) 25 mg tablet Take 1 tablet (25 mg total) by mouth every 8 (eight) hours as needed for dizziness, Starting Wed 7/27/2022, Normal      mupirocin (BACTROBAN) 2 % ointment Apply topically in the morning for 14 days Apply to open wound daily and as needed., Starting Fri 5/23/2025, Until Mon 7/21/2025, Normal      Nitro-Bid 2 % ointment 0.5 inches every 8 (eight) hours remove at bedtime to affected fingers, Historical Med      omeprazole (PriLOSEC) 20 mg delayed release capsule daily 30 minutes to 1 hour before a meal, Starting Fri 5/30/2025, Historical Med      ondansetron (ZOFRAN) 4 mg tablet Take 1 tablet (4 mg total) by mouth every 6 (six) hours, Starting Sat 3/1/2025, Normal      senna-docusate sodium (SENOKOT-S) 8.6-50 mg per tablet Take 1 tablet by mouth daily, Starting Sat 3/1/2025, Normal      tadalafil (CIALIS) 5 MG tablet Take 1 tablet by mouth in the morning, Starting Tue 5/20/2025, Historical Med      traMADol (ULTRAM) 50 mg tablet Take 50 mg by mouth every 6 (six) hours as needed, Starting Wed 3/6/2024, Historical Med      VanishPoint Safety Syringe 25G X 5/8\" 3 ML MISC " Historical Med      zolpidem (AMBIEN) 5 mg tablet Take 5 mg by mouth, Starting Wed 6/4/2025, Historical Med           No discharge procedures on file.  ED SEPSIS DOCUMENTATION   Time reflects when diagnosis was documented in both MDM as applicable and the Disposition within this note       Time User Action Codes Description Comment    7/26/2025 12:25 AM Matt Greene [R10.30] Lower abdominal pain     7/26/2025 12:25 AM Matt Greene [R42] Intermittent lightheadedness     7/26/2025 12:25 AM Matt Greene [R11.0] Nausea                      [1]   Past Medical History:  Diagnosis Date    Anemia     Aphasia as late effect of cerebrovascular accident (CVA)     expressive    CVA (cerebral vascular accident) ([facility])     Hyperlipidemia     Hypertension     Hypothyroidism     PVD (peripheral vascular disease) ([facility])     Raynaud disease     SBO (small bowel obstruction) ([facility])     TIA (transient ischemic attack)     Weakness as late effect of cerebrovascular accident (CVA)     right sided    Wrist fracture     Left   [2]   Past Surgical History:  Procedure Laterality Date    COLONOSCOPY      CT EPIDURAL STEROID INJECTION (GALINA LUMBAR)      HAND SURGERY Right     Mallet finger repair    ORIF WRIST FRACTURE Left 1/4/2024    Procedure: Open reduction internal fixation of distal ulna;  Surgeon: Jim Max;  Location:  MAIN OR;  Service: Orthopedics    TONSILLECTOMY      TUBAL LIGATION     [3]   Family History  Problem Relation Name Age of Onset    Other Mother          encephalitis    Other Father          trauma   [4]   Social History  Tobacco Use    Smoking status: Never    Smokeless tobacco: Never   Vaping Use    Vaping status: Never Used   Substance Use Topics    Alcohol use: Not Currently    Drug use: Not Currently        Mtat Greene DO  07/26/25 0204

## 2025-07-26 NOTE — DISCHARGE INSTRUCTIONS
The laboratory/imaging studies that were obtained did not show any significant abnormalities.    Drink plenty of clear/hydrating/electrolyte rich fluids over the next few days.  Eat a bland/brat (bananas/rice/applesauce/toast) diet.    You are being prescribed a course of Zofran as needed for nausea.  Take as directed.  In addition, you are being prescribed a course of naproxen (anti-inflammatory).  Take as directed.    Do not take naproxen with other anti-inflammatory medication such as Advil/ibuprofen/Motrin/Aleve as they are in the same medication class.    You can take Tylenol 1000 mg every 6 hours while taking the naproxen every 12 hours.     Follow up with your PCP and GI doctor.  Return to the emergency department for any concerning signs or symptoms.

## 2025-07-29 ENCOUNTER — TELEPHONE (OUTPATIENT)
Age: 75
End: 2025-07-29

## 2025-08-11 ENCOUNTER — TELEPHONE (OUTPATIENT)
Age: 75
End: 2025-08-11

## 2025-08-15 ENCOUNTER — DOCTOR'S OFFICE (OUTPATIENT)
Dept: URBAN - NONMETROPOLITAN AREA CLINIC 1 | Facility: CLINIC | Age: 75
Setting detail: OPHTHALMOLOGY
End: 2025-08-15
Payer: MEDICARE

## 2025-08-15 DIAGNOSIS — H57.12: ICD-10-CM

## 2025-08-15 DIAGNOSIS — H02.014: ICD-10-CM

## 2025-08-15 PROCEDURE — 67820 REVISE EYELASHES: CPT | Mod: LT | Performed by: OPTOMETRIST

## 2025-08-15 PROCEDURE — 92012 INTRM OPH EXAM EST PATIENT: CPT | Mod: 25 | Performed by: OPTOMETRIST

## 2025-08-15 ASSESSMENT — LID POSITION - COMMENTS: OS_COMMENTS: +VE EVERTED PUNCTUM LUL

## 2025-08-15 ASSESSMENT — LID EXAM ASSESSMENTS: OS_TRICHIASIS: LUL 1+ 2+

## 2025-08-15 ASSESSMENT — CONFRONTATIONAL VISUAL FIELD TEST (CVF)
OS_FINDINGS: FULL
OD_FINDINGS: FULL

## 2025-08-15 ASSESSMENT — LID POSITION - DERMATOCHALASIS
OS_DERMATOCHALASIS: LUL 1+
OD_DERMATOCHALASIS: RUL 1+

## 2025-08-18 PROBLEM — H57.12 OCULAR PAIN; LEFT EYE: Status: ACTIVE | Noted: 2025-08-15

## 2025-08-18 ASSESSMENT — REFRACTION_CURRENTRX
OS_CYLINDER: -1.00
OS_VPRISM_DIRECTION: PROGS
OD_CYLINDER: -1.75
OD_CYLINDER: -0.75
OD_AXIS: 096
OD_SPHERE: -0.50
OD_ADD: +2.50
OD_VPRISM_DIRECTION: PROGS
OD_OVR_VA: 20/
OS_SPHERE: -2.50
OS_OVR_VA: 20/
OD_SPHERE: -2.75
OS_AXIS: 066
OS_CYLINDER: SPH
OD_AXIS: 090
OS_ADD: +2.50
OD_OVR_VA: 20/
OS_OVR_VA: 20/
OS_SPHERE: -0.75

## 2025-08-18 ASSESSMENT — REFRACTION_AUTOREFRACTION
OD_CYLINDER: -2.00
OD_AXIS: 88
OD_SPHERE: +0.25
OS_SPHERE: +0.75
OS_AXIS: 87
OS_CYLINDER: -2.25

## 2025-08-18 ASSESSMENT — REFRACTION_MANIFEST
OD_ADD: +2.50
OD_SPHERE: -0.75
OD_CYLINDER: -0.75
OS_AXIS: 070
OU_VA: 20/25
OS_VA2: 20/25-2
OD_VA2: 20/30+2
OD_VA1: 20/30+2
OS_ADD: +2.50
OS_CYLINDER: -1.25
OD_AXIS: 090
OS_VA1: 20/25-2
OS_SPHERE: -0.75

## 2025-08-18 ASSESSMENT — VISUAL ACUITY
OD_BCVA: 20/60+2
OS_BCVA: 20/60

## (undated) DEVICE — SUT ETHILON 3-0 PS-1 18 IN 1663G

## (undated) DEVICE — CAST PADDING 4 IN SYNTHETIC STRL

## (undated) DEVICE — GLOVE INDICATOR UNDERGLOVE SZ 7.5 GREEN

## (undated) DEVICE — SYRINGE 10ML LL

## (undated) DEVICE — 3M™ STERI-DRAPE™ U-DRAPE 1015: Brand: STERI-DRAPE™

## (undated) DEVICE — CAST PADDING 4 IN UNSTERILE

## (undated) DEVICE — CURITY NON-ADHERENT STRIPS: Brand: CURITY

## (undated) DEVICE — ARM SLING: Brand: DEROYAL

## (undated) DEVICE — COBAN 4 IN STERILE

## (undated) DEVICE — SPLINT 3 X 12 IN PRECUT SYNTHETIC

## (undated) DEVICE — ACE WRAP 3 IN STERILE

## (undated) DEVICE — PAD CAST 4 IN COTTON NON STERILE

## (undated) DEVICE — STOCKINETTE,IMPERVIOUS,12X48,STERILE: Brand: MEDLINE

## (undated) DEVICE — SPONGE LAP 18 X 18 IN STRL RFD

## (undated) DEVICE — GAUZE SPONGES,16 PLY: Brand: CURITY

## (undated) DEVICE — 1.0MM KIRSCHNER WIRE W/TROCAR POINT 150MM
Type: IMPLANTABLE DEVICE | Site: WRIST | Status: NON-FUNCTIONAL
Removed: 2024-01-04

## (undated) DEVICE — 1.5MM DRILL BIT/MQC FOR THREADED HOLE/74MM

## (undated) DEVICE — CHLORAPREP HI-LITE 26ML ORANGE

## (undated) DEVICE — SPLINT COMFORT 3 X 12

## (undated) DEVICE — GLOVE SRG BIOGEL 7.5

## (undated) DEVICE — ZIMMER® STERILE DISPOSABLE TOURNIQUET CUFF, DUAL PORT, SINGLE BLADDER, 18 IN. (46 CM)

## (undated) DEVICE — PADDING CAST 4 IN  COTTON STRL

## (undated) DEVICE — ALL PURPOSE SPONGES,NON-WOVEN, 4 PLY: Brand: CURITY

## (undated) DEVICE — STERILE BETHLEHEM PLASTIC HAND: Brand: CARDINAL HEALTH

## (undated) DEVICE — OCCLUSIVE GAUZE STRIP,3% BISMUTH TRIBROMOPHENATE IN PETROLATUM BLEND: Brand: XEROFORM

## (undated) DEVICE — TUBING SUCTION 5MM X 12 FT

## (undated) DEVICE — DRAPE C-ARM X-RAY

## (undated) DEVICE — DRAPE STERI 1010 18IN X 12IN

## (undated) DEVICE — ACE WRAP 4 IN STERILE

## (undated) DEVICE — DRESSING XEROFORM 5 X 9

## (undated) DEVICE — 10FR FRAZIER SUCTION HANDLE: Brand: CARDINAL HEALTH

## (undated) DEVICE — DRAPE SHEET THREE QUARTER